# Patient Record
Sex: MALE | Race: WHITE
[De-identification: names, ages, dates, MRNs, and addresses within clinical notes are randomized per-mention and may not be internally consistent; named-entity substitution may affect disease eponyms.]

---

## 2020-02-27 LAB
BASOPHILS # BLD AUTO: 0 10^3/UL (ref 0–0.1)
BASOPHILS NFR BLD AUTO: 0 % (ref 0–10)
BASOPHILS NFR BLD MANUAL: 0 %
EOSINOPHIL # BLD AUTO: 0 10^3/UL (ref 0–0.3)
EOSINOPHIL NFR BLD AUTO: 1 % (ref 0–10)
EOSINOPHIL NFR BLD MANUAL: 1 %
ERYTHROCYTE [DISTWIDTH] IN BLOOD BY AUTOMATED COUNT: 14.3 % (ref 10–14.5)
HCT VFR BLD CALC: 44 % (ref 40–54)
HGB BLD-MCNC: 14.2 G/DL (ref 13.3–17.7)
LYMPHOCYTES # BLD AUTO: 1.6 X 10^3 (ref 1–4)
LYMPHOCYTES NFR BLD AUTO: 28 % (ref 12–44)
MCH RBC QN AUTO: 28 PG (ref 25–34)
MCHC RBC AUTO-ENTMCNC: 32 G/DL (ref 32–36)
MCV RBC AUTO: 88 FL (ref 80–99)
MONOCYTES # BLD AUTO: 0.6 X 10^3 (ref 0–1)
MONOCYTES NFR BLD AUTO: 10 % (ref 0–12)
MONOCYTES NFR BLD: 9 %
NEUTROPHILS # BLD AUTO: 3.7 X 10^3 (ref 1.8–7.8)
NEUTROPHILS NFR BLD AUTO: 62 % (ref 42–75)
NEUTS BAND NFR BLD MANUAL: 58 %
NEUTS BAND NFR BLD: 0 %
PLATELET # BLD: 166 10^3/UL (ref 130–400)
PMV BLD AUTO: 10.3 FL (ref 7.4–10.4)
RBC MORPH BLD: NORMAL
RETICS #: 69 10E9/L (ref 24–90)
RETICS/RBC NFR: 1.38 % (ref 0.5–2.4)
VARIANT LYMPHS NFR BLD MANUAL: 1 %
VARIANT LYMPHS NFR BLD MANUAL: 31 %
WBC # BLD AUTO: 5.9 10^3/UL (ref 4.3–11)

## 2020-03-12 LAB
BUN/CREAT SERPL: 11
CALCIUM SERPL-MCNC: 8.7 MG/DL (ref 8.5–10.1)
CHLORIDE SERPL-SCNC: 103 MMOL/L (ref 98–107)
CO2 SERPL-SCNC: 25 MMOL/L (ref 21–32)
CREAT SERPL-MCNC: 1.33 MG/DL (ref 0.6–1.3)
GFR SERPLBLD BASED ON 1.73 SQ M-ARVRAT: 53 ML/MIN
GLUCOSE SERPL-MCNC: 88 MG/DL (ref 70–105)
POTASSIUM SERPL-SCNC: 4.3 MMOL/L (ref 3.6–5)
SODIUM SERPL-SCNC: 133 MMOL/L (ref 135–145)
URATE SERPL-MCNC: 5.9 MG/DL (ref 2.6–7.2)

## 2020-03-16 ENCOUNTER — HOSPITAL ENCOUNTER (OUTPATIENT)
Dept: HOSPITAL 75 - RAD | Age: 70
End: 2020-03-16
Attending: INTERNAL MEDICINE
Payer: MEDICARE

## 2020-03-16 DIAGNOSIS — R91.1: ICD-10-CM

## 2020-03-16 DIAGNOSIS — C85.90: Primary | ICD-10-CM

## 2020-03-16 DIAGNOSIS — N28.9: ICD-10-CM

## 2020-03-16 PROCEDURE — 74178 CT ABD&PLV WO CNTR FLWD CNTR: CPT

## 2020-03-16 PROCEDURE — 71260 CT THORAX DX C+: CPT

## 2020-03-16 NOTE — DIAGNOSTIC IMAGING REPORT
PROCEDURE: CT chest with contrast, CT abdomen and pelvis with and

without contrast.



TECHNIQUE: Pre and post intravenous contrast axial imaging of the

abdomen and pelvis and post contrast axial imaging of the chest

were performed. Auto Exposure Controls were utilized during the

CT exam to meet ALARA standards for radiation dose reduction. 



DATE: March 16, 2020.



COMPARISON: None. 



INDICATION: 69-year-old male, history of lymphoma.



FINDINGS: 



There is pleural parenchymal scarring in the right lung apex.

There is mild pleural parenchymal scarring in the left lung apex.

There is a 3 mm left upper lobe pulmonary nodule on axial image

71. There is no additional focal airspace consolidation. There is

no pneumothorax. There is no pleural effusion. The central

airways are patent.



The heart is not enlarged. There is no identified pericardial

effusion. There is no identified central pulmonary embolus.



There is no identified abnormally enlarged mediastinal, hilar, or

axillary lymph node meeting CT size criteria for adenopathy.



There is a small hiatal hernia. There is contrast in the

esophagus which may relate to slow transit of recently swallowed

contrast material and/or gastroesophageal reflux.



The liver is normal in size and contour. There is no identified

liver lesion. The main, right, and left portal veins are patent.

The gallbladder is unremarkable. There is no intrahepatic or

extrahepatic bile duct dilation. The main pancreatic duct is not

abnormally dilated. Unremarkable appearance of the pancreatic

parenchyma. The spleen is normal in size. The adrenal glands are

unremarkable.



There is a left renal mass on axial image 149 measuring 3.5 x 3.3

cm in axial dimension with a precontrast attenuation of 53

Hounsfield units and postcontrast attenuation of 117 Hounsfield

units. This is an enhancing lesion and is highly concerning for

renal malignancy. There is no additional identified concerning

renal mass. The urinary collecting systems are not distended.

There is no identified renal or ureteral stone. There is a

filling defect in the posterior aspect of the urinary bladder

just to the right of midline on delayed image 158. This could

relate to a primary urinary bladder lesion versus asymmetric

prominence and protrusion of the prostate gland. Correlation with

cystoscopy is recommended.



There are sutures at the level of the mid sigmoid colon. There

are procedural-related changes along the anterior abdominal wall.

The intestinal tract is not distended. There is no free

intraperitoneal air. There is no drainable fluid collection.

There is no free pelvic fluid.



There are atherosclerotic calcifications. There is no identified

abnormally enlarged lymph node in the abdomen or pelvis which

meets CT size criteria for adenopathy.



There are degenerative changes of the spine. There is no

identified bone lesion concerning for bone metastasis. 



IMPRESSION: 

CT ABDOMEN AND PELVIS.

1. Enhancing left renal mass concerning for renal malignancy.

2. 3 mm left upper lobe pulmonary nodule.

3. Apparent filling defect in the posterior aspect of the urinary

bladder just to the right of midline. This potentially could

reflect a primary urinary bladder lesion versus eccentric

protrusion of the prostate gland. Correlation with cystoscopy is

recommended.







Dictated by: 



  Dictated on workstation # [id number]

## 2020-05-01 ENCOUNTER — HOSPITAL ENCOUNTER (OUTPATIENT)
Dept: HOSPITAL 75 - ONC | Age: 70
LOS: 5 days | Discharge: HOME | End: 2020-05-06
Attending: INTERNAL MEDICINE
Payer: MEDICARE

## 2020-05-01 DIAGNOSIS — Z80.0: ICD-10-CM

## 2020-05-01 DIAGNOSIS — Z80.41: ICD-10-CM

## 2020-05-01 DIAGNOSIS — I10: ICD-10-CM

## 2020-05-01 DIAGNOSIS — E88.09: Primary | ICD-10-CM

## 2020-05-01 LAB
ALBUMIN SERPL-MCNC: 3.5 GM/DL (ref 3.2–4.5)
ALP SERPL-CCNC: 69 U/L (ref 40–136)
ALT SERPL-CCNC: 20 U/L (ref 0–55)
BASOPHILS # BLD AUTO: 0 10^3/UL (ref 0–0.1)
BASOPHILS NFR BLD AUTO: 0 % (ref 0–10)
BILIRUB SERPL-MCNC: 0.7 MG/DL (ref 0.1–1)
BUN/CREAT SERPL: 11
CALCIUM SERPL-MCNC: 8.6 MG/DL (ref 8.5–10.1)
CHLORIDE SERPL-SCNC: 106 MMOL/L (ref 98–107)
CO2 SERPL-SCNC: 23 MMOL/L (ref 21–32)
CREAT SERPL-MCNC: 1.4 MG/DL (ref 0.6–1.3)
EOSINOPHIL # BLD AUTO: 0 10^3/UL (ref 0–0.3)
EOSINOPHIL NFR BLD AUTO: 1 % (ref 0–10)
ERYTHROCYTE [DISTWIDTH] IN BLOOD BY AUTOMATED COUNT: 14 % (ref 10–14.5)
GFR SERPLBLD BASED ON 1.73 SQ M-ARVRAT: 50 ML/MIN
GLUCOSE SERPL-MCNC: 98 MG/DL (ref 70–105)
HCT VFR BLD CALC: 41 % (ref 40–54)
HGB BLD-MCNC: 14.2 G/DL (ref 13.3–17.7)
LYMPHOCYTES # BLD AUTO: 2.4 X 10^3 (ref 1–4)
LYMPHOCYTES NFR BLD AUTO: 38 % (ref 12–44)
MANUAL DIFFERENTIAL PERFORMED BLD QL: NO
MCH RBC QN AUTO: 30 PG (ref 25–34)
MCHC RBC AUTO-ENTMCNC: 34 G/DL (ref 32–36)
MCV RBC AUTO: 86 FL (ref 80–99)
MONOCYTES # BLD AUTO: 0.5 X 10^3 (ref 0–1)
MONOCYTES NFR BLD AUTO: 9 % (ref 0–12)
NEUTROPHILS # BLD AUTO: 3.3 X 10^3 (ref 1.8–7.8)
NEUTROPHILS NFR BLD AUTO: 53 % (ref 42–75)
PLATELET # BLD: 187 10^3/UL (ref 130–400)
PMV BLD AUTO: 10.2 FL (ref 7.4–10.4)
POTASSIUM SERPL-SCNC: 3.8 MMOL/L (ref 3.6–5)
PROT SERPL-MCNC: 9.3 GM/DL (ref 6.4–8.2)
SODIUM SERPL-SCNC: 137 MMOL/L (ref 135–145)
URATE SERPL-MCNC: 6.4 MG/DL (ref 2.6–7.2)
WBC # BLD AUTO: 6.2 10^3/UL (ref 4.3–11)

## 2020-05-01 PROCEDURE — 99213 OFFICE O/P EST LOW 20 MIN: CPT

## 2020-05-01 PROCEDURE — 99214 OFFICE O/P EST MOD 30 MIN: CPT

## 2020-05-01 PROCEDURE — 84155 ASSAY OF PROTEIN SERUM: CPT

## 2020-05-01 PROCEDURE — 83883 ASSAY NEPHELOMETRY NOT SPEC: CPT

## 2020-05-01 PROCEDURE — 85045 AUTOMATED RETICULOCYTE COUNT: CPT

## 2020-05-01 PROCEDURE — 88342 IMHCHEM/IMCYTCHM 1ST ANTB: CPT

## 2020-05-01 PROCEDURE — 85007 BL SMEAR W/DIFF WBC COUNT: CPT

## 2020-05-01 PROCEDURE — 84156 ASSAY OF PROTEIN URINE: CPT

## 2020-05-01 PROCEDURE — 85025 COMPLETE CBC W/AUTO DIFF WBC: CPT

## 2020-05-01 PROCEDURE — 88305 TISSUE EXAM BY PATHOLOGIST: CPT

## 2020-05-01 PROCEDURE — 86335 IMMUNFIX E-PHORSIS/URINE/CSF: CPT

## 2020-05-01 PROCEDURE — 80048 BASIC METABOLIC PNL TOTAL CA: CPT

## 2020-05-01 PROCEDURE — 88365 INSITU HYBRIDIZATION (FISH): CPT

## 2020-05-01 PROCEDURE — 84550 ASSAY OF BLOOD/URIC ACID: CPT

## 2020-05-01 PROCEDURE — 38221 DX BONE MARROW BIOPSIES: CPT

## 2020-05-01 PROCEDURE — 84166 PROTEIN E-PHORESIS/URINE/CSF: CPT

## 2020-05-01 PROCEDURE — 86334 IMMUNOFIX E-PHORESIS SERUM: CPT

## 2020-05-01 PROCEDURE — 85027 COMPLETE CBC AUTOMATED: CPT

## 2020-05-01 PROCEDURE — 82570 ASSAY OF URINE CREATININE: CPT

## 2020-05-01 PROCEDURE — 83615 LACTATE (LD) (LDH) ENZYME: CPT

## 2020-05-01 PROCEDURE — 88313 SPECIAL STAINS GROUP 2: CPT

## 2020-05-01 PROCEDURE — 88364 INSITU HYBRIDIZATION (FISH): CPT

## 2020-05-01 PROCEDURE — 82784 ASSAY IGA/IGD/IGG/IGM EACH: CPT

## 2020-05-01 PROCEDURE — 88341 IMHCHEM/IMCYTCHM EA ADD ANTB: CPT

## 2020-05-01 PROCEDURE — 84165 PROTEIN E-PHORESIS SERUM: CPT

## 2020-05-01 PROCEDURE — 82232 ASSAY OF BETA-2 PROTEIN: CPT

## 2020-05-01 PROCEDURE — 80053 COMPREHEN METABOLIC PANEL: CPT

## 2020-05-01 PROCEDURE — 88311 DECALCIFY TISSUE: CPT

## 2020-05-08 ENCOUNTER — HOSPITAL ENCOUNTER (OUTPATIENT)
Dept: HOSPITAL 75 - ONC | Age: 70
LOS: 55 days | Discharge: HOME | End: 2020-07-02
Attending: INTERNAL MEDICINE
Payer: MEDICARE

## 2020-05-08 DIAGNOSIS — E88.09: Primary | ICD-10-CM

## 2020-05-08 DIAGNOSIS — I10: ICD-10-CM

## 2020-05-08 DIAGNOSIS — Z80.0: ICD-10-CM

## 2020-05-08 DIAGNOSIS — Z80.41: ICD-10-CM

## 2020-05-08 PROCEDURE — 99213 OFFICE O/P EST LOW 20 MIN: CPT

## 2020-07-06 LAB
ALBUMIN SERPL-MCNC: 3.4 GM/DL (ref 3.2–4.5)
ALP SERPL-CCNC: 78 U/L (ref 40–136)
ALT SERPL-CCNC: 22 U/L (ref 0–55)
BASOPHILS # BLD AUTO: 0 10^3/UL (ref 0–0.1)
BASOPHILS NFR BLD AUTO: 0 % (ref 0–10)
BILIRUB SERPL-MCNC: 0.7 MG/DL (ref 0.1–1)
BUN/CREAT SERPL: 10
CALCIUM SERPL-MCNC: 8.6 MG/DL (ref 8.5–10.1)
CHLORIDE SERPL-SCNC: 106 MMOL/L (ref 98–107)
CO2 SERPL-SCNC: 20 MMOL/L (ref 21–32)
CREAT SERPL-MCNC: 1.64 MG/DL (ref 0.6–1.3)
EOSINOPHIL # BLD AUTO: 0.1 10^3/UL (ref 0–0.3)
EOSINOPHIL NFR BLD AUTO: 1 % (ref 0–10)
GFR SERPLBLD BASED ON 1.73 SQ M-ARVRAT: 42 ML/MIN
GLUCOSE SERPL-MCNC: 131 MG/DL (ref 70–105)
HCT VFR BLD CALC: 39 % (ref 40–54)
HGB BLD-MCNC: 13 G/DL (ref 13.3–17.7)
LYMPHOCYTES # BLD AUTO: 2.1 X 10^3 (ref 1–4)
LYMPHOCYTES NFR BLD AUTO: 38 % (ref 12–44)
MANUAL DIFFERENTIAL PERFORMED BLD QL: NO
MCH RBC QN AUTO: 30 PG (ref 25–34)
MCHC RBC AUTO-ENTMCNC: 34 G/DL (ref 32–36)
MCV RBC AUTO: 89 FL (ref 80–99)
MONOCYTES # BLD AUTO: 0.3 X 10^3 (ref 0–1)
MONOCYTES NFR BLD AUTO: 6 % (ref 0–12)
NEUTROPHILS # BLD AUTO: 3 X 10^3 (ref 1.8–7.8)
NEUTROPHILS NFR BLD AUTO: 55 % (ref 42–75)
PLATELET # BLD: 168 10^3/UL (ref 130–400)
PMV BLD AUTO: 10.1 FL (ref 7.4–10.4)
POTASSIUM SERPL-SCNC: 3.8 MMOL/L (ref 3.6–5)
PROT SERPL-MCNC: 9.2 GM/DL (ref 6.4–8.2)
SODIUM SERPL-SCNC: 134 MMOL/L (ref 135–145)
URATE SERPL-MCNC: 5.4 MG/DL (ref 2.6–7.2)
WBC # BLD AUTO: 5.6 10^3/UL (ref 4.3–11)

## 2020-07-10 ENCOUNTER — HOSPITAL ENCOUNTER (OUTPATIENT)
Dept: HOSPITAL 75 - ONC | Age: 70
LOS: 69 days | Discharge: HOME | End: 2020-09-17
Attending: INTERNAL MEDICINE
Payer: MEDICARE

## 2020-07-10 DIAGNOSIS — Z98.890: ICD-10-CM

## 2020-07-10 DIAGNOSIS — I10: ICD-10-CM

## 2020-07-10 DIAGNOSIS — Z85.038: ICD-10-CM

## 2020-07-10 DIAGNOSIS — C85.90: Primary | ICD-10-CM

## 2020-07-10 PROCEDURE — 84155 ASSAY OF PROTEIN SERUM: CPT

## 2020-07-10 PROCEDURE — 83883 ASSAY NEPHELOMETRY NOT SPEC: CPT

## 2020-07-10 PROCEDURE — 99213 OFFICE O/P EST LOW 20 MIN: CPT

## 2020-07-10 PROCEDURE — 83615 LACTATE (LD) (LDH) ENZYME: CPT

## 2020-07-10 PROCEDURE — 84550 ASSAY OF BLOOD/URIC ACID: CPT

## 2020-07-10 PROCEDURE — 84165 PROTEIN E-PHORESIS SERUM: CPT

## 2020-07-10 PROCEDURE — 82784 ASSAY IGA/IGD/IGG/IGM EACH: CPT

## 2020-07-10 PROCEDURE — 80053 COMPREHEN METABOLIC PANEL: CPT

## 2020-07-10 PROCEDURE — 85025 COMPLETE CBC W/AUTO DIFF WBC: CPT

## 2020-07-30 ENCOUNTER — HOSPITAL ENCOUNTER (OUTPATIENT)
Dept: HOSPITAL 75 - CARD | Age: 70
End: 2020-07-30
Attending: INTERNAL MEDICINE
Payer: MEDICARE

## 2020-07-30 DIAGNOSIS — E78.2: ICD-10-CM

## 2020-07-30 DIAGNOSIS — C64.9: ICD-10-CM

## 2020-07-30 DIAGNOSIS — I10: Primary | ICD-10-CM

## 2020-07-30 PROCEDURE — 93306 TTE W/DOPPLER COMPLETE: CPT

## 2020-08-10 ENCOUNTER — HOSPITAL ENCOUNTER (OUTPATIENT)
Dept: HOSPITAL 75 - 4TH | Age: 70
Setting detail: OBSERVATION
LOS: 1 days | Discharge: HOME | End: 2020-08-11
Attending: INTERNAL MEDICINE | Admitting: INTERNAL MEDICINE
Payer: MEDICARE

## 2020-08-10 VITALS — SYSTOLIC BLOOD PRESSURE: 175 MMHG | DIASTOLIC BLOOD PRESSURE: 86 MMHG

## 2020-08-10 VITALS — BODY MASS INDEX: 28.7 KG/M2 | HEIGHT: 70.98 IN | WEIGHT: 205.03 LBS

## 2020-08-10 DIAGNOSIS — Z85.038: ICD-10-CM

## 2020-08-10 DIAGNOSIS — Z88.2: ICD-10-CM

## 2020-08-10 DIAGNOSIS — S82.62XA: Primary | ICD-10-CM

## 2020-08-10 DIAGNOSIS — Z79.899: ICD-10-CM

## 2020-08-10 DIAGNOSIS — Z85.528: ICD-10-CM

## 2020-08-10 DIAGNOSIS — N18.9: ICD-10-CM

## 2020-08-10 DIAGNOSIS — Z87.891: ICD-10-CM

## 2020-08-10 DIAGNOSIS — I12.9: ICD-10-CM

## 2020-08-10 PROCEDURE — 99211 OFF/OP EST MAY X REQ PHY/QHP: CPT

## 2020-08-10 PROCEDURE — 73600 X-RAY EXAM OF ANKLE: CPT

## 2020-08-10 PROCEDURE — 85810 BLOOD VISCOSITY EXAMINATION: CPT

## 2020-08-10 PROCEDURE — 83735 ASSAY OF MAGNESIUM: CPT

## 2020-08-10 PROCEDURE — 80053 COMPREHEN METABOLIC PANEL: CPT

## 2020-08-10 PROCEDURE — 36415 COLL VENOUS BLD VENIPUNCTURE: CPT

## 2020-08-10 NOTE — XMS REPORT
Encounter Summary

                             Created on: 08/10/2020



Cas Lewis

External Reference #: [id number]

: 1950

Sex: Male



Demographics





                          Address                   [address] [place]  [address]

 

                          Home Phone                [phone]

 

                          Preferred Language        English

 

                          Marital Status            Unknown

 

                          Church Affiliation     [employer]

 

                          Race                      White

 

                          Ethnic Group              Not  or 





Author





                          Author                    [facility]

 

                          Organization              [facility]

 

                          Address                   Unknown

 

                          Phone                     Unavailable







Support





                Name            Relationship    Address         Phone

 

                Kendy Richardson ECON            Unknown         [phone]

[phone]







Care Team Providers





                    Care Team Member Name Role                Phone

 

                    Arnaldo Singh         PCP                 [phone]

 

                    Tawil, Elias A MD   Unavailable         [phone]







Reason for Visit

* 



 



                           Reason                    Comments

 

 



                                         Kidney Mass 





* Consult, Test & Treat (Routine)



                          Referred By Contact       Referred To Contact



                 Status          Reason          Specialty       Diagnoses /  



                                         Procedures  

 

                                        



Tawil, Elias A, MD



[address] [place] [address]



Phone: [phone]



Fax: [phone]                       



Cc - W[facility] Cl Exm/Proc [address] 



Phone: [phone]



Fax: [phone]



                     No Auth Needed      Oncology            Diagnoses  



                                         Renal Mass  











Encounter Details





                          Care Team                 Description



                     Date                Type                Department  

 

                                        



Nabil Lee MD



 [address]



[facility] [address]



[place] [address] [phone] (Fax)                      Left renal mass (Primary Dx); 

Dysuria



                     2020          Office Visit        [facility]  



                                          [address]  



                                         [address]  



                                         [place]  



                                         [address] [phone]  







Social History





                                        Date



                 Tobacco Use     Types           Packs/Day       Years Used 

 

                                        Quit: 



                           Former Smoker             Pipe   

 

    



                     Smokeless Tobacco: Former  Chew                Quit: 



                                         User   







                    Drinks/Week         oz/Week             Comments



                                         Alcohol Use   

 

                                                             



                                         Never   







  



                     Alcohol Habits      Answer              Date Recorded

 

  



                     How often do you have a drink containing alcohol?  Never   

            2020

 

  



                           How many drinks containing alcohol do you have on  No

t asked 



                                         a typical day when you are drinking?  

 

  



                           How often do you have six or more drinks on one  Not 

asked 



                                         occasion?  







 



                           Sex Assigned at Birth     Date Recorded

 

 



                                         Male 







                                        Industry



                           Job Start Date            Occupation 

 

                                        Not on file



                           Not on file               Not on file 







                                        Travel End



                           Travel History            Travel Start 

 





                                         No recent travel history available.







                                        Date Recorded



                           COVID-19 Exposure         Response 

 

                                        2020  9:24 AM CDT



                           In the last month, have you been in contact with  No 

/ Unsure 



                                         someone who was confirmed or suspected 

to have  



                                         Coronavirus / COVID-19?  



documented as of this encounter



Last Filed Vital Signs





                    Reading             Time Taken          Comments



                                         Vital Sign   

 

                    124/65              2020  9:43 AM CDT  



                                         Blood Pressure   

 

                    80                  2020  9:43 AM CDT  



                                         Pulse   

 

                    -                   -                    



                                         Temperature   

 

                    -                   -                    



                                         Respiratory Rate   

 

                    -                   -                    



                                         Oxygen Saturation   

 

                    -                   -                    



                                         Inhaled Oxygen   



                                         Concentration   

 

                    93.4 kg (206 lb)    2020  9:43 AM CDT  



                                         Weight   

 

                    180.3 cm (5' 11")   2020  9:43 AM CDT  



                                         Height   

 

                    28.73               2020  9:43 AM CDT  



                                         Body Mass Index   



documented in this encounter



Patient Instructions

* Patient Instructions* 



 Tabatha Erazo MA - 2020 10:00 AM CDT







[facility] 

Pre-Operative Instructions



Surgical Procedure:  Robotic-assisted laparoscopic nephrectomy versus radical ne
phrectomy, intraoperative ultrasound  

Date of Surgery:  2020 

Arrival Time at the Admission Office (Main Lobby):  To be determined 



To ensure that your surgery can proceed without delay, you will be contacted by 
a phone triage nurse from the Pre-Operative Assessment Clinic (PAC) to complete 
this process.  



Please review the information given to you by your surgeon.



You will be called by the surgery staff with your day of surgery arrival time be
tween 2:30 - 4:30 PM the business day prior to surgery. If you have not heard fr
om them after 4:30 PM, please call [phone] to confirm your arrival time.



Pre-Operative Assessment and Instructions:



Once you speak to the nurse or are seen in the Pre-Operative Assessment Clinic, 
you will be given medication instructions. However, if surgery is within 2 weeks
, please read and follow the medication instructions below to prepare for surger
y before you speak with the phone triage nurse:



DO NOT STOP your blood thinner until you have contacted your prescribing provide
r or have been specifically instructed to do so.



Starting Now:

? Contact your provider who prescribes any of the following to develop a plan fo
r surgery:

o Blood thinners such as aspirin, Aggrenox, Brilinta, Effient, Eliquis, enoxapar
in (Lovenox), clopidogrel (Plavix), cilostazol, pentoxifylline (Trental), Pradax
a, Savaysa, ticlopidine, Xarelto, and warfarin (Coumadin)

o Immunosuppresants such as methotrexate, azathioprine, sulfasalazine, everolimu
s, sirolimus, Humira, Remicade, Enbrel, Simponi, Orencia, Cimzia, Actemra, and X
eljanz

o Chemotherapy



14 days prior to surgery:

? Stop most vitamins, herbals, and supplements including (but not limited to):

o Alpha lipoic acid, black cohosh, CoQ10, echinacea, eye vitamins, fish oil, fla
xseed oil, garlic, gingko biloba, ginseng, glucosamine/chondroitin, kava, Lovaza
, lutein, lysine, multivitamin, red yeast rice, KIKI-e, saw palmetto, [place]
s wort, turmeric, valerian root, Vascepa, Vitamin A, Vitamin B complex, Vitamin 
C, Vitamin E

? You DO NOT need to stop: iron, magnesium, potassium



7 days prior to surgery:

? Stop anti-inflammatory medications such as ibuprofen (Advil, Motrin), naproxen
(Aleve), [place], Excedrin, Midol, celecoxib (Celebrex), diclofenac ([place]
alfonso), diflunisal, etodolac, flurbiprofen, indomethacin, ketoprofen, ketorolac, m
eloxicam, nabumetone, and piroxicam



The day prior to surgery begin a clear liquid diet.  This includes water, Jell-O
, tea, coffee, broth, pop, clear juices (apple, cranberry, grape, orange witho
ut pulp), sports drinks, and popsicles.  You may have any color of the above dri
nks, but no milk or milk products. 



The day prior to surgery, drink one bottle of magnesium citrate at 2:00 p.m.  Ex
pect a laxative effect.  This laxative may be purchased at any pharmacy.  No pre
scription needed, but a printed prescription may be provided for your reference.



Do not drink alcohol within 24 hours of surgery.



Please do not eat or drink anything after midnight.  No gum, mints, hard candy, 
snacks, coffee, etc or chewing tobacco allowed after midnight before surgery.  Y
ou may brush your teeth but be sure to rinse and spit.

      (DIAL)

Please shower with an over the counter antibacterial soap the evening before or 
the morning of surgery.



If your surgery is scheduled as an outpatient, you must arrange to have someone 
drive you home and have someone with you 24 hours after anesthesia.



========================================================================



If you have any questions, please contact your provider's office at [phone]
. (Chauncey, Admin) 



For emergencies during evenings, nights, weekends, and holidays, contact [facility]  and request they contact the on-call Urology
Resident at [phone].



=========================================================================





Electronically signed by Tabatha Erazo MA at 2020 11:02 AM CDT





documented in this encounter



Progress Notes

* Nabil Lee MD - 2020 10:00 AM CDT



Formatting of this note might be different from the original.

Subjective:

Cas Lewis is a 69 y.o. male who is referred by Dr. Tawil for further tiffanie
luation and treatment of left renal mass



HPI:

69 year old male with PMH of HYPERTENSION, GERD, BPH (for years), colon cancer 1
995 (with partial colectomy; receives colonoscopy every 4 years), new diagnosis 
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



During work up for lymphoplasmacytic lymphoma about 1 month ago when imaging was
done, left renal mass was noted, concerning for RCC.  Imaging also showed abnor
mality in bladder for which Urologist Dr.Tawil did cystoscopy 2 weeks ago and wa
s found to be non-concerning.  He has had elevated PSA in the past and has had 3
cystoscopies and 2 prostate biopsies which were non-revealing; last cystoscopy 
performed Dec 2019.  He continues to take Cardura for for BPH and HYPERTENSION. 
His BPH symptoms are no longer a concern; he denies dribbling, incomplete empty
ing, weak urinary stream.  He is on surveillance for lymphoplasmacytic lymphoma 
and has a f/u appt on 2020 for that.



He reports dysuria with initiation of voids that has been ongoing for months and
urinary frequency which started 2020 after he was asked to increased fluid 
intake by his PCP.  This has eased since he has been drinking less fluids.



No hematuria, unexpected weight loss, night sweats, apetite changes, nocturia

No FH of kidney cancer. No FH of genetic cancer syndromes.



ROS:

10 point ROS negative other than chronic hearing loss, runny nose, light-headedn
ess when going from sitting to standing position and nervousness/anxiety.



Active Ambulatory Problems 

  Diagnosis Date Noted 

 No Active Ambulatory Problems 



Resolved Ambulatory Problems 

  Diagnosis Date Noted 

 No Resolved Ambulatory Problems 



Past Medical History: 

Diagnosis Date 

 Colon polyps  

 Depression  

 Elevated PSA  

 Enlarged prostate  

 Hypertension  

 Kidney stones  

 Lymphoplasmacytoid lymphoma, CLL (HCC)  

 Malignant neoplasm of colon (HCC)  



Surgical History: 

Procedure Laterality Date 

 HX TONSILLECTOMY   

 COLON SURGERY  1995 

 COLECTOMY  1995 

 HX APPENDECTOMY  1995 

 HERNIA REPAIR   

 CYSTOURETHROSCOPY  2020 

 PROSTATE SURGERY   

 Biopsy  



Current Outpatient Medications on File Prior to Visit 

Medication Sig Dispense Refill 

 amitriptyline (ELAVIL) 25 mg tablet    

 atenoloL (TENORMIN) 50 mg tablet    

 doxazosin (CARDURA) 4 mg tablet    

 pantoprazole DR (PROTONIX) 40 mg tablet    



No current facility-administered medications on file prior to visit.  



Social History 



Socioeconomic History 

 Marital status:  

  Spouse name: Not on file 

 Number of children: Not on file 

 Years of education: Not on file 

 Highest education level: Not on file 

Occupational History 

 Not on file 

Tobacco Use 

 Smoking status: Former Smoker 

  Types: Pipe 

  Last attempt to quit:  

  Years since quittin.3 

 Smokeless tobacco: Former User 

  Types: Chew 

  Quit date:  

Substance and Sexual Activity 

 Alcohol use: Never 

  Frequency: Never 

 Drug use: Never 

 Sexual activity: Not Currently 

  Partners: Female 

Other Topics Concern 

 Not on file 

Social History Narrative 

 Not on file 



Family History 

Problem Relation Age of Onset 

 Cancer Mother  

     Endometrail  

 Hypertension Mother  

 Cancer Father  

 Cancer-Colon Father  

 Hypertension Father  

 Neurologic Disorder Father  

 Alzheimer's Father  

 Cancer-Skin Brother  

 Cancer Daughter  

 Cancer-Breast Daughter  

 Hypertension Maternal Grandfather  



Objective:



Vitals: 

 20 0943 

BP: 124/65 

BP Source: Arm, Left Upper 

Patient Position: Sitting 

Pulse: 80 

Weight: 93.4 kg (206 lb) 

Height: 180.3 cm (71") 

PainSc: Zero 



Body mass index is 28.73 kg/m.



General: No acute distress, non-labored breathing. Afebrile

Head: Normocephalic and atraumatic.  

Eyes: Conjunctivae are normal. No scleral icterus. 

Cardiovascular: Regular rate and rhythm

Pulmonary/Chest: Normal Effort, CTAB

Abd: surgical scar vertically below umbilicus from previous colectomy, Soft, NT,
ND

Musculoskeletal: Moves all extremities

Skin: Warm and dry.  

Neuro: Awake and alert.

Psychiatric: Normal mood and affect. 

Nursing note and vitals reviewed.



ALL OUTSIDE RECORDS WERE REVIEWED AND FILMS EXAMINED



Imaging:  3-16-20 CT Chest W Ab/Pel W/WO - Impression:  1.  Enhancing left renal
mass concerning for renal malignancy.  2.  3 mm upper lobe pulmonary nodule.  3.
 Apparent filling defect of the urinary bladder just to the right of midline.  
This potentially could reflect a primary urinary bladder lesion vs eccentric pr
otrusion of the prostate gland.  Correlation with cystoscopy is recommended.  





Assessment/Plan:

Cas Lewis was seen today for kidney mass.



Diagnoses and all orders for this visit:



Left renal mass

Discussed with patient treatment options for left renal mass including surveilla
nce, biopsy, nephrectomy and patient elected to proceed with partial nephrectomy
with the possibility of radial nephrectomy if deemed necessary.  Risk and benef
its discussed with patient.



Dysuria

-     CULTURE-URINE W/SENSITIVITY; Future; Expected date: 2020



Plan for patient to return on 2020 for robotic-assisted laparoscopic nephre
ctomy vs radical nephrectomy



Patient discussed with Dr. Jesus Monaco MD 

Family Medicine, PGY-3



ATTESTATION



I personally performed the key portions of the E/M visit, discussed case with re
sident and concur with resident documentation of history, physical exam, assessm
ent, and treatment plan unless otherwise noted.



Patient referred to me by Dr. Tawil for the findings of a 3.5 cm left renal mass
concerning for renal cell carcinoma.  Patient does have a long history of BPH a
nd urinary symptoms which have been fully evaluated by Dr. Tawil.  He denies sharon
ss hematuria.  The mass was discovered incidentally during evaluation of lymphop
lasmacytic lymphoma.  He also has a history of colon cancer s/p hemicolectomy wi
th no additional treatment needed.  He continues with colonoscopy.



We discussed the findings of his mass and the likelihood that this represents a 
renal cell carcinoma.  We discussed options of observation, biopsy, ablation, pa
rtial nephrectomy, and radical nephrectomy.  The mass is in a difficult location
near the hilar vessels and ureter.  Therefore, I discussed that ablation is not 
an option.  The patient would like treatment, so we focused on partial nephrect
danielle versus radical nephrectomy.  Pros and cons of each approach were discussed i
n depth.  After discussion, we elected to proceed with a LEFT robotic partial ne
phrectomy and possible radical nephrectomy.  I quoted him a 30% chance of needin
g a radical nephrectomy given the location.  Patient would like to proceed.  All
risks and benefits were discussed in depth.  All questions answered.  Consent on
chart.  We will proceed with surgery in the near future.  Complex counseling.



Staff name:  Nabil Lee MD Date:  2020 



Patient Instructions 



[facility] 

Pre-Operative Instructions



Surgical Procedure:  Robotic-assisted laparoscopic nephrectomy versus radical ne
phrectomy, intraoperative ultrasound  

Date of Surgery:  2020 

Arrival Time at the Admission Office (Main Lobby):  To be determined 



To ensure that your surgery can proceed without delay, you will be contacted by 
a phone triage nurse from the Pre-Operative Assessment Clinic (PAC) to complete 
this process.  



Please review the information given to you by your surgeon.



You will be called by the surgery staff with your day of surgery arrival time be
tween 2:30 - 4:30 PM the business day prior to surgery. If you have not heard fr
om them after 4:30 PM, please call [phone] to confirm your arrival time.



Pre-Operative Assessment and Instructions:



Once you speak to the nurse or are seen in the Pre-Operative Assessment Clinic, 
you will be given medication instructions. However, if surgery is within 2 weeks
, please read and follow the medication instructions below to prepare for surger
y before you speak with the phone triage nurse:



DO NOT STOP your blood thinner until you have contacted your prescribing provide
r or have been specifically instructed to do so.



Starting Now:

? Contact your provider who prescribes any of the following to develop a plan fo
r surgery:

o Blood thinners such as aspirin, Aggrenox, Brilinta, Effient, Eliquis, enoxapar
in (Lovenox), clopidogrel (Plavix), cilostazol, pentoxifylline (Trental), Pradax
a, Savaysa, ticlopidine, Xarelto, and warfarin (Coumadin)

o Immunosuppresants such as methotrexate, azathioprine, sulfasalazine, everolimu
s, sirolimus, Humira, Remicade, Enbrel, Simponi, Orencia, Cimzia, Actemra, and X
eljanz

o Chemotherapy



14 days prior to surgery:

? Stop most vitamins, herbals, and supplements including (but not limited to):

o Alpha lipoic acid, black cohosh, CoQ10, echinacea, eye vitamins, fish oil, fla
xseed oil, garlic, gingko biloba, ginseng, glucosamine/chondroitin, kava, Lovaza
, lutein, lysine, multivitamin, red yeast rice, KIKI-e, saw palmetto, [place]
s wort, turmeric, valerian root, Vascepa, Vitamin A, Vitamin B complex, Vitamin 
C, Vitamin E

? You DO NOT need to stop: iron, magnesium, potassium



7 days prior to surgery:

? Stop anti-inflammatory medications such as ibuprofen (Advil, Motrin), naproxen
(Aleve), [place], Excedrin, Midol, celecoxib (Celebrex), diclofenac ([place]
alfonso), diflunisal, etodolac, flurbiprofen, indomethacin, ketoprofen, ketorolac, m
eloxicam, nabumetone, and piroxicam



The day prior to surgery begin a clear liquid diet.  This includes water, Jell-O
, tea, coffee, broth, pop, clear juices (apple, cranberry, grape, orange witho
ut pulp), sports drinks, and popsicles.  You may have any color of the above dri
nks, but no milk or milk products. 



The day prior to surgery, drink one bottle of magnesium citrate at 2:00 p.m.  Ex
pect a laxative effect.  This laxative may be purchased at any pharmacy.  No pre
scription needed, but a printed prescription may be provided for your reference.



Do not drink alcohol within 24 hours of surgery.



Please do not eat or drink anything after midnight.  No gum, mints, hard candy, 
snacks, coffee, etc or chewing tobacco allowed after midnight before surgery.  Y
ou may brush your teeth but be sure to rinse and spit.

      (DIAL)

Please shower with an over the counter antibacterial soap the evening before or 
the morning of surgery.



If your surgery is scheduled as an outpatient, you must arrange to have someone 
drive you home and have someone with you 24 hours after anesthesia.



========================================================================



If you have any questions, please contact your provider's office at [phone]
. (Chauncey, Admin) 



For emergencies during evenings, nights, weekends, and holidays, contact [facility]  and request they contact the on-call Urology
Resident at [phone].



=========================================================================







Electronically signed by Nabil Lee MD at 2020  8:02 AM CDT

documented in this encounter



Plan of Treatment





Not on filedocumented as of this encounter



Goals





     



            Goal       Patient    Associated  Recent Progress  Patient-Stat  Aut

hor



                     Goal Type           Problems            ed? 

 

     



                 Resume normal activities  Hospital        No              Lizette Zacarias RN



documented as of this encounter



Procedures





                                        Comments



                 Procedure Name  Priority        Date/Time       Associated Diag

nosis 

 

                                        



Results for this procedure are in the results section.



                 [facility] CULTURE-URINE  Routine         2020      Dysuria 



                                         11:00 AM CDT  



documented in this encounter



Results

* CULTURE-URINE W/SENSITIVITY (2020 11:00 AM CDT)



    



              Component    Value        Ref Range    Performed At  Pathologist



                                         Signature

 

    



                     Battery Name        URINE CULTURE       [facility] 

 

    



                     Specimen            URINE               [facility] 



                                         Description    

 

    



                     Special             NONE                [facility] 



                                         Requests    

 

    



                     Culture             NO GROWTH           [facility] 

 

    



                     Report Status       FINAL               [facility] 



                                         2020   











                                         Specimen

 





                                         Urine - Urine, Outpatient







   



                 Performing Organization  Address         [facility]  Ph

one Number

 

   



                     [facility]         [address] [place]

 [address] 





documented in this encounter



Visit Diagnoses









                                         Diagnosis

 





                                         Left renal mass



                                         Unspecified disorder of kidney and uret

er

 





                                         Dysuria



documented in this encounter

## 2020-08-10 NOTE — XMS REPORT
Continuity of Care Document

                             Created on: 08/10/2020



JAS BRODERICK

External Reference #: [id number]

: 1950

Sex: Male



Demographics





                          Address                   [address] [place]  [address]

 

                          Home Phone                [phone]

 

                          Preferred Language        English

 

                          Marital Status            Unknown

 

                          Nondenominational Affiliation     Unknown

 

                          Race                      Unknown

 

                          Ethnic Group              Unknown





Author





                          JAS Yanez

 

                          Organization              [facility]

 

                          Address                   Unknown

 

                          Phone                     Unavailable







Care Team Providers





                    Care Team Member Name Role                Phone

 

                    [facility]               Unavailable         Unavailable



                                    



Problems

        



                                        No Data Provided for This Section       

             



                                                                



Medications

        



                                        No Data Provided for This Section       

             



                                                                



Allergies, Adverse Reactions, Alerts

        



                                        No Known Medication Allergies           

           



                                                        



Immunizations

                    



                    Immunization                         Date Given             

            Site    

                          Status                         Last Updated           

      

                          Comments                         Source               

     

 

                    Evaluated Forecast                         2020       

                    

                          completed                                             

    

                                                table.evaluated-forecast {      

  border-collapse: collapse;        

font-family: [place], Helvetica, sans-serif;    }    .evaluated-forecast th, 
.evaluated-forecast td {        paddinpx 8px;    }    .evaluated-forecast 
thead th {        background: #[id number];        text-transform: lowercase;        
text-align: left;        font-size: 15px;        color: #fff;    }    
.evaluated-forecast tr {        border: 1px solid #[id number];    }    .evaluated-
forecast tbody tr {        border-bottom: 1px solid #[id number];    }    .evaluated-
forecast tbody tr:nth-child(odd) {        background: #[id number];    }    .e
valuated-forecast tbody th, .evaluated-forecast tbody tr td {        border-
right: 1px solid #[id number];    }    .evaluated-forecast tfoot th {        
background: #[id number];        text-align: left;        font-weight: normal;       
font-size: 10px;        color: #fff;    }    .evaluated-forecast tr *:nth-
child(3), .evaluated-forecast tr *:nth-child(4) {        text-align: right;    }
       

 

 

 

                          MARIETTA (Varivax)             1951    

 

                          DTaP, UF                  1957    

 

                          Zoster Subunit (Shingrix)         2000    

 

                          Influenza IIV4 MDV         2020    

 

                          Polio,                  PS12936-9^Too Old^LN    

 

                          MMR                       FO79478-2^Immune^LN    

 

                          Hib, UF                   EN77904-2^Too Old^LN    

 

                          Hep B, UF                 DP06786-7^Too Old^LN    

 

                          Hep A, UF                 ER09689-0^Too Old^LN    

 

                          Pneumococcal, UF          LO75743-3^Complete^LN    

 

                          Rotavirus, UF             DN17034-4^Too Old^LN    

 

                          Meningococcal, UF         WL25552-2^Too Old^LN    

 

                          HPV, UF                   KS91046-0^Too Old^LN    



                        [id number],                                                 
                              Influenza IIV3 High                        
10/23/2019                        Left Posterolateral fat of Upper Arm          
             Not Given                                                          
             [id number],                                                            
                   Influenza IIV3 High                        10/02/2018        
               Left Posterolateral fat of Upper Arm                        Not 
Given                                                                        
[id number],                                                                         
      Influenza IIV3 High                        10/24/2017                     
  Right Posterolateral fat of Upper Arm                        Not Given        
                                                               [id number],          
                                                                     PPSV23 
(Pneumovax 23)                        10/24/2017                        Left 
Posterolateral fat of Upper Arm                        Not Given                
                                                       [id number],                  
                                                             Influenza IIV3 High
                       10/18/2016                        Left Posterolateral fat
of Upper Arm                        Not Given                                   
                                    [id number],                                     
                                          PCV-13 (Prevnar)                      
 10/18/2016                        Right Posterolateral fat of Upper Arm        
               Not Given                                                        
               [id number],                                                          
                     Influenza IIV4 MDV                        2015       
                                        Not Given                               
                                        [id number],                                 
                                              Influenza IIV4 PFree              
         10/02/2014                                                Not Given    
                                                                   [id number],      
                                                                         
Influenza IIV3 MDV                        2013                            
                   Not Given                                                    
                   [id number],                                                      
                         Influenza TIV                        10/14/2010        
                                       Not Given                                
                                       [id number],                                  
                                             Influenza TIV                      
 2009                                                Not Given            
                                                           [id number],              
                                                                 Influenza TIV  
                     10/20/2008                                                
Not Given                                                                       
[id number],                                                                         
      Influenza TIV                        10/05/2007                           
                    Not Given                                                   
                    [id number],                                                     
                          Influenza TIV                        2006       
                                        Not Given                               
                                        [id number],                                 
                                              Influenza TIV                     
  2005                                                Not Given           
                                                            QV8619,             
                                                                  Influenza TIV 
                      10/12/2001                                                
Not Given                                                                       
GQ2025,                                                                         
      PPSV23 (Pneumovax 23)                        2000                   
                            Not Given                                           
                            [id number],                                             
                                  Influenza TIV                        
2000                                                Not Given             
                                                          ON9399,               
                                                                Td              
         2000                                                Not Given    
                                                                   [id number],      
                                                                         
Influenza TIV                        10/22/1999                                 
              Not Given                                                         
              [id number],                                                           
                    PPSV23 (Pneumovax 23)                        10/09/1998     
                                          Not Given                             
                                          [id number],                               
                                                Influenza TIV                   
    10/09/1998                                                Not Given         
                                                              [id number],           
                                                                    Influenza 
TIV                        10/17/1997                                           
    Not Given                                                                   
    [id number],                                                                     
                                                                                
                                                                                
                                                                                
                                                                                
                                                       



Results





                                        No Data Provided for This Section



                    



Pathology Reports





                                        No Data Provided for This Section       

             



                            



Diagnostic Reports

            



                                        No Data Provided for This Section       

             



                                                            



Consultation Notes

                    



                                        No Data Provided for This Section       

             



                                                            



Discharge Summaries

                    



                                        No Data Provided for This Section       

             



                                                            



History and Physicals

                    



                                        No Data Provided for This Section       

             



                                                                



Vital Signs

                         



                                        No Data Provided for This Section



                                                                 



Encounters

                    



                    Location                         Location Details           

              

Encounter Type                         Encounter Number                         

Reason For Visit                         Attending Provider                     

                    ADM Date                         DC Date                    

     Status      

                                        Source                    

 

                    [facility]                                                  OUTP

ATIENT              

                    [id number]                                                  D

AVID 

DUCHENE                          2020  

                          Active                         [facility],                    

 

                    HC8                                                  EXT REC

OVERY               

                    [id number]                                                  D

AVID 

DUCHENE                          2020  

                          Discharged                         [facility],                    

 

                    [facility]                                                  OUTP

ATIENT              

                    [id number]                                                  D

AVID 

DUCHENE                          06/15/2020                         06/15/2020  

                          Active                         [facility],                    

 

                    ICSPSCR                                                  O  

                    

                                                                                

              

                                                Active                         

[facility],                    



                                                                                
                           



Procedures

        



                                        No Data Provided for This Section



                                     



Plan of Care





                                        No Data Provided for This Section       

             



                                                            



Social History

                    



                                        No Data Provided for This Section       

             



                                                                 



Assessment and Plan

                    



                                        No Data Provided for This Section       

             



                                                            



Family History

                    



                                        No Data Provided for This Section       

             



                                                            



Advance Directives

                    



                                        No Data Provided for This Section       

             



                                                            



Functional Status

                    



                                        No Data Provided for This Section

## 2020-08-10 NOTE — XMS REPORT
Encounter Summary

                             Created on: 08/10/2020



Jas Lewis

External Reference #: [id number]

: 1950

Sex: Male



Demographics





                          Address                   [address] [place]  [address]

 

                          Home Phone                [phone]

 

                          Preferred Language        English

 

                          Marital Status            Unknown

 

                          Voodoo Affiliation     CHR

 

                          Race                      White

 

                          Ethnic Group              Not  or 





Author





                          Author                    [facility]

 

                          Organization              [facility]

 

                          Address                   Unknown

 

                          Phone                     Unavailable







Support





                Name            Relationship    Address         Phone

 

                Kendy Richardson ECON            Unknown         [phone]

[phone]







Care Team Providers





                    Care Team Member Name Role                Phone

 

                    Arnaldo Singh         PCP                 [phone]

 

                    Tawil, Elias A MD   Unavailable         [phone]







Reason for Visit

* Auth/Cert



                          Referred By Contact       Referred To Contact



                 Status          Reason          Specialty       Diagnoses /  



                                         Procedures  

 

                                        



                                        







                                         Diagnoses  



                                         Left renal mass  



                                         Left renal mass  



                                         [N28.89]

  



                                         P  



                                         rocedures  



                                         [place] LAPAROSCOPY  



                                         SURG PARTIAL  



                                         NEPHRECTOMY  



                                         IL ULTRASONIC  



                                         GUIDANCE  



                                         INTRAOPERATIVE  



                                         ROBOT ASSISTED  



                                         LAPAROSCOPIC  



                                         NEPHRECTOMY  



                                         PARTIAL  



                                         ULTRASOUND  



                                         GUIDANCE -  



                                         INTRAOPERATIVE  











Encounter Details





                          Care Team                 Description



                     Date                Type                Department  

 

                                        



Miranda Warner MD



 [address]



[facility] [address]



[place] [address]



[phone] (Fax)                      Renal cell carcinoma of left kidney (HCC

)



                     2020          [facility]  



                           2020                [address] [place] [address]  



                                         [phone]  







Social History





                                        Date



                 Tobacco Use     Types           Packs/Day       Years Used 

 

                                        Quit: 



                           Former Smoker             Pipe   

 

    



                     Smokeless Tobacco: Former  Chew                Quit: 



                                         User   







                    Drinks/Week         oz/Week             Comments



                                         Alcohol Use   

 

                                                             



                                         Never   







  



                     Alcohol Habits      Answer              Date Recorded

 

  



                     How often do you have a drink containing alcohol?  Never   

            2020

 

  



                           How many drinks containing alcohol do you have on  No

t asked 



                                         a typical day when you are drinking?  

 

  



                           How often do you have six or more drinks on one  Not 

asked 



                                         occasion?  







 



                           Sex Assigned at Birth     Date Recorded

 

 



                           Male                      2020  6:16 AM CDT







                                        Industry



                           Job Start Date            Occupation 

 

                                        Not on file



                           Not on file               Not on file 







                                        Travel End



                           Travel History            Travel Start 

 





                                         No recent travel history available.







                                        Date Recorded



                           COVID-19 Exposure         Response 

 

                                        2020  6:14 AM CDT



                           In the last month, have you been in contact with  No 

/ Unsure 



                                         someone who was confirmed or suspected 

to have  



                                         Coronavirus / COVID-19?  



documented as of this encounter



Last Filed Vital Signs





                    Reading             Time Taken          Comments



                                         Vital Sign   

 

                    143/62              2020  7:27 AM CDT  



                                         Blood Pressure   

 

                    77                  2020  7:27 AM CDT  



                                         Pulse   

 

                    37.5 C (99.5 F) 2020  7:27 AM CDT  



                                         Temperature   

 

                    -                   -                    



                                         Respiratory Rate   

 

                    93%                 2020  7:27 AM CDT  



                                         Oxygen Saturation   

 

                    -                   -                    



                                         Inhaled Oxygen   



                                         Concentration   

 

                    92.2 kg (203 lb 4.2 oz) 2020  4:38 PM CDT  



                                         Weight   

 

                    180.3 cm (5' 11")   2020  4:38 PM CDT  



                                         Height   

 

                    28.35               2020  4:38 PM CDT  



                                         Body Mass Index   



documented in this encounter



Functional Status





                                        Date of Assessment



                           Functional Status         Response 

 

                                        2020



                           Does the patient have a hearing impairment:  No 

 

                                        2020



                           Does the patient have a visual impairment:  No 

 

                                        2020



                           Does the patient have impaired ambulation:  No 

 

                                        2020



                           Does the patient have an activity of daily living  No

 



                                         (ADL) impairment:  

 

                                        2020



                           Does the patient have an instrumental activity of  No

 



                                         daily living (IADL) impairment:  







                                        Date of Assessment



                           Cognitive Status          Response 

 

                                        2020



                           Does the patient have a cognitive impairment:  No 



documented as of this encounter



Discharge Instructions

* Pre-Anesthesia Patient Instructions* 



 Nicky Alvarado RN - 2020 11:12 AM CDT



GENERAL INFORMATION



Before you come to the hospital

 Make arrangements for a responsible adult to drive you home and stay with you
 for 24 hours following surgery.

 Bath/Shower Instructions

 Take a bath or shower with antibacterial soap the night before or the morning
 of your procedure. Use clean towels.

 Put on clean clothes after bath or shower.  Avoid using lotion and oils.

 Sleep on clean sheets if bath or shower is done the night before procedure.

 Leave money, credit cards, jewelry, and any other valuables at home. The [facility] is not responsible for the loss or breakage of persona
l items.

 Remove nail polish, makeup and all jewelry (including piercings) before comin
g to the hospital.

 The morning of your procedure:

 brush your teeth and tongue

 do not smoke

 do not shave the area where you will have surgery



What to bring to the hospital

 ID/ Insurance Card

 Medical Device card

 Official documents for legal guardianship 

 Copy of your Living Will, Advanced Directives, and/or Durable Power of Attorn
ey 

 Small bag with a few personal belongings

 Walker,cane, or motorized scooter

 Cases for glasses/hearing aids/contact lens (bring solutions for contacts)

 Dress in clean, loose, comfortable clothing 



Eating or drinking before surgery

 Do not eat or drink anything after 11:00 p.m. the day before your procedure (
including gum, mints, candy, or chewing tobacco) OR follow the specific instruct
ions you were given by your Surgeon.

 You may have WATER ONLY up to 2 hours before arriving at the hospital.

 

Other instructions

Notify your surgeon if:

 you become ill with a cough, fever, sore throat, nausea, vomiting or flu-like
 symptoms

 you have any open wounds/sores that are red, painful, draining, or are new si
nce you last saw  the doctor

 you need to cancel your procedure

 You will receive a call with your surgery arrival time from between 2:30pm an
d 4:30pm the last business day before your procedure.  If you do not receive a c
all, please call [phone] before 4:30pm or [phone] after 4:30pm.



Notify us at [facility]: [phone]

 if you need to cancel your procedure

 if you are going to be late



Arrival at the [address] [address]



 Park in the  Parking Garage, located directly across from the main entrance
 to the hospital.

  parking is available  from 7 AM to 4 PM Monday through Friday.

 Enter through the ground floor [facility] entrance and check in at the Inf
ormation Desk in the lobby.

 They will validate your parking ticket and direct you to the next location.

 If you are a woman between the ages of 10 and 55, and have not had a hysterec
evy, you will be asked for a urine sample prior to surgery.  Please do not urin
ate before arriving in the Surgery Waiting Room.  Once there, check in and let t
he attendant know if you need to provide a sample.



For the safety of all patients, visitors and staff as we work to contain COVID-1
9, we must dramatically restrict patient visitors. 

For most patients, no visitors are allowed. Exceptions include:

? 1 parent or guardian for patients younger than 18 

? 1 support person for labor and delivery patients 

? 1 support person for patients with disabilities or impairments needing assista
nce 

? 1 support person or  for patients undergoing outpatient treatment or pro
cedures 

? Support persons for patients nearing end of life



You will need to have a COVID19 test performed 2 days prior to your surgery.  Yo
u will be contacted with the date and time of the COVID19 test.  Your test will 
be performed at a drive-thru clinic at the [facility].  If you are also plannin
g to have lab work drawn while at the [place], please do so first and then have y
our COVID19 test completed second.



[facility]

[address]

[place] [address]



Please bring a cell phone, your photo ID and your insurance card.

Please use the bathroom prior to your trip to the [facility] for your test.



Once arrived at the [facility], follow the signage/cones to a
 parking spot.  Park and call [phone] to check in.  The team will come out 
to you.  You do not have to get out of your vehicle.  Our team members will viral
ect the test sample using a swab and will be wearing all of the necessary person
al protective equipment, so you do not need to wear a mask.  Once the test is pe
rformed it will be important to self quarantine at home until your surgery.  Ple
ase stay at home, wash your hands frequently, and practice physical distancing. 





Electronically signed by Nicky Alvarado RN at 2020 11:12 AM CDT





* Pre-Anesthesia Medication Instructions* 



 Nicky Alvarado RN - 2020 11:14 AM CDT



Formatting of this note might be different from the original.



YOUR MEDICATIONS:



 amitriptyline (ELAVIL) 25 mg tablet  

 atenoloL (TENORMIN) 50 mg tablet  

 doxazosin (CARDURA) 4 mg tablet  

 pantoprazole DR (PROTONIX) 40 mg tablet  



 

YOUR  MEDICATION INSTRUCTIONS FOR SURGERY:



Before surgery

Stop taking any vitamins, herbals, and natural supplements 14 days before surger
y:



Stop the following medications 7 days before surgery:

 Anti-inflammatory medications such as ibuprofen (Advil, Motrin) and naproxen 
(Aleve)

You may use acetaminophen (Tylenol)



Morning of surgery

On the morning of surgery, do NOT take these medications:

 Remaining vitamins/supplements

 Ointments/creams/lotions/oils/powders/deodorant



On the morning of surgery, take ONLY these medications with a sip (1-2 ounces) o
f water:

 Amitriptyline

 Atenolol

 Doxazosin

 Pantoprazole



Other information

Before surgery, please contact ANTONETTE Sequeira, with any medicine updates or question
s.

 E-mail:  [email or web address]



Before going home from the hospital, please ask your doctor when you should re-s
tart your medicines that were stopped before surgery.



Electronically signed by Nicky Alvarado RN at 2020 11:14 AM CDT





documented in this encounter



Medications at Time of Discharge





                          Start Date                End Date



                 Medication      Sig             Dispensed       Refills  

 

                          2020                 



                     acetaminophen (TYLENOL)  Take two            0  



                           325 mg tablet             tablets by    



                                         mouth every 6    



                                         hours as    



                                         needed.    

 

                          2020                 



                     amitriptyline (ELAVIL) 25  Take 25 mg by       0  



                           mg tablet                 mouth at    



                                         bedtime    



                                         daily.    

 

                          2020                 



                     atenoloL (TENORMIN) 50 mg  Take 50 mg by       0  



                           tablet                    mouth daily.    

 

                          2020                 



                     doxazosin (CARDURA) 4 mg  Take 4 mg by        0  



                           tablet                    mouth daily.    

 

                          2020                 



                 oxyCODONE (ROXICODONE) 5  Take one        30 tablet       0  



                           mg tablet                 tablet to two    



                                         tablets by    



                                         mouth every 4    



                                         hours as    



                                         needed    

 

                          2020                 



                     pantoprazole DR     Take 40 mg by       0  



                           (PROTONIX) 40 mg tablet   mouth daily.    

 

                          2020                 



                 polyethylene glycol 3350  Take            527 g           3  



                           (MIRALAX) 17 gram/dose    seventeen g    



                           powder                    by mouth    



                                         daily.    

 

                          2020                 



                 senna/docusate (SENNA-S)  Take one        30 tablet       0  



                           8.6/50 mg tablet          tablet by    



                                         mouth twice    



                                         daily.    



documented as of this encounter



Progress Notes

* Miranda Warner MD - 2020 11:31 AM CDT



I called patient with pathology results.  Pathological T1a clear cell renal cell
 carcinoma, grade 2 with necrosis, margins negative.  He expressed understanding
 of the pathology.  He is doing well.  Pain nearly completely resolved.  Eating 
well and he is having normal bowel activity.  He will keep follow-up as schedule
d.



Electronically signed by Miranda Warner MD at 2020  6:22 PM CDT

* Miranda Warner MD - 2020  5:29 AM CDT



Formatting of this note might be different from the original.

Urology Progress Note 2020 



ASSESSMENT:

Jas Lewis is a 69 y.o. Male with history of lymphoma, left renal ma
ss s/p left robotic partial nephrectomy on 2020.  LOS: 0 days 



PLAN: Will discuss plan with staff surgeon - Dr. Warner



- Pain control: PO pain

- Diet/FEN: Regular diet, SLIVF

- GI: Bowel regimen, zofran prn

- : Felix out, trial of void; Cr 1.58 (1.45)

- Heme/ID: Afebrile. Hgb, WBC appropriate

- OOB/Amb 

- PTA: Atenolol, doxazosin

- Dispo: Discharge planning today; f/u 3 weeks with BMP



Prophylaxis Review:

-  DVT: SCD's, contraindictated (partial nephrectomy)

-  GI: Yes

-  Catheter: Yes

-  Abx: Yes - Kae-operative Luigi Mcclain M.D.

Urology PGY-2

Please page urology on call with questions



ATTESTATION



I personally performed the key portions of the E/M visit, discussed case with re
sident and concur with resident documentation of history, physical exam, assessm
ent, and treatment plan unless otherwise noted.



Patient doing well. No issues overnight.  Tolerating diet.  Pain controlled.  Am
bulating.



Abdomen soft, appropriate

Incisions c/d/i

Felix removed

EMILY with serosanginous fluid



A/P: s/p LEFT robotic partial nephrectomy doing well

-- increase activity

-- diet as tolerated

-- monitor EMILY and send EMILY creatinine -- D/C if normal

-- D/C planning.  F/U 3 weeks with BMP



Staff name:  Miranda Warner MD Date:  2020 



________________________________________________________________________ 

SUBJECTIVE: 

No acute events overnight. Pain is well-controlled. Patient denies nausea, denie
s vomiting, is ambulating. Patient has not passed flatus, has not had a bowel mo
vement. Patient is tolerating Diet Clear Liquid

Advance Diet as Tolerated.



OBJECTIVE:

                 Vital Signs: Most Recent                Vital Signs: Past 24 Ho
urs 

BP: 111/60 (38)

Temp: 37.1 C (98.7 F) (38)

Pulse: 82 (38)

Respirations: 16 PER MINUTE (38)

SpO2: 93 % (38)

Height: 180.3 cm (71") (1638)

Weight: 92.2 kg (203 lb 4.2 oz) (1638)  BP: (111-155)/(60-90) 

Temp:  [36.3 C (97.3 F)-37.1 C (98.8 F)] 

Pulse:  [62-82] 

Respirations:  [13 PER MINUTE-21 PER MINUTE] 

SpO2:  [91 %-99 %]  



Physical Exam:

General: Alert & oriented, no acute distress

Pulm: Equal chest rise, non-labored

CV: Regular rate and rhythm

Extremities: No edema, SCD's in place

Abd: Soft, appropriately tender to palpation, non-distended

Incisions/Wounds: Appropriately tender to palpation. Portsite and extraction sit
e incisions C/D/I with dermabond 

: Felix catheter in place draining pale salmon urine



Intake/Output:

Date 20 - 20 0720 - 20 0700 

Shift 4348-7566 9885-1172 24 Hour Total 5608-0282 5366-0345 24 Hour Total 

INTAKE 

P.O. 240 0 240    

I.V.(mL/kg/hr) 2500(2.3) 682 3182    

Shift Total(mL/kg) 2740(29.7) 682(7.4) 3422(37.1)    

OUTPUT 

Urine(mL/kg/hr) 465(0.4) 1350 1815    

  Urine 115  115    

  Urine Output (ml) (Indwelling Urinary Catheter 20 0830 16 FR) 350 1350 1
700    

Drains 105 80 185    

  Drain Output (ml) ([place] Drain 20 1129 Left Abdomen) 105 80 185 
  

Other 150  150    

  Estimated Blood Loss 150  150    

Shift Total(mL/kg) 720(7.8) 1430(15.5) 2150(23.3)    

NET 2020 1272    

Weight (kg) 92.2 92.2 92.2 92.2 92.2 92.2 



 



Labs:

                   Hematology                               Chemistry 

Recent Labs 

  20

0438 

WBC 7.7 

HGB 12.1* 

PLTCT 134* 

 Recent Labs 

  20

0734 

* 

K 3.8 

 

CO2 24 

BUN 18 

CR 1.45* 

GFR 48* 

GLU 91 

CA 8.7 

 



ACTIVE PROBLEMS:

Principal Problem:

  Left renal mass







Electronically signed by Miranda Warner MD at 2020  8:35 AM CDT

* Zaida Padilla RN - 2020  6:05 PM CDT



Patient arrived to room # (807) via cart accompanied by transport. Patient trans
ferred to the bed with assistance. Bedside safety checks completed. Initial zelda
ent assessment completed. Refer to flowsheet for details.



Admission skin assessment completed with: ANTONETTE Diaz



Pressure injury present on arrival?: No



1. Head/Face/Neck: No

2. Trunk/Back: No

3. Upper Extremities: No

4. Lower Extremities: No

5. Pelvic/Coccyx: No

6. Assessed for device associated injury? Yes

7. Malnutrition Screening Tool (Nursing Nutrition Assessment) Completed? No



See Doc Flowsheet for additional wound details. 



INTERVENTIONS: 

Falls bundle initiated. 



Electronically signed by Zaida Padilla RN at 2020  6:06 PM CDT

* Pretty Capellan RN - 2020  3:22 PM CDT



Attempted to edie patient for pre procedure covid screening, but no answer. Voice
mail and phone number left for patient to call return call.



Electronically signed by Pretty Capellan RN at 2020  3:25 PM CDT

* Nicky Alvarado RN - 2020 11:17 AM CDT



PAC phone triage completed with patient for surgery on 20 with Dr. Warner.
Medications, allergies and medical history reviewed and updated in chart.  He d
enies CP, SOA, dizziness, numbness, tingling, N/V/D, cough, fever, flu-like symp
toms, exposure to anyone ill, positive for COVID-19 or waiting for results of sa
me.  He denies travel outside of [place] or [place] in last 15 days.  He denies PMH of com
plications from anesthesia.  Denies URI symptoms at this time. No PAC visit karol
cated.



Preop and medication instructions reviewed with patient. No vitamins or suppleme
nts for 14 days and no NSAIDS for 7 days before surgery. 

NPO after 11 pm the night before surgery but ok to drink water until 2 hours bef
ore arrival at hospital. Patient verbalized understanding and copy of instructio
ns will be mailed to his home.



Patient informed of visitor policy and expressed understanding.





Electronically signed by Nicky Alvarado RN at 2020 11:19 AM CDT

documented in this encounter



H&P Notes

* Miranda Warner MD - 2020  6:42 AM CDT



Formatting of this note might be different from the original.

History and Physical Update Note



Allergies:  Sulfa (sulfonamide antibiotics)



Lab/Radiology/Other Diagnostic Tests:

24-hour labs:  No results found for this visit on 20 (from the past 24 angélica
r(s)).

Point of Care Testing:  (Last 24 hours):

 



ASSESSMENT: 69 y.o. male with history of cT1a LEFT renal mass who presents for L
EFT robot-assisted laparoscopic partial nephrectomy, possible radical, possible 
open. The patient has an allergy to sulfa medications. The patient does not take
anticoagulation or antiplatelet agents.





PLAN:

> To OR for LEFT robot-assisted laparoscopic partial nephrectomy, possible 
radical, possible open

> 2g Ancef preoperatively

> Risk and benefits of operative procedure were again discussed with the patient
in detail, as in clinic; all questions were answered. Informed consent was obtai
marbella and placed in the chart.



Discussed plan of care with staff surgeon, Dr. Warner, who directed plan of car
e



I have examined the patient, and there are no significant changes in their condi
tion, from the previous H&P performed on 20.



Lencho Pathak MD

Pager  [phone]



ATTESTATION



I personally performed the key portions of the E/M visit, discussed case with re
sident and concur with resident documentation of history, physical exam, assessm
ent, and treatment plan unless otherwise noted.



Patient here for a left robotic versus open partial nephrectomy and possible rad
ical nephrectomy.  The mass is in a difficult location.  He has also had multipl
e prior abdominal surgeries, so he understands increased risks of needing an ope
n approach or radical nephrectomy.  All risks and benefits were discussed in [facility]
th.  All questions answered.  Consent on chart.  Patient marked. Ready for OR.



Staff name:  Miranda Warner MD Date:  2020 



--------------------------------------------------------------------------------
------------------------------------------------------------



Electronically signed by Miranda Warner MD at 2020  7:27 AM CDT

* Miranda Warner MD - 2020 10:00 AM CDT



Formatting of this note might be different from the original.

Subjective:

Jas Lewis is a 69 y.o. male who is referred by Dr. Tawil for further tiffanie
luation and treatment of left renal mass



HPI:

69 year old male with PMH of HYPERTENSION, GERD, BPH (for years), colon cancer 1
995 (with partial colectomy; receives colonoscopy every 4 years), new diagnosis 
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



During work up for lymphoplasmacytic lymphoma about 1 month ago when imaging was
done, left renal mass was noted, concerning for RCC.  Imaging also showed abnor
mality in bladder for which Urologist Dr.Tawil did cystoscopy 2 weeks ago and wa
s found to be non-concerning.  He has had elevated PSA in the past and has had 3
cystoscopies and 2 prostate biopsies which were non-revealing; last cystoscopy 
performed Dec 2019.  He continues to take Cardura for for BPH and HYPERTENSION. 
His BPH symptoms are no longer a concern; he denies dribbling, incomplete empty
ing, weak urinary stream.  He is on surveillance for lymphoplasmacytic lymphoma 
and has a f/u appt on 2020 for that.



He reports dysuria with initiation of voids that has been ongoing for months and
urinary frequency which started 2020 after he was asked to increased fluid 
intake by his PCP.  This has eased since he has been drinking less fluids.



No hematuria, unexpected weight loss, night sweats, apetite changes, nocturia

No FH of kidney cancer. No FH of genetic cancer syndromes.



ROS:

10 point ROS negative other than chronic hearing loss, runny nose, light-headedn
ess when going from sitting to standing position and nervousness/anxiety.



Active Ambulatory Problems 

  Diagnosis Date Noted 

 No Active Ambulatory Problems 



Resolved Ambulatory Problems 

  Diagnosis Date Noted 

 No Resolved Ambulatory Problems 



Past Medical History: 

Diagnosis Date 

 Colon polyps  

 Depression  

 Elevated PSA  

 Enlarged prostate  

 Hypertension  

 Kidney stones  

 Lymphoplasmacytoid lymphoma, CLL (HCC)  

 Malignant neoplasm of colon (HCC)  



Surgical History: 

Procedure Laterality Date 

 HX TONSILLECTOMY   

 COLON SURGERY  1995 

 COLECTOMY  1995 

 HX APPENDECTOMY  1995 

 HERNIA REPAIR   

 CYSTOURETHROSCOPY  2020 

 PROSTATE SURGERY   

 Biopsy  



Current Outpatient Medications on File Prior to Visit 

Medication Sig Dispense Refill 

 amitriptyline (ELAVIL) 25 mg tablet    

 atenoloL (TENORMIN) 50 mg tablet    

 doxazosin (CARDURA) 4 mg tablet    

 pantoprazole DR (PROTONIX) 40 mg tablet    



No current facility-administered medications on file prior to visit.  



Social History 



Socioeconomic History 

 Marital status:  

  Spouse name: Not on file 

 Number of children: Not on file 

 Years of education: Not on file 

 Highest education level: Not on file 

Occupational History 

 Not on file 

Tobacco Use 

 Smoking status: Former Smoker 

  Types: Pipe 

  Last attempt to quit:  

  Years since quittin.3 

 Smokeless tobacco: Former User 

  Types: Chew 

  Quit date:  

Substance and Sexual Activity 

 Alcohol use: Never 

  Frequency: Never 

 Drug use: Never 

 Sexual activity: Not Currently 

  Partners: Female 

Other Topics Concern 

 Not on file 

Social History Narrative 

 Not on file 



Family History 

Problem Relation Age of Onset 

 Cancer Mother  

     Endometrail  

 Hypertension Mother  

 Cancer Father  

 Cancer-Colon Father  

 Hypertension Father  

 Neurologic Disorder Father  

 Alzheimer's Father  

 Cancer-Skin Brother  

 Cancer Daughter  

 Cancer-Breast Daughter  

 Hypertension Maternal Grandfather  



Objective:



Vitals: 

 20 0943 

BP: 124/65 

BP Source: Arm, Left Upper 

Patient Position: Sitting 

Pulse: 80 

Weight: 93.4 kg (206 lb) 

Height: 180.3 cm (71") 

PainSc: Zero 



Body mass index is 28.73 kg/m.



General: No acute distress, non-labored breathing. Afebrile

Head: Normocephalic and atraumatic.  

Eyes: Conjunctivae are normal. No scleral icterus. 

Cardiovascular: Regular rate and rhythm

Pulmonary/Chest: Normal Effort, CTAB

Abd: surgical scar vertically below umbilicus from previous colectomy, Soft, NT,
ND

Musculoskeletal: Moves all extremities

Skin: Warm and dry.  

Neuro: Awake and alert.

Psychiatric: Normal mood and affect. 

Nursing note and vitals reviewed.



ALL OUTSIDE RECORDS WERE REVIEWED AND FILMS EXAMINED



Imaging:  3-16-20 CT Chest W Ab/Pel W/WO - Impression:  1.  Enhancing left renal
mass concerning for renal malignancy.  2.  3 mm upper lobe pulmonary nodule.  3.
 Apparent filling defect of the urinary bladder just to the right of midline.  
This potentially could reflect a primary urinary bladder lesion vs eccentric pr
otrusion of the prostate gland.  Correlation with cystoscopy is recommended.  





Assessment/Plan:

Jas Lewis was seen today for kidney mass.



Diagnoses and all orders for this visit:



Left renal mass

Discussed with patient treatment options for left renal mass including surveilla
nce, biopsy, nephrectomy and patient elected to proceed with partial nephrectomy
with the possibility of radial nephrectomy if deemed necessary.  Risk and benef
its discussed with patient.



Dysuria

-     CULTURE-URINE W/SENSITIVITY; Future; Expected date: 2020



Plan for patient to return on 2020 for robotic-assisted laparoscopic nephre
ctomy vs radical nephrectomy



Patient discussed with Dr. Jesus Monaco MD 

Family Medicine, PGY-3



ATTESTATION



I personally performed the key portions of the E/M visit, discussed case with re
sident and concur with resident documentation of history, physical exam, assessm
ent, and treatment plan unless otherwise noted.



Patient referred to me by Dr. Tawil for the findings of a 3.5 cm left renal mass
concerning for renal cell carcinoma.  Patient does have a long history of BPH a
nd urinary symptoms which have been fully evaluated by Dr. Tawil.  He denies sharon
ss hematuria.  The mass was discovered incidentally during evaluation of lymphop
lasmacytic lymphoma.  He also has a history of colon cancer s/p hemicolectomy wi
th no additional treatment needed.  He continues with colonoscopy.



We discussed the findings of his mass and the likelihood that this represents a 
renal cell carcinoma.  We discussed options of observation, biopsy, ablation, pa
rtial nephrectomy, and radical nephrectomy.  The mass is in a difficult location
near the hilar vessels and ureter.  Therefore, I discussed that ablation is not 
an option.  The patient would like treatment, so we focused on partial nephrect
danielle versus radical nephrectomy.  Pros and cons of each approach were discussed i
n depth.  After discussion, we elected to proceed with a LEFT robotic partial ne
phrectomy and possible radical nephrectomy.  I quoted him a 30% chance of needin
g a radical nephrectomy given the location.  Patient would like to proceed.  All
risks and benefits were discussed in depth.  All questions answered.  Consent on
chart.  We will proceed with surgery in the near future.  Complex counseling.



Staff name:  Miranda Warner MD Date:  2020 



Patient Instructions 



[facility] 

Pre-Operative Instructions



Surgical Procedure:  Robotic-assisted laparoscopic nephrectomy versus radical ne
phrectomy, intraoperative ultrasound  

Date of Surgery:  2020 

Arrival Time at the Admission Office (Main Lobby):  To be determined 



To ensure that your surgery can proceed without delay, you will be contacted by 
a phone triage nurse from the Pre-Operative Assessment Clinic (PAC) to complete 
this process.  



Please review the information given to you by your surgeon.



You will be called by the surgery staff with your day of surgery arrival time be
tween 2:30 - 4:30 PM the business day prior to surgery. If you have not heard fr
om them after 4:30 PM, please call [phone] to confirm your arrival time.



Pre-Operative Assessment and Instructions:



Once you speak to the nurse or are seen in the Pre-Operative Assessment Clinic, 
you will be given medication instructions. However, if surgery is within 2 weeks
, please read and follow the medication instructions below to prepare for surger
y before you speak with the phone triage nurse:



DO NOT STOP your blood thinner until you have contacted your prescribing provide
r or have been specifically instructed to do so.



Starting Now:

? Contact your provider who prescribes any of the following to develop a plan fo
r surgery:

o Blood thinners such as aspirin, Aggrenox, Brilinta, Effient, Eliquis, enoxapar
in (Lovenox), clopidogrel (Plavix), cilostazol, pentoxifylline (Trental), Pradax
a, Savaysa, ticlopidine, Xarelto, and warfarin (Coumadin)

o Immunosuppresants such as methotrexate, azathioprine, sulfasalazine, everolimu
s, sirolimus, Humira, Remicade, Enbrel, Simponi, Orencia, Cimzia, Actemra, and X
eljanz

o Chemotherapy



14 days prior to surgery:

? Stop most vitamins, herbals, and supplements including (but not limited to):

o Alpha lipoic acid, black cohosh, CoQ10, echinacea, eye vitamins, fish oil, fla
xseed oil, garlic, gingko biloba, ginseng, glucosamine/chondroitin, kava, Lovaza
, lutein, lysine, multivitamin, red yeast rice, KIKI-e, saw palmetto, Barry
s wort, turmeric, valerian root, Vascepa, Vitamin A, Vitamin B complex, Vitamin 
C, Vitamin E

? You DO NOT need to stop: iron, magnesium, potassium



7 days prior to surgery:

? Stop anti-inflammatory medications such as ibuprofen (Advil, Motrin), naproxen
(Aleve), Toyin-C[place], Excedrin, Midol, celecoxib (Celebrex), diclofenac (Letts
alfonso), diflunisal, etodolac, flurbiprofen, indomethacin, ketoprofen, ketorolac, m
eloxicam, nabumetone, and piroxicam



The day prior to surgery begin a clear liquid diet.  This includes water, Jell-O
, tea, coffee, broth, pop, clear juices (apple, cranberry, grape, orange witho
ut pulp), sports drinks, and popsicles.  You may have any color of the above dri
nks, but no milk or milk products. 



The day prior to surgery, drink one bottle of magnesium citrate at 2:00 p.m.  Ex
pect a laxative effect.  This laxative may be purchased at any pharmacy.  No pre
scription needed, but a printed prescription may be provided for your reference.



Do not drink alcohol within 24 hours of surgery.



Please do not eat or drink anything after midnight.  No gum, mints, hard candy, 
snacks, coffee, etc or chewing tobacco allowed after midnight before surgery.  Y
ou may brush your teeth but be sure to rinse and spit.

      (DIAL)

Please shower with an over the counter antibacterial soap the evening before or 
the morning of surgery.



If your surgery is scheduled as an outpatient, you must arrange to have someone 
drive you home and have someone with you 24 hours after anesthesia.



========================================================================



If you have any questions, please contact your provider's office at [phone]
. (Yenni, Admin) 



For emergencies during evenings, nights, weekends, and holidays, contact [facility]  and request they contact the on-call Urology
Resident at [phone].



=========================================================================







Electronically signed by Miranda Warner MD at 2020  8:02 AM CDT

documented in this encounter



Miscellaneous Notes

* Care Plan - Gabriela Dodge RN - 2020 11:31 AM CDT



Felix is out and voiding well on own.

Free of falls.

Jas Lewis discharged on 2020.

 .

Discharge instructions reviewed with patient.

Valuables returned: pt states has all belongins

Where Are Valuables Stored?: to [facility].

Home medications: 

 .to ben at  pharmacy

Functional assessment at discharge complete: Yes .



 



Electronically signed by Gabriela Dodge RN at 2020 12:02 PM CDT

* Anesthesia Post Op Day 1 - Heather Lozano CRNA - 2020  7:27 AM CDT



Formatting of this note might be different from the original.

Anesthesia Follow-Up Evaluation: Post-Procedure Day One



Name: Jas Lewis     MRN: [id number]     : 1950     Age: 69 y.o
.     Sex: male 

__________________________________________________________________________ 



Procedure Date: 2020 

Procedure: Procedure(s) with comments:

ROBOT ASSISTED LAPAROSCOPIC NEPHRECTOMY PARTIAL - CASE LENGTH 4 HOURS, PLEASE CL
IP PT IN SDS/PRE-POST, REQUEST FIREFLY, INTRA-OP SONO, ARGON, FROZEN SECTION REQ
UIRED

ULTRASOUND GUIDANCE - INTRAOPERATIVE



Physical Assessment

Height: 180.3 cm (71")  Weight: 92.2 kg (203 lb 4.2 oz)



Vital Signs (Last Filed in 24 hours)

BP: 111/60 ( 0038)

Temp: 37.1 C (98.7 F) ( 0038)

Pulse: 82 ( 0038)

Respirations: 16 PER MINUTE ( 0038)

SpO2: 93 % ( 0038)

SpO2 Pulse: 71 ( 1345)

Height: 180.3 cm (71") ( 1638)



Patient History 

Allergies

Allergies 

Allergen Reactions 

 Sulfa (Sulfonamide Antibiotics) HIVES 

 



Medications

Scheduled Meds:atenoloL (TENORMIN) tablet 50 mg, 50 mg, Oral, QDAY

bacitracin topical ointment, , Topical, BID

ceFAZolin (ANCEF) IVP 1 g, 1 g, Intravenous, Q8H*

docusate (COLACE) oral solution 100 mg, 100 mg, Oral, BID

doxazosin (CARDURA) tablet 4 mg, 4 mg, Oral, QDAY

pantoprazole DR (PROTONIX) tablet 40 mg, 40 mg, Oral, QDAY(21)

polyethylene glycol 3350 (MIRALAX) packet 17 g, 1 packet, Oral, QDAY

senna (SENOKOT) oral syrup 17.6 mg, 17.6 mg, Oral, BID



Continuous Infusions:

PRN and Respiratory Meds:acetaminophen Q6H PRN, fentaNYL citrate PF Q1H PRN, hyo
scyamine Q4H PRN, ondansetron Q6H PRN **OR** ondansetron (ZOFRAN) IV Q6H PRN, ox
yCODONE Q4H PRN



Diagnostic Tests

Hematology: 

Lab Results 

Component Value Date 

 HGB 12.1 2020 

 HCT 35.3 2020 

 PLTCT 134 2020 

 WBC 7.7 2020 

 MCV 86.8 2020 

 MCH 29.8 2020 

 MCHC 34.3 2020 

 MPV 8.3 2020 

 RDW 14.6 2020 

  



General Chemistry: 

Lab Results 

Component Value Date 

  2020 

 K 4.1 2020 

  2020 

 CO2 24 2020 

 GAP 5 2020 

 BUN 20 2020 

 CR 1.58 2020 

  2020 

 CA 7.7 2020 

 ALBUMIN 3.4 2020 

 TOTBILI 1.2 2020 

 

Coagulation: No results found for: PT, PTT, INR



Follow-Up Assessment

Patient location during evaluation: floor



Anesthetic Complications: 

Anesthetic complications: The patient did not experience any anesthestic complic
ations. 



 Pain:

Score: 1



Management:adequate

 

Level of Consciousness: awake and alert

 Hydration:acceptable

 

Airway Patency: patent

 Respiratory Status: acceptable

 

Cardiovascular Status:acceptable

 Regional/Neuroaxial: 



 







Electronically signed by Heather Lozano CRNA at 2020  7:27 AM CDT

* Care Plan - Amada Sellers RN - 2020  6:24 AM CDT





Problem: Infection, Risk of, Urinary Catheter-Associated Urinary Tract Infection

Goal: Absence of urinary catheter-associated infection

Outcome: Goal Ongoing

Flowsheets (Taken 2020 0623)

Absence of urinary catheter-associated infections:

 Urinary catheter discontinuation

 Assess for signs and sypmtoms of catheter-associated urinary tract infection

Note: Phillipey pulled at 0620 

 

Problem: Falls, High Risk of

Goal: Absence of falls-Adult Patient

Outcome: Goal Ongoing

Flowsheets (Taken 2020 0623)

Absence of falls-Adult Patient:

 Complete Fall Risk Assessment.

 Implement fall risk bundle.

 Consider additional interventions if patient is confused, has gait/balance prob
lems and on high risk medications.

 Provide safe ambulation.

 Provde safe environment.

 Provide fall prevention strategies.

 

Problem: Discharge Planning

Goal: Participation in plan of care

Outcome: Goal Ongoing

Flowsheets (Taken 2020 0623)

Participation in Plan of Care: Involve patient/caregiver in care planning decisi
on making

Goal: Knowledge regarding plan of care

Outcome: Goal Ongoing

Flowsheets (Taken 2020 0623)

Knowledge regarding plan of care:

 Provide admission education to parent/caregiver

 Provide procedural and treatment education

 Provide infection prevention education

 Provide medication management education

 Provide fall prevention education

 Provide plan of care education

Goal: Prepared for discharge

Outcome: Goal Ongoing

Flowsheets (Taken 2020 0623)

Prepared for discharge:

 Complete ADL ability assessment

 Collaborate with multidisciplinary team for hospital discharge coordination

 Provide safe use medical equipment education

 Provide diet and oral health education

 



Electronically signed by Amada Sellers RN at 2020  6:25 AM CDT

* Procedures (Immed Post or Bedside) - Miranda Warner MD - 2020 12:10 PM 
  CDT



Formatting of this note might be different from the original.



 Brief Operative Note



Name: Jas Lewis is a 69 y.o. male     : 1950             MR
N#: [id number]

DATE OF OPERATION: 2020



Date:  2020    



Preoperative Dx: 

Left renal mass [N28.89]



Post-op Diagnosis 

   * Left renal mass [N28.89]



Procedure(s) (LRB):

ROBOT ASSISTED LAPAROSCOPIC NEPHRECTOMY PARTIAL (Left)

ULTRASOUND GUIDANCE - INTRAOPERATIVE (Left)



Anesthesia Type: General



Surgeon(s) and Role:

   * Miranda Warner MD - Primary

   * Param Hobson MD - Resident - Assisting



Findings: Left 4 cm posterior mid/inferior pole renal mass abutting renal artery
, significant endophytic component. Complex hilar anatomy with branching artery 
and vein. Able to perform partial nephrectomy without vascular injury, collectin
g system entry noted and oversewn. Hemostatic renorrhaphy. EMILY drain placed. Fole
y. 



Estimated Blood Loss: 50 ml



Specimen(s) Removed/Disposition: 

ID Type Source Tests Collected by Time Destination 

1 : PERITUMOR FAT Tissue Kidney, Left SURGICAL PATHOLOGY          Miranda Warner MD 2020 1059  

2 : DEEP MARGIN Tissue Kidney, Left SURGICAL PATHOLOGY          Miranda Warner MD 2020 1108  

3 : LEFT RENAL MASS Tissue Kidney, Left SURGICAL PATHOLOGY          Nick Warner MD 2020 1131  



Complications:  None



Implants: None



Drains: EMILY drain LLQ, Felix 



Disposition:  PACU - stable



Param Hobson MD

Pager [phone] 



ATTESTATION



I performed this procedure with a resident.



Staff name:  Miranda Warner MD Date:  2020 







Electronically signed by Miranda Warner MD at 2020  1:10 PM CDT

* Operative Report (Direct Entry) - Miranda Warner MD - 2020  8:39 AM CDT



Formatting of this note might be different from the original.

OPERATIVE REPORT



Name: Jas Lewis is a 69 y.o. male     : 1950             MR
N#: [id number]



DATE OF OPERATION: 2020



Surgeon(s) and Role:

   * Miranda Warner MD - Primary

   * Param Hobson MD - Resident - Assisting

 

Preoperative Diagnosis:  

Left renal mass [N28.89]



Post-op Diagnosis 

   * Left renal mass [N28.89]



Procedure(s) (LRB):

ROBOT ASSISTED LAPAROSCOPIC NEPHRECTOMY PARTIAL (Left)

ULTRASOUND GUIDANCE - INTRAOPERATIVE (Left)



Anesthesia Type: General



Indications: 68 yo M with history of lymphoma, who was found to have an enhancin
g left renal mass, presents for robotic partial nephrectomy. 



FINDINGS:  

1 late branching artery, and 1 early branching vein; artery  clamped with 24-min
[place] warm ischemia time; 4 cm posterior LEFT midpole mass excised with grossly ne
gative margins; hemostatic renorrhaphy. 



OPERATIVE DETAILS: 

The patient was identified and informed consent was obtained.  The patient was b
rought to the operative suite and placed supine on the table.  A time-out proced
ure was performed per protocol.  Preoperative antibiotics were administered.  Se
quential compression devices were operational on bilateral lower extremities.  A
fter smooth induction of general anesthesia, the patient was moved to the latera
l position with care taken to pad all pressure points including the use of an ax
illary roll and then secured to the table.  The abdomen and flank were prepped a
nd draped in the usual sterile fashion.



Veress was needle placed in the left upper quadrant and drop test confirmed plac
ement.  The abdomen was insufflated 15 mm of pneumoperitoneum.  An 8 mm robotic 
non-bladed trocar was placed at the umbilical level just lateral to the rectus m
uscle.  Camera was inserted and no injuries were noted.  This permitted advancem
ent of our two 8-mm assistant ports without difficulty into the left upper quadr
ant inferior to the costal margin and in the left lower quadrant medial and supe
rior to the anterior superior iliac spine. An AirSeal trocar, and a 10-mm assist
ant port were then place between these ports.  The robotic camera was advanced a
nd there was obvious midline omental adhesions that were taken down bluntly with
 the Kitt[place].  No bowel injuries were identified, but he did have some bowel adh
esions to the midline. The left colon was also tented up on the left side inferi
ellen. The robot was then docked. 



We began by taking down the White line of [place] and the lateral attachments of gina vu left colon and this occurred without damage to the bowel. The colon was mobil
ized superiorly past the splenocolic attachments, and the spleen was easily medi
alized away from the kidney. The mesentery of the colon was swept away and media
lized leaving Gerota's fascia visible. The kidney was immediately visualized, we
 then dissected into the retroperitoneal fat and identified the ureter, and we t
racked the ureter up to the renal pelvis. The hilar vessels were then easily teresa
ntified medial and anterior to the left renal pelvis There was 1 late branching 
artery, and one early branching renal vein, which were dissected cleanly.  The u
reter was identified and tented upwards, away from the hilum.  After the hilum w
as adequately mobilized, we then turned our attention identifying the mass itsel
f.  Gerota fascia was incised and the kidney was exposed. The patient did have s
ignificantly adherent fat. The entire kidney had to be mobilized and medialized 
in order to identify the posterior mid/inferior pole mass. Of note a branch of gina vu renal artery was abutting but not involving the tumor which was partially exo
phytic. We felt we could avoid this vessel and we proceed with plans to perform 
a partial nephrectomy. 



Once the mass was identified, we were able to sho out its margins and performed
 an intraoperative ultrasound to verify its depth and laterality.  The mass was 
significantly endophytic and appeared to abut the collecting system.  It was kofi
y near the renal hilum and ureter which were identified and avoided throughout t
he case.  



Once this was completed, we administered 12.5 g of mannitol and clamped our blayne
l hilum with 2 bulldog clamps on the main artery.  We did not clamp the vein.  



We excised the mass using a combination of sharp dissection and electrocautery. 
 We were able to trace the contour of the mass away from the renal surface itsel
f and coagulated any bleeding vessels as they occurred.  We did enter the [facility]
ting system on the deepest aspect of the resection.  After the mass was excised,
 it was placed in an endocatch bag for later removal.  It had grossly negative m
argins.  We also took a deep margin and sent to pathology for frozen section.  T
he margin was negative for carcinoma.



We then ran a 2-0 V-loc suture along the base of the defect to close the collect
ing system.  We then placed a Surgicel bolster into the defect and closed the pa
renchyma over it using 2-0 V-loc suture with Hem-o-lyndsey clips renorrhaphy.  This 
required a total of 3 sutures.  Once this was done, we took the bulldog clamps o
ff the renal artery and noted no significant oozing or bleeding.  5 cc of Flosea
l and a sheet of Surgicel were placed over the defect.  Floseal  was also placed
 at the renal hilum, which was atraumatic.  Gerota fascia was then re-approximat
ed with a 2-0 V-loc suture.



Total warm ischemia time was 24 minutes.  

  

A Alexander-Washington drain was brought in through one of the camera ports after closi
ng the 12 mm ports with a Estevan-Jordan device and on 0-Vicryl suture.  The ro
bot was then undocked.  We then moved away from the field.  The mass was able to
 be brought out through the assistant port site.  After the fascia had been clos
ed, the skin was irrigated and closed using 4-0 Monocryl.  Local anesthetic was 
applied.  The patient was then cleaned, dressed, smoothly extubated, and dischar
ged to the postanesthesia care unit in stable condition.  



Estimated Blood Loss:  150 ml



Specimen(s) Removed/Disposition: 

ID Type Source Tests Collected by Time Destination 

1 : PERITUMOR FAT Tissue Kidney, Left SURGICAL PATHOLOGY          Miranda Warner MD 2020 1059  

2 : DEEP MARGIN Tissue Kidney, Left SURGICAL PATHOLOGY          Miranda Warner MD 2020 1108  

3 : LEFT RENAL MASS Tissue Kidney, Left SURGICAL PATHOLOGY          Nick Warner MD 2020 1131  



Complications:  None



Implants: None



Drains: Felix Catheter: 40 mL and Alexander-Washington Drain: #1 = 10 mL



Disposition:  PACU - stable



Param Hobson MD

Pager [phone] 



ATTESTATION



I performed this procedure with a resident.



Staff name:  Miranda Warner MD Date:  2020 







Electronically signed by Miranda Warner MD at 2020  8:47 AM CDT

documented in this encounter



Plan of Treatment





Not on filedocumented as of this encounter



Goals





     



            Goal       Patient    Associated  Recent Progress  Patient-Stat  Aut

hor



                     Goal Type           Problems            ed? 

 

     



                 Resume normal activities  [facility]              Lizette Zacarias RN



documented as of this encounter



Procedures





                                        Comments



                 Procedure Name  Priority        Date/Time       Associated Diag

nosis 

 

                                        



Results for this procedure are in the results section.



                     HC CREATININE-FLUID  Routine             2020  



                                         6:50 AM CDT  

 

                                        



Results for this procedure are in the results section.



                     HC CBC,AUTOMATED    Routine             2020  



                                         4:38 AM CDT  

 

                                        



Results for this procedure are in the results section.



                     HC BASIC METABOLIC PANEL  Routine             2020  



                                         4:38 AM CDT  

 

                                        



Results for this procedure are in the results section.



                  FROZEN SECTION #1  Routine         2020      Left blayne

l mass 



                                         10:59 AM CDT  

 

                                         



                     ULTRASOUND GUIDANCE -   2020          Left renal mass

 



                           INTRAOPERATIVE            7:45 AM CDT  

 

                                         



                     ROBOT ASSISTED      2020          Left renal mass 



                           LAPAROSCOPIC NEPHRECTOMY   7:45 AM CDT  



                                         PARTIAL    

 

                                        



Results for this procedure are in the results section.



                     HC COMPREHENSIVE    STAT                2020  



                           METABOLIC PANEL           7:34 AM CDT  

 

                                        



Results for this procedure are in the results section.



                     HC BLOOD TYPING, ABO  STAT                2020  



                           CONFIRM 91                6:55 AM CDT  

 

                                        



Results for this procedure are in the results section.



                     HC CBC,AUTOMATED    Routine             2020  



                                         6:45 AM CDT  

 

                                        



Results for this procedure are in the results section.



                     HC ABO GROUP        Routine             2020  



                                         6:45 AM CDT  

 

                                        



Results for this procedure are in the results section.



                           PATHOLOGY INTEROPERATIVE   2020  



                           REPORT SCAN               12:00 AM CDT  

 

                                        



Results for this procedure are in the results section.



                           TELEMETRY STRIPS-SCAN     2020  



                                         12:00 AM CDT  

 

                                        



Results for this procedure are in the results section.



                     EXTERNAL COVID-19   STAT                05/15/2020  



                           (SARS-COV-2)              7:15 AM CDT  



documented in this encounter



Results

* BASIC METABOLIC PANEL (06/15/2020 11:08 AM CDT)



    



              Component    Value        Ref Range    Performed At  Pathologist



                                         Signature

 

    



                 Sodium          136 (L)         137 - 147 MMOL/L  [facility] 

 

    



                 Potassium       4.3             3.5 - 5.1 MMOL/L  [facility] 

 

    



                 Chloride        106             98 - 110 MMOL/L  [facility] 

 

    



                 CO2             24              21 - 30 MMOL/L  [facility] 

 

    



                 Anion Gap       6               3 - 12          [facility] 

 

    



                 Glucose         81              70 - 100 MG/DL  [facility] 

 

    



                 Blood Urea      15              7 - 25 MG/DL    [facility] 



                                         Nitrogen    

 

    



                 Creatinine      1.56 (H)        0.4 - 1.24 MG/DL  [facility] 

 

    



                 Calcium         9.1             8.5 - 10.6 MG/DL  [facility] 

 

    



                 eGFR Non        44 (L)          >60 mL/min      [facility] 



                                              Comment:   



                           [employer]                  The eGFR is not validated f

or   



                                         use in drug dosing   



                                         adjustments. Continue to use   



                                         estimated creatinine   



                                         clearance per dosing reference   



                                         text. Please contact the   



                                         Clinical Pharmacist for   



                                         questions.   

 

    



                 eGFR     54 (L)          >60 mL/min      [facility] 



                           [employer]                  Comment:   



                                         The eGFR is not validated for   



                                         use in drug dosing   



                                         adjustments. Continue to use   



                                         estimated creatinine   



                                         clearance per dosing reference   



                                         text. Please contact the   



                                         Clinical Pharmacist for   



                                         questions.   











                                         Specimen

 





                                         Blood







   



                 Performing Organization  Address         City/[facility]  Ph

one Number

 

   



                      [facility]         [address] [place] [address] 





* DRAIN FLUID CREATININE (2020  6:50 AM CDT)



    



              Component    Value        Ref Range    Performed At  Pathologist



                                         Signature

 

    



                 Drain Fluid     1.80            mg/dL           [facility] 



                           Creatinine                Comment:   



                                         Body fluid to serum creatinine   



                                         ratios >1.0 suggest the   



                                         specimen may be   



                                         contaminated with urine   











                                         Specimen

 





                                         Drainage







   



                 Performing Organization  Address         City/[facility]  Ph

one Number

 

   



                      [facility]         [address] [place] [address] 





* BASIC METABOLIC PANEL (2020  4:38 AM CDT)



    



              Component    Value        Ref Range    Performed At  Pathologist



                                         Signature

 

    



                 Sodium          134 (L)         137 - 147 MMOL/L  [facility] 

 

    



                 Potassium       4.1             3.5 - 5.1 MMOL/L  [facility] 

 

    



                 Chloride        105             98 - 110 MMOL/L  [facility] 

 

    



                 CO2             24              21 - 30 MMOL/L  [facility] 

 

    



                 Anion Gap       5               3 - 12          [facility] 

 

    



                 Glucose         100             70 - 100 MG/DL  [facility] 

 

    



                 Blood Urea      20              7 - 25 MG/DL    [facility] 



                                         Nitrogen    

 

    



                 Creatinine      1.58 (H)        0.4 - 1.24 MG/DL  [facility] 

 

    



                 Calcium         7.7 (L)         8.5 - 10.6 MG/DL   [facility] 

 

    



                 eGFR Non        44 (L)          >60 mL/min       [facility] 



                                              Comment:   



                           [employer]                  The eGFR is not validated f

or   



                                         use in drug dosing   



                                         adjustments. Continue to use   



                                         estimated creatinine   



                                         clearance per dosing reference   



                                         text. Please contact the   



                                         Clinical Pharmacist for   



                                         questions.   

 

    



                 eGFR     53 (L)          >60 mL/min      [facility] 



                           [employer]                  Comment:   



                                         The eGFR is not validated for   



                                         use in drug dosing   



                                         adjustments. Continue to use   



                                         estimated creatinine   



                                         clearance per dosing reference   



                                         text. Please contact the   



                                         Clinical Pharmacist for   



                                         questions.   











                                         Specimen

 





                                         Blood







   



                 Performing Organization  Address         City/[facility]  Ph

one Number

 

   



                      [facility]         [address] [place] [address] 





* CBC (2020  4:38 AM CDT)



    



              Component    Value        Ref Range    Performed At  Pathologist



                                         Signature

 

    



                 White Blood     7.7             4.5 - 11.0 K/UL   [facility] 



                                         Cells    

 

    



                 RBC             4.07 (L)        4.4 - 5.5 M/UL  [facility] 

 

    



                 Hemoglobin      12.1 (L)        13.5 - 16.5 GM/DL   [facility] 

 

    



                 Hematocrit      35.3 (L)        40 - 50 %       [facility] 

 

    



                 MCV             86.8            80 - 100 FL      [facility] 

 

    



                 MCH             29.8            26 - 34 PG       [facility] 

 

    



                 MCHC            34.3            32.0 - 36.0 G/DL  [facility] 

 

    



                 RDW             14.6            11 - 15 %        [facility] 

 

    



                 Platelet Count  134 (L)         150 - 400 K/UL   [facility] 

 

    



                 MPV             8.3             7 - 11 FL        [facility] 











                                         Specimen

 





                                         Blood







   



                 Performing Organization  Address         City/[facility]  Ph

one Number

 

   



                     [facility]         [address] [place] [address] 





* SURGICAL PATHOLOGY          (2020 10:59 AM CDT)



    



              Component    Value        Ref Range    Performed At  Pathologist



                                         Signature

 

    



                     PATHOLOGY           [facility]   [facility] 



                           REPORT                    [facility]   



                                         [email or web address]   



                                         Department of Pathology and   



                                         Laboratory Medicine   



                                         [address] [place] [address]   



                                         Surgical Pathology Office:   



                                         [phone] Fax:   



                                         [phone]   



                                         SURGICAL PATHOLOGY REPORT   



                                         NAME: JAS LEWIS SURG   



                                         PATH #: [id number] MR #:   



                                         [id number] SPECIMEN   



                                         CLASS: SR BILLING #:   



                                         [id number] ALT ID #:   



                                         LOCATION: [facility] DATE OF   



                                         PROCEDURE: 2020 AGE: 69   



                                         SEX: M DATE RECEIVED:   



                                         2020 :   



                                         1950 TIME RECEIVED:   



                                         11:14 PHYSICIAN:   



                                         MIRANDA WARNER  UROTEMITOPE DATE OF   



                                         REPORT: 2020 COPY TO:   



                                         DATE OF PRINTIN2020   



                                         ##############################   



                                         ##############################   



                                         ############   



                                         Final Diagnosis:   



                                         A. Kidney, deep margin,   



                                         biopsy:    



                                         Negative for malignancy.   



                                         B. Soft tissue, peritumor fat,   



                                         biopsy:   



                                         Negative for malignancy.   



                                         C. Kidney, left, partial   



                                         nephrectomy:   



                                         Renal cell carcinoma, clear   



                                         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   



                                         2.7 cm, with   



                                         necrosis, confined to kidney.   



                                         Margins uninvolved.   



                                         Comment:   



                                         KIDNEY: Nephrectomy, Partial   



                                         or Radical   



                                         CAP Version: Kidney 4.0.1.0   



                                         Procedure   



                                         Partial nephrectomy   



                                         Specimen Laterality   



                                         Left   



                                         Tumor Site (select all that   



                                         apply)   



                                         Not specified   



                                         Tumor Size (largest tumor if   



                                         multiple)   



                                         Greatest dimension: 2.7 cm   



                                         Tumor Focality   



                                         Unifocal   



                                         Histologic Type   



                                         Clear cell renal cell   



                                         carcinoma   



                                         Sarcomatoid Features   



                                         Not identified   



                                         Rhabdoid Features   



                                         Not identified   



                                         Tumor Necrosis (any amount)   



                                         Present: <5%   



                                         Histologic Grade (WHO/ISUP)   



                                         G2: Nucleoli are conspicuous   



                                         and eosinophilic at x400   



                                         magnification   



                                         and visible but not prominent   



                                                  



                                         at x100 magnification.   



                                         Tumor Extension (select all   



                                         that apply)   



                                         Tumor limited to kidney   



                                         Tumor extension into other   



                                         organ(s)/structure(s)   



                                         (specify):   



                                         Margins (select all that   



                                         apply)   



                                         Margins uninvolved by invasive   



                                         carcinoma   



                                         Lymph-Vascular Invasion   



                                         (excluding renal vein and its   



                                         muscle containing segmental   



                                         branches and   



                                         inferior venacava)   



                                         Not identified   



                                         Pathologic Staging (pTNM)   



                                         TNM Descriptors (required only   



                                         if applicable) (select all   



                                         that apply)   



                                         Primary Tumor (pT)   



                                         pT1: Tumor 7 cm or less in   



                                         greatest dimension, limited to   



                                         the kidney   



                                         pT1a:   Tumor 4 cm or less   



                                         in greatest dimension, limited   



                                         to the   



                                         kidney   



                                         Regional Lymph Nodes (pN)   



                                         pNX:   Regional lymph   



                                         nodes cannot be assessed   



                                         No nodes submitted or found   



                                         Distant Metastasis (pM)   



                                         Not applicable   



                                         Pathologic Findings in   



                                         Nonneoplastic Kidney (select   



                                         all that apply)   



                                         Insufficient tissue (partial   



                                         nephrectomy specimen with <5   



                                         mm of   



                                         adjacent nonneoplastic kidney)   



                                         Significant pathologic   



                                         alterations   



                                         Glomerular disease (specify   



                                         type): Mild   



                                         Vascular disease (specify   



                                         type): Mild   



                                         Other Tumors and/or Tumor-like   



                                         Lesions (select all that   



                                         apply)   



                                         None   



                                         The pathologic stage assigned   



                                         here should be regarded as   



                                         provisional, as   



                                         it reflects only current   



                                         pathologic data and does not   



                                         incorporate full   



                                         knowledge of the patient's   



                                         clinical status and/or prior   



                                         pathology.   



                                         Pursuant to the Quality   



                                         Assurance Program at the   



                                         [facility] Pathology Department,   



                                         selected slides from this case   



                                         have been   



                                         concurrently reviewed by the   



                                         following pathologist:  Dr. Danna Avila who   



                                         agrees with the final   



                                         diagnosis.   



                                         Attestation:   



                                         By this signature, I attest   



                                         that I have personally   



                                         formulated the final   



                                         interpretation expressed in   



                                         this report and that the above   



                                         diagnosis is   



                                         based upon my examination of   



                                         the slides and/or other   



                                         material indicated in   



                                         this report.   



                                         +++Electronically Signed Out   



                                         By RAKAN DESAI MD on   



                                         2020+++   



                                               



                                         ksw/2020   



                                              



                                         ##############################   



                                         ##############################   



                                         ############   



                                         Material Received:   



                                         A: deep margin   



                                         B: peritumor fat   



                                         C: left renal mass stitch at   



                                         deep margin   



                                         History:   



                                         69-year-old male with a   



                                         clinical history of a left   



                                         renal mass.   



                                         Gross Description:   



                                         A. Received fresh labeled with   



                                         the patient's name and "deep   



                                         margin" is a   



                                         0.7 x 0.5 x 0.3 cm fragment of   



                                         tan-brown soft tissue. The   



                                         specimen is   



                                         submitted entirely in cassette   



                                         A1FS for frozen and permanent   



                                         sectioning.   



                                         (sc)   



                                         B. Received in formalin   



                                         labeled with the patient's   



                                         name and "peritumor   



                                         fat" is a 2.8 x 2.3 x 0.3 cm   



                                         aggregate of yellow-tan   



                                         fibroadipose tissue.   



                                         The specimen is submitted   



                                         without sectioning in cassette   



                                         B1. (sc)   



                                         C. Fixative: Formalin   



                                         Labeled: "Left renal mass"   



                                         Measurement: 4.7 x 2.9 x 2.4   



                                         cm   



                                         Mass: 2.7 x 2.4 x 1.8 cm   



                                         Mass appearance: Tan-yellow to   



                                         tan-red, soft, hemorrhagic and   



                                         possibly   



                                         necrotic   



                                         Perinephric fat involved by   



                                         tumor: No   



                                         The resection margin is inked   



                                         black. The remainder of the   



                                         specimen is   



                                         inked blue.   



                                         Mass from inked resection   



                                         margin: 0.1 cm   



                                         Tissue submitted to   



                                         [facility]ios[facility] Repository Core   



                                         Facility: No   



                                         Representative sections are   



                                         submitted as follows:   



                                         C1-C2   Representative   



                                         sections of mass to inked   



                                         margin.   



                                         C3   Additional   



                                         representative section of   



                                         tumor.   



                                         C4   Tumor nearest   



                                         perinephric fat (blue ink).   



                                         C5   Representative   



                                         section of uninvolved kidney.   



                                         (cjj)   



                                         sc/2020   



                                         Intraoperative Consultation:   



                                         A1FS, kidney, "deep margin",   



                                         biopsy:   



                                         Negative for malignancy.   



                                         MARINO BRIZUELA MD   











                                         Specimen

 





                                         Tissue - Kidney, Left

 





                                         Tissue - Kidney, Left

 





                                         Tissue - Kidney, Left







   



                 Performing Organization  Address         City/State/Zipcode  Ph

one Number

 

   



                     [facility]         [address] [place]

 [address] 





* COMPREHENSIVE METABOLIC PANEL (2020  7:34 AM CDT)



    



              Component    Value        Ref Range    Performed At  Pathologist



                                         Signature

 

    



                 Sodium          135 (L)         137 - 147 MMOL/L  [facility] 

 

    



                 Potassium       3.8             3.5 - 5.1 MMOL/L  [facility] 

 

    



                 Chloride        104             98 - 110 MMOL/L  [facility] 

 

    



                 Glucose         91              70 - 100 MG/DL  [facility] 

 

    



                 Blood Urea      18              7 - 25 MG/DL    [facility] 



                                         Nitrogen    

 

    



                 Creatinine      1.45 (H)        0.4 - 1.24 MG/DL  [facility] 

 

    



                 Calcium         8.7             8.5 - 10.6 MG/DL  [facility] 

 

    



                 Total Protein   8.7 (H)         6.0 - 8.0 G/DL  [facility] 

 

    



                 Total Bilirubin  1.2             0.3 - 1.2 MG/DL  [facility] 

 

    



                 Albumin         3.4 (L)         3.5 - 5.0 G/DL  [facility] 

 

    



                 Alk Phosphatase  58              25 - 110 U/L    [facility] 

 

    



                 AST (SGOT)      23              7 - 40 U/L      [facility] 

 

    



                 CO2             24              21 - 30 MMOL/L  [facility] 

 

    



                 ALT (SGPT)      29              7 - 56 U/L      [facility] 

 

    



                 Anion Gap       7               3 - 12          [facility] 

 

    



                 eGFR Non        48 (L)          >60 mL/min      [facility] 



                                              Comment:   



                           [employer]                  The eGFR is not validated f

or   



                                         use in drug dosing   



                                         adjustments. Continue to use   



                                         estimated creatinine   



                                         clearance per dosing reference   



                                         text. Please contact the   



                                         Clinical Pharmacist for   



                                         questions.   

 

    



                 eGFR     58 (L)          >60 mL/min       [facility] 



                           [facility]                  Comment:   



                                         The eGFR is not validated for   



                                         use in drug dosing   



                                         adjustments. Continue to use   



                                         estimated creatinine   



                                         clearance per dosing reference   



                                         text. Please contact the   



                                         Clinical Pharmacist for   



                                         questions.   











                                         Specimen

 





                                         Blood







   



                 Performing Organization  Address         City/[facility]

one Number

 

   



                      [facility]         [address] [place] [address] 





* BLOOD TYPE CONFIRMATION - ORDER ONLY IF REQUESTED BY LAB (2020  6:55 AM 
  CDT)



    



              Component    Value        Ref Range    Performed At  Pathologist



                                         Signature

 

    



                     ABO/RH(D)           B POS                [facility] 











                                         Specimen

 





                                         Blood







   



                 Performing Organization  Address         City/State/[facility]

one Number

 

   



                      [facility]         [address] [place] [address] 





* TYPE & CROSSMATCH (2020  6:45 AM CDT)



    



              Component    Value        Ref Range    Performed At  Pathologist



                                         Signature

 

    



                     Units Ordered       4                    [facility] 

 

    



                     Crossmatch          2020,2359      [facility] 



                                         Expires    

 

    



                     Record Check        2ND TYPE REQUIRED    [facility] 

 

    



                     ABO/RH(D)           B POS                [facility] 

 

    



                     Antibody Screen     NEG                  [facility] 

 

    



                     Electronic          YES                  [facility] 



                                         Crossmatch    

 

    



                     Unit Number         [id number]        [facility] 

 

    



                     Blood Component     RBC,ADSOL,LEUKO REDUCED    [facility] 



                                         Type    

 

    



                     Unit Division       0                    [facility] 

 

    



                     Status OF Unit      REL FROM ALLOC      [facility] 

 

    



                     Transfusion         OK TO TRANSFUSE      [facility] 



                                         Status    

 

    



                     Crossmatch          COMPATIBLE,ELECTRONIC    [facility] 



                                         Result    

 

    



                     Unit Number         [id number]        [facility] 

 

    



                     Blood Component     RBC,ADSOL,LEUKO REDUCED    [facility] 



                                         Type    

 

    



                     Unit Division       0                    [facility] 

 

    



                     Status OF Unit      REL FROM ALLOC       [facility] 

 

    



                     Transfusion         OK TO TRANSFUSE      [facility] 



                                         Status    

 

    



                     Crossmatch          COMPATIBLE,ELECTRONIC    [facility] 



                                         Result    











                                         Specimen

 





                                         Blood







   



                 Performing Organization  Address         City/[facility]

one Number

 

   



                      [facility]         [address] [place] [address] 





* CBC (2020  6:45 AM CDT)



    



              Component    Value        Ref Range    Performed At  Pathologist



                                         Signature

 

    



                 White Blood     5.9             4.5 - 11.0 K/UL   [facility] 



                                         Cells    

 

    



                 RBC             4.89            4.4 - 5.5 M/UL   [facility] 

 

    



                 Hemoglobin      14.7            13.5 - 16.5 GM/DL   [facility] 

 

    



                 Hematocrit      42.8            40 - 50 %       [facility] 

 

    



                 MCV             87.6            80 - 100 FL     [facility] 

 

    



                 MCH             30.0            26 - 34 PG       [facility] 

 

    



                 MCHC            34.2            32.0 - 36.0 G/DL   [facility] 

 

    



                 RDW             14.5            11 - 15 %       [facility] 

 

    



                 Platelet Count  160             150 - 400 K/UL  [facility] 

 

    



                 MPV             8.3             7 - 11 FL        [facility] 











                                         Specimen

 





                                         Blood







   



                 Performing Organization  Address         City/[facility]  Ph

one Number

 

   



                      [facility]         [address] [place]

 [address] 





* TELEMETRY STRIPS-SCAN (2020 12:00 AM CDT)



 



                           Narrative                 Performed At

 

 



                                         This result has an attachment that is n

ot available. 



                                         Ordered by an unspecified provider. 





* PATHOLOGY INTEROPERATIVE REPORT SCAN (2020 12:00 AM CDT)



 



                           Narrative                 Performed At

 

 



                                         This result has an attachment that is n

ot available. 



                                         Ordered by an unspecified provider. 





* EXTERNAL COVID-19 (SARS-COV-2) (05/15/2020  7:15 AM CDT)



    



              Component    Value        Ref Range    Performed At  Pathologist



                                         Signature

 

    



                           Overall                   [facility] OUTS[facility] 



                           COVID-19                  LAB 



                                         (SARS-CoV-2)    



                                         Result    

 

    



                     Source COVID-19     Unknown             [facility] 



                           (SARS-CoV-2)              LAB 

 

    



                     COVID-19            Negative            [facility] 



                           (SARS-CoV-2)              LAB 



                                         RNA    

 

    



                           Pan-SARS RNA              [facility] 



                                         LAB 











                                         Specimen

 





                                         Nasopharyngeal Swab -



                                         Nasopharyngeal Swab







 



                           Narrative                 Performed At

 

 



                                         This result has an attachment that is n

ot available. 







   



                 Performing Organization  Address         City/[facility]  Ph

one Number

 

   



                                         [facility] LAB   





documented in this encounter



Visit Diagnoses









                                         Diagnosis

 





                                         Left renal mass



                                         Unspecified disorder of kidney and uret

er

 





                                         Renal cell carcinoma of left kidney (HC

C)



documented in this encounter



Admitting Diagnoses









                                         Diagnosis

 





                                         Left renal mass



                                         Unspecified disorder of kidney and uret

er



documented in this encounter



Administered Medications





                Action Date     Dose            Rate            Site



                           Medication Order          MAR Action    

 

                2020  7:42 AM CDT 1,000 mg                         



                           acetaminophen (TYLENOL) tablet 1,000 mg  Given    



                                         1,000 mg, Oral, ONCE, 1 dose, Tu20 at 0745, To be given pre-op wit

h     



                                         a sip of water immediately upon arrival

     



                                         to [facility] (>30 minutes prior to scheduled 

    



                                         surgery time). TOTAL ACETAMINOPHEN DOSE

     



                                         NOT TO EXCEED 4 GM DAILY., Pre-Op     

 

  

 

                2020  9:13 AM  mg                           



                           acetaminophen (TYLENOL) tablet 650 mg  Given    



                                         650 mg, Oral, EVERY  6 HOURS PRN,     



                                         Starting 20 at 1634, Until 20 at 1331, Pain non-opioid: may 

e     



                                         used alone or in combination with opioi

d     



                                         analgesia, TOTAL ACETAMINOPHEN DOSE NOT

     



                                         TO EXCEED 4GM DAILY,     

 

                    650 mg                                   



                           Given                     2020   



                                         4:49 PM CDT   

 

  

 

                2020  8:43 AM CDT 50 mg                            



                           atenoloL (TENORMIN) tablet 50 mg  Given    



                                         50 mg, Oral, DAILY, First dose on 20 at 0900, Until Discontinued,   

  



                                         Hold for heart rate < 55 bpm or systoli

c     



                                         BP < 110,     

 

  

 

                2020  8:50 PM CDT                                  



                           bacitracin topical ointment  Given    



                                         Topical, TWICE DAILY, First dose on 20 at 2100, Until Discontinued,   

  



                                         Apply to tip of penis and catheter for 

    



                                         comfort,     

 

  

 

                2020  8:44 AM CDT 1 g                              



                           ceFAZolin (ANCEF) IVP 1 g  Given    



                                         1 g, Intravenous, EVERY  8 HOURS, 3    

 



                                         doses, First dose on 20 at    

 



                                         1645, Last dose on 20 at 0845,

     



                                         IV PUSH -- RECONSTITUTE each 1 g vial b

     



                                         adding 10 mL 0.9% NACL,     

 

                    1 g                                      



                           Given                     2020   



                                         12:25 AM CDT   

 

                    1 g                                      



                           Given                     2020   



                                         4:49 PM CDT   

 

  

 

                2020  8:50 PM  mg                           



                           docusate (COLACE) oral solution 100 mg  Given    



                                         100 mg, Oral, TWICE DAILY, First dose o

n     



                                         20 at 2100, Until Discontinued

,     



                                         Hold for loose stools,     

 

  

 

                2020  8:44 AM CDT 4 mg                             



                           doxazosin (CARDURA) tablet 4 mg  Given    



                                         4 mg, Oral, DAILY, First dose on 20 at 0900, Until Discontinued    

 

 

  

 

                2020  7:42 AM  mg                           



                           gabapentin (NEURONTIN) capsule 300 mg  Given    



                                         300 mg, Oral, ONCE, 1 dose, 20

     



                                         at 0745, To be given pre-op with a sip 

    



                                         of water immediately upon arrival to [facility]     



                                         (>30 minutes prior to scheduled surgery

     



                                         time)., Pre-Op     

 

  

 

                2020  6:40 AM CDT 1,000 mL        20 mL/hr         



                           lactated ringers infusion  Given - New    



                           1,000 mL, 1,000 mL, Intravenous, at 20  Bag    



                                         mL/hr, CONTINUOUS, Starting 20

     



                                         at 0615, Until 20 at 1634,    

 



                                         Pre-Op     

 

  

 

  



                                         ondansetron (ZOFRAN) injection 4 mg 



                                         4 mg, Intravenous, EVERY  6 HOURS PRN, 



                                         Starting 20 at 1634, Until 20 at 1331, Nausea/Vomiting 



                                         Injectable 

 

  

 

  



                                         ondansetron (ZOFRAN) tablet 4 mg 



                                         4 mg, Oral, EVERY  6 HOURS PRN, Startin

g 



                                         20 at 1634, Until 20 



                                         at 1331, Nausea/Vomiting PO 

 

  

 

                2020  7:42 AM CDT 5 mg                             



                           oxyCODONE (ROXICODONE) tablet 5 mg  Given    



                                         5 mg, Oral, ONCE, 1 dose, 20 a

t     



                                         0745, To be given pre-op with a sip of 

    



                                         water immediately upon arrival to [facility]  

   



                                         (>30 minutes prior to scheduled surgery

     



                                         time)., Pre-Op     

 

  

 

                2020 12:56 PM CDT 5 mg                             



                           oxyCODONE (ROXICODONE) tablet 5-10 mg  Given    



                                         5-10 mg, Oral, ONCE PRN, 1 dose,     



                                         Starting 20 at 1145, Until 20 at 1256, Pain PO, For Pain Scor

e     



                                         <4, PACU (only)     

 

  

 

                2020  4:50 AM CDT 5 mg                             



                           oxyCODONE (ROXICODONE) tablet 5-10 mg  Given    



                                         5-10 mg, Oral, EVERY  4 HOURS PRN,     



                                         Starting 20 at 1634, Until 20 at 1331, Pain PO     

 

                    5 mg                                     



                           Given                     2020   



                                         4:49 PM CDT   

 

  

 

                2020  8:44 AM CDT 17 g                             



                           polyethylene glycol 3350 (MIRALAX)  Given    



                                         packet 17 g     



                                         17 g (1 packet), Oral, DAILY, First dos

e     



                                         on 20 at 1645, Until     



                                         Discontinued, 8.5 GRAMS = 0.5 PACKET 17

     



                                         GRAMS = 1 PACKET 34 GRAMS = 2 PACKETS, 

    

 

                    17 g                                     



                           Given                     2020   



                                         4:49 PM CDT   

 

  

 

                2020  8:50 PM CDT 17.6 mg                          



                           senna (SENOKOT) oral syrup 17.6 mg  Given    



                                         17.6 mg, Oral, TWICE DAILY, First dose 

    



                                         on 20 at 2100, Until     



                                         Discontinued, Hold for loose stools,   

  

 

  

 

                2020 12:25 AM CDT                 100 mL/hr        



                           sodium chloride 0.9 %   infusion  Given - New    



                           1,000 mL, Intravenous, at 100 mL/hr,  Bag    



                                         CONTINUOUS, Starting 20 at    

 



                                         1315, Until 20 at 0610     

 

                                        100 mL/hr            



                           Given - New Bag           2020   



                                         1:56 PM CDT   

 

  



documented in this encounter

## 2020-08-10 NOTE — XMS REPORT
Encounter Summary

                             Created on: 08/10/2020



Cas Lewis

External Reference #: [id number]

: 1950

Sex: Male



Demographics





                          Address                   [address] [place]  [address]

 

                          Home Phone                [phone]

 

                          Preferred Language        English

 

                          Marital Status            Unknown

 

                          Gnosticist Affiliation     [facility]

 

                          Race                      White

 

                          Ethnic Group              Not  or 





Author





                          Author                    [facility]

 

                          Organization              [facility]

 

                          Address                   Unknown

 

                          Phone                     Unavailable







Support





                Name            Relationship    Address         Phone

 

                Kendy Richardson ECON            Unknown         [phone]

[phone]







Care Team Providers





                    Care Team Member Name Role                Phone

 

                    Arnaldo Singh         PCP                 [phone]

 

                    Tawil, Elias A MD   Unavailable         [phone]







Encounter Details





                          Care Team                 Description



                     Date                Type                Department  

 

                                                     



                           06/15/2020                Travel   







Social History





                                        Date



                 Tobacco Use     Types           Packs/Day       Years Used 

 

                                        Quit: 



                           Former Smoker             Pipe   

 

    



                     Smokeless Tobacco: Former  Chew                Quit: 



                                         User   







                    Drinks/Week         oz/Week             Comments



                                         Alcohol Use   

 

                                                             



                                         Never   







  



                     Alcohol Habits      Answer              Date Recorded

 

  



                     How often do you have a drink containing alcohol?  Never   

            2020

 

  



                           How many drinks containing alcohol do you have on  No

t asked 



                                         a typical day when you are drinking?  

 

  



                           How often do you have six or more drinks on one  Not 

asked 



                                         occasion?  







 



                           Sex Assigned at Birth     Date Recorded

 

 



                           Male                      2020  6:16 AM CDT







                                        Industry



                           Job Start Date            Occupation 

 

                                        Not on file



                           Not on file               Not on file 







                                        Travel End



                           Travel History            Travel Start 

 





                                         No recent travel history available.







                                        Date Recorded



                           COVID-19 Exposure         Response 

 

                                        6/15/2020 11:02 AM CDT



                           In the last month, have you been in contact with  No 

/ Unsure 



                                         someone who was confirmed or suspected 

to have  



                                         Coronavirus / COVID-19?  



documented as of this encounter



Functional Status





                                        Date of Assessment



                           Functional Status         Response 

 

                                        2020



                           Does the patient have a hearing impairment:  No 

 

                                        2020



                           Does the patient have a visual impairment:  No 

 

                                        2020



                           Does the patient have impaired ambulation:  No 

 

                                        2020



                           Does the patient have an activity of daily living  No

 



                                         (ADL) impairment:  

 

                                        2020



                           Does the patient have an instrumental activity of  No

 



                                         daily living (IADL) impairment:  







                                        Date of Assessment



                           Cognitive Status          Response 

 

                                        2020



                           Does the patient have a cognitive impairment:  No 



documented as of this encounter



Plan of Treatment





Not on filedocumented as of this encounter



Goals





     



            Goal       Patient    Associated  Recent Progress  Patient-Stat  Aut

hor



                     Goal Type           Problems            ed? 

 

     



                 Resume normal activities  Hospital        No              Lizette Zacarias RN



documented as of this encounter



Visit Diagnoses

Not on filedocumented in this encounter

## 2020-08-10 NOTE — XMS REPORT
Encounter Summary

                             Created on: 08/10/2020



Cas Lewis

External Reference #: [id number]

: 1950

Sex: Male



Demographics





                          Address                   [address] [place]  [address]

 

                          Home Phone                [phone]

 

                          Preferred Language        English

 

                          Marital Status            Unknown

 

                          Denominational Affiliation     [employer]

 

                          Race                      White

 

                          Ethnic Group              Not  or 





Author





                          Author                    [facility]

 

                          Organization              [facility]

 

                          Address                   Unknown

 

                          Phone                     Unavailable







Support





                Name            Relationship    Address         Phone

 

                Kendy Richardson ECON            Unknown         [phone]

[phone]







Care Team Providers





                    Care Team Member Name Role                Phone

 

                    Arnaldo Singh         PCP                 [phone]

 

                    Tawil, Elias A MD   Unavailable         [phone]







Reason for Visit

* Auth/Cert



                          Referred By Contact       Referred To Contact



                 Status          Reason          Specialty       Diagnoses /  



                                         Procedures  

 

                                        



                                        







                                         Diagnoses  



                                         Left renal mass  



                                         Left renal mass  



                                         [N28.89]

  



                                         P  



                                         rocedures  



                                         ID LAPAROSCOPY  



                                         SURG PARTIAL  



                                         NEPHRECTOMY  



                                         ID ULTRASONIC  



                                         GUIDANCE  



                                         INTRAOPERATIVE  



                                         ROBOT ASSISTED  



                                         LAPAROSCOPIC  



                                         NEPHRECTOMY  



                                         PARTIAL  



                                         ULTRASOUND  



                                         GUIDANCE -  



                                         INTRAOPERATIVE  











Encounter Details





                          Care Team                 Description



                     Date                Type                Department  

 

                                        



Wallisch, William, MD



[address]



[place], [place] [id number] [phone] (Fax)                       



                     2020          Anesthesia          [facility] OR  



                                         [address] [id number] [phone]  







Anesthesia Record





                    Responsible Anesthesiologist Anesthesia Start Time Anesthesi

a Stop Time



                                         Procedure Name   

 

                    Wallisch, William, MD 20 0745       20 1239



                                         ROBOT ASSISTED   



                                         LAPAROSCOPIC NEPHRECTOMY   



                                         PARTIAL (Left Flank)   







   



                 Date            Time            Event           Comment

 

   



                     720                AN Equip Check 



                                            

 

   



                                         07  

 

   



                           0744                      Out of Pre 



                                         Procedure 

 

   



                           0745                      Anes Start 

 

   



                           0745                      In Room 

 

   



                           0746                      An Start Data 

 

   



                     0754                An Induction        The patient was ree

valuated immediately before moderate or

 deep sedation



                                         use and before anesthesia induction.

 

   



                           0757                      An Intubation 

 

   



                           0811                      Anesthesia 



                                         Ready 

 

   



                           0835                      Antibiotic 



                                         Given 

 

   



                           0839                      Proc Start 

 

   



                           1214                      An Extubation 

 

   



                           1236                      an stop data 

 

   



                     1238                Handoff to RN       I completed my SBAR

 handoff to the receiving nurse.

 

   



                           1239                      An Stop 











                                         Meds

 

 



                           Name                      Total

 

 



                           lidocaine (2%) 200 mg/10mL Injection  100 mg



                                         syringe 

 

 



                           propofol (DIPRIVAN) 200 mg/ 20 mL  160 mg



                                         injection (VIAL) 

 

 



                           rocuronium (ZEMURON) injection  160 mg

 

 



                           dexamethasone  (DECADRON)  4 mg/mL  4 mg



                                         injection 

 

 



                           phenylephrine (VERN-SYNEPHRINE) 0.1 mg/mL  400 mcg



                                         injection (SYRINGE) 

 

 



                           sugammadex (BRIDION) 100 mg/mL iv soln  200 mg

 

 



                           dextran 70/hypromellose (GENTEAL TEARS;  2 drop



                                         BION TEARS) ophthalmic solution 

 

 



                           HYDROmorphone  injection (DILAUDID)  2  1.75 mg



                                         mg syringe 

 

 



                           lidocaine PF 1% (10 mg/mL) injection  0 mL



                                         0.1-2 mL 

 

 



                           ePHEDrine 50 mg/mL 50 mg in sodium  20 mg



                                         chloride PF 0.9% 5 mL IV syringe 

 

 



                           ceFAZolin (ANCEF) injection  2 g

 

 



                           ketamine (KETALAR)  10 mg/mL injection  30 mg

 

 



                           mannitol 12.5 g/50 mL injection (VIAL)  12.5 g

 

 



                           dexMEDEtomidine (PRECEDEX) 20 mcg/5 mL  16 mcg



                                         (4 mcg/mL) injection 

 

 



                           ondansetron (ZOFRAN) injection 4 mg  4 mg

 

 



                           lactated ringers infusion  600 mL

 

 



                           electrolyte-A (PLASMA-LYTE A PH 7.4)  1,900 mL



                                         infusion 





* 



                                        Name

 

                                        O2 

 

                                        N2O Inspired

 

                                        N2O

 

                                         Isoflurane 

 

                                        Inspired Isoflurane 





* 



                                        No blood administrations on file.









                                        Removal



                     Type                Details             Placement 

 

                                         



                     Wounds              20; 1130; Left; Abdomen; Surgical

  20 1130 by Lee, 



                     (NOT for            Incision; 4 port sites  ANTONETTE Savage 



                                         Pressure   



                                         Injuries)   

 

                                        20 1038 by Mariama Edmondson



                     Peripheral          20; 0640; RN; L; Posterior;   0640 by Rodrick, 



                     IV                  Forearm; 18 G; N/A; 1; 20; 1038  

ANTONETTE Rodriguez 

 

                                        20 1214 by Carol Cueva SRNA



                     ETT                 20; 0757; Ventilated by mask with

  20 0757 by Anay, 



                           oral airway (2); Direct laryngoscopy,  RAYRAY Medina 



                                         Stylet; Single-Lumen, Cuffed; 7.5mm;  



                                         Mac; 4; Oral; 1-Full view of the  



                                         glottis; 1 insertion attempt;  



                                         Auscultation, ETCO2 Detector; 23  



                                         centimeters; atraumatic, oral mucosa  



                                         unchanged; 20; 1214  

 

                                        20 0845 by Gabriela Dodge RN



                     Peripheral          20; 0802; Provider; R; Hand; 18 G

;  20 0802 by RADHA Cueva                  2; 20; 0845   Carol, SRNA 

 

                                        20 0619 by Amada Sellers RN



                     Indwelling          20; 0830; Unit (Comment) (OR 3); 

 20 0830 by Lee, 



                     Urinary             16 FR; Regular (Two-way); 20; 061

9  ANTONETTE Savage 



                                         Catheter   

 

                                        20 1000 by Gabriela Dodge RN



                     Alexander             20; 1129; Left; Abdomen; 19 FR;  

20 1129 by Felix Howard               20; 1000      ANTONETTE Savage 



                                         Drain   



documented in this encounter



Social History





                                        Date



                 Tobacco Use     Types           Packs/Day       Years Used 

 

                                        Quit: 



                           Former Smoker             Pipe   

 

    



                     Smokeless Tobacco: Former  Chew                Quit: 



                                         User   







                    Drinks/Week         oz/Week             Comments



                                         Alcohol Use   

 

                                                             



                                         Never   







  



                     Alcohol Habits      Answer              Date Recorded

 

  



                     How often do you have a drink containing alcohol?  Never   

            2020

 

  



                           How many drinks containing alcohol do you have on  No

t asked 



                                         a typical day when you are drinking?  

 

  



                           How often do you have six or more drinks on one  Not 

asked 



                                         occasion?  







 



                           Sex Assigned at Birth     Date Recorded

 

 



                           Male                      2020  6:16 AM CDT







                                        Industry



                           Job Start Date            Occupation 

 

                                        Not on file



                           Not on file               Not on file 







                                        Travel End



                           Travel History            Travel Start 

 





                                         No recent travel history available.







                                        Date Recorded



                           COVID-19 Exposure         Response 

 

                                        2020  6:14 AM CDT



                           In the last month, have you been in contact with  No 

/ Unsure 



                                         someone who was confirmed or suspected 

to have  



                                         Coronavirus / COVID-19?  



documented as of this encounter



OR Notes

* Anesthesia Postprocedure Evaluation - Marvin Key MD - 2020  1:53 PM
   CDT



Formatting of this note might be different from the original.

Post-Anesthesia Evaluation



Name: Cas Lewis      MRN: [id number]     : 1950     Age: 69 y.
o.     Sex: male 

__________________________________________________________________________ 



Procedure Date: 2020

Procedure(s) (LRB):

ROBOT ASSISTED LAPAROSCOPIC NEPHRECTOMY PARTIAL (Left)

ULTRASOUND GUIDANCE - INTRAOPERATIVE (Left)  



Surgeon: Surgeon(s):

Nabil Lee MD Penticuff, Justin, MD



Post-Anesthesia Vitals

BP: 145/85 ( 1345)

Pulse: 71 ( 1345)

Respirations: 18 PER MINUTE ( 1345)

SpO2: 92 % ( 1345)

SpO2 Pulse: 71 ( 1345) 

Vitals Value Taken Time 

/85 2020  1:45 PM 

Temp 37.1 C (98.8 F) 2020 12:40 PM 

Pulse 71 2020  1:45 PM 

Respirations 18 PER MINUTE 2020  1:45 PM 

SpO2 92 % 2020  1:45 PM 



Post Anesthesia Evaluation Note



Evaluation location: Pre/Post

Patient participation: recovered; patient participated in evaluation

Level of consciousness: alert



Pain score: 2

Pain management: adequate



Hydration: normovolemia

Temperature: 36.0C - 38.4C

Airway patency: adequate



Perioperative Events



   Post-op nausea and vomiting: no PONV



Postoperative Status

Cardiovascular status: hemodynamically stable

Respiratory status: spontaneous ventilation

Follow-up needed: none



Perioperative Events

Perioperative Event: No

Emergency Case Activation: No 



Electronically signed by Hung Bustos MD at 2020  2:12 PM CDT





Associated attestation - Hung Bustos MD - 2020  2:12 PM CDT



Formatting of this note might be different from the original.

ATTESTATION



Post-Anesthesia Evaluation Attestation: I reviewed and agree the indicated post-
anesthesia care was provided. I have reviewed key portions of the indicated post
 anesthesia care. I have examined the patient's vitals, physical status, and com
plications and agree with what is documented.



Staff name:  Hung Bustos MD Date:  2020 



* Anesthesia Preprocedure Evaluation - Wallisch, William, MD - 2020  7:04 
  AM CDT



Formatting of this note might be different from the original.

Anesthesia Pre-Procedure Evaluation



Name: Cas Lewis      MRN: [id number]     : 1950     Age: 69 y.
o.     Sex: male 

_________________________________________________________________________ 



Procedure Info: 

Procedure Information  

 Date/Time:  2045 

 Procedures: 

    ROBOT ASSISTED LAPAROSCOPIC NEPHRECTOMY PARTIAL (Left ) - CASE LENGTH 4 HOUR
S, PLEASE CLIP PT IN SDS/PRE-POST, REQUEST FIREFLY, INTRA-OP SONO, ARGON, FROZEN
 SECTION REQUIRED

    ULTRASOUND GUIDANCE - INTRAOPERATIVE (Left ) 

 Location:  MAIN OR 03 / Main OR/Periop 

 Surgeon:  Nabil Lee MD 

 



Physical Assessment

Vital Signs (last filed in past 24 hours):

BP: 146/83 (657)

Temp: 36.7 C (98.1 F) (657)

Pulse: 62 (657)

Respirations: 20 PER MINUTE (657)

SpO2: 98 % (657)

Height: 182.9 cm (72") (657)

Weight: 92.2 kg (203 lb 3.2 oz) (657) 

 

Patient History 

Allergies 

Allergen Reactions 

 Sulfa (Sulfonamide Antibiotics) HIVES 

 



Current Medications  

Medication Directions 

amitriptyline (ELAVIL) 25 mg tablet  

atenoloL (TENORMIN) 50 mg tablet  

doxazosin (CARDURA) 4 mg tablet  

pantoprazole DR (PROTONIX) 40 mg tablet  



Review of Systems/Medical History



Patient summary reviewed

Nursing notes reviewed

Pertinent labs reviewed



PONV Screening: Non-smoker

No history of anesthetic complications

No family history of anesthetic complications



Airway - negative



Pulmonary - negative

    Not a current smoker



Cardiovascular 



    Exercise tolerance: >4 METS 

    Beta Blocker therapy: Yes (took this am)

    Beta blockers within 24 hours: Yes



    Hypertension, well controlled



GI/Hepatic/Renal 



    GERD, well controlled



Neuro/Psych 



    Psychiatric history

        Depression



Musculoskeletal 



    Back pain (lower back pain, mild)



Endocrine/Other 



    Malignancy



Constitution - negative

 Physical Exam



Airway Findings

    Mallampati: II

    TM distance: >3 FB

    Neck ROM: full

    Mouth opening: good



Dental Findings: 

    Upper dentures and lower dentures



Cardiovascular Findings: Negative

    Rhythm: regular      Rate: normal



Pulmonary Findings: Negative

    Breath sounds clear to auscultation.



Abdominal Findings: Negative



Neurological Findings: Negative

    Alert and oriented x 3



Constitutional findings: Negative

 



Diagnostic Tests

Hematology: 

Lab Results 

Component Value Date 

 HGB 14.7 2020 

 HCT 42.8 2020 

 PLTCT 160 2020 

 WBC 5.9 2020 

 MCV 87.6 2020 

 MCH 30.0 2020 

 MCHC 34.2 2020 

 MPV 8.3 2020 

 RDW 14.5 2020 

  



General Chemistry: No results found for: NA, K, CL, CO2, GAP, BUN, CR, GLU, CA, 
KETONES, ALBUMIN, LACTIC, OBSCA, MG, TOTBILI, TOTBILCB, PO4 

Coagulation: No results found for: PT, PTT, INR



Anesthesia Plan



ASA score: 3 

Plan: general

Induction method: intravenous

NPO status: acceptable



Informed Consent

Anesthetic plan and risks discussed with patient.

Use of blood products discussed with patient

Blood Consent: consented



Plan discussed with: anesthesiologist, SRNA and CRNA.





Electronically signed by Wallisch, William, MD at 2020  7:20 AM CDT

documented in this encounter



Plan of Treatment





Not on filedocumented as of this encounter



Goals





     



            Goal       Patient    Associated  Recent Progress  Patient-Stat  Aut

hor



                     Goal Type           Problems            ed? 

 

     



                 Resume normal activities  [facility]              Lizette Zacarias RN



documented as of this encounter



Visit Diagnoses

Not on filedocumented in this encounter



Administered Medications





                Action Date     Dose            Rate            Site



                           Medication Order          MAR Action    

 

                2020  8:36 AM CDT 2 g                              



                           ceFAZolin (ANCEF) injection  Given    



                                         INTRA-PROCEDURE MED, Starting 20 at 0836, Until 20 at  

   



                                         1247, Anesthesia Intra-op     

 

  

 

                2020  8:43 AM CDT 4 mg                             



                           dexamethasone (DECADRON) injection  Given    



                                         Intravenous, INTRA-PROCEDURE MED,     



                                         Starting 20 at 0843, Until 20 at 1247, Anesthesia Intra-op   

  

 

  

 

                2020 12:10 PM CDT 4 mcg                            



                           dexMEDEtomidine (PRECEDEX) injection  Given    



                                         INTRA-PROCEDURE MED, Starting 20 at 1144, Until 20 at  

   



                                         1247, Anesthesia Intra-op     

 

                    4 mcg                                    



                           Given                     2020   



                                         11:59 AM CDT   

 

                    4 mcg                                    



                           Given                     2020   



                                         11:50 AM CDT   

 

  

 

                2020  7:57 AM CDT 2 drops                          



                           dextran 70/hypromellose (GENTEAL TEARS)  Given    



                                         ophthalmic solution     



                                         INTRA-PROCEDURE MED, Starting 20 at 0757, Until 20 at  

   



                                         1247, Anesthesia Intra-op     

 

  

 

                2020 10:06 AM CDT                                  



                           electrolyte-A (PLASMA-LYTE A PH 7.4)  Given - New    



                           injection                 Bag    



                                         INTRA-PROCEDURE MED(CONT), Starting 20 at 0804, Until 20 at  

   



                                         1247, Anesthesia Intra-op     

 

                                                             



                           Given - New Bag           2020   



                                         8:04 AM CDT   

 

  

 

                2020 10:10 AM CDT 10 mg                            



                           ePHEDrine 50 mg/mL 50 mg in sodium  Bolus    



                                         chloride PF 0.9% 5 mL IV syringe     



                                         5 mL, INTRA-PROCEDURE MED(CONT),     



                                         Starting 20 at 0835, Until 20 at 1247, Anesthesia Intra-op   

  

 

                    10 mg                                    



                           Given - New Bag           2020   



                                         8:35 AM CDT   

 

  

 

                2020 12:46 PM CDT 0.25 mg                          



                           HYDROmorphone injection (DILAUDID)  Given    



                                         INTRA-PROCEDURE MED, Starting 20 at 0746, Until 20 at  

   



                                         1247, Anesthesia Intra-op     

 

                    0.25 mg                                  



                           Given                     2020   



                                         10:50 AM CDT   

 

                    0.25 mg                                  



                           Given                     2020   



                                         9:17 AM CDT   

 

  

 

                2020  8:43 AM CDT 10 mg                            



                           ketamine (KETALAR) injection  Given    



                                         INTRA-PROCEDURE MED, Starting 20 at 0843, Until 20 at  

   



                                         1247, Anesthesia Intra-op     

 

                    20 mg                                    



                           Given                     2020   



                                         8:11 AM CDT   

 

  

 

                2020  7:54 AM  mg                           



                           lidocaine (PF) injection  Given    



                                         INTRA-PROCEDURE MED, Starting 20 at 0754, Until 20 at  

   



                                         1247, Anesthesia Intra-op     

 

  

 

                2020 10:59 AM CDT 12.5 g                           



                           mannitol injection        Given    



                                         INTRA-PROCEDURE MED, Starting 20 at 1059, Until 20 at  

   



                                         1247, Anesthesia Intra-op     

 

  

 

                2020 12:12 PM CDT 4 mg                             



                           ondansetron (ZOFRAN) injection 4 mg  Given    



                                         4 mg, Intravenous, ONCE PRN, 1 dose,   

  



                                         Starting 20 at 1145, Until 20 at 1212, Other...,     



                                         nausea/vomiting, First line agent., DO 

    



                                         NOT ADMINISTER if given within the last

     



                                         six hours., PACU (only)     

 

  

 

                2020  8:32 AM  mcg                          



                           phenylephrine in NS injection syringe  Given    



                                         Intravenous, INTRA-PROCEDURE MED,     



                                         Starting 20 at 0806, Until 20 at 1247, Anesthesia Intra-op   

  

 

                    100 mcg                                  



                           Given                     2020   



                                         8:20 AM CDT   

 

                    100 mcg                                  



                           Given                     2020   



                                         8:12 AM CDT   

 

  

 

                2020  7:54 AM  mg                           



                           propofol (DIPRIVAN) injection  Given    



                                         INTRA-PROCEDURE MED, Starting 20 at 0754, Until 20 at  

   



                                         1247, Anesthesia Intra-op     

 

  

 

                2020 11:40 AM CDT 10 mg                            



                           rocuronium injection      Given    



                                         Intravenous, INTRA-PROCEDURE MED,     



                                         Starting 20 at 0754, Until 20 at 1247, Anesthesia Intra-op   

  

 

                    20 mg                                    



                           Given                     2020   



                                         10:32 AM CDT   

 

                    10 mg                                    



                           Given                     2020   



                                         10:10 AM CDT   

 

  

 

                2020 12:10 PM  mg                           



                           sugammadex (BRIDION) injection  Given    



                                         Intravenous, INTRA-PROCEDURE MED,     



                                         Starting 20 at 1210, Until 20 at 1247, Anesthesia Intra-op   

  

 

  



documented in this encounter

## 2020-08-10 NOTE — XMS REPORT
Encounter Summary

                             Created on: 08/10/2020



Cas Lewis

External Reference #: [id number]

: 1950

Sex: Male



Demographics





                          Address                   [address] [place]  [address]

 

                          Home Phone                [phone]

 

                          Preferred Language        English

 

                          Marital Status            Unknown

 

                          Denominational Affiliation     [facility]

 

                          Race                      White

 

                          Ethnic Group              Not  or 





Author





                          Author                    [facility]

 

                          Organization              [facility]

 

                          Address                   Unknown

 

                          Phone                     Unavailable







Support





                Name            Relationship    Address         Phone

 

                Kendy Richardson ECON            Unknown         [phone]

19







Care Team Providers





                    Care Team Member Name Role                Phone

 

                    Paadeline Arnaldo         PCP                 [phone]

 

                    Tawil, Elias A MD   Unavailable         [phone]







Encounter Details





                          Care Team                 Description



                     Date                Type                Department  

 

                                        



Unknown, Unknown, MD                     



                     05/15/2020          Orders Only         [facility]  



                                         [address] [address]  







Social History





                                        Date



                 Tobacco Use     Types           Packs/Day       Years Used 

 

                                        Quit: 



                           Former Smoker             Pipe   

 

    



                     Smokeless Tobacco: Former  Chew                Quit: 



                                         User   







                    Drinks/Week         oz/Week             Comments



                                         Alcohol Use   

 

                                                             



                                         Never   







  



                     Alcohol Habits      Answer              Date Recorded

 

  



                     How often do you have a drink containing alcohol?  Never   

            2020

 

  



                           How many drinks containing alcohol do you have on  No

t asked 



                                         a typical day when you are drinking?  

 

  



                           How often do you have six or more drinks on one  Not 

asked 



                                         occasion?  







 



                           Sex Assigned at Birth     Date Recorded

 

 



                                         Male 







                                        Industry



                           Job Start Date            Occupation 

 

                                        Not on file



                           Not on file               Not on file 







                                        Travel End



                           Travel History            Travel Start 

 





                                         No recent travel history available.







                                        Date Recorded



                           COVID-19 Exposure         Response 

 

                                        2020  6:14 AM CDT



                           In the last month, have you been in contact with  No 

/ Unsure 



                                         someone who was confirmed or suspected 

to have  



                                         Coronavirus / COVID-19?  



documented as of this encounter



Plan of Treatment





Not on filedocumented as of this encounter



Goals





     



            Goal       Patient    Associated  Recent Progress  Patient-Stat  Aut

hor



                     Goal Type           Problems            ed? 

 

     



                 Resume normal activities  Hospital        No              Lizette Zacarias RN



documented as of this encounter



Visit Diagnoses

Not on filedocumented in this encounter

## 2020-08-10 NOTE — XMS REPORT
Encounter Summary

                             Created on: 08/10/2020



Cas Lewis

External Reference #: [id number]

: 1950

Sex: Male



Demographics





                          Address                   [address] [place]  [address]

 

                          Home Phone                [phone]

 

                          Preferred Language        English

 

                          Marital Status            Unknown

 

                          Restorationist Affiliation     [employer]

 

                          Race                      White

 

                          Ethnic Group              Not  or 





Author





                          Author                    [facility]

 

                          Organization              [facility]

 

                          Address                   Unknown

 

                          Phone                     Unavailable







Support





                Name            Relationship    Address         Phone

 

                Kendy Richardson ECON            Unknown         [phone]

19







Care Team Providers





                    Care Team Member Name Role                Phone

 

                    Arnaldo Singh         PCP                 [phone]

 

                    Tawil, Elias A MD   Unavailable         [phone]







Reason for Visit

* 



 



                           Reason                    Comments

 

 



                           Medical Question          patient's daughter asking i

f pre-surgery Covid 19 testing can

be done



                                         locally--pt lives 2 hrs away









Encounter Details





                          Care Team                 Description



                     Date                Type                Department  

 

                                        



Nabil Lee MD



 [address]



[facility] [address]



[place] [address] [phone] (Fax)                      Medical Question (patient's daughter ask

ing if pre-surgery 

Covid 19 testing can be done locally--pt lives 2 hrs away)



                     2020          Telephone           [facility]  



                                          [address]  



                                         [address]  



                                         [place]  



                                         [address] [phone]  







Social History





                                        Date



                 Tobacco Use     Types           Packs/Day       Years Used 

 

                                        Quit: 



                           Former Smoker             Pipe   

 

    



                     Smokeless Tobacco: Former  Chew                Quit: 



                                         User   







                    Drinks/Week         oz/Week             Comments



                                         Alcohol Use   

 

                                                             



                                         Never   







  



                     Alcohol Habits      Answer              Date Recorded

 

  



                     How often do you have a drink containing alcohol?  Never   

            2020

 

  



                           How many drinks containing alcohol do you have on  No

t asked 



                                         a typical day when you are drinking?  

 

  



                           How often do you have six or more drinks on one  Not 

asked 



                                         occasion?  







 



                           Sex Assigned at Birth     Date Recorded

 

 



                                         Male 







                                        Industry



                           Job Start Date            Occupation 

 

                                        Not on file



                           Not on file               Not on file 







                                        Travel End



                           Travel History            Travel Start 

 





                                         No recent travel history available.







                                        Date Recorded



                           COVID-19 Exposure         Response 

 

                                        2020  9:24 AM CDT



                           In the last month, have you been in contact with  No 

/ Unsure 



                                         someone who was confirmed or suspected 

to have  



                                         Coronavirus / COVID-19?  



documented as of this encounter



Miscellaneous Notes

* Telephone Encounter - Kendy Gardner LPN - 2020 11:07 AM CDT



Pt daughter Kendy calling to see if pre-surgery Covid testing can be done lo
edie to pt as he lives 2 hrs away.  Attempted to return call but phone VM box was
full & I was unable to leave a message.



Order for Covid testing faxed to daughter at fax # [phone] with instruction
s that testing needs to be done within 48 hrs of surgery and [facility] must receive t
he results.



Electronically signed by Kendy Gardner LPN at 2020 11:12 AM CDT

documented in this encounter



Plan of Treatment





Not on filedocumented as of this encounter



Goals





     



            Goal       Patient    Associated  Recent Progress  Patient-Stat  Aut

hor



                     Goal Type           Problems            ed? 

 

     



                 Resume normal activities  Hospital        No              Lizette Zacarias RN



documented as of this encounter



Results

* COVID-19 (SARS-COV-2) PCR (05/15/2020)



    



              Component    Value        Ref Range    Performed At  Pathologist



                                         Signature

 

    



                           COVID-19                   [facility] 



                                         (SARS-CoV-2)    



                                         PCR    

 

    



                           COVID-19                   [facility] 



                                         (SARS-CoV-2)    



                                         PCR Source    











                                         Specimen

 





                                         Nasopharyngeal Swab







 



                           Narrative                 Performed At

 

 



                                         This result has an attachment that is n

ot available. 







   



                 Performing Organization  Address         City/State/Zipcode  Ph

one Number

 

   



                      [facility]         [address] [place]

 [address] 





documented in this encounter



Visit Diagnoses









                                         Diagnosis

 





                                         Preop testing



                                         Preoperative examination, unspecified



documented in this encounter

## 2020-08-10 NOTE — XMS REPORT
Encounter Summary

                             Created on: 08/10/2020



Cas Lewis

External Reference #: [id number]

: 1950

Sex: Male



Demographics





                          Address                   [address] [place]  [address]

 

                          Home Phone                [phone]

 

                          Preferred Language        English

 

                          Marital Status            Unknown

 

                          [employer] Affiliation     [employer]

 

                          Race                      White

 

                          Ethnic Group              Not  or 





Author





                          Author                    [facility]

 

                          Organization              [facility]

 

                          Address                   Unknown

 

                          Phone                     Unavailable







Support





                Name            Relationship    Address         Phone

 

                Kendy Richardson ECON            Unknown         [phone]

19







Care Team Providers





                    Care Team Member Name Role                Phone

 

                    Arnaldo Singh         PCP                 [phone]

 

                    Tawil, Elias A MD   Unavailable         [phone]







Reason for Visit

* 



 



                           Reason                    Comments

 

 



                                         Kidney Cancer 









Encounter Details





                          Care Team                 Description



                     Date                Type                Department  

 

                                        



Nabil Lee MD



 [address]



[facility] [address]



[place] [address] [phone] (Fax)                      Renal cell carcinoma of left kidney (HCC

); 

CKD (chronic kidney disease) stage 3, GFR 30-59 ml/min (HCC)



                     06/15/2020          Office Visit        [facility]  



                                          [address]  



                                         [address]  



                                         [place]  



                                         [address] [phone]  







Social History





                                        Date



                 Tobacco Use     Types           Packs/Day       Years Used 

 

                                        Quit: 



                           Former Smoker             Pipe   

 

    



                     Smokeless Tobacco: Former  Chew                Quit: 



                                         User   







                    Drinks/Week         oz/Week             Comments



                                         Alcohol Use   

 

                                                             



                                         Never   







  



                     Alcohol Habits      Answer              Date Recorded

 

  



                     How often do you have a drink containing alcohol?  Never   

            2020

 

  



                           How many drinks containing alcohol do you have on  No

t asked 



                                         a typical day when you are drinking?  

 

  



                           How often do you have six or more drinks on one  Not 

asked 



                                         occasion?  







 



                           Sex Assigned at Birth     Date Recorded

 

 



                           Male                      2020  6:16 AM CDT







                                        Industry



                           Job Start Date            Occupation 

 

                                        Not on file



                           Not on file               Not on file 







                                        Travel End



                           Travel History            Travel Start 

 





                                         No recent travel history available.







                                        Date Recorded



                           COVID-19 Exposure         Response 

 

                                        6/15/2020 11:02 AM CDT



                           In the last month, have you been in contact with  No 

/ Unsure 



                                         someone who was confirmed or suspected 

to have  



                                         Coronavirus / COVID-19?  



documented as of this encounter



Last Filed Vital Signs





                    Reading             Time Taken          Comments



                                         Vital Sign   

 

                    116/71              06/15/2020  1:58 PM CDT  



                                         Blood Pressure   

 

                    76                  06/15/2020  1:58 PM CDT  



                                         Pulse   

 

                    -                   -                    



                                         Temperature   

 

                    -                   -                    



                                         Respiratory Rate   

 

                    -                   -                    



                                         Oxygen Saturation   

 

                    -                   -                    



                                         Inhaled Oxygen   



                                         Concentration   

 

                    90.7 kg (200 lb)    06/15/2020  1:58 PM CDT  



                                         Weight   

 

                    180.3 cm (5' 11")   06/15/2020  1:58 PM CDT  



                                         Height   

 

                    27.89               06/15/2020  1:58 PM CDT  



                                         Body Mass Index   



documented in this encounter



Functional Status





                                        Date of Assessment



                           Functional Status         Response 

 

                                        2020



                           Does the patient have a hearing impairment:  No 

 

                                        2020



                           Does the patient have a visual impairment:  No 

 

                                        2020



                           Does the patient have impaired ambulation:  No 

 

                                        2020



                           Does the patient have an activity of daily living  No

 



                                         (ADL) impairment:  

 

                                        2020



                           Does the patient have an instrumental activity of  No

 



                                         daily living (IADL) impairment:  







                                        Date of Assessment



                           Cognitive Status          Response 

 

                                        2020



                           Does the patient have a cognitive impairment:  No 



documented as of this encounter



Progress Notes

* Nabil Lee MD - 06/15/2020  2:00 PM CDT



Formatting of this note might be different from the original.

Date of Service: 6/15/2020 



Subjective:      Follow-up kidney cancer.  

 

History of Present Illness

Cas Lewis is a 69 y.o. male with history of lymphoma, LEFT renal ma
ss s/p LEFT robotic partial nephrectomy on 2020 T1a clear cell renal cell c
arcinoma, grade 2 with necrosis, margins negative. Patient has done well post-op
eratively and is not having any abdominal pain. He has normal bowel function. No
voiding complaints or issues. He states that he is back to regular activities.



Medical History: 

Diagnosis Date 

 Colon polyps  

 Depression  

 Elevated PSA  

 Enlarged prostate  

 Hypertension  

 Kidney stones  

 Lymphoplasmacytoid lymphoma, CLL (HCC)  

 Malignant neoplasm of colon (HCC)  



Surgical History: 

Procedure Laterality Date 

 HX TONSILLECTOMY   

 COLON SURGERY  1995 

 COLECTOMY  1995 

 HX APPENDECTOMY  1995 

 HERNIA REPAIR   

 CYSTOURETHROSCOPY  2020 

 ROBOT ASSISTED LAPAROSCOPIC NEPHRECTOMY PARTIAL Left 2020 

 Performed by Nabil Lee MD at [facility] OR 

 ULTRASOUND GUIDANCE - INTRAOPERATIVE Left 2020 

 Performed by Nabil Lee MD at [facility] OR 

 PROSTATE SURGERY   

 Biopsy  



Family History 

Problem Relation Age of Onset 

 Cancer Mother  

     Endometrail  

 Hypertension Mother  

 Cancer Father  

 Cancer-Colon Father  

 Hypertension Father  

 Neurologic Disorder Father  

 Alzheimer's Father  

 Cancer-Skin Brother  

 Cancer Daughter  

 Cancer-Breast Daughter  

 Hypertension Maternal Grandfather  



Current Outpatient Medications 

Medication Sig Dispense Refill 

 acetaminophen (TYLENOL) 325 mg tablet Take two tablets by mouth every 6 hour
s as needed.   

 amitriptyline (ELAVIL) 25 mg tablet Take 25 mg by mouth at bedtime daily.   

 atenoloL (TENORMIN) 50 mg tablet Take 50 mg by mouth daily.   

 doxazosin (CARDURA) 4 mg tablet Take 4 mg by mouth daily.   

 oxyCODONE (ROXICODONE) 5 mg tablet Take one tablet to two tablets by mouth e
very 4 hours as needed 30 tablet 0 

 pantoprazole DR (PROTONIX) 40 mg tablet Take 40 mg by mouth daily.   

 polyethylene glycol 3350 (MIRALAX) 17 gram/dose powder Take seventeen g by m
outh daily. 527 g 3 

 senna/docusate (SENNA-S) 8.6/50 mg tablet Take one tablet by mouth twice mary
ly. 30 tablet 0 



No current facility-administered medications for this visit.  



Allergies 

Allergen Reactions 

 Sulfa (Sulfonamide Antibiotics) HIVES 



Social History 



Socioeconomic History 

 Marital status:  

  Spouse name: Not on file 

 Number of children: Not on file 

 Years of education: Not on file 

 Highest education level: Not on file 

Occupational History 

 Not on file 

Tobacco Use 

 Smoking status: Former Smoker 

  Types: Pipe 

  Last attempt to quit:  

  Years since quittin.4 

 Smokeless tobacco: Former User 

  Types: Chew 

  Quit date:  

Substance and Sexual Activity 

 Alcohol use: Never 

  Frequency: Never 

 Drug use: Never 

 Sexual activity: Not Currently 

  Partners: Female 

Other Topics Concern 

 Not on file 

Social History Narrative 

 Not on file 



Review of Systems 

Constitutional: Positive for fatigue. Negative for activity change, appetite nadira
nge, chills, diaphoresis, fever and unexpected weight change. 

HENT: Negative for congestion, hearing loss, mouth sores and sinus pressure.  

Eyes: Negative for visual disturbance. 

Respiratory: Negative for apnea, cough, chest tightness and shortness of breath.
 

Cardiovascular: Negative for chest pain, palpitations and leg swelling. 

Gastrointestinal: Negative for abdominal distention, abdominal pain, blood in st
ool, constipation, nausea and vomiting. 

Genitourinary: Negative for decreased urine volume, difficulty urinating, discha
rge, dysuria, enuresis, flank pain, frequency, genital sores, hematuria, penile 
pain, penile swelling, scrotal swelling, testicular pain and urgency. 

Musculoskeletal: Negative for arthralgias, back pain, gait problem and myalgias.


Skin: Negative for rash and wound. 

Neurological: Negative for dizziness, tremors, seizures, syncope, weakness, ligh
t-headedness, numbness and headaches. 

Hematological: Negative for adenopathy. Does not bruise/bleed easily. 

Psychiatric/Behavioral: Negative for decreased concentration and dysphoric mood.
The patient is not nervous/anxious.  



Objective:       

 acetaminophen (TYLENOL) 325 mg tablet Take two tablets by mouth every 6 hour
s as needed. 

 amitriptyline (ELAVIL) 25 mg tablet Take 25 mg by mouth at bedtime daily. 

 atenoloL (TENORMIN) 50 mg tablet Take 50 mg by mouth daily. 

 doxazosin (CARDURA) 4 mg tablet Take 4 mg by mouth daily. 

 oxyCODONE (ROXICODONE) 5 mg tablet Take one tablet to two tablets by mouth e
very 4 hours as needed 

 pantoprazole DR (PROTONIX) 40 mg tablet Take 40 mg by mouth daily. 

 polyethylene glycol 3350 (MIRALAX) 17 gram/dose powder Take seventeen g by m
outh daily. 

 senna/docusate (SENNA-S) 8.6/50 mg tablet Take one tablet by mouth twice mary
ly. 



Vitals: 

 06/15/20 1358 

BP: 116/71 

BP Source: Arm, Left Upper 

Patient Position: Sitting 

Pulse: 76 

Weight: 90.7 kg (200 lb) 

Height: 180.3 cm (71") 

PainSc: Zero 



Body mass index is 27.89 kg/m. 



Physical Exam 

Constitutional: He is oriented to person, place, and time. He appears well-devel
oped and well-nourished. No distress. 

HENT: 

Head: Normocephalic and atraumatic. 

Eyes: EOM are normal. Right eye exhibits no discharge. Left eye exhibits no disc
harge. 

Neck: Normal range of motion. No JVD present. 

Cardiovascular: Normal rate and regular rhythm. 

Pulmonary/Chest: Effort normal and breath sounds normal. No stridor. No respirat
ory distress. He has no wheezes. 

Abdominal: Soft. He exhibits no distension. There is no abdominal tenderness. 

Genitourinary:    Genitourinary Comments: Lap incisions well healing. No CVA ten
derness. No ecchymoses.  



Musculoskeletal: Normal range of motion.    

   General: No edema. 

Neurological: He is alert and oriented to person, place, and time. Coordination 
normal. 

Skin: Skin is warm. He is not diaphoretic. No erythema. No pallor. 

Psychiatric: He has a normal mood and affect. His behavior is normal. Judgment a
nd thought content normal. 

Vitals reviewed.



Basic Metabolic Profile  

Lab Results 

Component Value Date/Time 

  (L) 06/15/2020 11:08 AM 

 K 4.3 06/15/2020 11:08 AM 

 CA 9.1 06/15/2020 11:08 AM 

  06/15/2020 11:08 AM 

 CO2 24 06/15/2020 11:08 AM 

 GAP 6 06/15/2020 11:08 AM 

 Lab Results 

Component Value Date/Time 

 BUN 15 06/15/2020 11:08 AM 

 CR 1.56 (H) 06/15/2020 11:08 AM 

 GLU 81 06/15/2020 11:08 AM 

 



Pathology (2020)

Final Diagnosis: 

A. Kidney, deep margin, biopsy:  

Negative for malignancy. 

B. Soft tissue, peritumor fat, biopsy: 

Negative for malignancy. 

C. Kidney, left, partial nephrectomy: 

Renal cell carcinoma, clear cell type, ISUP/WHO grade 2, 2.7 cm, with 

necrosis, confined to kidney. 

Margins uninvolved. 

 



Assessment and Plan:

Problem 

Ckd (Chronic Kidney Disease) Stage 3, Gfr 30-59 Ml/Min ([facility]) 

 Baseline Cr ~1.5

 

Renal Cell Carcinoma of Left Kidney (Hcc) 

 LEFT renal mass s/p LEFT robotic partial nephrectomy on 2020 T1a clear alonso
l renal cell carcinoma, grade 2 with necrosis, margins negative. 



Lab Results 

Component Value Date/Time 

 CR 1.56 (H) 06/15/2020 11:08 AM 

 CR 1.58 (H) 2020 04:38 AM 

 CR 1.45 (H) 2020 07:34 AM 



 



Renal cell carcinoma of left kidney (HCC)

69 y.o. male with history of lymphoma, LEFT renal mass s/p LEFT robotic partial 
nephrectomy on 2020 T1a clear cell renal cell carcinoma, grade 2 with necro
sis, margins negative. 



Plan:

- Patient plans to continue follow up surveillance for recurrence of mass with jerome robles urologist Dr. Tawil given long distance to [facility] 

- Outlined typical surveillence plan to patient and family for pT1a masses with 
negative margins including imaging out to 3 years post-op and BMPs

- He will transfer care back to Dr. Tawil and follow up with [facility] prn.



Patient seen and discussed with Dr. Lee, who directed plan of care.



Marvin Tay MD

Urology Resident



 

ATTESTATION



I personally performed the key portions of the E/M visit, discussed case with re
sident and concur with resident documentation of history, physical exam, assessm
ent, and treatment plan unless otherwise noted.



Staff name:  Nabil Lee MD Date:  2020 



 



Electronically signed by Nabil Lee MD at 2020  5:13 PM CDT

documented in this encounter



Plan of Treatment





Not on filedocumented as of this encounter



Goals





     



            Goal       Patient    Associated  Recent Progress  Patient-Stat  Aut

hor



                     Goal Type           Problems            ed? 

 

     



                 Resume normal activities  [facility]              Lizette Zacarias RN



documented as of this encounter



Visit Diagnoses









                                         Diagnosis

 





                                         Renal cell carcinoma of left kidney (HC

C)

 





                                         CKD (chronic kidney disease) stage 3, G

FR 30-59 ml/min (HCC)



                                         Chronic kidney disease, Stage III (mode

rate)





* Assessment & Plan Note - Marvin Tay MD - 06/15/2020 10:00 PM CDT



Associated Problem(s): Renal cell carcinoma of left kidney (HCC)



69 y.o. male with history of lymphoma, LEFT renal mass s/p LEFT robotic partial 
nephrectomy on 2020 T1a clear cell renal cell carcinoma, grade 2 with necro
sis, margins negative. 



Plan:

- Patient plans to continue follow up surveillance for recurrence of mass with jerome robles urologist Dr. Tawil given long distance to [facility] 

- Outlined typical surveillence plan to patient and family for pT1a masses with 
negative margins including imaging out to 3 years post-op and BMPs

- He will transfer care back to Dr. Tawil and follow up with [facility] prn.



Electronically signed by Marvin Tay MD at 06/15/2020 10:03 PM CDT

documented in this encounter

## 2020-08-10 NOTE — XMS REPORT
Encounter Summary

                             Created on: 08/10/2020



Cas Lewis

External Reference #: [id number]

: 1950

Sex: Male



Demographics





                          Address                   [address] [place]  [address]

 

                          Home Phone                [phone]

 

                          Preferred Language        English

 

                          Marital Status            Unknown

 

                          B[employer]ist Affiliation     [employer]

 

                          Race                      White

 

                          Ethnic Group              Not  or 





Author





                          Author                    [facility]

 

                          Organization              [facility]

 

                          Address                   Unknown

 

                          Phone                     Unavailable







Support





                Name            Relationship    Address         Phone

 

                Kendy Richardson ECON            Unknown         [phone]

19







Care Team Providers





                    Care Team Member Name Role                Phone

 

                    Arnaldo Singh         PCP                 [phone]

 

                    Tawil, Elias A MD   Unavailable         [phone]







Encounter Details





                          Care Team                 Description



                     Date                Type                Department  

 

                                        



Nabil Lee MD



 [address]



[facility] [address]



[place] [address] [phone] (Fax)                      Left renal mass (Primary Dx)



                     2020          Prep for Case       [facility]  



                                          [address]  



                                         [address]  



                                         [place]  



                                         [address] [phone]  







Social History





                                        Date



                 Tobacco Use     Types           Packs/Day       Years Used 

 

                                        Quit: 



                           Former Smoker             Pipe   

 

    



                     Smokeless Tobacco: Former  Chew                Quit: 



                                         User   







                    Drinks/Week         oz/Week             Comments



                                         Alcohol Use   

 

                                                             



                                         Never   







  



                     Alcohol Habits      Answer              Date Recorded

 

  



                     How often do you have a drink containing alcohol?  Never   

            2020

 

  



                           How many drinks containing alcohol do you have on  No

t asked 



                                         a typical day when you are drinking?  

 

  



                           How often do you have six or more drinks on one  Not 

asked 



                                         occasion?  







 



                           Sex Assigned at Birth     Date Recorded

 

 



                                         Male 







                                        Industry



                           Job Start Date            Occupation 

 

                                        Not on file



                           Not on file               Not on file 







                                        Travel End



                           Travel History            Travel Start 

 





                                         No recent travel history available.







                                        Date Recorded



                           COVID-19 Exposure         Response 

 

                                        2020  9:24 AM CDT



                           In the last month, have you been in contact with  No 

/ Unsure 



                                         someone who was confirmed or suspected 

to have  



                                         Coronavirus / COVID-19?  



documented as of this encounter



Plan of Treatment





Not on filedocumented as of this encounter



Goals





     



            Goal       Patient    Associated  Recent Progress  Patient-Stat  Aut

hor



                     Goal Type           Problems            ed? 

 

     



                 Resume normal activities  Hospital                      Lizette Zacarias RN



documented as of this encounter



Visit Diagnoses









                                         Diagnosis

 





                                         Left renal mass



                                         Unspecified disorder of kidney and uret

er



documented in this encounter

## 2020-08-10 NOTE — XMS REPORT
Encounter Summary

                             Created on: 08/10/2020



Cas Lewis

External Reference #: [id number]

: 1950

Sex: Male



Demographics





                          Address                   [address] [place]  [address]

 

                          Home Phone                [phone]

 

                          Preferred Language        English

 

                          Marital Status            Unknown

 

                          Restoration Affiliation     [facility]

 

                          Race                      White

 

                          Ethnic Group              Not  or 





Author





                          Author                    [facility]

 

                          Organization              [facility]

 

                          Address                   Unknown

 

                          Phone                     Unavailable







Support





                Name            Relationship    Address         Phone

 

                Kendy Richardson ECON            Unknown         [phone]

19







Care Team Providers





                    Care Team Member Name Role                Phone

 

                    Arnaldo Singh         PCP                 [phone]

 

                    Tawil, Elias A MD   Unavailable         [phone]







Encounter Details





                          Care Team                 Description



                     Date                Type                Department  

 

                                        



[facility] Johnson                          Preop testing



                     2020          Orders Only         [facility]  



                                          [address]  



                                         [address]  



                                         [place]  



                                         [id number] [phone]  







Social History





                                        Date



                 Tobacco Use     Types           Packs/Day       Years Used 

 

                                        Quit: 



                           Former Smoker             Pipe   

 

    



                     Smokeless Tobacco: Former  Chew                Quit: 



                                         User   







                    Drinks/Week         oz/Week             Comments



                                         Alcohol Use   

 

                                                             



                                         Never   







  



                     Alcohol Habits      Answer              Date Recorded

 

  



                     How often do you have a drink containing alcohol?  Never   

            2020

 

  



                           How many drinks containing alcohol do you have on  No

t asked 



                                         a typical day when you are drinking?  

 

  



                           How often do you have six or more drinks on one  Not 

asked 



                                         occasion?  







 



                           Sex Assigned at Birth     Date Recorded

 

 



                           Male                      2020  6:16 AM CDT







                                        Industry



                           Job Start Date            Occupation 

 

                                        Not on file



                           Not on file               Not on file 







                                        Travel End



                           Travel History            Travel Start 

 





                                         No recent travel history available.



documented as of this encounter



Functional Status





                                        Date of Assessment



                           Functional Status         Response 

 

                                        2020



                           Does the patient have a hearing impairment:  No 

 

                                        2020



                           Does the patient have a visual impairment:  No 

 

                                        2020



                           Does the patient have impaired ambulation:  No 

 

                                        2020



                           Does the patient have an activity of daily living  No

 



                                         (ADL) impairment:  

 

                                        2020



                           Does the patient have an instrumental activity of  No

 



                                         daily living (IADL) impairment:  







                                        Date of Assessment



                           Cognitive Status          Response 

 

                                        2020



                           Does the patient have a cognitive impairment:  No 



documented as of this encounter



Plan of Treatment





Not on filedocumented as of this encounter



Goals





     



            Goal       Patient    Associated  Recent Progress  Patient-Stat  Aut

hor



                     Goal Type           Problems            ed? 

 

     



                 Resume normal activities  Hospital        No              Lizette Zacarias RN



documented as of this encounter



Procedures





                                        Comments



                 Procedure Name  Priority        Date/Time       Associated Diag

nosis 

 

                                         



                 COVID-19 (SARS-COV-2) PCR  Routine         05/15/2020      Preo

p testing 



documented in this encounter



Results

* COVID-19 (SARS-COV-2) PCR (05/15/2020)



    



              Component    Value        Ref Range    Performed At  Pathologist



                                         Signature

 

    



                           COVID-19                   [facility] 



                                         (SARS-CoV-2)    



                                         PCR    

 

    



                           COVID-19                   [facility] 



                                         (SARS-CoV-2)    



                                         PCR Source    











                                         Specimen

 





                                         Nasopharyngeal Swab







 



                           Narrative                 Performed At

 

 



                                         This result has an attachment that is n

ot available. 







   



                 Performing Organization  Address         City/State/Zipcode  Ph

one Number

 

   



                      [facility]         [address] [place]

 [address] 





documented in this encounter



Visit Diagnoses









                                         Diagnosis

 





                                         Preop testing



                                         Preoperative examination, unspecified



documented in this encounter

## 2020-08-10 NOTE — XMS REPORT
Encounter Summary

                             Created on: 08/10/2020



Cas Lewis

External Reference #: [id number]

: 1950

Sex: Male



Demographics





                          Address                   [address] [place]  [address]

 

                          Home Phone                [phone]

 

                          Preferred Language        English

 

                          Marital Status            Unknown

 

                          Gnosticism Affiliation     [facility]

 

                          Race                      White

 

                          Ethnic Group              Not  or 





Author





                          Author                    [facility]

 

                          Organization              [facility]

 

                          Address                   Unknown

 

                          Phone                     Unavailable







Support





                Name            Relationship    Address         Phone

 

                Kendy CHAVEZ            Unknown         [phone]

19







Care Team Providers





                    Care Team Member Name Role                Phone

 

                    Arnaldo Singh         PCP                 [phone]

 

                    Tawil, Elias A MD   Unavailable         [phone]







Reason for Visit

* 



 



                           Reason                    Comments

 

 



                                         Navigation Assessment 









Encounter Details





                          Care Team                 Description



                     Date                Type                Department  

 

                                        



Nabil Lee MD



 [address]



[facility] [address]



[place] [address]



[phone] (Fax)                      Navigation Assessment



                     2020          Telephone           [facility]  



                                         [facility] [address] [address]  



                                         [phone]  







Social History





                                        Date



                 Tobacco Use     Types           Packs/Day       Years Used 

 

                                         



                                         Never Assessed    







 



                           Sex Assigned at Birth     Date Recorded

 

 



                                         Male 







                                        Industry



                           Job Start Date            Occupation 

 

                                        Not on file



                           Not on file               Not on file 







                                        Travel End



                           Travel History            Travel Start 

 





                                         No recent travel history available.



documented as of this encounter



Miscellaneous Notes

* Telephone Encounter - Ayleen Hoyt RN - 2020  2:12 PM CDT



Formatting of this note might be different from the original.

Navigation Intake Assessment Document



Patient Name:  Cas Lewis

:  1950

Insurance:   Medicare



Future Appointments 

Date Time Provider Department Center 

2020 10:00 AM Nabil Lee MD [facility] 



Diagnosis & Reason for Visit:  Left Renal Mass



Physician Info:

 Referring Physician:  Elias Tawil

 PCP:  Arnaldo Singh



Location of Films:  [facility] 



Location of Pathology:  None



History of Present Illness:  Mr. Lewis is a 69 year old gentleman referred by
Dr. Tawil for left renal mass concerning for renal cell carcinoma.  The mass is 
described as 3.5 x 3.3 cm in size and enhancing.  CT was ordered by Dr. Chin for
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  CT also identified an a
bnormality in the bladder but Dr. Tawil performed a cystoscopy and the abnormali
ty was noted to be the prostate protruding into the bladder.  Patient also has a
history of elevated PSA but prostate biopsy done in 2019 showed only b
enign tissue.  Dr. Chin's note references a remote history of colon cancer.  Ot
er medical history includes hypertension.  Patient formerly smoked a pipe for ab
out three years.



Imaging:  3-16-20 CT Chest W Ab/Pel W/WO - Impression:  1.  Enhancing left renal
mass concerning for renal malignancy.  2.  3 mm upper lobe pulmonary nodule.  3.
 Apparent filling defect of the urinary bladder just to the right of midline.  
This potentially could reflect a primary urinary bladder lesion vs eccentric pr
otrusion of the prostate gland.  Correlation with cystoscopy is recommended.  



Prior Treatment (XRT, Surgery, Chemotherapy):  None



Comments:  This RN spoke to patient who verbalized understanding of date, time a
nd location of appointment.  Gave direct contact information for further questio
ns or concerns.  Appointment information sent to patient via mail.  Patient noti
fied of zero visitor policy.  ANTONETTE Naranjo







Electronically signed by Ayleen Hoyt, RN at 2020  8:38 AM CDT

documented in this encounter



Plan of Treatment





Not on filedocumented as of this encounter



Goals





     



            Goal       Patient    Associated  Recent Progress  Patient-Stat  Aut

hor



                     Goal Type           Problems            ed? 

 

     



                 Resume normal activities  [facility]              Lizette Zacarias RN



documented as of this encounter



Visit Diagnoses

Not on filedocumented in this encounter

## 2020-08-10 NOTE — XMS REPORT
Encounter Summary

                             Created on: 08/10/2020



Cas Lewis

External Reference #: [id number]

: 1950

Sex: Male



Demographics





                          Address                   [address]  [address]

 

                          Home Phone                [phone]

 

                          Preferred Language        English

 

                          Marital Status            Unknown

 

                          [employer]ian Affiliation     [facility]

 

                          Race                      White

 

                          Ethnic Group              Not  or 





Author





                          Author                    [facility]

 

                          Organization              [facility]

 

                          Address                   Unknown

 

                          Phone                     Unavailable







Support





                Name            Relationship    Address         Phone

 

                Kendy Richardson ECON            Unknown         [phone]

19







Care Team Providers





                    Care Team Member Name Role                Phone

 

                    Arnaldo Singh         PCP                 [phone]

 

                    Tawil, Elias A MD   Unavailable         [phone]







Encounter Details





                          Care Team                 Description



                     Date                Type                Department  

 

                                                     



                           2020                Travel   







Social History





                                        Date



                 Tobacco Use     Types           Packs/Day       Years Used 

 

                                        Quit: 



                           Former Smoker             Pipe   

 

    



                     Smokeless Tobacco: Former  Chew                Quit: 



                                         User   







                    Drinks/Week         oz/Week             Comments



                                         Alcohol Use   

 

                                                             



                                         Never   







  



                     Alcohol Habits      Answer              Date Recorded

 

  



                     How often do you have a drink containing alcohol?  Never   

            2020

 

  



                           How many drinks containing alcohol do you have on  No

t asked 



                                         a typical day when you are drinking?  

 

  



                           How often do you have six or more drinks on one  Not 

asked 



                                         occasion?  







 



                           Sex Assigned at Birth     Date Recorded

 

 



                                         Male 







                                        Industry



                           Job Start Date            Occupation 

 

                                        Not on file



                           Not on file               Not on file 







                                        Travel End



                           Travel History            Travel Start 

 





                                         No recent travel history available.







                                        Date Recorded



                           COVID-19 Exposure         Response 

 

                                        2020  9:24 AM CDT



                           In the last month, have you been in contact with  No 

/ Unsure 



                                         someone who was confirmed or suspected 

to have  



                                         Coronavirus / COVID-19?  



documented as of this encounter



Plan of Treatment





Not on filedocumented as of this encounter



Goals





     



            Goal       Patient    Associated  Recent Progress  Patient-Stat  Aut

hor



                     Goal Type           Problems            ed? 

 

     



                 Resume normal activities  [facility]        No              Lizette Zacarias RN



documented as of this encounter



Visit Diagnoses

Not on filedocumented in this encounter

## 2020-08-10 NOTE — XMS REPORT
Encounter Summary

                             Created on: 08/10/2020



Cas Lewis

External Reference #: [id number]

: 1950

Sex: Male



Demographics





                          Address                   [address] [place]  [address]

 

                          Home Phone                [phone]

 

                          Preferred Language        English

 

                          Marital Status            Unknown

 

                          [place] Affiliation     [facility]

 

                          Race                      White

 

                          Ethnic Group              Not  or 





Author





                          Author                    [facility] 

System

 

                          Organization              [facility]

 

                          Address                   Unknown

 

                          Phone                     Unavailable







Support





                Name            Relationship    Address         Phone

 

                Kendy Richardson ECON            Unknown         [phone]

[phone]







Care Team Providers





                    Care Team Member Name Role                Phone

 

                    Arnaldo Singh         PCP                 [phone]

 

                    Tawil, Elias A MD   Unavailable         [phone]







Encounter Details





                          Care Team                 Description



                     Date                Type                Department  

 

                                        



Nabil Lee MD



 [address]



[facility] [address]



[place] [address]



[phone] (Fax)                       



                     06/15/2020          [facility]                 [facility]  







Social History





                                        Date



                 Tobacco Use     Types           Packs/Day       Years Used 

 

                                        Quit: 



                           Former Smoker             Pipe   

 

    



                     Smokeless Tobacco: Former  Chew                Quit: 



                                         User   







                    Drinks/Week         oz/Week             Comments



                                         Alcohol Use   

 

                                                             



                                         Never   







  



                     Alcohol Habits      Answer              Date Recorded

 

  



                     How often do you have a drink containing alcohol?  Never   

            2020

 

  



                           How many drinks containing alcohol do you have on  No

t asked 



                                         a typical day when you are drinking?  

 

  



                           How often do you have six or more drinks on one  Not 

asked 



                                         occasion?  







 



                           Sex Assigned at Birth     Date Recorded

 

 



                           Male                      2020  6:16 AM CDT







                                        Industry



                           Job Start Date            Occupation 

 

                                        Not on file



                           Not on file               Not on file 







                                        Travel End



                           Travel History            Travel Start 

 





                                         No recent travel history available.







                                        Date Recorded



                           COVID-19 Exposure         Response 

 

                                        6/15/2020 11:02 AM CDT



                           In the last month, have you been in contact with  No 

/ Unsure 



                                         someone who was confirmed or suspected 

to have  



                                         Coronavirus / COVID-19?  



documented as of this encounter



Functional Status





                                        Date of Assessment



                           Functional Status         Response 

 

                                        2020



                           Does the patient have a hearing impairment:  No 

 

                                        2020



                           Does the patient have a visual impairment:  No 

 

                                        2020



                           Does the patient have impaired ambulation:  No 

 

                                        2020



                           Does the patient have an activity of daily living  No

 



                                         (ADL) impairment:  

 

                                        2020



                           Does the patient have an instrumental activity of  No

 



                                         daily living (IADL) impairment:  







                                        Date of Assessment



                           Cognitive Status          Response 

 

                                        2020



                           Does the patient have a cognitive impairment:  No 



documented as of this encounter



Medications at Time of Discharge





                          Start Date                End Date



                 Medication      Sig             Dispensed       Refills  

 

                          2020                 



                     acetaminophen (TYLENOL)  Take two            0  



                           325 mg tablet             tablets by    



                                         mouth every 6    



                                         hours as    



                                         needed.    

 

                          2020                 



                     amitriptyline (ELAVIL) 25  Take 25 mg by       0  



                           mg tablet                 mouth at    



                                         bedtime    



                                         daily.    

 

                          2020                 



                     atenoloL (TENORMIN) 50 mg  Take 50 mg by       0  



                           tablet                    mouth daily.    

 

                          2020                 



                     doxazosin (CARDURA) 4 mg  Take 4 mg by        0  



                           tablet                    mouth daily.    

 

                          2020                 



                 oxyCODONE (ROXICODONE) 5  Take one        30 tablet       0  



                           mg tablet                 tablet to two    



                                         tablets by    



                                         mouth every 4    



                                         hours as    



                                         needed    

 

                          2020                 



                     pantoprazole DR     Take 40 mg by       0  



                           (PROTONIX) 40 mg tablet   mouth daily.    

 

                          2020                 



                 polyethylene glycol 3350  Take            527 g           3  



                           (MIRALAX) 17 gram/dose    seventeen g    



                           powder                    by mouth    



                                         daily.    

 

                          2020                 



                 senna/docusate (SENNA-S)  Take one        30 tablet       0  



                           8.6/50 mg tablet          tablet by    



                                         mouth twice    



                                         daily.    



documented as of this encounter



Plan of Treatment





Not on filedocumented as of this encounter



Goals





     



            Goal       Patient    Associated  Recent Progress  Patient-Stat  Aut

hor



                     Goal Type           Problems            ed? 

 

     



                 Resume normal activities  [facility]              Lizette Zacarias RN



documented as of this encounter



Procedures





                                        Comments



                 Procedure Name  Priority        Date/Time       Associated Diag

nosis 

 

                                        



Results for this procedure are in the results section.



                 HC BASIC METABOLIC PANEL  Routine         06/15/2020      Left 

renal mass 



                                         11:08 AM CDT  



documented in this encounter



Results

* BASIC METABOLIC PANEL (06/15/2020 11:08 AM CDT)



    



              Component    Value        Ref Range    Performed At  Pathologist



                                         Signature

 

    



                 Sodium          136 (L)         137 - 147 MMOL/L  [facility] 

 

    



                 Potassium       4.3             3.5 - 5.1 MMOL/L  [facility] 

 

    



                 Chloride        106             98 - 110 MMOL/L  [facility] 

 

    



                 CO2             24              21 - 30 MMOL/L  [facility] 

 

    



                 Anion Gap       6               3 - 12          [facility] 

 

    



                 Glucose         81              70 - 100 MG/DL  [facility] 

 

    



                 Blood Urea      15              7 - 25 MG/DL    [facility] 



                                         Nitrogen    

 

    



                 Creatinine      1.56 (H)        0.4 - 1.24 MG/DL  [facility] 

 

    



                 Calcium         9.1             8.5 - 10.6 MG/DL  [facility] 

 

    



                 eGFR Non        44 (L)          >60 mL/min      [facility] 



                                              Comment:   



                           [employer]                  The eGFR is not validated f

or   



                                         use in drug dosing   



                                         adjustments. Continue to use   



                                         estimated creatinine   



                                         clearance per dosing reference   



                                         text. Please contact the   



                                         Clinical Pharmacist for   



                                         questions.   

 

    



                 eGFR     54 (L)          >60 mL/min      [facility] 



                           [employer]                  Comment:   



                                         The eGFR is not validated for   



                                         use in drug dosing   



                                         adjustments. Continue to use   



                                         estimated creatinine   



                                         clearance per dosing reference   



                                         text. Please contact the   



                                         Clinical Pharmacist for   



                                         questions.   











                                         Specimen

 





                                         Blood







   



                 Performing Organization  Address         City/State/Zipcode  Ph

one Number

 

   



                     [facility]         [address] [place]

 [address] 





documented in this encounter



Visit Diagnoses









                                         Diagnosis

 





                                         Left renal mass



                                         Unspecified disorder of kidney and uret

er



documented in this encounter

## 2020-08-10 NOTE — XMS REPORT
Patient Summarization Differential

                             Created on: 08/10/2020



JAS BRODERICK

External Reference #: [id number]

: 1950

Sex: Male



Demographics





                          Address                   [address]



 

                          Home Phone                [phone]

 

                          Preferred Language        English

 

                          Marital Status            Unknown

 

                          Religion Affiliation     Unknown

 

                          Race                      White

 

                          Ethnic Group              Unknown





Author





                          Author                    [place]  [employer]

 

                          [facility]              [place] [facility] [employer]

 

                          Address                   [address] [place] [address]



 

                          Phone                     [phone]







Care Team Providers





                    Care Team Member Name Role                Phone

 

                    ARNALDO GOMEZ S       Unavailable         [phone]

 

                    ROOSEVELT ALVA MD    Unavailable         Unavailable

 

                    PAONI, ARNALDO         Unavailable         Unavailable

 

                    PAONI, ARNALDO         Unavailable         Unavailable

 

                    PAONI, ARNALDO         Unavailable         Unavailable

 

                    BROWN, TERRY      Unavailable         Unavailable

 

                    BROWN, TERRY      Unavailable         Unavailable

 

                    BROWN, TERRY      Unavailable         Unavailable

 

                    ROOSEVELT ALVA MD    Unavailable         Unavailable

 

                    SOLANGE ALBERTO MD     Unavailable         Unavailable

 

                    ARNALDO GOMEZ DO S    Unavailable         Unavailable

 

                    JOSE HONEYCUTT MD Unavailable         Unavailable

 

                    SOLANGE ALBERTO MD     Unavailable         Unavailable

 

                    PADILAN STAUFFER ARNALDO S    Unavailable         Unavailable

 

                    DUCHENEMIRANDA      Unavailable         Unavailable

 

                    DUCHENE, MIRANDA      Unavailable         Unavailable

 

                    DUCHENE, MIRANDA      Unavailable         Unavailable

 

                    ANUSHA SNYDER MD   Unavailable         Unavailable

 

                    HOWAYEK, JENNY      Unavailable         Unavailable

 

                    HOWAYEK, JENNY      Unavailable         Unavailable

 

                    HOWAYEK, JENNY      Unavailable         Unavailable

 

                    BROKOB, RD       Unavailable         Unavailable

 

                    BROKOB, RD       Unavailable         Unavailable

 

                    BROKOB, RD       Unavailable         Unavailable

 

                    LEISURE, LYNIETA    Unavailable         Unavailable

 

                    PADILAN, ARNALDO         Unavailable         Unavailable

 

                    PADILAN, ARNALDO         Unavailable         Unavailable

 

                    EDITH FARLEY MD  Unavailable         Unavailable

 

                          Unavailable               Unavailable

 

                          Unavailable               Unavailable

 

                          Unavailable               Unavailable

 

                          Unavailable               Unavailable

 

                          Unavailable               Unavailable



                                            



Allergies

                      



      



           Allergy   Reported Allergen(s)  Allergy Type  Date of   Reaction(s)  

Care      

Facility



                     Classificati        Onset               Provider 



                                         on      

 

      



            Sulfonamides  Sulfonamides (Antibiotic)  Drug Allergy  2014   

[facility]

Via



                     (antibiotic)        TANGELA Bal



                           (21 sources)              [facility]



                                         ([id number])

 

      



              Unclassified  SULFACETAMIDE    JENNY FERRIS        Not



              (27 sources)  SODIUM-SULFUR    MODERATE     HOWAYEK      Available



                                         ([id number])



                                                                                
       



Encounters

                      



    



              Encounter Date  Encounter Type  Encounter Diagnosis  Care Provider

  Facility

 

    



                 Start:          Patient encounter   EDITH FARLEY MD  [facility] 

[place]



                     2020          [facility]

 

    



                 Start:          Patient encounter   [facility]

carmen #1



                     2020          procedure           [facility]



                                         

    



                                         End:    



                                         2020    

 

    



                 [facility]          [facility]



                     [facility]          [facility]           [facility]



                                         

    



                                         End:    



                                         2020    

 

    



                 [facility]          [facility]

[facility] #[facility]



                     07-          procedure           of [facility]



                                         

    



                                         End:    



                                         07-    

 

    



                 Start:          Patient encounter   ANUSHA SNYDER MD  [facility] 

hristi



                     07-          procedure           [facility]

 

    



                 [facility]:          [facility]



                     [facility]-          procedure           of [facility]



                                         

    



                                         End:    



                                         07-    

 

    



                 Start:          Patient encounter   NARVAEZ REMY MD  [facility]

isti



                     2020          procedure           [facility]

 

    



                 Start:          Patient encounter   ANUSHA SNYDER MD  [facility] 

hristi



                     2020          procedure           [facility]

 

    



                 Start:          Patient encounter   [facility]

strict #1



                     05-          procedure           of [facility]



                                         

    



                                         End:    



                                         05-    

 

    



                 Start:          Patient encounter   NARVAEZ REMY MD  [facility]

isti



                     2020          procedure           [facility]



                                         

    



                                         End:    



                                         2020    

 

    



                 Start:          Patient encounter   NARVAEZ REMY MD  [facility]

isti



                     2020          procedure           [facility]

 

    



                 Start:          Patient encounter   NARVAEZ REMY MD  [facility]

isti



                     2020          procedure           [facility]



                                         

    



                                         End:    



                                         2020    

 

    



                 Start:          Patient encounter   NARVAEZ REMY MD  [facility]

isti



                     2020          procedure           [facility]

 

    



                 Start:          Patient encounter   NARVAEZ REMY MD  [facility]

isti



                     2020          procedure           [facility]

 

    



                 Start:          Patient encounter   NARVAEZ REMY MD  [facility]

isti



                     2020          procedure           [facility]

 

    



                 Start:          Patient encounter   NARVAEZ REMY MD  [facility]

isti



                     2020          procedure           [facility]

 

    



                 Start:          Patient encounter   NARVAEZ REMY MD  [facility]

isti



                     2020          procedure           [facility]

 

    



                 Start:          Patient encounter   NARVAEZ REMY MD  [facility]

isti



                     2020          procedure           [facility]

 

    



                 Start:          Pre-operative   ROOSEVELT ALVA MD  [facility]



                     2020          examination,        [facility]



                           unspecified               ([id number])

 

    



                 Start:          Encounter for other  ROOSEVELT ALVA MD  [facility] 

hristi



                     2020          preprocedural       [facility]



                           examination               ([id number])

 

    



                 Start:          Patient encounter   NARVAEZ REMY MD  [facility]

isti



                     2020          procedure           [facility]

 

    



                 Start:          Patient encounter   [facility]

strict #1



                     2020          procedure           of [facility]



                                         

                                         ([id number])



                                         End:    



                                         2020    

 

    



                 Start:          Patient encounter   [facility]

strict #1



                     2020          procedure           of [facility]



                                         

                                         ([id number])



                                         End:    



                                         2020    

 

    



                 Start:          Patient encounter   [facility]

strict #1



                     2020          procedure           of [facility]



                                         

                                         ([id number])



                                         End:    



                                         2020    

 

    



                 Start:          Patient encounter   [facility]

strict #1



                     01-          procedure           of [facility]



                                         

                                         ([id number])



                                         End:    



                                         01-    

 

    



                 Start:          Patient encounter   [facility]

strict #1



                     2020          procedure           of [facility]



                                         

                                         ([id number])



                                         End:    



                                         2020    

 

    



                 Start:          Patient encounter   [facility]

strict #1



                     2019          procedure           of [facility]



                                         

                                         ([id number])



                                         End:    



                                         2019    

 

    



                 Start:          Patient encounter   [facility]

strict #1



                     2019          procedure           of [facility]



                                         

                                         ([id number])



                                         End:    



                                         2019    

 

    



                 Start:          Patient encounter   [facility]

strict #1



                     2019          procedure           of [facility]



                                         

                                         ([id number])



                                         End:    



                                         2019    

 

    



                 Start:          Patient encounter   [facility]

strict #1



                     2019          procedure           of [facility]



                                         

                                         ([id number])



                                         End:    



                                         2019    

 

    



                 Start:          Patient encounter   NA NA           Not [facility]

le ([id number])



                           2017                procedure   



                                         

    



                                         End:    



                                         2017    

 

    



                 Start:          Patient encounter   ROOSEVELT ALVA MD  Not [facility]

ble ([id number])



                           2016                procedure   



                                         

    



                                         End:    



                                         2016    

 

    



                 Start:          Patient encounter   ROOSEVELT ALVA MD  [facility] 

risti



                     2016          procedure           [facility]



                                         

    



                                         End:    



                                         2016    

 

    



                 Start:          Patient encounter   ROOSEVELT ALVA MD  [facility] 

risti



                     2016          procedure           [facility]



                                         

    



                                         End:    



                                         2016    

 

    



                 Start:          Patient encounter   ROOSEVELT ALVA MD  [facility] 

risti



                     2014          procedure           [facility]



                                         

    



                                         End:    



                                         2014    

 

    



                 Start:          Patient encounter   JOSE HONEYCUTT MD  [facility]

 [place]



                     2010          procedure           [facility]



                                         

    



                                         End:    



                                         2010    

 

    



                     Encounter for other  [facility] 

#1



                           general examination       of [facility]



                                         ([id number])



                                                                                
                                                                                
                                                                                
                                                                                
                                                                                
                                 



Medical Equipment

          No Information                                                        
 



Goals

          No Information                                                        
 



Immunizations

                      The data below is from unstructured sourcesNo immunization
records.No immunization records.No immunization records.                        
                                           



Interventions

          No Information                                                        
 



Medications

                      



    



              Medication   Drug         Dates        Sig          Sig (Original)



                           Class(es)                 (Normalized) 

 

    



                     cloNIDine           Central             Start:  



                     (1 source)          alpha-2             2020  



                           Adrenergic                

  



                           Agonist                   End:  



                                         2020  

 

    



                           Normal saline             Start:  



                           (1 source)                2020  



                                         

  



                                         End:  



                                         2020  



                                                                                
       



Payers

                      



   



                 Date            Payer           Normalized Payer  Policy ID

 

   



                           MEDICARE                  MEDICARE 

 

   



                           ALTERNATIVE RISK SERVICES  Unavailable / Unknown 



                                                                                
       



Plan of Treatment

                      The data below is from unstructured sources            



                          Discharge Date                   16 12:00pm     

           

 

                          Prescriptions                   See Medication Section

                



            



                          Discharge Date                   16 11:00am     

           

 

                          Instructions/Education Provided                   [facility]

NOSCOPY                   



EGD-ESOPHAGOGASTRODUODENOSCOPY                    

Gastritis (DC)                    

Hiatal Hernia (DC)                    

Ulcer and Gastritis Diet                                  

 

                          Prescriptions                   See Medication Section

                



No plan of care.                                                                
   



Problems

                      



     



            Problem    Problem    Date Last  Documented  Episodic/Chr  Provider



                 Classificati    Recorded        Date            onic 



                                         on     

 

     



              Abdominal    Diaphragmatic hernia without  08-   Episodic  

   TAKAAKI KIDO



                     hernia              obstruction or gangrene ;     MD



                                         

                                         Translations: [Diaphragmatic hernia    



                           (7 sources)               without mention of obstruct

ion or    



                                         gangrene]    

 

     



              Acute        Acute bronchitis, unspecified ;  2020   Episodi

c     TERRY



                     bronchitis          Translations: [Acute bronchitis]     BR

OWN



                                         (13 sources)     

 

     



              Cancer of    Personal history of other malignant  08-   Epi

sodic     TAKAAKI 

KIDO



                     colon               neoplasm of large intestine ;     MD



                           (10 sources)              Translations: [Personal his

tory of    



                                         malignant neoplasm of large    



                                         intestine]    

 

     



              Cancer of    Malignant neoplasm of unspecified  2020   Chron

ic      BASSHIMON FARLEY



                     kidney and          kidney, except renal pelvis     MD



                                         renal pelvis     



                                         (3 sources)     

 

     



              Conditions   Dizziness and giddiness  2020   Episodic     LY

NIETA



                           associated                LEISURE



                                         with     



                                         dizziness or     



                                         vertigo     



                                         (10 sources)     

 

     



              Disorders of  Mixed hyperlipidemia  2020   Chronic      DAMIEN FARLEY



                           lipid                     MD



                                         metabolism     



                                         (3 sources)     

 

     



              Esophageal   Gastro-esophageal reflux disease  08-   Chroni

c      TAKAAKI 

KIDO



                     disorders           with esophagitis ; Translations:     MD



                           (8 sources)               [Reflux esophagitis]    

 

     



              Essential    Essential (primary) hypertension ;  08-   

magali      TAKAAKI 

KIDO



                     hypertension        Translations: [Unspecified     MD



                           (22 sources)              essential hypertension]    

 

     



              Gastritis    Gastritis, unspecified, without  08-   Episodi

c     TAKAAKI KIDO



                     and                 bleeding ; Translations:     MD



                           duodenitis                [Unspecified gastritis and 

   



                           (7 sources)               gastroduodenitis, without m

ention    



                                         of hemorrhage]    

 

     



              Hemorrhoids  Second degree hemorrhoids ;  08-   Episodic   

  TAKAAKI KIDO



                     (8 sources)         Translations: [Internal hemorrhoids    

 MD



                                         without mention of complication]    

 

     



              Non-Hodgkin`  Non-Hodgkin lymphoma, unspecified,  2020   Chr

onic      SOLANGE ALBERTO



                     s lymphoma          unspecified site     MD



                                         (5 sources)     

 

     



              Other and    Polyp of colon  08-   Episodic     TAKAAKI KID

O



                           unspecified               MD



                                         benign     



                                         neoplasm     



                                         (6 sources)     

 

     



                 Other and       Benign neoplasm of rectum and anal    Episodic 

       TAKAAKI KIDO



                     unspecified         canal               MD



                                         benign     



                                         neoplasm     



                                         (1 source)     

 

     



                 Other and       Personal history of colonic polyps    Episodic 

       TAKAAKI KIDO



                           unspecified               MD



                                         benign     



                                         neoplasm     



                                         (1 source)     

 

     



                 Other           Disorder of kidney and ureter,    Episodic     

   SOLANGE ALBERTO



                     diseases of         unspecified         MD



                                         kidney and     



                                         ureters     



                                         (3 sources)     

 

     



              Other ear    Impacted cerumen, right ear  2020   Episodic   

  LYNIETA



                           and sense                 [facility]



                                         organ     



                                         disorders     



                                         (10 sources)     

 

     



                 Other lower     Solitary pulmonary nodule    Episodic        TONIA ALBERTO



                           respiratory               MD



                                         disease     



                                         (3 sources)     

 

     



              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

ic      SOLANGE ALBERTO



                     nutritional;        metabolism, not elsewhere     MD



                           endocrine;                classified    



                                         and     



                                         metabolic     



                                         disorders     



                                         (21 sources)     

 

     



                 Other           Special screening for malignant    Episodic    

    JOSE



                     screening           neoplasms of colon     TANGELA TINSLEY



                                         for     



                                         suspected     



                                         conditions     



                                         (not mental     



                                         disorders or     



                                         infectious     



                                         disease)     



                                         (1 source)     

 

     



              Residual     Family history of malignant  08-   Episodic   

  TAKAAKI KIDO



                     codes;              neoplasm of digestive organs     MD



                                         unclassified     



                                         (24 sources)     

 

     



                 Residual        Family history of malignant    Episodic        

JOSE



                     codes;              neoplasm of gastrointestinal tract     

TANGELA TINSLEY



                                         unclassified     



                                         (1 source)     

 

     



              Residual     Family history of malignant  2020   Episodic   

  SOLANGE ALBERTO



                     codes;              neoplasm of ovary     MD



                                         unclassified     



                                         (21 sources)     

 

     



              Residual     Other specified postprocedural  2020   Episodic

     ANUSHA TAHA



                     codes;              states              MD



                                         unclassified     



                                         (2 sources)     

 

     



              Sprains and  Strain of muscle, fascia and tendon  2020   Epi

sodic     JENNY



                     strains             of abdomen, initial encounter ;     JABARI LINTON



                           (10 sources)              Translations: [Other specif

ied    



                                         sites of sprains and strains]    

 

     



              Syncope      Syncope and collapse  2020   Episodic     LYNTAMIKA

TA



                           (9 sources)               [facility]ISURE



                                                                                
                                                                                
                                                                                
                                                                           



Procedures

                      The data below is from unstructured sourcesNo known 
history of procedures.                                                          
         



Results

                      



     



            Test Name  Value      Interpreta  Reference  Facilit    Date



                 tion            Range           y               Time

 

 



                                         laboratory



                                         on



                                         2020

 

     



            Basophils (Bld)  0.0 10*3/uL  Invalid    0.0-0.2    [facility]    



              [#/Vol]      Interpreta   K/uL         l            020



                     tion Code           Distric             14:15-0



                           t #1 of                   [address] [place] [address] 



                                         () 

 

     



            Basophils/100 WBC  0.40 %     Invalid    0.00-2.50  [facility]    



              (Bld)        Interpreta   %            l            020



                     tion Code           Distric             14:15-0



                           t #1 of                   [address] [place] [address] 



                                         () 

 

     



            Calcium.ionized ISE  1.23       Invalid    1.12-1.32  [facility]    



              [Moles/Vol]   Interpreta   mmol/L       l            020



                     tion Code           Distric             14:15-0



                           t #1 of                   [address] [place] [address] 



                                         ([id number]) 

 

     



            Chloride [Moles/Vol]  105 mmol/L  Invalid         [facility]    0





                 Interpreta      mmol/L          l               020



                     tion Code           Distric             14:15-0



                           t #1 of                   [address] [place] [address] 



                                         ([id number]) 

 

     



            Creatinine  1.50 mg/dL  Invalid    0.50-1.50  [facility]    



              [Mass/Vol]   Interpreta   mg/dL        l            020



                     tion Code           Distric             14:15-0



                           t #1 of                   [address] [place] [address] 



                                         ([id number]) 

 

     



            Eosinophils (Bld)  0.1 10*3/uL  Invalid    0.0-0.7    [facility]    



              [#/Vol]      Interpreta   K/uL         l            020



                     tion Code           Distric             14:15-0



                           t #1 of                   [address] [place] [address] 



                                         ([id number]) 

 

     



            Eosinophils/100 WBC  1.3 %      Invalid    0.0-7.0 %  [facility]    



                 (Bld)           Interpreta      l               020



                     tion Code           Distric             14:15-0



                           t #1 of                   [address] [place] [address] 



                                         ([id number]) 

 

     



            Erythrocyte  14.2 %     Invalid    11.6-14.8  [facility]    



              distribution width   Interpreta   %            l            020



                 (RBC) [Ratio]   tion Code       Distric         14:15-0



                           t #1 of                   [address] [place] [address] 



                                         () 

 

     



            GFR/1.73 sq  46 mL/min/{1.73_m2}  Low        >59        [facility]    0

7-11-2



                 M.predicted MDRD    mL/min/1.7      l               020



                 (S/P/Bld) [Vol    3m2             [facility]         14:15-0



                     rate/Area]          t #1 of             [address] [place] [address] 



                                         () 

 

     



            Glucose [Mass/Vol]  91 mg/dL   Invalid         [facility]    

1-2



                 Interpreta      mg/dL           l               020



                     tion Code           Distric             14:15-0



                           t #1 of                   [address] [place] [address] 



                                         () 

 

     



            HCO3 (P) [Moles/Vol]  25         Invalid    22-33      [facility]    



                 Interpreta      mEq/L           l               020



                     tion Code           Distric             14:15-0



                           t #1 of                   [address] [place] [address] 



                                         () 

 

     



            Hematocrit (Bld)  38.6 %     Low        42.0-52.0  [facility]    



                 [Volume fraction]    %               l               020



                           Distric                   14:15-0



                           t #1 of                   [address] [place] [address] 



                                         () 

 

     



            Hemoglobin (Bld)  12.6 g/dL  Low        14.0-17.0  [facility]    



                 [Mass/Vol]      g/dL            l               020



                           Distric                   14:15-0



                           t #1 of                   [address] [place] [address] 



                                         () 

 

     



            Lymphocytes (Bld)  2.16 10*3/uL  Invalid    0.60-3.40  [facility]    



              [#/Vol]      Interpreta   K/uL         l            020



                     tion Code           Distric             14:15-0



                           t #1 of                   [address] [place] [address] 



                                         () 

 

     



            Lymphocytes/100 WBC  40.9 %     Invalid    10.0-50.0  [facility]    



              (Bld)        Interpreta   %            l            020



                     tion Code           Distric             14:15-0



                           t #1 of                   [address] [place] [address] 



                                         () 

 

     



            MCH (RBC) [Entitic  29.7 pg    Invalid    27.0-31.2  [facility]    

1-2



              mass]        Interpreta   pg           l            020



                     tion Code           Distric             14:15-0



                           t #1 of                   [address] [place] [address] 



                                         () 

 

     



            MCHC (RBC)  32.6 g/dL  Invalid    32.0-36.0  [facility]    



              [Mass/Vol]   Interpreta   g/dL         l            020



                     tion Code           Distric             14:15-0



                           t #1 of                   400



                                         [facility] 



                                         ([id number]) 

 

     



            MCV (RBC) [Entitic  91.0 fL    Invalid    80.0-97.0  [facility]    

1-2



              vol]         Interpreta   fL           l            020



                     tion Code           Distric             14:15-0



                           t #1 of                   400



                                         [facility] 



                                         ([id number]) 

 

     



            Monocytes (Bld)  0.7 10*3/uL  Invalid    0.0-0.9    [facility]    



              [#/Vol]      Interpreta   K/uL         l            020



                     tion Code           Distric             14:15-0



                           t #1 of                   400



                                         [facility] 



                                         ([id number]) 

 

     



            Monocytes/100 WBC  12.9 %     High       0.0-12.0 %  [facility]    -2



                     (Bld)               l                   020



                           Distric                   14:15-0



                           t #1 of                   400



                                         [facility] 



                                         ([id number]) 

 

     



            Neutrophils (Bld)  2.35 10*3/uL  Invalid    2.00-6.90  [facility]    



              [#/Vol]      Interpreta   K/uL         l            020



                     tion Code           Distric             14:15-0



                           t #1 of                   [address] [place] [address] 



                                         ([id number]) 

 

     



            Neutrophils/100 WBC  44.5 %     Invalid    37.0-80.0  [facility]    



              (Bld)        Interpreta   %            l            020



                     tion Code           Distric             14:15-0



                           t #1 of                   400



                                         [facility] 



                                         ([id number]) 

 

     



            Platelet mean volume  10.2 fL    High       7.4-10.0   [facility]    



                 (Bld) [Entitic vol]    fL              l               020



                           Dist[facility]                   14:15-0



                           t #1 of                   400



                                         [facility] 



                                         ([id number]) 

 

     



            Platelets (Bld)  166 10*3/uL  Invalid    150-400    [facility]    



              [#/Vol]      Interpreta   K/uL         l            020



                     tion Code           Distric             14:15-0



                           t #1 of                   [address] [place] [address] 



                                         ([id number]) 

 

     



            Potassium  3.9 mmol/L  Invalid    3.5-5.3    [facility]    



              [Moles/Vol]   Interpreta   mmol/L       l            020



                     tion Code           Distric             14:15-0



                           t #1 of                   400



                                         [facility] 



                                         ([id number]) 

 

     



            RBC (Bld) [#/Vol]  4.24 10*6/uL  Invalid    4.20-5.40  [facility]    2



                 Interpreta      M/uL            l               020



                     tion Code           Distric             14:15-0



                           t #1 of                   [address] [place] [address] 



                                         ([id number]) 

 

     



            Sodium [Moles/Vol]  140 mmol/L  Invalid    134-148    [facility]    2



                 Interpreta      mmol/L          l               020



                     tion Code           Distric             14:15-0



                           t #1 of                   [address] [place] [address] 



                                         () 

 

     



            Urea nitrogen  17 mg/dL   Invalid    5-25 mg/dL  [facility]    2



                 [Mass/Vol]      Interpreta      l               020



                     tion Code           Distric             14:15-0



                           t #1 of                   [address] [place] [address] 



                                         ([id number]) 

 

     



            WBC (Bld) [#/Vol]  5.28 10*3/uL  Invalid    5.00-10.00  [facility]    0

-2



                 Interpreta      K/uL            l               020



                     tion Code           Distric             14:15-0



                           t #1 of                   [address] [place] [address] 



                                         () 

 

 



                                         laboratory



                                         on



                                         2020-07-10

 

     



            Anion gap  9 mmol/L   Invalid    6-14       [facility]    07-10-2



                 [Moles/Vol]     Interpreta      l               020



                     tion Code           Distric             02:56-0



                           t #1 of                   [address] [place] [address] 



                                         ([id number]) 

 

     



            Calcium [Mass/Vol]  8.8 mg/dL  Invalid    8.3-10.4   [facility]    07-1

0-2



                 Interpreta      mg/dL           l               020



                     tion Code           Distric             02:56-0



                           t #1 of                   [address] [place] [address] 



                                         () 

 

     



            Chloride [Moles/Vol]  102 mmol/L  Invalid         [facility]    0

10-2



                 Interpreta      mmol/L          l               020



                     tion Code           Distric             02:56-0



                           t #1 of                   [address] [place] [address] 



                                         () 

 

     



            Creatinine  1.50 mg/dL  Invalid    0.50-1.50  [facility]    07-10-2



              [Mass/Vol]   Interpreta   mg/dL        l            020



                     tion Code           Distric             02:56-0



                           t #1 of                   [address] [place] [address] 



                                         () 

 

     



            GFR/1.73 sq  46 mL/min/{1.73_m2}  Low        >59        [facility]    0

-10-2



                 M.predicted MDRD    mL/min/1.7      l               020



                 (S/P/Bld) [Vol    3m2             [facility]         02:56-0



                     rate/Area]          t #1 of             [address] [place] [address] 



                                         ([id number]) 

 

     



            Glucose [Mass/Vol]  99 mg/dL   Invalid         [facility]    07-1

0-2



                 Interpreta      mg/dL           l               020



                     tion Code           Distric             02:56-0



                           t #1 of                   [address] [place] [address] 



                                         ([id number]) 

 

     



            HCO3 (P) [Moles/Vol]  27         Invalid    22-33      [facility]    07

-10-2



                 Interpreta      mEq/L           l               020



                     tion Code           Distric             02:56-0



                           t #1 of                   [address] [place] [address] 



                                         ([id number]) 

 

     



            Osmolality Calc  279        Low        280-295    [facility]    07-10-2



                     [Osmolality]        l                   020



                           Distric                   02:56-0



                           t #1 of                   [address] [place] [address] 



                                         ([id number]) 

 

     



            Potassium  3.7 mmol/L  Invalid    3.5-5.3    [facility]    07-10-2



              [Moles/Vol]   Interpreta   mmol/L       l            020



                     tion Code           Distric             02:56-0



                           t #1 of                   [address] [place] [address] 



                                         ([id number]) 

 

     



            Sodium [Moles/Vol]  134 Testing performed at [facility]  Invalid    134-

148    [facility]   

 07-10-2



                 Interpreta      mmol/L          l               020



                     tion Code           Distric             02:56-0



                           t #1 of                   [address] [place] [address] 



                                         ([id number]) 

 

     



            Urea nitrogen  17 mg/dL   Invalid    5-25 mg/dL  [facility]    07-10-2



                 [Mass/Vol]      Interpreta      l               020



                     tion Code           Distric             02:56-0



                           t #1 of                   [address] [place] [address] 



                                         ([id number]) 

 

 



                                         not yet categorized



                                         on 2020-05-15

 

     



              COVID19      Negative     Invalid      [facility]      05-15-2



                     Interpreta          l                   020



                     tion Code           Distric             03:15-0



                           t #1 of                   [address] [place] [address] 



                                         () 

 

 



                                         not yet categorized



                                         on 2020

 

     



              Laboratory report  .            Invalid      [facility]      



                     Interpreta          ([id number])             020



                           tion Code                 08:24-0



                                         500

 

 



                                         laboratory



                                         on



                                         2020

 

     



            Albumin Elph (U)  37.7       Invalid    %          [facility]    



                 [Mass fraction]   Interpreta      (04412)         020



                           tion Code                 08:21-0



                                         500

 

     



            Alpha 1 globulin  7.3        Invalid    %          [facility]    



                 Elph (U) [Mass   Interpreta      (01834)         020



                     fraction]           tion Code           08:21-0



                                         500

 

     



            Alpha 2 globulin  13.3       Invalid    %          [facility]    



                 Elph (U) [Mass   Interpreta      (51135)         020



                     fraction]           tion Code           08:21-0



                                         500

 

     



            Beta globulin Elph  21.1       Invalid    %          [facility]    01-2

2-2



                 (U) [Mass fraction]   Interpreta      (81556)         020



                           tion Code                 08:21-0



                                         500

 

     



            Gamma globulin Elph  20.6       Invalid    %          [facility]    

22-2



                 (U) [Mass fraction]   Interpreta      (71115)         020



                           tion Code                 08:21-0



                                         500

 

     



            Protein.monoclonal  Not Observed  Invalid    Not        [facility]    0

22-2



              Elph (U) [Mass   Interpreta   Observed %   (81440)      020



                     fraction]           tion Code           08:21-0



                                         500

 

 



                                         laboratory



                                         on



                                         2020

 

     



            Protein (U)  7.9 mg/dL  Invalid    Not Estab.  [facility]    



              [Mass/Vol]   Interpreta   mg/dL        (96381)      020



                           tion Code                 01:47-0



                                         500

 

 



                                         not yet categorized



                                         on 2020

 

     



            ALBUMIN, U  37.7       Invalid    %          [facility]    



                     Interpreta          l                   020



                     tion Code           Distric             06:-



                           t #1 of                   500



                                         [facility] 



                                         ([id number]) 

 

     



            ALPHA-1-GLOBULIN, U  7.3        Invalid    %          [facility]    



                     Interpreta          l                   020



                     tion Code           Distric             06:-



                           t #1 of                   500



                                         [facility] 



                                         ([id number]) 

 

     



            ALPHA-2-GLOBULIN, U  13.3       Invalid    %          [facility]    



                     Interpreta          l                   020



                     tion Code           Distric             06:-0



                           t #1 of                   500



                                         [facility] 



                                         ([id number]) 

 

     



            BETA GLOBULIN, U  21.1       Invalid    %          [facility]    



                     Interpreta          l                   020



                     tion Code           Distric             06:-0



                           t #1 of                   500



                                         [facility] 



                                         ([id number]) 

 

     



            GAMMA GLOBULIN, U  20.6       Invalid    %          [facility]    



                     Interpreta          l                   020



                     tion Code           Distric             06:01-0



                           t #1 of                   500



                                         [facility] 



                                         ([id number]) 

 

     



            M-SPIKE, %  NOT OBSERVED  Invalid    NOT        [facility]    



                 Interpreta      OBSERVED %      l               020



                     tion Code           Distric             06:-



                           t #1 of                   500



                                         [facility] 



                                         ([id number]) 

 

     



              PDF          .            Invalid      [facility]      



                     Interpreta          l                   020



                     tion Code           Distric             06:



                           t #1 of                   [address] [place] [address] 



                                         ([id number]) 

 

     



              PLEASE NOTE:  PROTEIN ELECTROPHORESIS SCAN WILL FOLLOW VIA  [facility]

d      [facility]      





                 COMPUTER, MAIL, OR\.br\ DELIVERY.  Interpreta      l    

           020



                     tion Code           Distric             06:01-0



                           t #1 of                   500



                                         [facility] 



                                         () 

 

 



                                         laboratory



                                         on



                                         2020

 

     



              Laboratory comment  Comment      Invalid      [facility]      



                 Yordan (Report)    Interpreta      ([id number])         020



                           tion Code                 21:05-0



                                         500

 

     



            Protein (U)  7.9 mg/dL  Invalid    NOT ESTAB.  [facility]    



              [Mass/Vol]   Interpreta   MG/DL        l            020



                     tion Code           Distric             06:01-0



                           t #1 of                   500



                                         [facility] 



                                         () 

 

 



                                         laboratory



                                         on



                                         2020

 

     



            Albumin BCG dye  3.9        Invalid    3.6-5.1    [facility]    



              [Mass/Vol]   Interpreta   g/dL         l            020



                     tion Code           Distric             02:36-0



                           t #1 of                   500



                                         [facility] 



                                         () 

 

     



            ALP [Catalytic  75 U/L     Invalid     U/L  [facility]    



                 activity/Vol]   Interpreta      l               020



                     tion Code           Distric             02:36-0



                           t #1 of                   [address] [place] [address] 



                                         () 

 

     



            ALT [Catalytic  28 U/L     Invalid    6-45 U/L   [facility]    



                 activity/Vol]   Interpreta      l               020



                     tion Code           Distric             02:36-0



                           t #1 of                   [address] [place] [address] 



                                         () 

 

     



            Anion gap  13 mmol/L  Invalid    6-14       [facility]    



                 [Moles/Vol]     Interpreta      l               020



                     tion Code           Distric             02:36-0



                           t #1 of                   [address] [place] [address] 



                                         () 

 

     



            AST [Catalytic  23 U/L     Invalid    2-40 U/L   [facility]    



                 activity/Vol]   Interpreta      l               020



                     tion Code           Distric             02:36-0



                           t #1 of                   [address] [place] [address] 



                                         () 

 

     



            Bilirubin [Mass/Vol]  0.8 mg/dL  Invalid    0.2-1.2    [facility]    



                 Interpreta      mg/dL           l               020



                     tion Code           Distric             02:36-0



                           t #1 of                   [address] [place] [address] 



                                         ([id number]) 

 

     



            Calcium [Mass/Vol]  9.0 mg/dL  Invalid    8.3-10.4   [facility]    -

7-2



                 Interpreta      mg/dL           l               020



                     tion Code           Distric             02:36-0



                           t #1 of                   500



                                         [place] 



                                         [place] 



                                         [place] 



                                         ([id number]) 

 

     



            Chloride [Moles/Vol]  103 mmol/L  Invalid         [facility]    0

17-2



                 Interpreta      mmol/L          l               020



                     tion Code           Distric             02:36-0



                           t #1 of                   [address] [place] [address] 



                                         () 

 

     



            Creatinine  1.51 mg/dL  High       0.50-1.50  [facility]    -2



                 [Mass/Vol]      mg/dL           l               020



                           Distric                   02:36-0



                           t #1 of                   [address] [place] [address] 



                                         () 

 

     



            GFR/1.73 sq  46 mL/min/{1.73_m2}  Low        >59        [facility]    0

-17-2



                 M.predicted MDRD    mL/min/1.7      l               020



                 (S/P/Bld) [Vol    3m2             Distric         02:36-0



                     rate/Area]          t #1 of             [address] [place] [address] 



                                         () 

 

     



            Globulin (S)  5.5 g/dL   High       2.3-3.5    [facility]    -2



                 [Mass/Vol]      g/dL            l               020



                           Distric                   02:36-0



                           t #1 of                   [address] [place] [address] 



                                         () 

 

     



            Glucose [Mass/Vol]  93 mg/dL   Invalid         [facility]    -

7-2



                 Interpreta      mg/dL           l               020



                     tion Code           Distric             02:36-0



                           t #1 of                   [address] [place] [address] 



                                         () 

 

     



            HCO3 (P) [Moles/Vol]  24         Invalid    22-33      [facility]    -2



                 Interpreta      mEq/L           l               020



                     tion Code           Distric             02:36-0



                           t #1 of                   [address] [place] [address] 



                                         () 

 

     



            Osmolality Calc  282        Invalid    280-295    [facility]    2



                 [Osmolality]    Interpreta      l               020



                     tion Code           Distric             02:36-0



                           t #1 of                   [address] [place] [address] 



                                         () 

 

     



            Potassium  4.2 mmol/L  Invalid    3.5-5.3    [facility]    -2



              [Moles/Vol]   Interpreta   mmol/L       l            020



                     tion Code           Distric             02:36-0



                           t #1 of                   [address] [place] [address] 



                                         () 

 

     



            Protein [Mass/Vol]  9.4 g/dL   High       6.0-8.3    [facility]    01-1

7-2



                     g/dL                l                   020



                           Distric                   02:36-0



                           t #1 of                   [address] [place] [address] 



                                         () 

 

     



            Sodium [Moles/Vol]  136 mmol/L  Invalid    134-148    [facility]    



                 Interpreta      mmol/L          l               020



                     tion Code           Distric             02:36-0



                           t #1 of                   [address] [place] [address] 



                                         () 

 

     



            Urea nitrogen  16 mg/dL   Invalid    5-25 mg/dL  [facility]    



                 [Mass/Vol]      Interpreta      l               020



                     tion Code           Distric             02:36-0



                           t #1 of                   [address] [place] [address] 



                                         () 

 

 



                                         laboratory



                                         on



                                         2020-01-10

 

     



            Albumin BCG dye  3.7        Invalid    3.6-5.1    [facility]    01-10-2



              [Mass/Vol]   Interpreta   g/dL         l            020



                     tion Code           Distric             02:33-0



                           t #1 of                   [address] [place] [address] 



                                         () 

 

     



            ALP [Catalytic  75 U/L     Invalid     U/L  [facility]    01-10-2



                 activity/Vol]   Interpreta      l               020



                     tion Code           Distric             02:33-0



                           t #1 of                   [address] [place] [address] 



                                         () 

 

     



            ALT [Catalytic  23 U/L     Invalid    6-45 U/L   [facility]    01-10-2



                 activity/Vol]   Interpreta      l               020



                     tion Code           Distric             02:33-0



                           t #1 of                   [address] [place] [address] 



                                         () 

 

     



            Anion gap  14 mmol/L  Invalid    6-14       [facility]    01-10-2



                 [Moles/Vol]     Interpreta      l               020



                     tion Code           Distric             02:33-0



                           t #1 of                   [address] [place] [address] 



                                         () 

 

     



            AST [Catalytic  23 U/L     Invalid    2-40 U/L   [facility]    01-10-2



                 activity/Vol]   Interpreta      l               020



                     tion Code           Distric             02:33-0



                           t #1 of                   [address] [place] [address] 



                                         () 

 

     



            Bilirubin [Mass/Vol]  0.6 mg/dL  Invalid    0.2-1.2    [facility]    01

-10-2



                 Interpreta      mg/dL           l               020



                     tion Code           Distric             02:33-0



                           t #1 of                   [address] [place] [address] 



                                         () 

 

     



            Calcium [Mass/Vol]  8.7 mg/dL  Invalid    8.3-10.4   [facility]    01-1

0-2



                 Interpreta      mg/dL           l               020



                     tion Code           Distric             02:33-0



                           t #1 of                   [address] [place] [address] 



                                         () 

 

     



            Chloride [Moles/Vol]  104 mmol/L  Invalid         [facility]    0

1-10-2



                 Interpreta      mmol/L          l               020



                     tion Code           Distric             02:33-0



                           t #1 of                   [address] [place] [address] 



                                         () 

 

     



            Cholesterol  147 mg/dL  Invalid    100-240    [facility]    01-10-2



              [Mass/Vol]   Interpreta   mg/dL        l            020



                     tion Code           Distric             02:33-0



                           t #1 of                   [address] [place] [address] 



                                         () 

 

     



            Cholesterol in HDL  36 mg/dL   Invalid    30-85      [facility]    01-1

0-2



              [Mass/Vol]   Interpreta   mg/dL        l            020



                     tion Code           Distric             02:33-0



                           t #1 of                   [address] [place] [address] 



                                         () 

 

     



            Cholesterol in LDL  92 mg/dL   Invalid    0-100      [facility]    01-1

0-2



              [Mass/Vol]   Interpreta   mg/dL        l            020



                     tion Code           Distric             02:33-0



                           t #1 of                   [address] [place] [address] 



                                         () 

 

     



            Cholesterol in VLDL  19 mg/dL   Invalid    0-42 mg/dL  [facility]    01

-10-2



                 [Mass/Vol]      Interpreta      l               020



                     tion Code           Distric             02:33-0



                           t #1 of                   [address] [place] [address] 



                                         () 

 

     



            Cholesterol.total/Ch  4.1 {ratio}  Invalid    3.7-6.7    [facility]    

01-10-2



                 olesterol in HDL   Interpreta      l               020



                 [Mass ratio]    tion Code       Distric         02:33-0



                           t #1 of                   [address] [place] [address] 



                                         () 

 

     



            Creatinine  1.46 mg/dL  Invalid    0.50-1.50  [facility]    01-10-2



              [Mass/Vol]   Interpreta   mg/dL        l            020



                     tion Code           Distric             02:33-0



                           t #1 of                   [address] [place] [address] 



                                         () 

 

     



            GFR/1.73 sq  48 mL/min/{1.73_m2}  Low        >59        [facility]    0

10-2



                 M.predicted MDRD    mL/min/1.7      l               020



                 (S/P/Bld) [Vol    3m2             Distric         02:33-0



                     rate/Area]          t #1 of             [address] [place] [address] 



                                         () 

 

     



            Globulin (S)  5.7 g/dL   High       2.3-3.5    [facility]    01-10-2



                 [Mass/Vol]      g/dL            l               020



                           Distric                   02:33-0



                           t #1 of                   [address] [place] [address] 



                                         () 

 

     



            Glucose [Mass/Vol]  97 mg/dL   Invalid         [facility]    -

0-2



                 Interpreta      mg/dL           l               020



                     tion Code           Distric             02:33-0



                           t #1 of                   [address] [place] [address] 



                                         ([id number]) 

 

     



            HCO3 (P) [Moles/Vol]  23         Invalid    22-33      [facility]    01

-10-2



                 Interpreta      mEq/L           l               020



                     tion Code           Distric             02:33-0



                           t #1 of                   [address] [place] [address] 



                                         ([id number]) 

 

     



            Osmolality Calc  284        Invalid    280-295    [facility]    01-10-2



                 [Osmolality]    Interpreta      l               020



                     tion Code           Distric             02:33-0



                           t #1 of                   [address] [place] [address] 



                                         ([id number]) 

 

     



            Potassium  4.3 mmol/L  Invalid    3.5-5.3    [facility]    01-10-2



              [Moles/Vol]   Interpreta   mmol/L       l            020



                     tion Code           Distric             02:33-0



                           t #1 of                   [address] [place] [address] 



                                         ([id number]) 

 

     



            Protein [Mass/Vol]  9.4 g/dL   High       6.0-8.3    [facility]    

0-2



                     g/dL                l                   020



                           Distric                   02:33-0



                           t #1 of                   [address] [place] [address] 



                                         ([id number]) 

 

     



            Sodium [Moles/Vol]  137 mmol/L  Invalid    134-148    [facility]    01-

10-2



                 Interpreta      mmol/L          l               020



                     tion Code           Distric             02:33-0



                           t #1 of                   [address] [place] [address] 



                                         ([id number]) 

 

     



            Triglyceride  93 mg/dL   Invalid         [facility]    01-10-2



              [Mass/Vol]   Interpreta   mg/dL        l            020



                     tion Code           Distric             02:33-0



                           t #1 of                   [address] [place] [address] 



                                         ([id number]) 

 

     



            Urea nitrogen  16 mg/dL   Invalid    5-25 mg/dL  [facility]    01-10-2



                 [Mass/Vol]      Interpreta      l               020



                     tion Code           Distric             02:33-0



                           t #1 of                   [address] [place] [address] 



                                         ([id number]) 

 

 



                                         other



                                         on 2019

 

     



            Albumin BCG dye  3.7        Invalid    3.6-5.1    [facility]    -2



              [Mass/Vol]   Interpreta   g/dL         l            019



                     tion Code           Distric             08:11-0



                           t #1 of                   [address] [place] [address] 



                                         ([id number]) 

 

     



            Cholesterol in VLDL  16 mg/dL   Invalid    0-42 mg/dL  [facility]    



                 [Mass/Vol]      Interpreta      l               019



                     tion Code           Distric             08:11-



                           t #1 of                   [address] [place] [address] 



                                         ([id number]) 

 

     



            Cholesterol.total/Ch  4.3 {ratio}  Invalid    3.7-6.7    [facility]    





                 olesterol in HDL   Interpreta      l               019



                 [Mass ratio]    tion Code       Distric         08:11-0



                           t #1 of                   [address] [place] [address] 



                                         ([id number]) 

 

     



            GFR/1.73 sq  49 mL/min/{1.73_m2}  Low        >59        [facility]    0

-2



                 M.predicted MDRD    mL/min/1.7      l               019



                 (S/P/Bld) [Vol    3m2             Distric         08:110



                     rate/Area]          t #1 of             [address] [place] [address] 



                                         ([id number]) 

 

     



            Globulin (S)  4.6 g/dL   High       2.3-3.5    [facility]    



                 [Mass/Vol]      g/dL            l               019



                           Distric                   08:-0



                           t #1 of                   [address] [place] [address] 



                                         ([id number]) 

 

     



            HCO3 (P) [Moles/Vol]  23         Invalid    22-33      [facility]    2



                 Interpreta      mEq/L           l               019



                     tion Code           Distric             08:-



                           t #1 of                   [address] [place] [address] 



                                         ([id number]) 

 

     



            Osmolality Calc  286        Invalid    280-295    [facility]    



                 [Osmolality]    Interpreta      l               019



                     tion Code           Distric             08:-



                           t #1 of                   [address] [place] [address] 



                                         ([id number]) 

 

     



            Prostate specific Ag  4.7        High       0.0-4.0    [facility]    



                 DL <= 0.01 ng/mL    ng/mL           l               019



                     [Mass/Vol]          Distric             08:11-0



                           t #1 of                   [address] [place] [address] 



                                         ([id number]) 

 

 



                                         metabolic panel



                                         on



                                         2019

 

     



            ALP [Catalytic  74 U/L     Invalid     U/L  [facility]    2



                 activity/Vol]   Interpreta      l               019



                     tion Code           Distric             08:11-0



                           t #1 of                   [address] [place] [address] 



                                         ([id number]) 

 

     



            ALT [Catalytic  19 U/L     Invalid    6-45 U/L   [facility]    



                 activity/Vol]   Interpreta      l               019



                     tion Code           Distric             08:11-0



                           t #1 of                   [address] [place] [address] 



                                         ([id number]) 

 

     



            Anion gap  12 mmol/L  Invalid    6-14       [facility]    -2



                 [Moles/Vol]     Interpreta      l               019



                     tion Code           Distric             08:11-0



                           t #1 of                   [address] [place] [address] 



                                         ([id number]) 

 

     



            AST [Catalytic  20 U/L     Invalid    2-40 U/L   [facility]    -2



                 activity/Vol]   Interpreta      l               019



                     tion Code           Distric             08:11-0



                           t #1 of                   [address] [place] [address] 



                                         ([id number]) 

 

     



            Bilirubin [Mass/Vol]  1.1 mg/dL  Invalid    0.2-1.2    [facility]    -2



                 Interpreta      mg/dL           l               019



                     tion Code           Distric             08:11-0



                           t #1 of                   [address] [place] [address] 



                                         ([id number]) 

 

     



            Calcium [Mass/Vol]  8.9 mg/dL  Invalid    8.3-10.4   [facility]    06-2

7-2



                 Interpreta      mg/dL           l               019



                     tion Code           Distric             08:11-0



                           t #1 of                   [address] [place] [address] 



                                         ([id number]) 

 

     



            Chloride [Moles/Vol]  106 mmol/L  Invalid         [facility]    0

6-27-2



                 Interpreta      mmol/L          l               019



                     tion Code           Distric             08:11-0



                           t #1 of                   [address] [place] [address] 



                                         ([id number]) 

 

     



            Creatinine  1.42 mg/dL  Invalid    0.50-1.50  [facility]    -2



              [Mass/Vol]   Interpreta   mg/dL        l            019



                     tion Code           Distric             08:11-0



                           t #1 of                   [address] [place] [address] 



                                         ([id number]) 

 

     



            Glucose [Mass/Vol]  94 mg/dL   Invalid         [facility]    06-2

7-2



                 Interpreta      mg/dL           l               019



                     tion Code           Distric             08:11-0



                           t #1 of                   [address] [place] [address] 



                                         ([id number]) 

 

     



            Potassium  4.0 mmol/L  Invalid    3.5-5.3    [facility]    27-2



              [Moles/Vol]   Interpreta   mmol/L       l            019



                     tion Code           Distric             08:11-0



                           t #1 of                   [address] [place] [address] 



                                         ([id number]) 

 

     



            Protein [Mass/Vol]  8.3 g/dL   Invalid    6.0-8.3    [facility]    06-2

7-2



                 Interpreta      g/dL            l               019



                     tion Code           Distric             08:11-0



                           t #1 of                   [address] [place] [address] 



                                         ([id number]) 

 

     



            Sodium [Moles/Vol]  137 mmol/L  Invalid    134-148    [facility]    

27-2



                 Interpreta      mmol/L          l               019



                     tion Code           Distric             08:11-0



                           t #1 of                   [address] [place] [address] 



                                         ([id number]) 

 

     



            Urea nitrogen  22 mg/dL   Invalid    5-25 mg/dL  [facility]    -2



                 [Mass/Vol]      Interpreta      l               019



                     tion Code           Distric             08:11-0



                           t #1 of                   400



                                         [facility] 



                                         ([id number]) 

 

 



                                         cardiac



                                         on



                                         2019

 

     



            Cholesterol  133 mg/dL  Invalid    100-240    [facility]    -2



              [Mass/Vol]   Interpreta   mg/dL        l            019



                     tion Code           Distric             08:11-0



                           t #1 of                   400



                                         [facility] 



                                         ([id number]) 

 

     



            Cholesterol in HDL  31 mg/dL   Invalid    30-85      [facility]    06-2

7-2



              [Mass/Vol]   Interpreta   mg/dL        l            019



                     tion Code           Distric             08:11-0



                           t #1 of                   400



                                         [facility] 



                                         ([id number]) 

 

     



            Cholesterol in LDL  86 mg/dL   Invalid    0-100      [facility]    06-2

7-2



              [Mass/Vol]   Interpreta   mg/dL        l            019



                     tion Code           Distric             08:11-0



                           t #1 of                   400



                                         [facility] 



                                         ([id number]) 

 

     



            Triglyceride  81 mg/dL   Invalid         [facility]    -2



              [Mass/Vol]   Interpreta   mg/dL        l            019



                     tion Code           Distric             08:11-0



                           t #1 of                   400



                                         [facility] 



                                         ([id number]) 

 

 



                                         urinalysis



                                         on



                                         2017

 

     



            Clarity (U)  Clear      Invalid    Clear      Not        



                     Interpreta          [facility]             017



                     tion Code           le                  11:55-0



                           ([id number])                   400

 

     



            Color (U)  Yellow     Invalid    Colorless-  Not        



                 Interpreta      Lt. Yellow      [facility]         017



                     tion Code           le                  11:55-0



                           (28639)                   400

 

     



            Leukocyte esterase  Negative   Invalid    Negative   Not        

5-2



                 Test strip Ql (U)   Interpreta      [facility]         017



                     tion Code           le                  11:55-0



                           ([id number])                   400

 

     



            Protein (U)  Negative   Invalid    Negative   Not        



                 [Mass/Vol]      Interpreta      [facility]         017



                     tion Code           le                  11:55-0



                           ([id number])                   400

 

     



              RBC LM.HPF (Urine  Rare/HPF     Abnormal     Not          



                     sed) [#/Area]       [facility]             017



                           le                        11:55-0



                           ([id number])                   400

 

     



            Specific gravity (U)  1.010      Invalid    1.000-1.03  Not        0





              [Rel density]   Interpreta   0            [facility]      017



                     tion Code           le                  11:55-0



                           (35616)                   400

 

     



              WBC LM.HPF (Urine  Negative     Invalid      Not          



                 sed) [#/Area]   Interpreta      [facility]         017



                     tion Code           le                  11:55-0



                           (19851)                   400

 

 



                                         other



                                         on 2017

 

     



              Bacteria LM Ql  Negative     Invalid      Not          



                 (Urine sed)     Interpreta      [facility]         017



                     tion Code           le                  11:55-0



                           (91217)                   400

 

     



            Bilirubin Confirm Ql  N/A        Abnormal   Negative   Not        



                     (U)                 [facility]             017



                           le                        11:55-0



                           ([id number])                   400

 

     



            Bilirubin Ql (U)  Negative   Invalid    Negative   Not        



                     Interpreta          [facility]             017



                     tion Code           le                  11:55-0



                           (25521)                   400

 

     



            Erythrocyte  13.4 %     Invalid    11.6-14.8  Not        



              distribution width   Interpreta   %            [facility]      017



                 (RBC) [Ratio]   tion Code       le              12:57-0



                           (71209)                   400

 

     



            GFR/1.73 sq  60 mL/min/{1.73_m2}  Invalid    >59        Not        0





              M.predicted MDRD   Interpreta   mL/min/1.7   [facility]      017



              (S/P/Bld) [Vol   tion Code    3m2          le           12:57-0



                     rate/Area]          ([id number])             400

 

     



            Glucose Test strip  Negative   Invalid    Negative   Not        

5-2



                 (U) [Mass/Vol]   Interpreta      [facility]         017



                     tion Code           le                  11:55-0



                           (28142)                   400

 

     



            HCO3 (P) [Moles/Vol]  27         Invalid    22-33      Not        



                 Interpreta      mEq/L           [facility]         017



                     tion Code           le                  12:57-0



                           (61925)                   400

 

     



            Hemoglobin Ql (U)  Negative   Invalid    Negative   Not        



                     Interpreta          [facility]             017



                     tion Code           le                  11:55-0



                           (53727)                   400

 

     



            Ketones (U)  Negative   Invalid    Negative   Not        



                 [Mass/Vol]      Interpreta      [facility]         017



                     tion Code           le                  11:55-0



                           (11504)                   400

 

     



            MCHC (RBC)  32.4 g/dL  Invalid    32.0-36.0  Not        



              [Mass/Vol]   Interpreta   g/dL         [facility]      017



                     tion Code           le                  12:57-0



                           (68763)                   400

 

     



            Nitrite Ql (U)  Negative   Invalid    Negative   Not        



                     Interpreta          [facility]             017



                     tion Code           le                  11:55-0



                           (35405)                   400

 

     



            Osmolality Calc  289        Invalid    280-295    Not        



                 [Osmolality]    Interpreta      [facility]         017



                     tion Code           le                  12:57-0



                           (28813)                   400

 

     



            pH (U)     6.5 [pH]   Invalid    5-8.5      Not        



                     Interpreta          [facility]             017



                     tion Code           le                  11:55-0



                           (01471)                   400

 

     



            Platelet mean volume  11.1 fL    High       7.4-10.0   Not        



                 (Bld) [Entitic vol]    fL              [facility]         017



                           le                        12:57-0



                           (20828)                   400

 

     



              Urine Volume  Urine Volume Sufficient (10mL)  Invalid      Not    

      



                     Interpreta          [facility]             017



                     tion Code           le                  11:55-0



                           (28910)                   400

 

     



            Urobilinogen Qn (U)  0.2 {Lori'U}/dL  Abnormal   0.2-1.0    Not  

      



                           [facility]                   017



                           le                        11:55-0



                           (58463)                   400

 

     



              Yeast.budding Ql  No Yeast present  Invalid      Not          



                 (Urine sed)     Interpreta      [facility]         017



                     tion Code           le                  11:55-0



                           (31384)                   400

 

     



                 Urine Saved if Culture Needed (48hrs from  Abnormal        Not 

            



                     time of collection)    [facility]             017



                           le                        11:55-0



                           (32984)                   400

 

 



                                         metabolic panel



                                         on



                                         2017

 

     



            Anion gap  11 mmol/L  Invalid    6-14       Not        



                 [Moles/Vol]     Interpreta      [facility]         017



                     tion Code           le                  12:57-0



                           (17764)                   400

 

     



            Calcium [Mass/Vol]  9.4 mg/dL  Invalid    8.3-10.4   Not        -2



                 Interpreta      mg/dL           [facility]         017



                     tion Code           le                  12:57-0



                           (69983)                   400

 

     



            Chloride [Moles/Vol]  106 mmol/L  Invalid         Not        0





                 Interpreta      mmol/L          [facility]         017



                     tion Code           le                  12:57-0



                           (79243)                   400

 

     



            Creatinine  1.20 mg/dL  Invalid    0.50-1.50  Not        



              [Mass/Vol]   Interpreta   mg/dL        [facility]      017



                     tion Code           le                  12:57-0



                           (53029)                   400

 

     



            Glucose [Mass/Vol]  99 mg/dL   Invalid         Not        08-2

5-2



                 Interpreta      mg/dL           [facility]         017



                     tion Code           le                  12:57-0



                           (65743)                   400

 

     



            Potassium  4.4 mmol/L  Invalid    3.5-5.3    Not        



              [Moles/Vol]   Interpreta   mmol/L       [facility]      017



                     tion Code           le                  12:57-0



                           (99497)                   400

 

     



            Sodium [Moles/Vol]  140 mmol/L  Invalid    134-148    Not        



                 Interpreta      mmol/L          [facility]         017



                     tion Code           le                  12:57-0



                           (04595)                   400

 

     



            Urea nitrogen  13 mg/dL   Invalid    5-25 mg/dL  Not        



                 [Mass/Vol]      Interpreta      [facility]         017



                     tion Code           le                  12:57-0



                           (76961)                   400

 

 



                                         hematology



                                         on



                                         2017

 

     



            Basophils (Bld)  0.0 10*3/uL  Invalid    0.0-0.2    Not        



              [#/Vol]      Interpreta   K/uL         [facility]      017



                     tion Code           le                  12:57-0



                           (71198)                   400

 

     



            Basophils/100 WBC  0.30 %     Invalid    0.00-2.50  Not        



              (Bld)        Interpreta   %            [facility]      017



                     tion Code           le                  12:57-0



                           (84993)                   400

 

     



            Eosinophils (Bld)  0.0 10*3/uL  Invalid    0.0-0.7    Not        



              [#/Vol]      Interpreta   K/uL         [facility]      017



                     tion Code           le                  12:57-0



                           (66697)                   400

 

     



            Eosinophils/100 WBC  0.5 %      Invalid    0.0-7.0 %  Not        



                 (Bld)           Interpreta      [facility]         017



                     tion Code           le                  12:57-0



                           (33754)                   400

 

     



            Hematocrit (Bld)  45.1 %     Invalid    42.0-52.0  Not        



              [Volume fraction]   Interpreta   %            [facility]      017



                     tion Code           le                  12:57-0



                           (52177)                   400

 

     



            Hemoglobin (Bld)  14.6 g/dL  Invalid    14.0-17.0  Not        



              [Mass/Vol]   Interpreta   g/dL         [facility]      017



                     tion Code           le                  12:57-0



                           (97190)                   400

 

     



            Lymphocytes (Bld)  1.51 10*3/uL  Invalid    0.60-3.40  Not        



              [#/Vol]      Interpreta   K/uL         [facility]      017



                     tion Code           le                  12:57-0



                           (23976)                   400

 

     



            Lymphocytes/100 WBC  25.5 %     Invalid    10.0-50.0  Not        

25-2



              (Bld)        Interpreta   %            [facility]      017



                     tion Code           le                  12:57-0



                           (15105)                   400

 

     



            MCH (RBC) [Entitic  28.7 pg    Invalid    27.0-31.2  Not        08-2

5-2



              mass]        Interpreta   pg           [facility]      017



                     tion Code           le                  12:57-0



                           (85379)                   400

 

     



            MCV (RBC) [Entitic  88.6 fL    Invalid    80.0-97.0  Not        08-2

5-2



              vol]         Interpreta   fL           [facility]      017



                     tion Code           le                  12:57-0



                           (79034)                   400

 

     



            Monocytes (Bld)  0.6 10*3/uL  Invalid    0.0-0.9    Not        

-2



              [#/Vol]      Interpreta   K/uL         [facility]      017



                     tion Code           le                  12:57-0



                           (53643)                   400

 

     



            Monocytes/100 WBC  9.6 %      Invalid    0.0-12.0 %  Not        

5-2



                 (Bld)           Interpreta      [facility]         017



                     tion Code           le                  12:57-0



                           (27935)                   400

 

     



            Neutrophils (Bld)  3.78 10*3/uL  Invalid    2.00-6.90  Not        -2



              [#/Vol]      Interpreta   K/uL         [facility]      017



                     tion Code           le                  12:57-0



                           (90112)                   400

 

     



            Neutrophils/100 WBC  64.1 %     Invalid    37.0-80.0  Not        

25-2



              (Bld)        Interpreta   %            [facility]      017



                     tion Code           le                  12:57-0



                           (26507)                   400

 

     



            Platelets (Bld)  188 10*3/uL  Invalid    150-400    Not        

-2



              [#/Vol]      Interpreta   K/uL         [facility]      017



                     tion Code           le                  12:57-0



                           (70943)                   400

 

     



            RBC (Bld) [#/Vol]  5.09 10*6/uL  Invalid    4.20-5.40  Not        -2



                 Interpreta      M/uL            [facility]         017



                     tion Code           le                  12:57-0



                           (04514)                   400

 

     



            WBC (Bld) [#/Vol]  5.91 10*3/uL  Invalid    5.00-10.00  Not        0

-2



                 Interpreta      K/uL            [facility]         017



                     tion Code           le                  12:57-0



                           (30520)                   400



                                                                                
                                                                                
                                                                                
                                                                                
                                                                                
                                                                                
                                                                                
                                                                                
                                                                                
                                                                                
                                                                                
                                                                                
                                                                                
                                                                                
                                                                                
                                                                                
                                                                                
                                                                                
                                                                                
                                                                                
                                                                                
                                                                                
                                                                    



Social History

          No Information                                                        
  



Vital Signs

                      The data below is from unstructured sources            



                    Vital                   Response                   Date/Time

                

 

                    Height (Feet)                   5 feet                    11:42am     

           

 

                    Height (Inches)                   11.00 inches              

     2016 

11:42am                

 

                          Height (Calculated Centimeters)                   180.

956197 cm                 

                                        2016 11:42am                

 

                    Weight (Pounds)                   205 pounds                

   2016 

11:42am                

 

                    Weight (Ounces)                   0.0 oz                   1

2016 11:42am   

             

 

                    Weight (Calculated Grams)                   79377.44 gm     

              

2016 11:42am                

 

                          Weight (Calculated Kilograms)                   92.986

437 kilograms             

                                        2016 11:42am                

 

                    Calculated BMI                   28.6                    11:42am      

          



            



                    Vital                   Response                   Date/Time

                

 

                          Temperature (Fahrenheit)                   97.8 degree

s F (97.6 - 99.5)         

                                        2016 11:00am                

 

                          Temperature (Calculated Celsius)                   36.

52772 degrees C (36.4 - 

37.5)                                   2016 11:00am                

 

                    Temperature Source                   Tympanic               

    2016 

11:00am                

 

                    Pulse Rate (adult)                   69 bpm (60 - 90)       

            

2016 11:00am                

 

                    Respiratory Rate                   20 bpm (12 - 24)         

          2016

 11:00am                

 

                    O2 Sat by Pulse Oximetry                   97 % (88 - 100)  

                 

2016 11:00am                

 

                    Blood Pressure                   130/71 mm Hg               

    2016 

11:00am                

 

                     Blood Pressure Mean                   110 mm Hg            

       2016 

8:28am                

 

                    Pain                                                       

 

                     Numeric Pain Scale                   0-No Pain             

      2016 

11:00am                

 

                     Pain Intensity                   0                   2016 10:45am        

        

 

                    Height (Feet)                   5 feet                    8:25am      

          

 

                    Height (Inches)                   11.00 inches              

     2016 

8:25am                

 

                          Height (Calculated Centimeters)                   180.

743098 cm                 

                                        2016 8:25am                

 

                    Weight (Pounds)                   205 pounds                

   2016 8:25am

                

 

                    Weight (Ounces)                   0.0 oz                   1

2016 8:25am    

            

 

                    Weight (Calculated Grams)                   16240.44 gm     

              

2016 8:25am                

 

                          Weight (Calculated Kilograms)                   92.986

437 kilograms             

                                        2016 8:25am                

 

                    Calculated BMI                   28.6                   11/2

3/2016 8:25am       

         



            



                    Vital                   Response                   Date/Time

                

 

                          Temperature (Fahrenheit)                   97.5 degree

s F (97.6 - 99.5)         

                                                        

 

                          Temperature (Calculated Celsius)                   36.

70116 degrees C (36.4 - 

37.5)                                                   

 

                    Temperature Source                   Tympanic               

                    

 

 

                    Pulse Rate (adult)                   58 bpm (60 - 90)       

                    

        

 

                    Respiratory Rate                   18 bpm (12 - 24)         

                    

      

 

                    O2 Sat by Pulse Oximetry                   93 % (88 - 100)  

                    

             

 

                    Blood Pressure                   116/66 mm Hg               

                    

 

                    Pain                                                       

 

                     Pain Intensity                   0                         

           

 

                    Height (Feet)                   5 feet                      

              

 

                    Height (Inches)                   11.00 inches              

                    

  

 

                          Height (Calculated Centimeters)                   180.

817924 cm                 

                                                        

 

                    Weight (Pounds)                   205 pounds                

                    

 

                    Weight (Calculated Grams)                   84744.437 gm    

                    

            

 

                          Weight (Calculated Kilograms)                   92.986

437 kilograms             

                                                        

 

                    Calculated BMI                   28.59                      

              



            



                    Vital                   Response                   Date/Time

                

 

                          Temperature (Fahrenheit)                   97.5 degree

s F (97.6 - 99.5)         

                                                        

 

                          Temperature (Calculated Celsius)                   36.

28113 degrees C (36.4 - 

37.5)                                                   

 

                    Temperature Source                   Tympanic               

                    

 

 

                    Pulse Rate (adult)                   58 bpm (60 - 90)       

                    

        

 

                    Respiratory Rate                   18 bpm (12 - 24)         

                    

      

 

                    O2 Sat by Pulse Oximetry                   93 % (88 - 100)  

                    

             

 

                    Blood Pressure                   116/66 mm Hg               

                    

 

                    Pain                                                       

 

                     Pain Intensity                   0                         

           

 

                    Height (Feet)                   5 feet                      

              

 

                    Height (Inches)                   11.00 inches              

                    

  

 

                          Height (Calculated Centimeters)                   180.

382132 cm                 

                                                        

 

                    Weight (Pounds)                   205 pounds                

                    

 

                    Weight (Calculated Grams)                   55129.437 gm    

                    

            

 

                          Weight (Calculated Kilograms)                   92.986

437 kilograms             

                                                        

 

                    Calculated BMI                   28.59                      

              



                                                                    



Functional Status

                      The data below is from unstructured sourcesNo functional 
status results.No functional status results.No functional status results.No 
functional status results.No functional status results.                         
                                           



Mental Status

          No Information                                                        
  



Advance Directives

                      



                    Directive                   Response                   Recor

ded Date/Time       

         

 

                    Advance Directives                   No                    11:43am      

          

 

                    Health Care Power of                    No          

         16 

11:43am                

 

                    Organ Donor                   No                   16 

11:43am             

   

 

                    Resuscitation Status                   Full Code            

       16 

11:43am                



            



                    Directive                   Response                   Recor

ded Date/Time       

         

 

                    Advance Directives                   No                    8:26am       

         

 

                    Health Care Power of                    No          

         16 

8:26am                

 

                    Organ Donor                   No                   16 

8:26am              

  

 

                    Resuscitation Status                   Full Code            

       16 

8:26am                



            



                    Directive                   Response                   Recor

ded Date/Time       

         

 

                    Advance Directives                   No                    8:12am       

         

 

                    Health Care Power of                    No          

         14 

8:12am                

 

                    Organ Donor                   No                   14 

8:12am              

  

 

                    Resuscitation Status                   Full Code            

       14 

8:12am                



                                                                    



Discharge Instructions

          No hospital discharge instructions.                          

Patient Instructions              

Physician Instructions              

              

New, Converted, or Re-Newed RX: RX on Chart              

Follow Up 3 years              

              

Activity as tolerated              

              

High Fiber Diet 25g or more per day              

              

Avoid Alcohol, Caffeine, Spicy Greasy and Acid foods.              

              

Drink 64 fluid oz or more of fluids per day.              

              

Symptoms to Report: Fever over 101 degree F, Nausea/Vomiting               

If any problems/questions: Contact your physician or go to Emergency Room       
       

              

            No hospital discharge instructions.                                 
                         



Additional Source Comments

          This clinical document has been generated using [employer] 
software that has been certified by the Office of the National Coordinator for 
Health Information Technology (ONC 15.99.04.3023.Diam.31.00.0.449380) and the 
National Committee for  (NCQA, as an eMeasure certified 
technology).            

            

FOR RECORDS PERTAINING TO PATIENTS WHO ARE OR HAVE BEEN ENROLLED IN A CHEMICAL D
EPENDENCY/SUBSTANCE ABUSE PROGRAM, SOME INFORMATION MAY BE OMITTED. This clinica
l summary was aggregated from multiple sources.  Caution should be exercised in 
using it in the provision of clinical care. This summary normalizes information 
from multiple sources, and as a consequence, information in this document may ma
terially change the coding, format and clinical context of patient data. In olesya
tion, data may be omitted in some cases. CLINICAL DECISIONS SHOULD BE BASED ON T
HE PRIMARY CLINICAL RECORDS. [employer] provides no warranty or guara
ntee of the accuracy or completeness of information in this document.The followi
ng information is based on time limited clinical information

## 2020-08-10 NOTE — XMS REPORT
Encounter Summary

                             Created on: 08/10/2020



Cas Lewis

External Reference #: [id number]

: 1950

Sex: Male



Demographics





                          Address                   [address] [place]  [address]

 

                          Home Phone                [phone]

 

                          Preferred Language        English

 

                          Marital Status            Unknown

 

                          Rastafarian Affiliation     [facility]

 

                          Race                      White

 

                          Ethnic Group              Not  or 





Author





                          Author                    [facility]

 

                          Organization              [facility]

 

                          Address                   Unknown

 

                          Phone                     Unavailable







Support





                Name            Relationship    Address         Phone

 

                Kendy Richardson ECON            Unknown         [phone]

[phone]







Care Team Providers





                    Care Team Member Name Role                Phone

 

                    Arnaldo Singh         PCP                 [phone]

 

                    Tawil, Elias A MD   Unavailable         [phone]







Reason for Visit

* 



 



                           Reason                    Comments

 

 



                                         Results 









Encounter Details





                          Care Team                 Description



                     Date                Type                Department  

 

                                        



Latoya Arellano RN                     Results



                     2020          Telephone           [facility]  



                                         [address] [place] [address]  







Social History





                                        Date



                 Tobacco Use     Types           Packs/Day       Years Used 

 

                                        Quit: 



                           Former Smoker             Pipe   

 

    



                     Smokeless Tobacco: Former  Chew                Quit: 



                                         User   







                    Drinks/Week         oz/Week             Comments



                                         Alcohol Use   

 

                                                             



                                         Never   







  



                     Alcohol Habits      Answer              Date Recorded

 

  



                     How often do you have a drink containing alcohol?  Never   

            2020

 

  



                           How many drinks containing alcohol do you have on  No

t asked 



                                         a typical day when you are drinking?  

 

  



                           How often do you have six or more drinks on one  Not 

asked 



                                         occasion?  







 



                           Sex Assigned at Birth     Date Recorded

 

 



                           Male                      2020  6:16 AM CDT







                                        Industry



                           Job Start Date            Occupation 

 

                                        Not on file



                           Not on file               Not on file 







                                        Travel End



                           Travel History            Travel Start 

 





                                         No recent travel history available.



documented as of this encounter



Functional Status





                                        Date of Assessment



                           Functional Status         Response 

 

                                        2020



                           Does the patient have a hearing impairment:  No 

 

                                        2020



                           Does the patient have a visual impairment:  No 

 

                                        2020



                           Does the patient have impaired ambulation:  No 

 

                                        2020



                           Does the patient have an activity of daily living  No

 



                                         (ADL) impairment:  

 

                                        2020



                           Does the patient have an instrumental activity of  No

 



                                         daily living (IADL) impairment:  







                                        Date of Assessment



                           Cognitive Status          Response 

 

                                        2020



                           Does the patient have a cognitive impairment:  No 



documented as of this encounter



Plan of Treatment





Not on filedocumented as of this encounter



Goals





     



            Goal       Patient    Associated  Recent Progress  Patient-Stat  Aut

hor



                     Goal Type           Problems            ed? 

 

     



                 Resume normal activities  Hospital        No              [facility]

s,



                                         Lizette, RN



documented as of this encounter



Visit Diagnoses

Not on filedocumented in this encounter

## 2020-08-10 NOTE — XMS REPORT
Continuity of Care Document

                             Created on: 08/10/2020



JAS BRODERICK

External Reference #: [id number]

: 1950

Sex: Male



Demographics





                          Home Phone                [phone]

 

                          Preferred Language        Unknown

 

                          Marital Status            Unknown

 

                          [employer] Affiliation     Unknown

 

                          Race                      Unknown

 

                          Ethnic Group              Unknown





Author





                          Organization              Unknown

 

                          Address                   Unknown

 

                          Phone                     Unavailable



              



Allergies

      



             Active           Description           Code           Type         

  Severity   

                Reaction           Onset           Reported/Identified          

 

Relationship to Patient                 Clinical Status        

 

             Yes           SULFACETAMIDE SODIUM-SULFUR                          

           

MODERATE           MODERATE                                              

  

        

 

             Yes           SULFACETAMIDE SODIUM-SULFUR                          

           

MODERATE           OTHER                                                 

  

     

 

           Yes           SULFA           SULFA                       Unknown    

       N/A 

                                2014                                    



                      



Medications

      



                Medication           Packaging           Start Date           St

op Date         

                    Route               Dosage              Sig        

 

                                      KETOROLAC VIAL INJ 60 MG/2CC (TORADOL VIAL

)                     MG  

             2017                                    

       

     ONCE&1112                  

 

                                      ORPHENADRINE INJ 60 MG/2CC (NORFLEX)      

               MG         

             2017                                    

             

ONCE&1112                  

 

                                                    NORMAL SALINE 1000CC IV BAG 

INJ 0.9 % (NS 1000CC IV BAG)            

             ml           2020                       

      

                                                    ONCE&1816                  

 

                                CLONIDINE TAB 0.1 MG (CATAPRES)                 

    MG                  

2020                                                 

      

ONCE&1914                  



                        



Problems

      



             Date Dx Coded           Attending           Type           Code    

       

Diagnosis                               Diagnosed By        

 

             1445           SOLANGE ALBERTO MD           Ot           E88.09 

          OTH

DISORDERS OF PLASMA-PROTEIN METABOLI                    

 

             1445           SOLANGE ALBERTO MD           Ot           I10    

       

ESSENTIAL (PRIMARY) HYPERTENSION                    

 

             1445           SOLANGE ALBERTO MD           Ot           Z80.0  

         

FAMILY HISTORY OF MALIGNANT NEOPLASM OF                     

 

             1445           SOLANGE ALBERTO MD           Ot           Z80.41 

          

FAMILY HISTORY OF MALIGNANT NEOPLASM OF                     

 

             2010                        Ot           V16.0           FAMI

LY HX-GI 

MALIGNANCY                                       

 

             2010                        Ot           V76.51           SCR

EEN MAL NEOP-

COLON                                            

 

           2014                       Ot           550.92                 

           

   

 

           2014                       Ot           V72.63                 

           

   

 

           2014                       Ot           V72.81                 

           

   

 

           2014                       Ot           V74.8                  

           

  

 

           2014                       Ot           550.92                 

           

   

 

             2014           ROOSEVELT ALVA MD           Ot           211.4 

          

BENIGN NEOPL RECTUM/ANUS                         

 

             2014           ROOSEVELT ALVA MD           Ot           455.0 

          INT

HEMORRHOID W/O COMPL                             

 

             2014           ROOSEVELT ALVA MD           Ot           455.3 

          EXT

HEMORRHOID W/O COMPL                             

 

             2014           ROOSEVELT ALVA MD           Ot           530.11

           

REFLUX ESOPHAGITIS                               

 

             2014           ROOSEVELT ALVA MD           Ot           535.50

           

UNSP GASTRITIS   GASTRODUODENITIS W/O ME                    

 

             2014           ROOSEVELT ALVA MD           Ot           553.3 

          

DIAPHRAGMATIC HERNIA                             

 

             2014           ROOSEVELT ALVA MD           Ot           V10.05

           HX

OF COLONIC MALIGNANCY                            

 

             2016                        Ot           550.92           LYUDMILA

AT INGUINAL 

HERNIA                                           

 

             2016                        Ot           V72.63           PRE

-PROCEDURAL 

LABORATORY EXAMINATION                           

 

             2016                        Ot           V72.81           

EXAM-PRE-OPERATIVE CARDIOVASCULAR                    

 

             2016                        Ot           V74.8           SCRE

EN-BACTERIAL 

DIS NEC                                          

 

             2016                        Ot           550.92           LYUDMILA

AT INGUINAL 

HERNIA                                           

 

             2016           ROOSEVELT ALVA MD, Ot           V72.84

           

EXAM PRE-OPERATIVE NOS                           

 

             2016           ROOSEVELT ALVA MD           Ot           K21.9 

          

GASTRO-ESOPHAGEAL REFLUX DISEASE WITHOUT                    

 

             2016           ROOSEVELT ALVA MD           Ot           Z01.81

8           

ENCOUNTER FOR OTHER PREPROCEDURAL EXAMIN                    

 

             2016           ROOSEVELT ALVA MD           Ot           Z85.03

8           

PERSONAL HISTORY OF MALIGNANT NEOPLASM O                    

 

             2016           ROOSEVELT ALVA MD           Ot           Z86.01

0           

PERSONAL HISTORY OF COLONIC POLYPS                    

 

             2016                        Ot           550.92           LYUDMILA

AT INGUINAL 

HERNIA                                           

 

             2016                        Ot           V72.63           PRE

-PROCEDURAL 

LABORATORY EXAMINATION                           

 

             2016                        Ot           V72.81           

EXAM-PRE-OPERATIVE CARDIOVASCULAR                    

 

             2016                        Ot           V74.8           SCRE

EN-BACTERIAL 

DIS NEC                                          

 

             2016                        Ot           550.92           LYUDMILA

AT INGUINAL 

HERNIA                                           

 

             2016           ROOSEVELT ALVA MD           Ot           V72.84

           

EXAM PRE-OPERATIVE NOS                           

 

             2016           ROOSEVELT ALVA MD           Ot           I10   

        

ESSENTIAL (PRIMARY) HYPERTENSION                    

 

             2016           ROOSEVELT ALVA MD           Ot           K21.0 

          

GASTRO-ESOPHAGEAL REFLUX DISEASE WITH ES                    

 

             2016           ROOSEVELT ALVA MD           Ot           K29.70

           

GASTRITIS, UNSPECIFIED, WITHOUT BLEEDING                    

 

             2016           ROOSEVELT ALVA MD           Ot           K44.9 

          

DIAPHRAGMATIC HERNIA WITHOUT OBSTRUCTION                    

 

             2016           ROOSEVELT ALVA MD           Ot           K63.5 

          

POLYP OF COLON                                   

 

             2016           ROOSEVELT ALVA MD, Ot           K64.1 

          

SECOND DEGREE HEMORRHOIDS                        

 

             2016           ROOSEVELT ALVA MD           Ot           Z80.0 

          

FAMILY HISTORY OF MALIGNANT NEOPLASM OF                     

 

             2016           ROOSEVELT ALVA MD           Ot           Z85.03

8           

PERSONAL HISTORY OF MALIGNANT NEOPLASM O                    

 

             2016           ROOSEVELT ALVA MD           Ot           I10   

        

ESSENTIAL (PRIMARY) HYPERTENSION                    

 

             2016           ROOSEVELT ALVA MD           Ot           K21.0 

          

GASTRO-ESOPHAGEAL REFLUX DISEASE WITH ES                    

 

             2016           ROOSEVELT ALVA MD           Ot           K29.70

           

GASTRITIS, UNSPECIFIED, WITHOUT BLEEDING                    

 

             2016           ROOSEVELT ALVA MD           Ot           K44.9 

          

DIAPHRAGMATIC HERNIA WITHOUT OBSTRUCTION                    

 

             2016           ROOSEVELT ALVA MD           Ot           K63.5 

          

POLYP OF COLON                                   

 

             2016           ROOSEVELT ALVA MD           Ot           K64.1 

          

SECOND DEGREE HEMORRHOIDS                        

 

             2016           ROOSEVELT ALVA MD           Ot           Z80.0 

          

FAMILY HISTORY OF MALIGNANT NEOPLASM OF                     

 

             2016           ROOSEVELT ALVA MD           Ot           Z85.03

8           

PERSONAL HISTORY OF MALIGNANT NEOPLASM O                    

 

             2016           ROOSEVELT ALVA MD           Ot           I10   

        

ESSENTIAL (PRIMARY) HYPERTENSION                    

 

             2016           ROOSEVELT ALVA MD           Ot           K21.0 

          

GASTRO-ESOPHAGEAL REFLUX DISEASE WITH ES                    

 

             2016           ROOSEVELT ALVA MD           Ot           K29.70

           

GASTRITIS, UNSPECIFIED, WITHOUT BLEEDING                    

 

             2016           ROOSEVELT ALVA MD           Ot           K44.9 

          

DIAPHRAGMATIC HERNIA WITHOUT OBSTRUCTION                    

 

             2016           ROOSEVELT ALVA MD           Ot           K63.5 

          

POLYP OF COLON                                   

 

             2016           ROOSEVELT ALVA MD           Ot           K64.1 

          

SECOND DEGREE HEMORRHOIDS                        

 

             2016           ROOSEVELT ALVA MD           Ot           Z80.0 

          

FAMILY HISTORY OF MALIGNANT NEOPLASM OF                     

 

             2016           ROOSEVELT ALVA MD           Ot           Z85.03

8           

PERSONAL HISTORY OF MALIGNANT NEOPLASM O                    

 

             2017           Calos Osborn            848.8   

        OTHER 

SPECIFIED SITES OF SPRAINS AND STRAINS                    

 

             2017           Calos Osborn            S39.011A

           

STRAIN OF MUSCLE, FASCIA AND TENDON OF ABDOMEN, INIT ENCNTR                    

 

             2019                        W            466.0           ACUT

E BRONCHITIS   

                                                 

 

             2019                        W            J20.9           ACUT

E BRONCHITIS, 

UNSPECIFIED                                      

 

             2019                        W            401.9           UNSP

ECIFIED 

ESSENTIAL HYPERTENSION                           

 

             2019                        W            I10           ESSENT

IAL (PRIMARY) 

HYPERTENSION                                     

 

             2020           NIDA TINSLEY, ROOSEVELT           Ot           V72.84

           

EXAM PRE-OPERATIVE NOS                           

 

             2020           SOLANGE ALBERTO MD           Ot           E88.09 

          OTH

DISORDERS OF PLASMA-PROTEIN METABOLI                    

 

             2020           SOLANGE ALBERTO MD           Ot           I10    

       

ESSENTIAL (PRIMARY) HYPERTENSION                    

 

             2020           SOLANGE ALBERTO MD           Ot           Z80.0  

         

FAMILY HISTORY OF MALIGNANT NEOPLASM OF                     

 

             2020           SOLANGE ALBERTO MD           Ot           Z80.41 

          

FAMILY HISTORY OF MALIGNANT NEOPLASM OF                     

 

             2020           SOLANGE ALBERTO MD           Ot           E88.09 

          OTH

DISORDERS OF PLASMA-PROTEIN METABOLI                    

 

             2020           SOLANGE ALBERTO MD           Ot           I10    

       

ESSENTIAL (PRIMARY) HYPERTENSION                    

 

             2020           SOLANGE ALBERTO MD           Ot           Z80.0  

         

FAMILY HISTORY OF MALIGNANT NEOPLASM OF                     

 

             2020           SOLANGE ALBERTO MD           Ot           Z80.41 

          

FAMILY HISTORY OF MALIGNANT NEOPLASM OF                     

 

             2020           SOLANGE ALBERTO MD           Ot           C85.90 

          

NON-HODGKIN LYMPHOMA, UNSPECIFIED, UNSPE                    

 

             2020           SOLANGE ALBERTO MD           Ot           N28.9  

         

DISORDER OF KIDNEY AND URETER, UNSPECIFI                    

 

             2020           SOLANGE ALBERTO MD           Ot           R91.1  

         

SOLITARY PULMONARY NODULE                        

 

             2020           SOLANGE ALBERTO MD           Ot           C85.90 

          

NON-HODGKIN LYMPHOMA, UNSPECIFIED, UNSPE                    

 

             2020           SOLANGE ALBERTO MD           Ot           N28.9  

         

DISORDER OF KIDNEY AND URETER, UNSPECIFI                    

 

             2020           SOLANGE ALBERTO MD           Ot           R91.1  

         

SOLITARY PULMONARY NODULE                        

 

             2020           SOLANGE ALBERTO MD           Ot           E88.09 

          OTH

DISORDERS OF PLASMA-PROTEIN METABOLI                    

 

             2020           SOLANGE ALBERTO MD           Ot           I10    

       

ESSENTIAL (PRIMARY) HYPERTENSION                    

 

             2020           SOLANGE ALBERTO MD           Ot           Z80.0  

         

FAMILY HISTORY OF MALIGNANT NEOPLASM OF                     

 

             2020           SOLANGE ALBERTO MD           Ot           Z80.41 

          

FAMILY HISTORY OF MALIGNANT NEOPLASM OF                     

 

             2020           SOLANGE ALBERTO MD           Ot           C85.90 

          

NON-HODGKIN LYMPHOMA, UNSPECIFIED, UNSPE                    

 

             2020           SOLANGE ALBERTO MD           Ot           N28.9  

         

DISORDER OF KIDNEY AND URETER, UNSPECIFI                    

 

             2020           SOLANGE ALBERTO MD           Ot           R91.1  

         

SOLITARY PULMONARY NODULE                        

 

             2020           SOLANGE ALBERTO MD           Ot           E88.09 

          OTH

DISORDERS OF PLASMA-PROTEIN METABOLI                    

 

             2020           REMY TINSLEY NARVAEZ           Ot           I10    

       

ESSENTIAL (PRIMARY) HYPERTENSION                    

 

             2020           SOLANGE ALBERTO MD           Ot           Z80.0  

         

FAMILY HISTORY OF MALIGNANT NEOPLASM OF                     

 

             2020           PRADEEP ALBERTO MDNER           Ot           Z80.41 

          

FAMILY HISTORY OF MALIGNANT NEOPLASM OF                     

 

             2020           PRADEEP ALBERTO MDNER           Ot           E88.09 

          OTH

DISORDERS OF PLASMA-PROTEIN METABOLI                    

 

             2020           SOLANGE ALBERTO MD           Ot           I10    

       

ESSENTIAL (PRIMARY) HYPERTENSION                    

 

             2020           REMY TINSLEY NARVAEZ           Ot           Z80.0  

         

FAMILY HISTORY OF MALIGNANT NEOPLASM OF                     

 

             2020           SOLANGE ALBERTO MD           Ot           Z80.41 

          

FAMILY HISTORY OF MALIGNANT NEOPLASM OF                     

 

             2020           SOLANGE ALBERTO MD           Ot           E88.09 

          OTH

DISORDERS OF PLASMA-PROTEIN METABOLI                    

 

             2020           SOLANGE ALBERTO MD           Ot           I10    

       

ESSENTIAL (PRIMARY) HYPERTENSION                    

 

             2020           PRADEEP ALBERTO MDNER           Ot           Z80.0  

         

FAMILY HISTORY OF MALIGNANT NEOPLASM OF                     

 

             2020           SOLANGE ALBERTO MD           Ot           Z80.41 

          

FAMILY HISTORY OF MALIGNANT NEOPLASM OF                     

 

             2020           SOLANGE ALBERTO MD           Ot           E88.09 

          OTH

DISORDERS OF PLASMA-PROTEIN METABOLI                    

 

             2020           SOLANGE ALBERTO MD           Ot           I10    

       

ESSENTIAL (PRIMARY) HYPERTENSION                    

 

             2020           SOLANGE ALBERTO MD           Ot           Z80.0  

         

FAMILY HISTORY OF MALIGNANT NEOPLASM OF                     

 

             2020           SOLANGE ALBERTO MD           Ot           Z80.41 

          

FAMILY HISTORY OF MALIGNANT NEOPLASM OF                     

 

             2020           SOLANGE ALBERTO MD           Ot           E88.09 

          OTH

DISORDERS OF PLASMA-PROTEIN METABOLI                    

 

             2020           SOLANGE ALBERTO MD           Ot           I10    

       

ESSENTIAL (PRIMARY) HYPERTENSION                    

 

             2020           PRADEEP ALBERTO MDNER           Ot           Z80.0  

         

FAMILY HISTORY OF MALIGNANT NEOPLASM OF                     

 

             2020           REMY TINSLEY NARVAEZ           Ot           Z80.41 

          

FAMILY HISTORY OF MALIGNANT NEOPLASM OF                     

 

             2020           LEISURE, MARIUM           W            H61.21

           

IMPACTED CERUMEN, RIGHT EAR                      

 

             2020           LEISURE, LYNMARKA           W            R42   

        

DIZZINESS AND GIDDINESS                          

 

             2020           LEISURE, LYNMARKA           W            R55   

        

SYNCOPE AND COLLAPSE                             

 

             2020           LEISURE, LYNMARKA           W            Z00.8 

          

ENCOUNTER FOR OTHER GENERAL EXAMINATION                    

 

             2020           LEISURE, ANTONA           W            401.0 

          

MALIGNANT ESSENTIAL HYPERTENSION                    

 

             2020           MARIUM KENDALL           W            I10   

        

ESSENTIAL (PRIMARY) HYPERTENSION                    

 

             2020           MARIUM KENDALL            J20.9 

          

ACUTE BRONCHITIS, UNSPECIFIED                    

 

             2020           MARIUM KENDALL            S39.01

1A           

STRAIN OF MUSCLE, FASCIA AND TENDON OF ABDOMEN, INIT ENCNTR                    

 

             2020           ANUSHA SNYDER MD, Ot           C85.9

0           

NON-HODGKIN LYMPHOMA, UNSPECIFIED, UNSPE                    

 

             2020           ANUSHA SNYDER MD, Ot           I10  

         

ESSENTIAL (PRIMARY) HYPERTENSION                    

 

             2020           ANUSHA SNYDER MD, Ot           Z85.0

38           

PERSONAL HISTORY OF MALIGNANT NEOPLASM O                    

 

             2020           ANUSHA SNYDER MD, Ot           Z98.8

90           

OTHER SPECIFIED POSTPROCEDURAL STATES                    

 

             2020           EDITH FARLEY MD, Ot           C64.

9           

MALIGNANT NEOPLASM OF UNSP KIDNEY, EXCEP                    

 

             2020           EDITH FARLEY MD           Ot           E78.

2           

MIXED HYPERLIPIDEMIA                             

 

             2020           EDITH FARLEY MD, Ot           I10 

          

ESSENTIAL (PRIMARY) HYPERTENSION                    

 

             2020           EDITH FARLEY MD, Ot           C64.

9           

MALIGNANT NEOPLASM OF UNSP KIDNEY, EXCEP                    

 

             2020           EDITH FARLEY MD           Ot           E78.

2           

MIXED HYPERLIPIDEMIA                             

 

             2020           EDITH FARLEY MD, Ot           I10 

          

ESSENTIAL (PRIMARY) HYPERTENSION                    



                                                                                
                                                                                
                                                                                
                     



Procedures

      



There is no data.                  



Results

      



                    Test                Result              Range        

 

                                        Urinalysis - 17 10:55         

 

                    Icotest             N/A                 Negative        

 

                    Urine Volume           Urine Volume Sufficient (10mL)       

              

 

                    Urine Yeast           No Yeast present                     

 

                    Urine-Appearance           Clear               Clear        

 

                    Urine-Bacteria           Negative                     

 

                    Urine-Bilirubin           Negative            Negative      

  

 

                    Urine-Blood           Negative            Negative        

 

                    Urine-Color           Yellow              Colorless-[place]

ow        

 

                    Urine-Glucose           Negative            Negative        

 

                    Urine-Ketones           Negative            Negative        

 

                    Urine-Leukocytes           Negative            Negative     

   

 

                    Urine-Nitrite           Negative            Negative        

 

                          Urine-Other                Urine Saved if Culture Need

ed (48hrs from time of 

collection)                                      

 

                    Urine-pH            6.5                 5-8.5        

 

                    Urine-Protein           Negative            Negative        

 

                    Urine-RBC           Rare/HPF                     

 

                    Urine-Specific Gravity           1.010               1.000-1

.030        

 

                    Urine-WBC           Negative                     

 

                    Urobilinogen           0.2 E.U./dL            0.2-1.0       

 

 

                                        BMP - 17 11:57         

 

                    Anion Gap           11                  6-14        

 

                    BUN                 13 mg/dL            5-25        

 

                    Calcium             9.4 mg/dL           8.3-10.4        

 

                    Chloride            106 mmol/L                   

 

                    CO2                 27 mEq/L            22-33        

 

                    Creat               1.20 mg/dL           0.50-1.50        

 

                    eGFR                60 mL/min/1.73m2           >59        

 

                    Glucose             99 mg/dL                    

 

                    Osmo                289                 280-295        

 

                    Potassium           4.4 mmol/L           3.5-5.3        

 

                    Sodium              140 mmol/L           134-148        

 

                                        PSA Yearly Screen - 19 07:11      

   

 

                    PSA TOTAL           4.7 ng/mL           0.0-4.0        

 

                                        Lipid Panel - 01/10/20 07:33         

 

                    C/HDL               4.1                 3.7-6.7        

 

                    Cholesterol           147 mg/dL           100-240        

 

                    HDL                 36 mg/dL            30-85        

 

                    LDL-Calculated           92 mg/dL            0-100        

 

                    Trig                93 mg/dL                    

 

                    VLDL                19 mg/dL            0-42        

 

                                        Comprehensive Metabolic Panel - 20

 07:36         

 

                    Albumin             3.9 g/dL            3.6-5.1        

 

                    ALP                 75 U/L                      

 

                    ALT                 28 U/L              6-45        

 

                    Anion Gap           13                  6-14        

 

                    AST                 23 U/L              2-40        

 

                    BUN                 16 mg/dL            5-25        

 

                    Calcium             9.0 mg/dL           8.3-10.4        

 

                    Chloride            103 mmol/L                   

 

                    CO2                 24 mEq/L            22-33        

 

                    Creat               1.51 mg/dL           0.50-1.50        

 

                    eGFR                46 mL/min/1.73m2           >59        

 

                    Globulin            5.5 g/dL            2.3-3.5        

 

                    Glucose             93 mg/dL                    

 

                    Osmo                282                 280-295        

 

                    Potassium           4.2 mmol/L           3.5-5.3        

 

                    Sodium              136 mmol/L           134-148        

 

                    TBil                0.8 mg/dL           0.2-1.2        

 

                    TP                  9.4 g/dL            6.0-8.3        

 

                                        Protein Electro, Random Urine - 20

 11:01         

 

                    Protein,Total,Urine           7.9 mg/dL           Not Estab.

        

 

                    Please note:           Comment                      

 

                    Albumin, U           37.7 %                       

 

                    Alpha-1-Globulin, U           7.3 %                        

 

                    Alpha-2-Globulin, U           13.3 %                       

 

                    Beta Globulin, U           21.1 %                       

 

                    Gamma Globulin, U           20.6 %                       

 

                    M-Agustin, %           Not Observed %           Not Observed  

      

 

                    PDF                 .                            

 

                                        Protein Electro, Random Urine - 20

 11:01         

 

                    PROTEIN,TOTAL,URINE           7.9 MG/DL           NOT ESTAB.

        

 

                    ALBUMIN, U           37.7 %                       

 

                    ALPHA-1-GLOBULIN, U           7.3 %                        

 

                    ALPHA-2-GLOBULIN, U           13.3 %                       

 

                    BETA GLOBULIN, U           21.1 %                       

 

                    GAMMA GLOBULIN, U           20.6 %                       

 

                          PLEASE NOTE:              PROTEIN ELECTROPHORESIS SCAN

 WILL FOLLOW VIA COMPUTER, 

MAIL, OR.brCOURIER DELIVERY.                     

 

                    M-SPIKE, %           NOT OBSERVED %           NOT OBSERVED  

      

 

                    PDF                 .                            

 

                                        COVID19 - 05/15/20 07:15         

 

                    COVID19              NEGATIVE test performed at [facility]         

            

 

                                        Troponin I - 20 13:18         

 

                    Troponin            <0.020 ng/mL           0.0-0.4        

 

                                        [facility] - 07/10/20 06:56         

 

                    Anion Gap           9                   6-14        

 

                    BUN                 17 mg/dL            5-25        

 

                    Calcium             8.8 mg/dL           8.3-10.4        

 

                    Chloride            102 mmol/L                   

 

                    CO2                 27 mEq/L            22-33        

 

                    Creat               1.50 mg/dL           0.50-1.50        

 

                    eGFR                46 mL/min/1.73m2           >59        

 

                    Glucose             99 mg/dL                    

 

                    Osmo                279                 280-295        

 

                    Potassium           3.7 mmol/L           3.5-5.3        

 

                    Sodium              134 Testing performed at [facility] mmol/L  

         134-148       



 

                                        [facility] - 20 18:15         

 

                    BUN                 17 mg/dL            5-25        

 

                    Chloride            105 mmol/L                   

 

                    CO2                 25 mEq/L            22-33        

 

                    Creat               1.50 mg/dL           0.50-1.50        

 

                    eGFR                46 mL/min/1.73m2           >59        

 

                    Glucose             91 mg/dL                    

 

                    Ionized Ca           1.23 mmol/L           1.12-1.32        

 

                    Potassium           3.9 mmol/L           3.5-5.3        

 

                    Sodium              140 mmol/L           134-148        



                                    



Encounters

      



                ACCT No.           Visit Date/Time           Discharge          

 Status         

             Pt. Type           Provider           Facility           Loc./Unit 

          

Complaint        

 

             [id number]           2020 14:10:00                         

            

Document Registration                                                           

         

 

                    [id number]           2020 08:30:00            23:59:59        

                CLS             Outpatient           EDITH FARLEY MD         

  [facility]           CARD                      HTN        

 

                    [id number]           07/10/2020 14:05:00           07/10/2

020 23:59:59        

                CLS             Outpatient           ANUSHA SNYDER MD          

 [facility]           ON[facility]                                

 

                    [id number]           2020 15:18:00           07/02/2

020 14:46:00        

                DIS             Outpatient           SOLANGE ALBERTO MD

[facility]           ONC                                

 

                    [id number]           2020 08:21:00           

020 00:01:00        

                DIS             Outpatient           SOLANGE ALBERTO MD

[facility]           ONC                                

 

                    [id number]           2020 07:45:00           

020 23:59:59        

                CLS             Outpatient           SOLANGE ALBERTO MD

[facility]           RAD                       [place]        

 

                    [id number]           2016 07:42:00           

016 11:00:00        

                DIS             Outpatient           ROOSEVELT ALVA MD           

[facility]                       HX COLON CANCER; REFLUX

        

 

                    [id number]           2016 06:04:00           

016 12:00:00        

                DIS             Outpatient           ROOSEVELT ALVA MD           

[facility]           PREOP                     HX COLON CANCER;REFLUX 

       

 

                    [id number]           2014 07:51:00           

014 10:50:00        

                DIS             Outpatient           ROOSEVELT ALVA MD           

[facility]                       OCCULT BLOOD IN STOOLS;

 XH COLON 

CANCER        

 

                    [id number]           2014 06:20:00           

014 23:59:59        

                CLS             Outpatient           ROOSEVELT ALVA MD           

[facility]           PREOP                     OCCULT BLOOD IN STOOLS,

 HX COLON 

CANCER        

 

             [id number]           2020 07:30:00                        P

EN           

Lizbeth FARLEY MD, EDITH FERRARA           [facility]

                          CARD                      HTN        

 

             [id number]           2011 05:40:00                         

            

Document Registration                                                           

         

 

             [id number]           2011 12:06:00                         

            

Document Registration                                                           

         

 

             [id number]           2010 09:55:00                         

            

Document Registration                                                           

         

 

                [id number]           2020 16:33:00           2020 23:59

:00           

DIS             Outpatient           Kay Madrid                              

      

                                                 

 

                [id number]           2020 17:28:00           2020 20:35

:00           

DIS                 Outpatient           MARIUM KENDALL [facility]                                 

 

                [id number]           07/10/2020 15:48:00           07/10/2020 23:59

:00           

DIS             Outpatient           Kay Madrid                              

      

                                                 

 

                [id number]           07/10/2020 06:51:00           07/10/2020 23:59

:00           

DIS             Outpatient           Calos Osborn                             

      

                                                 

 

                [id number]           2020 12:21:00           2020 14:15

:00           

DIS                 Outpatient           Irena, [facility]                    ER                                 

 

                [id number]           05/15/2020 07:11:00           05/15/2020 23:59

:00           

DIS             Outpatient           JewelhenchristopherNabil                             

      

                                                 

 

                [id number]           2020 13:07:00           2020 23:59

:00           

DIS             Outpatient           Paoni, Arnaldo                                

      

                                                 

 

                [id number]           2020 10:58:00           2020 23:59

:00           

DIS             Outpatient           Paoni, Arnaldo                                

      

                                                 

 

                [id number]           2020 07:31:00           2020 23:59

:00           

DIS             Outpatient           Paoni, Arnaldo                                

      

                                                 

 

                [id number]           01/10/2020 07:29:00           01/10/2020 23:59

:00           

DIS             Outpatient           Paoni, Arnaldo                                

      

                                                 

 

                [id number]           2020 13:57:00           2020 23:59

:00           

DIS             Outpatient           Paoni, Arnaldo                                

      

                                                 

 

                [id number]           2019 14:27:00           2019 23:59:

00           DIS

             Outpatient           Paoni, Arnaldo                                   

     

     

 

                [id number]           2019 06:55:00           2019 23:59:

00           DIS

             Outpatient           Paoni, Arnaldo                                   

     

     

 

                [id number]           2017 10:45:00           2017 13:05:

00           DIS

                    Outpatient           Irena [facility]                                 

 

             [id number]           2019 14:02:00                               

      Document 

Registration                                                                    

 

             [id number]           2019 09:32:00                               

      Document 

Registration                                                                    

 

             [id number]           2017 11:13:28                                 

    Document 

Registration                                                                    

 

             [id number]           2014 07:51:50                        [facility]   

        

Document Registration

## 2020-08-10 NOTE — XMS REPORT
Encounter Summary

                             Created on: 08/10/2020



Cas Lewis

External Reference #: [id number]

: 1950

Sex: Male



Demographics





                          Address                   [address]  [address]

 

                          Home Phone                [phone]

 

                          Preferred Language        English

 

                          Marital Status            Unknown

 

                          Congregation Affiliation     [employer]

 

                          Race                      White

 

                          Ethnic Group              Not  or 





Author





                          Author                    [facility]

 

                          Organization              [facility]

 

                          Address                   Unknown

 

                          Phone                     Unavailable







Support





                Name            Relationship    Address         Phone

 

                Kendy Richardson ECON            Unknown         [phone]

19







Care Team Providers





                    Care Team Member Name Role                Phone

 

                    Arnaldo Singh         PCP                 [phone]

 

                    Tawil, Elias A MD   Unavailable         [phone]







Encounter Details





                          Care Team                 Description



                     Date                Type                Department  

 

                                                     



                           2020                Travel   







Social History





                                        Date



                 Tobacco Use     Types           Packs/Day       Years Used 

 

                                        Quit: 



                           Former Smoker             Pipe   

 

    



                     Smokeless Tobacco: Former  Chew                Quit: 



                                         User   







                    Drinks/Week         oz/Week             Comments



                                         Alcohol Use   

 

                                                             



                                         Never   







  



                     Alcohol Habits      Answer              Date Recorded

 

  



                     How often do you have a drink containing alcohol?  Never   

            2020

 

  



                           How many drinks containing alcohol do you have on  No

t asked 



                                         a typical day when you are drinking?  

 

  



                           How often do you have six or more drinks on one  Not 

asked 



                                         occasion?  







 



                           Sex Assigned at Birth     Date Recorded

 

 



                           Male                      2020  6:16 AM CDT







                                        Industry



                           Job Start Date            Occupation 

 

                                        Not on file



                           Not on file               Not on file 







                                        Travel End



                           Travel History            Travel Start 

 





                                         No recent travel history available.







                                        Date Recorded



                           COVID-19 Exposure         Response 

 

                                        2020  6:14 AM CDT



                           In the last month, have you been in contact with  No 

/ Unsure 



                                         someone who was confirmed or suspected 

to have  



                                         Coronavirus / COVID-19?  



documented as of this encounter



Plan of Treatment





Not on filedocumented as of this encounter



Goals





     



            Goal       Patient    Associated  Recent Progress  Patient-Stat  Aut

hor



                     Goal Type           Problems            ed? 

 

     



                 Resume normal activities  Hospital        No              Lizette Zacarias RN



documented as of this encounter



Visit Diagnoses

Not on filedocumented in this encounter

## 2020-08-10 NOTE — XMS REPORT
Encounter Summary

                             Created on: 08/10/2020



Cas Lewis

External Reference #: [id number]

: 1950

Sex: Male



Demographics





                          Address                   [address] [place]  [address]

 

                          Home Phone                [phone]

 

                          Preferred Language        English

 

                          Marital Status            Unknown

 

                          Yazidism Affiliation     [employer]

 

                          Race                      White

 

                          Ethnic Group              Not  or 





Author





                          Author                    [facility]

 

                          Organization              [facility]

 

                          Address                   Unknown

 

                          Phone                     Unavailable







Support





                Name            Relationship    Address         Phone

 

                Kendy Richardson ECON            Unknown         [phone]

19







Care Team Providers





                    Care Team Member Name Role                Phone

 

                          PCP                       Unavailable







Encounter Details





                          Care Team                 Description



                     Date                Type                Department  

 

                                                     



                     2020          [facility]  



                           Encounter                 [facility]  



                                         [address] [address]  



                                         [phone]  







Social History





                                        Date



                 Tobacco Use     Types           Packs/Day       Years Used 

 

                                         



                                         Never Assessed    







 



                           Sex Assigned at Birth     Date Recorded

 

 



                                         Male 







                                        Industry



                           Job Start Date            Occupation 

 

                                        Not on file



                           Not on file               Not on file 







                                        Travel End



                           Travel History            Travel Start 

 





                                         No recent travel history available.



documented as of this encounter



Medications at Time of Discharge





                          Start Date                End Date



                 Medication      Sig             Dispensed       Refills  

 

                          2020                 



                     amitriptyline (ELAVIL) 25  Take 25 mg by       0  



                           mg tablet                 mouth at    



                                         bedtime    



                                         daily.    

 

                          2020                 



                     doxazosin (CARDURA) 4 mg  Take 4 mg by        0  



                           tablet                    mouth daily.    

 

                          2020                 



                     pantoprazole DR     Take 40 mg by       0  



                           (PROTONIX) 40 mg tablet   mouth daily.    



documented as of this encounter



Plan of Treatment





Not on filedocumented as of this encounter



Goals





     



            Goal       Patient    Associated  Recent Progress  Patient-Stat  Aut

hor



                     Goal Type           Problems            ed? 

 

     



                 Resume normal activities  Hospital                      Lizette Zacarias RN



documented as of this encounter



Procedures





                                        Comments



                 Procedure Name  Priority        Date/Time       Associated Diag

nosis 

 

                                        



Results for this procedure are in the results section.



                     CT CHEST/ABD/PEL EXTERNAL  Routine             2020  



                           IMAGING                   12:00 AM CDT  



documented in this encounter



Results

* CT CHEST/ABD/PEL EXTERNAL IMAGING (2020 12:00 AM CDT)







                                         Specimen

 









 



                           Narrative                 Performed At

 

 



                                         This order has been auto finalized and 

does not contain a result. 





documented in this encounter



Visit Diagnoses

Not on filedocumented in this encounter

## 2020-08-10 NOTE — XMS REPORT
Clinical Summary

                             Created on: 08/10/2020



Jas Lewis

External Reference #: [id number]

: 1950

Sex: Male



Demographics





                          Address                   [address]  [address]

 

                          Home Phone                [phone]

 

                          Preferred Language        English

 

                          Marital Status            Unknown

 

                          Mosque Affiliation     [facility]

 

                          Race                      White

 

                          Ethnic Group              Not  or 





Author





                          Author                    [facility]

 

                          Organization              [facility]

 

                          Address                   Unknown

 

                          Phone                     Unavailable







Support





                Name            Relationship    Address         Phone

 

                Kendy Richardson ECON            Unknown         [phone]

[phone]







Care Team Providers





                    Care Team Member Name Role                Phone

 

                    Arnaldo Singh         PCP                 [phone]

 

                    Tawil, Elias A MD   Unavailable         [phone]







Source Comments

Some departments are not documenting in the electronic medical record.  If you d
o not see the information that you expected, contact Release of Information in [facility] Information Management department at [phone] for further assistan
ce in locating additional records.[facility]



Allergies





                                        Comments



                 Active Allergy  Reactions       Severity        Noted Date 

 

                                         



                 Sulfa (Sulfonamide  HIVES           Medium          2014 



                                         Antibiotics)    







Medications





                          End Date                  Status



              Medication   Sig          Dispensed    Refills      Start  



                                         Date  

 

                                                    Active



                 amitriptyline (ELAVIL) 25  Take 25 mg by   0               //202  



                     mg tablet           mouth at            0  



                                         bedtime     



                                         daily.     

 

                                                    Active



                 atenoloL (TENORMIN) 50 mg  Take 50 mg by   0                 



                     tablet              mouth daily.        0  

 

                                                    Active



                 doxazosin (CARDURA) 4 mg  Take 4 mg by    0                 



                     tablet              mouth daily.        0  

 

                                                    Active



                 pantoprazole DR  Take 40 mg by   0                 



                     (PROTONIX) 40 mg tablet  mouth daily.        0  

 

                                                    Active



                 acetaminophen (TYLENOL)  Take two        0                 



                     325 mg tablet       tablets by          0  



                                         mouth every 6     



                                         hours as     



                                         needed.     

 

                                                    Active



              oxyCODONE (ROXICODONE) 5  Take one     30 tablet    0            0

  



                     mg tablet           tablet to two       0  



                                         tablets by     



                                         mouth every 4     



                                         hours as     



                                         needed     

 

                                                    Active



              senna/docusate (SENNA-S)  Take one     30 tablet    0            0

  



                     8.6/50 mg tablet    tablet by           0  



                                         mouth twice     



                                         daily.     

 

                                                    Active



              polyethylene glycol 3350  Take         527 g        3            0

  



                     (MIRALAX) 17 gram/dose  seventeen g         0  



                           powder                    by mouth     



                                         daily.     







Active Problems





 



                           Problem                   Noted Date

 

 



                           CKD (chronic kidney disease) stage 3, GFR 30-59 ml/mi

n  06/15/2020

 

 



                                         Overview:



                                         Baseline Cr ~1.5

 

 



                           Renal cell carcinoma of left kidney  2020

 

 



                                         Overview:



                                         Formatting of this note might be differ

ent from the original.



                                         LEFT renal mass s/p LEFT robotic partia

l nephrectomy on 2020 T1a clear



                                         cell renal cell carcinoma, grade 2 with

 necrosis, margins negative.





                                         Lab Results



                                         Component Value Date/Time



                                         CR 1.56 (H) 06/15/2020 11:08 AM



                                         CR 1.58 (H) 2020 04:38 AM



                                         CR 1.45 (H) 2020 07:34 AM





                                         





                                         L



                                         ast Assessment & Plan:



                                         69 y.o. male with history of lymphoma, 

LEFT renal mass s/p LEFT robotic



                                         partial nephrectomy on 2020 T1a cl

ear cell renal cell carcinoma, grade



                                         2 with necrosis, margins negative.





                                         Plan:



                                         - Patient plans to continue follow up s

urveillance for recurrence of mass



                                         with referring urologist Dr. Tawil give

n long distance to [facility]



                                         - Outlined typical surveillence plan to

 patient and family for pT1a masses



                                         with negative margins including imaging

 out to 3 years post-op and BMPs



                                         - He will transfer care back to Dr. Hamlin

il and follow up with [facility] prn.







Encounters





                          Care Team                 Description



                     Date                Type                Specialty  

 

                                        



Latoya Arellano RN                     Results



                     2020          Telephone           Urgent Care  

 

                                        



Dallas Johnson                          Preop testing



                     2020          Orders Only         Urology  

 

                                        



Miranda Lee MD                      Renal cell carcinoma of left kidney (HCC

); 

CKD (chronic kidney disease) stage 3, GFR 30-59 ml/min (HCC)



                     06/15/2020          Office Visit        Urology  

 

                                        



Miranda Lee MD                       



                     06/15/2020          Hospital            Lab  



                                         Encounter   

 

                                                     



                           06/15/2020                Travel   

 

                                        



Miranda Lee MD                      Results (pt calling for pathology result

s)



                     2020          Telephone           Urology  

 

                                        



Miranda Lee MD                      ROBOT ASSISTED LAPAROSCOPIC NEPHRECTOMY 

PARTIAL



                           2020                Surgery   

 

                                        



Wallisch, William, MD                    



                           2020                Anesthesia   



                                         Event   

 

                                        



Miranda Lee MD                      Renal cell carcinoma of left kidney (HCC

)



                           2020                Hospital   



                           -                         Encounter   



                                         2020                Travel   

 

                                        



Marian, MD Marian                     



                     05/15/2020          Orders Only         Lab  

 

                                        



Miranda Lee MD                      Medical Question (patient's daughter ask

ing if pre-surgery 

Covid 19 testing can be done locally--pt lives 2 hrs away)



                     2020          Telephone           Urology  



from Last 3 Months



Family History





   



                 Medical History  Relation        Name            Comments

 

   



                           Cancer-Skin               Brother  

 

   



                           Cancer                    Daughter  

 

   



                           Cancer-Breast             Daughter  

 

   



                           Alzheimer's               Father  

 

   



                           Cancer                    Father  

 

   



                           Cancer-Colon              Father  

 

   



                           Hypertension              Father  

 

   



                           Neurologic Disorder       Father  

 

   



                           Hypertension              Maternal  



                                         Grandfather  

 

   



                     Cancer              Mother              [employer]ometrail

 

   



                           Hypertension              Mother  







   



                 Relation        Name            Status          Comments

 

   



                                         Brother   

 

   



                                         Daughter   

 

   



                           Father                     

 

   



                                         Maternal Grandfather   

 

   



                                         Mother   







Social History





                                        Date



                 Tobacco Use     Types           Packs/Day       Years Used 

 

                                        Quit: 



                           Former Smoker             Pipe   

 

    



                     Smokeless Tobacco: Former  Chew                Quit: 



                                         User   







                    Drinks/Week         oz/Week             Comments



                                         Alcohol Use   

 

                                                             



                                         Never   







  



                     Alcohol Habits      Answer              Date Recorded

 

  



                     How often do you have a drink containing alcohol?  Never   

            2020

 

  



                           How many drinks containing alcohol do you have on  No

t asked 



                                         a typical day when you are drinking?  

 

  



                           How often do you have six or more drinks on one  Not 

asked 



                                         occasion?  







 



                           Sex Assigned at Birth     Date Recorded

 

 



                           Male                      2020  6:16 AM CDT







                                        Industry



                           Job Start Date            Occupation 

 

                                        Not on file



                           Not on file               Not on file 







                                        Travel End



                           Travel History            Travel Start 

 





                                         No recent travel history available.







Last Filed Vital Signs





                    Reading             Time Taken          Comments



                                         Vital Sign   

 

                    116/71              06/15/2020  1:58 PM CDT  



                                         Blood Pressure   

 

                    76                  06/15/2020  1:58 PM CDT  



                                         Pulse   

 

                    37.5 C (99.5 F) 2020  7:27 AM CDT  



                                         Temperature   

 

                    -                   -                    



                                         Respiratory Rate   

 

                    93%                 2020  7:27 AM CDT  



                                         Oxygen Saturation   

 

                    -                   -                    



                                         Inhaled Oxygen   



                                         Concentration   

 

                    90.7 kg (200 lb)    06/15/2020  1:58 PM CDT  



                                         Weight   

 

                    180.3 cm (5' 11")   06/15/2020  1:58 PM CDT  



                                         Height   

 

                    27.89               06/15/2020  1:58 PM CDT  



                                         Body Mass Index   







Plan of Treatment





   



                 Health Maintenance  Due Date        Last Done       Comments

 

   



                           MEDICARE ANNUAL WELLNESS  1950  



                                         VISIT   

 

   



                           DTAP/TDAP VACCINES (1 -   1968  



                                         Tdap)   

 

   



                           HEPATITIS C SCREENING     1968  

 

   



                           PHYSICAL (COMPREHENSIVE)  1968  



                                         EXAM   

 

   



                           COLORECTAL CANCER         2000  



                                         SCREENING   

 

   



                           SHINGLES RECOMBINANT      2000  



                                         VACCINE (1 of 2)   

 

   



                           ABDOMINAL AORTIC ANEURYSM  2015  



                                         SCREENING   

 

   



                           PNEUMONIA (PPSV23)        2015  



                                         VACCINE (1 of 1 - PPSV23)   

 

   



                     INFLUENZA VACCINE   10/01/2020          10/23/2019 







Goals





     



            Goal       Patient    Associated  Recent Progress  Patient-Stat  Aut

hor



                     Goal Type           Problems            ed? 

 

     



                 Resume normal activities  [facility]        Lizette Gibson RN







Procedures





                                        Comments



                 Procedure Name  Priority        Date/Time       Associated Diag

nosis 

 

                                        



Results for this procedure are in the results section.



                 HC BASIC METABOLIC PANEL  Routine         06/15/2020      Left 

renal mass 



                                         11:08 AM CDT  

 

                                        



Results for this procedure are in the results section.



                     HC CREATININE-FLUID  Routine             2020  



                                         6:50 AM CDT  

 

                                        



Results for this procedure are in the results section.



                     HC BASIC METABOLIC PANEL  Routine             2020  



                                         4:38 AM CDT  

 

                                        



Results for this procedure are in the results section.



                     HC CBC,AUTOMATED    Routine             2020  



                                         4:38 AM CDT  

 

                                        



Results for this procedure are in the results section.



                 HC FROZEN SECTION #1  Routine         2020      Left blayne

l mass 



                                         10:59 AM CDT  

 

                                         



                     ULTRASOUND GUIDANCE -   2020          Left renal mass

 



                           INTRAOPERATIVE            7:45 AM CDT  

 

                                         



                     ROBOT ASSISTED      2020          Left renal mass 



                           LAPAROSCOPIC NEPHRECTOMY   7:45 AM CDT  



                                         PARTIAL    

 

                                        



Results for this procedure are in the results section.



                     HC COMPREHENSIVE    STAT                2020  



                           METABOLIC PANEL           7:34 AM CDT  

 

                                        



Results for this procedure are in the results section.



                     HC BLOOD TYPING, ABO  STAT                2020  



                           CONFIRM 91                6:55 AM CDT  

 

                                        



Results for this procedure are in the results section.



                     HC ABO GROUP        Routine             2020  



                                         6:45 AM CDT  

 

                                        



Results for this procedure are in the results section.



                     HC CBC,AUTOMATED    Routine             2020  



                                         6:45 AM CDT  

 

                                        



Results for this procedure are in the results section.



                           PATHOLOGY INTEROPERATIVE   2020  



                           REPORT SCAN               12:00 AM CDT  

 

                                        



Results for this procedure are in the results section.



                           TELEMETRY STRIPS-SCAN     2020  



                                         12:00 AM CDT  

 

                                        



Results for this procedure are in the results section.



                     EXTERNAL COVID-19   STAT                05/15/2020  



                           (SARS-COV-2)              7:15 AM CDT  

 

                                         



                 COVID-19 (SARS-COV-2) PCR  Routine         05/15/2020      Preo

p testing 



from Last 3 Months



Results

* BASIC METABOLIC PANEL (06/15/2020 11:08 AM CDT)



Only the most recent of 2 results within the time period is included.



    



              Component    Value        Ref Range    Performed At  Pathologist



                                         Signature

 

    



                 Sodium          136 (L)         137 - 147 MMOL/L  [facility] 

 

    



                 Potassium       4.3             3.5 - 5.1 MMOL/L  [facility] 

 

    



                 Chloride        106             98 - 110 MMOL/L  [facility] 

 

    



                 CO2             24              21 - 30 MMOL/L  [facility] 

 

    



                 Anion Gap       6               3 - 12          [facility] 

 

    



                 Glucose         81              70 - 100 MG/DL  [facility] 

 

    



                 Blood Urea      15              7 - 25 MG/DL    [facility] 



                                         Nitrogen    

 

    



                 Creatinine      1.56 (H)        0.4 - 1.24 MG/DL  [facility] 

 

    



                 Calcium         9.1             8.5 - 10.6 MG/DL  [facility] 

 

    



                 eGFR Non        44 (L)          >60 mL/min       [facility] 



                                              Comment:   



                           [employer]                  The eGFR is not validated f

or   



                                         use in drug dosing   



                                         adjustments. Continue to use   



                                         estimated creatinine   



                                         clearance per dosing reference   



                                         text. Please contact the   



                                         Clinical Pharmacist for   



                                         questions.   

 

    



                 eGFR     54 (L)          >60 mL/min      [facility] 



                           [employer]                  Comment:   



                                         The eGFR is not validated for   



                                         use in drug dosing   



                                         adjustments. Continue to use   



                                         estimated creatinine   



                                         clearance per dosing reference   



                                         text. Please contact the   



                                         Clinical Pharmacist for   



                                         questions.   











                                         Specimen

 





                                         Blood







   



                 Performing Organization  Address         City[facility]  Ph

one Number

 

   



                      [facility]         [address] [place] [address] 





* DRAIN FLUID CREATININE (2020  6:50 AM CDT)



    



              Component    Value        Ref Range    Performed At  Pathologist



                                         [facility]

 

    



                 Drain Fluid     1.80            mg/dL            [facility] 



                           Creatinine                Comment:   



                                         Body fluid to serum creatinine   



                                         ratios >1.0 suggest the   



                                         specimen may be   



                                         contaminated with urine   











                                         Specimen

 





                                         Drainage







   



                 Performing Organization  Address         City/[facility]

one Number

 

   



                      [facility]         [address] [place] [address] 





* CBC (2020  4:38 AM CDT)



Only the most recent of 2 results within the time period is included.



    



              Component    Value        Ref Range    Performed At  Pathologist



                                         Signature

 

    



                 White Blood     7.7             4.5 - 11.0 K/UL   [facility] 



                                         Cells    

 

    



                 RBC             4.07 (L)        4.4 - 5.5 M/UL  [facility] 

 

    



                 Hemoglobin      12.1 (L)        13.5 - 16.5 GM/DL  [facility] 

 

    



                 Hematocrit      35.3 (L)        40 - 50 %       [facility] 

 

    



                 MCV             86.8            80 - 100 FL     [facility] 

 

    



                 MCH             29.8            26 - 34 PG      [facility] 

 

    



                 MCHC            34.3            32.0 - 36.0 G/DL  [facility] 

 

    



                 RDW             14.6            11 - 15 %       [facility] 

 

    



                 Platelet Count  134 (L)         150 - 400 K/UL   [facility] 

 

    



                 MPV             8.3             7 - 11 FL        [facility] 











                                         Specimen

 





                                         Blood







   



                 Performing Organization  Address         City/State/Zipcode  Ph

one Number

 

   



                     [facility]         [address] [place]

 [address] 





* SURGICAL PATHOLOGY          (2020 10:59 AM CDT)



    



              Component    Value        Ref Range    Performed At  Pathologist



                                         Signature

 

    



                     PATHOLOGY           [facility] 



                           RE[facility]                    [facility]   



                                         [email or web address]   



                                         Department of Pathology and   



                                         Laboratory Medicine   



                                         [address] [place] [address]   



                                         Surgical Pathology Office:   



                                         [phone] Fax:   



                                         [phone]   



                                         SURGICAL PATHOLOGY REPORT   



                                         NAME: JAS LEWIS SURG   



                                         PATH #: [id number] MR #:   



                                         [id number] SPECIMEN   



                                         CLASS: SR BILLING #:   



                                         [id number] ALT ID #:   



                                         LOCATION: 8 DATE OF   



                                         PROCEDURE: 2020 AGE: 69   



                                         SEX: M DATE RECEIVED:   



                                         2020 :   



                                         1950 TIME RECEIVED:   



                                         11:14 PHYSICIAN:   



                                         MIRANDA LEE  UROTEMITOPE DATE OF   



                                         REPORT: 2020 COPY TO:   



                                         DATE OF PRINTIN2020   



                                         ##############################   



                                         ##############################   



                                         ############   



                                         Final Diagnosis:   



                                         A. Kidney, deep margin,   



                                         biopsy:    



                                         Negative for malignancy.   



                                         B. Soft tissue, peritumor fat,   



                                         biopsy:   



                                         Negative for malignancy.   



                                         C. Kidney, left, partial   



                                         nephrectomy:   



                                         Renal cell carcinoma, clear   



                                         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   



                                         2.7 cm, with   



                                         necrosis, confined to kidney.   



                                         Margins uninvolved.   



                                         Comment:   



                                         KIDNEY: Nephrectomy, Partial   



                                         or Radical   



                                         CAP Version: Kidney 4.0.1.0   



                                         Procedure   



                                         Partial nephrectomy   



                                         Specimen Laterality   



                                         Left   



                                         Tumor Site (select all that   



                                         apply)   



                                         Not specified   



                                         Tumor Size (largest tumor if   



                                         multiple)   



                                         Greatest dimension: 2.7 cm   



                                         Tumor Focality   



                                         Unifocal   



                                         Histologic Type   



                                         Clear cell renal cell   



                                         carcinoma   



                                         Sarcomatoid Features   



                                         Not identified   



                                         Rhabdoid Features   



                                         Not identified   



                                         Tumor Necrosis (any amount)   



                                         Present: <5%   



                                         Histologic Grade (WHO/ISUP)   



                                         G2: Nucleoli are conspicuous   



                                         and eosinophilic at x400   



                                         magnification   



                                         and visible but not prominent   



                                                  



                                         at x100 magnification.   



                                         Tumor Extension (select all   



                                         that apply)   



                                         Tumor limited to kidney   



                                         Tumor extension into other   



                                         organ(s)/structure(s)   



                                         (specify):   



                                         Margins (select all that   



                                         apply)   



                                         Margins uninvolved by invasive   



                                         carcinoma   



                                         Lymph-Vascular Invasion   



                                         (excluding renal vein and its   



                                         muscle containing segmental   



                                         branches and   



                                         inferior venacava)   



                                         Not identified   



                                         Pathologic Staging (pTNM)   



                                         TNM Descriptors (required only   



                                         if applicable) (select all   



                                         that apply)   



                                         Primary Tumor (pT)   



                                         pT1: Tumor 7 cm or less in   



                                         greatest dimension, limited to   



                                         the kidney   



                                         pT1a:   Tumor 4 cm or less   



                                         in greatest dimension, limited   



                                         to the   



                                         kidney   



                                         Regional Lymph Nodes (pN)   



                                         pNX:   Regional lymph   



                                         nodes cannot be assessed   



                                         No nodes submitted or found   



                                         Distant Metastasis (pM)   



                                         Not applicable   



                                         Pathologic Findings in   



                                         Nonneoplastic Kidney (select   



                                         all that apply)   



                                         Insufficient tissue (partial   



                                         nephrectomy specimen with <5   



                                         mm of   



                                         adjacent nonneoplastic kidney)   



                                         Significant pathologic   



                                         alterations   



                                         Glomerular disease (specify   



                                         type): Mild   



                                         Vascular disease (specify   



                                         type): Mild   



                                         Other Tumors and/or Tumor-like   



                                         Lesions (select all that   



                                         apply)   



                                         None   



                                         The pathologic stage assigned   



                                         here should be regarded as   



                                         provisional, as   



                                         it reflects only current   



                                         pathologic data and does not   



                                         incorporate full   



                                         knowledge of the patient's   



                                         clinical status and/or prior   



                                         pathology.   



                                         Pursuant to the Quality   



                                         Assurance Program at the   



                                         [facility] Pathology Department,   



                                         selected slides from this case   



                                         have been   



                                         concurrently reviewed by the   



                                         following pathologist:  Dr. Danna Avila who   



                                         agrees with the final   



                                         diagnosis.   



                                         Attestation:   



                                         By this signature, I attest   



                                         that I have personally   



                                         formulated the final   



                                         interpretation expressed in   



                                         this report and that the above   



                                         diagnosis is   



                                         based upon my examination of   



                                         the slides and/or other   



                                         material indicated in   



                                         this report.   



                                         +++Electronically Signed Out   



                                         By RAKAN DESAI MD on   



                                         2020+++   



                                               



                                         rosie/2020   



                                              



                                         ##############################   



                                         ##############################   



                                         ############   



                                         Material Received:   



                                         A: deep margin   



                                         B: peritumor fat   



                                         C: left renal mass stitch at   



                                         deep margin   



                                         History:   



                                         69-year-old male with a   



                                         clinical history of a left   



                                         renal mass.   



                                         Gross Description:   



                                         A. Received fresh labeled with   



                                         the patient's name and "deep   



                                         margin" is a   



                                         0.7 x 0.5 x 0.3 cm fragment of   



                                         tan-brown soft tissue. The   



                                         specimen is   



                                         submitted entirely in cassette   



                                         A1FS for frozen and permanent   



                                         sectioning.   



                                         (sc)   



                                         B. Received in formalin   



                                         labeled with the patient's   



                                         name and "peritumor   



                                         fat" is a 2.8 x 2.3 x 0.3 cm   



                                         aggregate of yellow-tan   



                                         fibroadipose tissue.   



                                         The specimen is submitted   



                                         without sectioning in cassette   



                                         B1. (sc)   



                                         C. Fixative: Formalin   



                                         Labeled: "Left renal mass"   



                                         Measurement: 4.7 x 2.9 x 2.4   



                                         cm   



                                         Mass: 2.7 x 2.4 x 1.8 cm   



                                         Mass appearance: Tan-yellow to   



                                         tan-red, soft, hemorrhagic and   



                                         possibly   



                                         necrotic   



                                         Perinephric fat involved by   



                                         tumor: No   



                                         The resection margin is inked   



                                         black. The remainder of the   



                                         specimen is   



                                         inked blue.   



                                         Mass from inked resection   



                                         margin: 0.1 cm   



                                         Tissue submitted to   



                                         Biospecimen Repository Core   



                                         Facility: No   



                                         Representative sections are   



                                         submitted as follows:   



                                         C1-C2   Representative   



                                         sections of mass to inked   



                                         margin.   



                                         C3   Additional   



                                         representative section of   



                                         tumor.   



                                         C4   Tumor nearest   



                                         perinephric fat (blue ink).   



                                         C5   Representative   



                                         section of uninvolved kidney.   



                                         (cjj)   



                                         sc/2020   



                                         Intraoperative Consultation:   



                                         A1FS, kidney, "deep margin",   



                                         biopsy:   



                                         Negative for malignancy.   



                                         MARINO BRIZUELA MD   











                                         Specimen

 





                                         Tissue - Kidney, Left

 





                                         Tissue - Kidney, Left

 





                                         Tissue - Kidney, Left







   



                 Performing Organization  Address         City/State/Zipcode  Ph

one Number

 

   



                     [facility]         [address] [place]

 [address] 





* COMPREHENSIVE METABOLIC PANEL (2020  7:34 AM CDT)



    



              Component    Value        Ref Range    Performed At  Pathologist



                                         Signature

 

    



                 Sodium          135 (L)         137 - 147 MMOL/L  [facility] 

 

    



                 Potassium       3.8             3.5 - 5.1 MMOL/L  [facility] 

 

    



                 Chloride        104             98 - 110 MMOL/L  [facility] 

 

    



                 Glucose         91              70 - 100 MG/DL  [facility] 

 

    



                 Blood Urea      18              7 - 25 MG/DL    [facility] 



                                         Nitrogen    

 

    



                 Creatinine      1.45 (H)        0.4 - 1.24 MG/DL  [facility] 

 

    



                 Calcium         8.7             8.5 - 10.6 MG/DL  [facility] 

 

    



                 Total Protein   8.7 (H)         6.0 - 8.0 G/DL  [facility] 

 

    



                 Total Bilirubin  1.2             0.3 - 1.2 MG/DL  [facility] 

 

    



                 Albumin         3.4 (L)         3.5 - 5.0 G/DL  [facility] 

 

    



                 Alk Phosphatase  58              25 - 110 U/L    [facility] 

 

    



                 AST (SGOT)      23              7 - 40 U/L      [facility] 

 

    



                 CO2             24              21 - 30 MMOL/L  [facility] 

 

    



                 ALT (SGPT)      29              7 - 56 U/L      [facility] 

 

    



                 Anion Gap       7               3 - 12          [facility] 

 

    



                 eGFR Non        48 (L)          >60 mL/min      [facility] 



                                              Comment:   



                           American                  The eGFR is not validated f

or   



                                         use in drug dosing   



                                         adjustments. Continue to use   



                                         estimated creatinine   



                                         clearance per dosing reference   



                                         text. Please contact the   



                                         Clinical Pharmacist for   



                                         questions.   

 

    



                 eGFR     58 (L)          >60 mL/min      [facility] 



                           American                  Comment:   



                                         The eGFR is not validated for   



                                         use in drug dosing   



                                         adjustments. Continue to use   



                                         estimated creatinine   



                                         clearance per dosing reference   



                                         text. Please contact the   



                                         Clinical Pharmacist for   



                                         questions.   











                                         Specimen

 





                                         Blood







   



                 Performing Organization  Address         City/[facility]  Ph

one Number

 

   



                      [facility]         [address] [place]

 [address] 





* BLOOD TYPE CONFIRMATION - ORDER ONLY IF REQUESTED BY LAB (2020  6:55 AM 
  CDT)



    



              Component    Value        Ref Range    Performed At  Pathologist



                                         Signature

 

    



                     ABO/RH(D)           B POS                [facility] 











                                         Specimen

 





                                         Blood







   



                 Performing Organization  Address         City/[facility]

one Number

 

   



                      [facility]         [address] [place] [address] 





* TYPE & CROSSMATCH (2020  6:45 AM CDT)



    



              Component    Value        Ref Range    Performed At  Pathologist



                                         Signature

 

    



                     Units Ordered       4                    [facility] 

 

    



                     Crossmatch          2020,2359     [facility] 



                                         Expires    

 

    



                     Record Check        2ND TYPE REQUIRED    [facility] 

 

    



                     ABO/RH(D)           B POS                [facility] 

 

    



                     Antibody Screen     NEG                 [facility] 

 

    



                     Electronic          YES                 [facility] 



                                         Crossmatch    

 

    



                     Unit Number         [id number]        [facility] 

 

    



                     Blood Component     RBC,ADSOL,LEUKO REDUCED   [facility] 



                                         Type    

 

    



                     Unit Division       0                    [facility] 

 

    



                     Status OF Unit      REL FROM ALLOC      [facility] 

 

    



                     Transfusion         OK TO TRANSFUSE      [facility] 



                                         Status    

 

    



                     Crossmatch          COMPATIBLE,ELECTRONIC    [facility] 



                                         Result    

 

    



                     Unit Number         [id number]        [facility] 

 

    



                     Blood Component     RBC,ADSOL,LEUKO REDUCED   [facility] 



                                         Type    

 

    



                     Unit Division       0                    [facility] 

 

    



                     Status OF Unit      REL FROM ALLOC      [facility] 

 

    



                     Transfusion         OK TO TRANSFUSE     [facility] 



                                         Status    

 

    



                     Crossmatch          COMPATIBLE,ELECTRONIC    [facility] 



                                         Result    











                                         Specimen

 





                                         Blood







   



                 Performing Organization  Address         [facility]  Ph

one Number

 

   



                      [facility]         [address] [place]

 [address] 





* PATHOLOGY INTEROPERATIVE REPORT SCAN (2020 12:00 AM CDT)



 



                           Narrative                 Performed At

 

 



                                         This result has an attachment that is n

ot available. 



                                         Ordered by an unspecified provider. 





* TELEMETRY STRIPS-SCAN (2020 12:00 AM CDT)



 



                           Narrative                 Performed At

 

 



                                         This result has an attachment that is n

ot available. 



                                         Ordered by an unspecified provider. 





* EXTERNAL COVID-19 (SARS-COV-2) (05/15/2020  7:15 AM CDT)



    



              Component    Value        Ref Range    Performed At  Pathologist



                                         Signature

 

    



                           Overall                   OTHER OUTSIDE 



                           COVID-19                  LAB 



                                         (SARS-CoV-2)    



                                         Result    

 

    



                     Source COVID-19     Unknown             OTHER OUTSIDE 



                           (SARS-CoV-2)              LAB 

 

    



                     COVID-19            Negative            OTHER OUTSIDE 



                           (SARS-CoV-2)              LAB 



                                         RNA    

 

    



                           Pan-SARS RNA              OTHER OUTSIDE 



                                         LAB 











                                         Specimen

 





                                         Nasopharyngeal Swab -



                                         Nasopharyngeal Swab







 



                           Narrative                 Performed At

 

 



                                         This result has an attachment that is n

ot available. 







   



                 Performing Organization  Address         City/[facility]  Ph

one Number

 

   



                                         OTHER OUTSIDE LAB   





* COVID-19 (SARS-COV-2) PCR (05/15/2020)



    



              Component    Value        Ref Range    Performed At  Pathologist



                                         Signature

 

    



                           COVID-19                  [facility] 



                                         (SARS-CoV-2)    



                                         PCR    

 

    



                           COVID-19                   [facility] 



                                         (SARS-CoV-2)    



                                         PCR Source    











                                         Specimen

 





                                         Nasopharyngeal Swab







 



                           Narrative                 Performed At

 

 



                                         This result has an attachment that is n

ot available. 







   



                 Performing Organization  Address         City/[facility]  [place]

one Number

 

   



                      [facility]         [address] [place]

 [address] 





from Last 3 Months



Insurance





                                        Type



            Payer      Benefit    Subscriber ID  Effective  Phone      Address 



                           Plan /                    Dates   



                                         Group     

 

                                        Medicare



                 MEDICARE        MEDICARE        xxxxxxxxxxx     2015-P   



                           PART A AND                resent   



                                         B     

 

                                        PPO



                 [employer]  BANKERS         xxxxxxxxx       20

20-P   



                           LIFE &                    resent   



                                         CASUALTY     







     



            Guarantor Name  Account    Relation to  Date of    Phone      Royal

g Address



                     Type                Patient             Birth  

 

     



            Jas Lewis  Personal/F  Self       1950  [phone]

[phone]               (Home)              [place] [address]







Advance Directives





                          Patient Representative    Explanation



                           Type                      Date Recorded  

 

                                                     



                                         Advance   



                                         Directive/DPOA   











                          Date Inactivated          Comments



                           Code Status               Date Activated  

 

                          2020  1:36 PM         



                           Full Code                 2020  4:34 PM  







  



                           Provider has discussed Code Status  Yes 



                                         w/Patient or Family?

## 2020-08-10 NOTE — XMS REPORT
Encounter Summary

                             Created on: 08/10/2020



Jas Lewis

External Reference #: [id number]

: 1950

Sex: Male



Demographics





                          Address                   [address] [place]  [address]

 

                          Home Phone                [phone]

 

                          Preferred Language        English

 

                          Marital Status            Unknown

 

                          Gnosticism Affiliation     CHR

 

                          Race                      White

 

                          Ethnic Group              Not  or 





Author





                          Author                    [facility]

 

                          Organization              [facility]

 

                          Address                   Unknown

 

                          Phone                     Unavailable







Support





                Name            Relationship    Address         Phone

 

                Kendy Richardson ECON            Unknown         [phone]

[phone]







Care Team Providers





                    Care Team Member Name Role                Phone

 

                    Arnaldo Singh         PCP                 [phone]

 

                    Tawil, Elias A MD   Unavailable         [phone]







Reason for Visit

* Auth/Cert



                          Referred By Contact       Referred To Contact



                 Status          Reason          Specialty       Diagnoses /  



                                         Procedures  

 

                                        



                                        







                                         Diagnoses  



                                         Left renal mass  



                                         Left renal mass  



                                         [N28.89]

  



                                         P  



                                         rocedures  



                                         TX LAPAROSCOPY  



                                         SURG PARTIAL  



                                         NEPHRECTOMY  



                                         TX ULTRASONIC  



                                         GUIDANCE  



                                         INTRAOPERATIVE  



                                         ROBOT ASSISTED  



                                         LAPAROSCOPIC  



                                         NEPHRECTOMY  



                                         PARTIAL  



                                         ULTRASOUND  



                                         GUIDANCE -  



                                         INTRAOPERATIVE  











Encounter Details





                          Care Team                 Description



                     Date                Type                Department  

 

                                        



Miranda Warner MD



 [address]



[facility] [address]



[place] [address]



[phone] (Fax)                      ROBOT ASSISTED LAPAROSCOPIC NEPHRECTOMY 

PARTIAL



                     2020          Surgery             [facility] [place]  



                                         [address] [address]  



                                         [phone]  







Social History





                                        Date



                 Tobacco Use     Types           Packs/Day       Years Used 

 

                                        Quit: 



                           Former Smoker             Pipe   

 

    



                     Smokeless Tobacco: Former  Chew                Quit: 



                                         User   







                    Drinks/Week         oz/Week             Comments



                                         Alcohol Use   

 

                                                             



                                         Never   







  



                     Alcohol Habits      Answer              Date Recorded

 

  



                     How often do you have a drink containing alcohol?  Never   

            2020

 

  



                           How many drinks containing alcohol do you have on  No

t asked 



                                         a typical day when you are drinking?  

 

  



                           How often do you have six or more drinks on one  Not 

asked 



                                         occasion?  







 



                           Sex Assigned at Birth     Date Recorded

 

 



                           Male                      2020  6:16 AM CDT







                                        Industry



                           Job Start Date            Occupation 

 

                                        Not on file



                           Not on file               Not on file 







                                        Travel End



                           Travel History            Travel Start 

 





                                         No recent travel history available.







                                        Date Recorded



                           COVID-19 Exposure         Response 

 

                                        2020  6:14 AM CDT



                           In the last month, have you been in contact with  No 

/ Unsure 



                                         someone who was confirmed or suspected 

to have  



                                         Coronavirus / COVID-19?  



documented as of this encounter



Last Filed Vital Signs





                    Reading             Time Taken          Comments



                                         Vital Sign   

 

                    143/62              2020  7:27 AM CDT  



                                         Blood Pressure   

 

                    77                  2020  7:27 AM CDT  



                                         Pulse   

 

                    37.5 C (99.5 F) 2020  7:27 AM CDT  



                                         Temperature   

 

                    -                   -                    



                                         Respiratory Rate   

 

                    93%                 2020  7:27 AM CDT  



                                         Oxygen Saturation   

 

                    -                   -                    



                                         Inhaled Oxygen   



                                         Concentration   

 

                    92.2 kg (203 lb 4.2 oz) 2020  4:38 PM CDT  



                                         Weight   

 

                    180.3 cm (5' 11")   2020  4:38 PM CDT  



                                         Height   

 

                    28.35               2020  4:38 PM CDT  



                                         Body Mass Index   



documented in this encounter



Functional Status





                                        Date of Assessment



                           Functional Status         Response 

 

                                        2020



                           Does the patient have a hearing impairment:  No 

 

                                        2020



                           Does the patient have a visual impairment:  No 

 

                                        2020



                           Does the patient have impaired ambulation:  No 

 

                                        2020



                           Does the patient have an activity of daily living  No

 



                                         (ADL) impairment:  

 

                                        2020



                           Does the patient have an instrumental activity of  No

 



                                         daily living (IADL) impairment:  







                                        Date of Assessment



                           Cognitive Status          Response 

 

                                        2020



                           Does the patient have a cognitive impairment:  No 



documented as of this encounter



Discharge Instructions

* Pre-Anesthesia Patient Instructions* 



 Nicky Alvarado RN - 2020 11:12 AM CDT



GENERAL INFORMATION



Before you come to the hospital

 Make arrangements for a responsible adult to drive you home and stay with you
 for 24 hours following surgery.

 Bath/Shower Instructions

 Take a bath or shower with antibacterial soap the night before or the morning
 of your procedure. Use clean towels.

 Put on clean clothes after bath or shower.  Avoid using lotion and oils.

 Sleep on clean sheets if bath or shower is done the night before procedure.

 Leave money, credit cards, jewelry, and any other valuables at home. The [facility] is not responsible for the loss or breakage of persona
l items.

 Remove nail polish, makeup and all jewelry (including piercings) before comin
g to the hospital.

 The morning of your procedure:

 brush your teeth and tongue

 do not smoke

 do not shave the area where you will have surgery



What to bring to the hospital

 ID/ Insurance Card

 Medical Device card

 Official documents for legal guardianship 

 Copy of your Living Will, Advanced Directives, and/or Durable Power of Attorn
ey 

 Small bag with a few personal belongings

 Walker,cane, or motorized scooter

 Cases for glasses/hearing aids/contact lens (bring solutions for contacts)

 Dress in clean, loose, comfortable clothing 



Eating or drinking before surgery

 Do not eat or drink anything after 11:00 p.m. the day before your procedure (
including gum, mints, candy, or chewing tobacco) OR follow the specific instruct
ions you were given by your Surgeon.

 You may have WATER ONLY up to 2 hours before arriving at the hospital.

 

Other instructions

Notify your surgeon if:

 you become ill with a cough, fever, sore throat, nausea, vomiting or flu-like
 symptoms

 you have any open wounds/sores that are red, painful, draining, or are new si
nce you last saw  the doctor

 you need to cancel your procedure

 You will receive a call with your surgery arrival time from between 2:30pm an
d 4:30pm the last business day before your procedure.  If you do not receive a c
all, please call [phone] before 4:30pm or [phone] after 4:30pm.



Notify us at [facility]: [phone]

 if you need to cancel your procedure

 if you are going to be late



Arrival at the [address] [address]



 Park in the  Parking Garage, located directly across from the main entrance
 to the hospital.

  parking is available  from 7 AM to 4 PM Monday through Friday.

 Enter through the ground floor [facility] entrance and check in at the Inf
ormation Desk in the lobby.

 They will validate your parking ticket and direct you to the next location.

 If you are a woman between the ages of 10 and 55, and have not had a hysterec
evy, you will be asked for a urine sample prior to surgery.  Please do not urin
ate before arriving in the Surgery Waiting Room.  Once there, check in and let t
he attendant know if you need to provide a sample.



For the safety of all patients, visitors and staff as we work to contain COVID-1
9, we must dramatically restrict patient visitors. 

For most patients, no visitors are allowed. Exceptions include:

? 1 parent or guardian for patients younger than 18 

? 1 support person for labor and delivery patients 

? 1 support person for patients with disabilities or impairments needing assista
nce 

? 1 support person or  for patients undergoing outpatient treatment or pro
cedures 

? Support persons for patients nearing end of life



You will need to have a COVID19 test performed 2 days prior to your surgery.  Alexei
u will be contacted with the date and time of the COVID19 test.  Your test will 
be performed at a drive-thru clinic at the [facility].  If you are also plannin
g to have lab work drawn while at the [facility], please do so first and then have y
our COVID19 test completed second.



[facility]

[address]

[place] [address]



Please bring a cell phone, your photo ID and your insurance card.

Please use the bathroom prior to your trip to the [facility] for your test.



Once [facility], follow the signage/cones to a
 parking spot.  Park and call [phone] to check in.  The team will come out 
to you.  You do not have to get out of your vehicle.  Our team members will viral
ect the test sample using a swab and will be wearing all of the necessary person
al protective equipment, so you do not need to wear a mask.  Once the test is pe
rformed it will be important to self quarantine at home until your surgery.  Ple
ase stay at home, wash your hands frequently, and practice physical distancing. 





Electronically signed by Nicky Alvarado RN at 2020 11:12 AM CDT





* Pre-Anesthesia Medication Instructions* 



 Nicky Alvarado, RN - 2020 11:14 AM CDT



Formatting of this note might be different from the original.



YOUR MEDICATIONS:



 amitriptyline (ELAVIL) 25 mg tablet  

 atenoloL (TENORMIN) 50 mg tablet  

 doxazosin (CARDURA) 4 mg tablet  

 pantoprazole DR (PROTONIX) 40 mg tablet  



 

YOUR  MEDICATION INSTRUCTIONS FOR SURGERY:



Before surgery

Stop taking any vitamins, herbals, and natural supplements 14 days before surger
y:



Stop the following medications 7 days before surgery:

 Anti-inflammatory medications such as ibuprofen (Advil, Motrin) and naproxen 
(Aleve)

You may use acetaminophen (Tylenol)



Morning of surgery

On the morning of surgery, do NOT take these medications:

 Remaining vitamins/supplements

 Ointments/creams/lotions/oils/powders/deodorant



On the morning of surgery, take ONLY these medications with a sip (1-2 ounces) o
f water:

 Amitriptyline

 Atenolol

 Doxazosin

 Pantoprazole



Other information

Before surgery, please contact ANTONETTE Sequeira, with any medicine updates or question
s.

 E-mail:  [email or web address]



Before going home from the hospital, please ask your doctor when you should re-s
tart your medicines that were stopped before surgery.



Electronically signed by Nicky Alvarado RN at 2020 11:14 AM CDT





documented in this encounter



Medications at Time of Discharge





                          Start Date                End Date



                 Medication      Sig             Dispensed       Refills  

 

                          2020                 



                     acetaminophen (TYLENOL)  Take two            0  



                           325 mg tablet             tablets by    



                                         mouth every 6    



                                         hours as    



                                         needed.    

 

                          2020                 



                     amitriptyline (ELAVIL) 25  Take 25 mg by       0  



                           mg tablet                 mouth at    



                                         bedtime    



                                         daily.    

 

                          2020                 



                     atenoloL (TENORMIN) 50 mg  Take 50 mg by       0  



                           tablet                    mouth daily.    

 

                          2020                 



                     doxazosin (CARDURA) 4 mg  Take 4 mg by        0  



                           tablet                    mouth daily.    

 

                          2020                 



                 oxyCODONE (ROXICODONE) 5  Take one        30 tablet       0  



                           mg tablet                 tablet to two    



                                         tablets by    



                                         mouth every 4    



                                         hours as    



                                         needed    

 

                          2020                 



                     pantoprazole DR     Take 40 mg by       0  



                           (PROTONIX) 40 mg tablet   mouth daily.    

 

                          2020                 



                 polyethylene glycol 3350  Take            527 g           3  



                           (MIRALAX) 17 gram/dose    seventeen g    



                           powder                    by mouth    



                                         daily.    

 

                          2020                 



                 senna/docusate (SENNA-S)  Take one        30 tablet       0  



                           8.6/50 mg tablet          tablet by    



                                         mouth twice    



                                         daily.    



documented as of this encounter



Progress Notes

* Miranda Warner MD - 2020 11:31 AM CDT



I called patient with pathology results.  Pathological T1a clear cell renal cell
 carcinoma, grade 2 with necrosis, margins negative.  He expressed understanding
 of the pathology.  He is doing well.  Pain nearly completely resolved.  Eating 
well and he is having normal bowel activity.  He will keep follow-up as schedule
d.



Electronically signed by Miranda Warner MD at 2020  6:22 PM CDT

* Miranda Warner MD - 2020  5:29 AM CDT



Formatting of this note might be different from the original.

Urology Progress Note 2020 



ASSESSMENT:

Jas Lewis is a 69 y.o. Male with history of lymphoma, left renal ma
ss s/p left robotic partial nephrectomy on 2020.  LOS: 0 days 



PLAN: Will discuss plan with staff surgeon - Dr. Warner



- Pain control: PO pain

- Diet/FEN: Regular diet, SLIVF

- GI: Bowel regimen, zofran prn

- : Felix out, trial of void; Cr 1.58 (1.45)

- Heme/ID: Afebrile. Hgb, WBC appropriate

- OOB/Amb 

- PTA: Atenolol, doxazosin

- Dispo: Discharge planning today; f/u 3 weeks with BMP



Prophylaxis Review:

-  DVT: SCD's, contraindictated (partial nephrectomy)

-  GI: Yes

-  Catheter: Yes

-  Abx: Yes - Kae-operative Luigi Mcclain M.D.

Urology PGY-2

Please page urology on call with questions



ATTESTATION



I personally performed the key portions of the E/M visit, discussed case with re
sident and concur with resident documentation of history, physical exam, assessm
ent, and treatment plan unless otherwise noted.



Patient doing well. No issues overnight.  Tolerating diet.  Pain controlled.  Am
bulating.



Abdomen soft, appropriate

Incisions c/d/i

Felix removed

EMILY with serosanginous fluid



A/P: s/p LEFT robotic partial nephrectomy doing well

-- increase activity

-- diet as tolerated

-- monitor EMILY and send EMILY creatinine -- D/C if normal

-- D/C planning.  F/U 3 weeks with BMP



Staff name:  Miranda Warner MD Date:  2020 



________________________________________________________________________ 

SUBJECTIVE: 

No acute events overnight. Pain is well-controlled. Patient denies nausea, denie
s vomiting, is ambulating. Patient has not passed flatus, has not had a bowel mo
vement. Patient is tolerating Diet Clear Liquid

Advance Diet as Tolerated.



OBJECTIVE:

                 Vital Signs: Most Recent                Vital Signs: Past 24 Ho
urs 

BP: 111/60 (38)

Temp: 37.1 C (98.7 F) (38)

Pulse: 82 (38)

Respirations: 16 PER MINUTE (38)

SpO2: 93 % (38)

Height: 180.3 cm (71") (1638)

Weight: 92.2 kg (203 lb 4.2 oz) (1638)  BP: (111-155)/(60-90) 

Temp:  [36.3 C (97.3 F)-37.1 C (98.8 F)] 

Pulse:  [62-82] 

Respirations:  [13 PER MINUTE-21 PER MINUTE] 

SpO2:  [91 %-99 %]  



Physical Exam:

General: Alert & oriented, no acute distress

Pulm: Equal chest rise, non-labored

CV: Regular rate and rhythm

Extremities: No edema, SCD's in place

Abd: Soft, appropriately tender to palpation, non-distended

Incisions/Wounds: Appropriately tender to palpation. Portsite and extraction sit
e incisions C/D/I with dermabond 

: Felix catheter in place draining pale salmon urine



Intake/Output:

Date 20 - 20 0720 - 20 0700 

Shift 8641-0475 7574-6536 24 Hour Total 2189-4465 5881-1570 24 Hour Total 

INTAKE 

P.O. 240 0 240    

I.V.(mL/kg/hr) 2500(2.3) 682 3182    

Shift Total(mL/kg) 2740(29.7) 682(7.4) 3422(37.1)    

OUTPUT 

Urine(mL/kg/hr) 465(0.4) 1350 1815    

  Urine 115  115    

  Urine Output (ml) (Indwelling Urinary Catheter 20 0830 16 FR) 350 1350 1
700    

Drains 105 80 185    

  Drain Output (ml) ([place] Drain 20 1129 Left Abdomen) 105 80 185 
  

Other 150  150    

  Estimated Blood Loss 150  150    

Shift Total(mL/kg) 720(7.8) 1430(15.5) 2150(23.3)    

NET 2020 1272    

Weight (kg) 92.2 92.2 92.2 92.2 92.2 92.2 



 



Labs:

                   Hematology                               Chemistry 

Recent Labs 

  20

0438 

WBC 7.7 

HGB 12.1* 

PLTCT 134* 

 Recent Labs 

  20

0734 

* 

K 3.8 

 

CO2 24 

BUN 18 

CR 1.45* 

GFR 48* 

GLU 91 

CA 8.7 

 



ACTIVE PROBLEMS:

Principal Problem:

  Left renal mass







Electronically signed by Miranda Warner MD at 2020  8:35 AM CDT

* Zaida Padilla RN - 2020  6:05 PM CDT



Patient arrived to room # (807) via cart accompanied by transport. Patient trans
ferred to the bed with assistance. Bedside safety checks completed. Initial zelda
ent assessment completed. Refer to flowsheet for details.



Admission skin assessment completed with: ANTONETTE Diaz



Pressure injury present on arrival?: No



1. Head/Face/Neck: No

2. Trunk/Back: No

3. Upper Extremities: No

4. Lower Extremities: No

5. Pelvic/Coccyx: No

6. Assessed for device associated injury? Yes

7. Malnutrition Screening Tool (Nursing Nutrition Assessment) Completed? No



See Doc Flowsheet for additional wound details. 



INTERVENTIONS: 

Falls bundle initiated. 



Electronically signed by Zaida Padilla RN at 2020  6:06 PM CDT

* Pretty Capellan RN - 2020  3:22 PM CDT



Attempted to edie patient for pre procedure covid screening, but no answer. Voice
mail and phone number left for patient to call return call.



Electronically signed by Pretty Capellan RN at 2020  3:25 PM CDT

* Nicky Alvarado RN - 2020 11:17 AM CDT



PAC phone triage completed with patient for surgery on 20 with Dr. Warner.
Medications, allergies and medical history reviewed and updated in chart.  He d
enies CP, SOA, dizziness, numbness, tingling, N/V/D, cough, fever, flu-like symp
toms, exposure to anyone ill, positive for COVID-19 or waiting for results of sa
me.  He denies travel outside of [place] or [place] in last 15 days.  He denies PMH of com
plications from anesthesia.  Denies URI symptoms at this time. No PAC visit karol
cated.



Preop and medication instructions reviewed with patient. No vitamins or suppleme
nts for 14 days and no NSAIDS for 7 days before surgery. 

NPO after 11 pm the night before surgery but ok to drink water until 2 hours bef
ore arrival at hospital. Patient verbalized understanding and copy of instructio
ns will be mailed to his home.



Patient informed of visitor policy and expressed understanding.





Electronically signed by Nicky Alvarado RN at 2020 11:19 AM CDT

documented in this encounter



H&P Notes

* Miranda Warner MD - 2020  6:42 AM CDT



Formatting of this note might be different from the original.

History and Physical Update Note



Allergies:  Sulfa (sulfonamide antibiotics)



Lab/Radiology/Other Diagnostic Tests:

24-hour labs:  No results found for this visit on 20 (from the past 24 angélica
r(s)).

Point of Care Testing:  (Last 24 hours):

 



ASSESSMENT: 69 y.o. male with history of cT1a LEFT renal mass who presents for L
EFT robot-assisted laparoscopic partial nephrectomy, possible radical, possible 
open. The patient has an allergy to sulfa medications. The patient does not take
anticoagulation or antiplatelet agents.





PLAN:

> To OR for LEFT robot-assisted laparoscopic partial nephrectomy, possible 
radical, possible open

> 2g Ancef preoperatively

> Risk and benefits of operative procedure were again discussed with the patient
in detail, as in clinic; all questions were answered. Informed consent was obtai
marbella and placed in the chart.



Discussed plan of care with staff surgeon, Dr. Warner, who directed plan of car
e



I have examined the patient, and there are no significant changes in their condi
tion, from the previous H&P performed on 20.



Lencho Pathak MD

Pager  [phone]



ATTESTATION



I personally performed the key portions of the E/M visit, discussed case with re
sident and concur with resident documentation of history, physical exam, assessm
ent, and treatment plan unless otherwise noted.



Patient here for a left robotic versus open partial nephrectomy and possible rad
ical nephrectomy.  The mass is in a difficult location.  He has also had multipl
e prior abdominal surgeries, so he understands increased risks of needing an ope
n approach or radical nephrectomy.  All risks and benefits were discussed in [facility]
th.  All questions answered.  Consent on chart.  Patient marked. Ready for OR.



Staff name:  Miranda Warner MD Date:  2020 



--------------------------------------------------------------------------------
------------------------------------------------------------



Electronically signed by Miranda Warner MD at 2020  7:27 AM CDT

* Miranda Warner MD - 2020 10:00 AM CDT



Formatting of this note might be different from the original.

Subjective:

Jas Lewis is a 69 y.o. male who is referred by Dr. Tawil for further tiffanie
luation and treatment of left renal mass



HPI:

69 year old male with PMH of HYPERTENSION, GERD, BPH (for years), colon cancer 1
995 (with partial colectomy; receives colonoscopy every 4 years), new diagnosis 
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



During work up for lymphoplasmacytic lymphoma about 1 month ago when imaging was
done, left renal mass was noted, concerning for RCC.  Imaging also showed abnor
mality in bladder for which Urologist Dr.Tawil did cystoscopy 2 weeks ago and wa
s found to be non-concerning.  He has had elevated PSA in the past and has had 3
cystoscopies and 2 prostate biopsies which were non-revealing; last cystoscopy 
performed Dec 2019.  He continues to take Cardura for for BPH and HYPERTENSION. 
His BPH symptoms are no longer a concern; he denies dribbling, incomplete empty
ing, weak urinary stream.  He is on surveillance for lymphoplasmacytic lymphoma 
and has a f/u appt on 2020 for that.



He reports dysuria with initiation of voids that has been ongoing for months and
urinary frequency which started 2020 after he was asked to increased fluid 
intake by his PCP.  This has eased since he has been drinking less fluids.



No hematuria, unexpected weight loss, night sweats, apetite changes, nocturia

No FH of kidney cancer. No FH of genetic cancer syndromes.



ROS:

10 point ROS negative other than chronic hearing loss, runny nose, light-headedn
ess when going from sitting to standing position and nervousness/anxiety.



Active Ambulatory Problems 

  Diagnosis Date Noted 

 No Active Ambulatory Problems 



Resolved Ambulatory Problems 

  Diagnosis Date Noted 

 No Resolved Ambulatory Problems 



Past Medical History: 

Diagnosis Date 

 Colon polyps  

 Depression  

 Elevated PSA  

 Enlarged prostate  

 Hypertension  

 Kidney stones  

 Lymphoplasmacytoid lymphoma, CLL (HCC)  

 Malignant neoplasm of colon (HCC)  



Surgical History: 

Procedure Laterality Date 

 HX TONSILLECTOMY   

 COLON SURGERY  1995 

 COLECTOMY  1995 

 HX APPENDECTOMY  1995 

 HERNIA REPAIR  2011 

 CYSTOURETHROSCOPY  2020 

 PROSTATE SURGERY   

 Biopsy  



Current Outpatient Medications on File Prior to Visit 

Medication Sig Dispense Refill 

 amitriptyline (ELAVIL) 25 mg tablet    

 atenoloL (TENORMIN) 50 mg tablet    

 doxazosin (CARDURA) 4 mg tablet    

 pantoprazole DR (PROTONIX) 40 mg tablet    



No current facility-administered medications on file prior to visit.  



Social History 



Socioeconomic History 

 Marital status:  

  Spouse name: Not on file 

 Number of children: Not on file 

 Years of education: Not on file 

 Highest education level: Not on file 

Occupational History 

 Not on file 

Tobacco Use 

 Smoking status: Former Smoker 

  Types: Pipe 

  Last attempt to quit:  

  Years since quittin.3 

 Smokeless tobacco: Former User 

  Types: Chew 

  Quit date:  

Substance and Sexual Activity 

 Alcohol use: Never 

  Frequency: Never 

 Drug use: Never 

 Sexual activity: Not Currently 

  Partners: Female 

Other Topics Concern 

 Not on file 

Social History Narrative 

 Not on file 



Family History 

Problem Relation Age of Onset 

 Cancer Mother  

     Endometrail  

 Hypertension Mother  

 Cancer Father  

 Cancer-Colon Father  

 Hypertension Father  

 Neurologic Disorder Father  

 Alzheimer's Father  

 Cancer-Skin Brother  

 Cancer Daughter  

 Cancer-Breast Daughter  

 Hypertension Maternal Grandfather  



Objective:



Vitals: 

 20 0943 

BP: 124/65 

BP Source: Arm, Left Upper 

Patient Position: Sitting 

Pulse: 80 

Weight: 93.4 kg (206 lb) 

Height: 180.3 cm (71") 

PainSc: Zero 



Body mass index is 28.73 kg/m.



General: No acute distress, non-labored breathing. Afebrile

Head: Normocephalic and atraumatic.  

Eyes: Conjunctivae are normal. No scleral icterus. 

Cardiovascular: Regular rate and rhythm

Pulmonary/Chest: Normal Effort, CTAB

Abd: surgical scar vertically below umbilicus from previous colectomy, Soft, NT,
ND

Musculoskeletal: Moves all extremities

Skin: Warm and dry.  

Neuro: Awake and alert.

Psychiatric: Normal mood and affect. 

Nursing note and vitals reviewed.



ALL OUTSIDE RECORDS WERE REVIEWED AND FILMS EXAMINED



Imaging:  3-16-20 CT Chest W Ab/Pel W/WO - Impression:  1.  Enhancing left renal
mass concerning for renal malignancy.  2.  3 mm upper lobe pulmonary nodule.  3.
 Apparent filling defect of the urinary bladder just to the right of midline.  
This potentially could reflect a primary urinary bladder lesion vs eccentric pr
otrusion of the prostate gland.  Correlation with cystoscopy is recommended.  





Assessment/Plan:

Jas Lewis was seen today for kidney mass.



Diagnoses and all orders for this visit:



Left renal mass

Discussed with patient treatment options for left renal mass including surveilla
nce, biopsy, nephrectomy and patient elected to proceed with partial nephrectomy
with the possibility of radial nephrectomy if deemed necessary.  Risk and benef
its discussed with patient.



Dysuria

-     CULTURE-URINE W/SENSITIVITY; Future; Expected date: 2020



Plan for patient to return on 2020 for robotic-assisted laparoscopic nephre
ctomy vs radical nephrectomy



Patient discussed with Dr. Jesus Monaco MD 

Family Medicine, PGY-3



ATTESTATION



I personally performed the key portions of the E/M visit, discussed case with re
sident and concur with resident documentation of history, physical exam, assessm
ent, and treatment plan unless otherwise noted.



Patient referred to me by Dr. Tawil for the findings of a 3.5 cm left renal mass
concerning for renal cell carcinoma.  Patient does have a long history of BPH a
nd urinary symptoms which have been fully evaluated by Dr. Tawil.  He denies sharon
ss hematuria.  The mass was discovered incidentally during evaluation of lymphop
lasmacytic lymphoma.  He also has a history of colon cancer s/p hemicolectomy wi
th no additional treatment needed.  He continues with colonoscopy.



We discussed the findings of his mass and the likelihood that this represents a 
renal cell carcinoma.  We discussed options of observation, biopsy, ablation, pa
rtial nephrectomy, and radical nephrectomy.  The mass is in a difficult location
near the hilar vessels and ureter.  Therefore, I discussed that ablation is not 
an option.  The patient would like treatment, so we focused on partial nephrect
danielle versus radical nephrectomy.  Pros and cons of each approach were discussed i
n depth.  After discussion, we elected to proceed with a LEFT robotic partial ne
phrectomy and possible radical nephrectomy.  I quoted him a 30% chance of needin
g a radical nephrectomy given the location.  Patient would like to proceed.  All
risks and benefits were discussed in depth.  All questions answered.  Consent on
chart.  We will proceed with surgery in the near future.  Complex counseling.



Staff name:  Miranda Warner MD Date:  2020 



Patient Instructions 



[facility] 

Pre-Operative Instructions



Surgical Procedure:  Robotic-assisted laparoscopic nephrectomy versus radical ne
phrectomy, intraoperative ultrasound  

Date of Surgery:  2020 

Arrival Time at the Admission Office (Main Lobby):  To be determined 



To ensure that your surgery can proceed without delay, you will be contacted by 
a phone triage nurse from the Pre-Operative Assessment Clinic (PAC) to complete 
this process.  



Please review the information given to you by your surgeon.



You will be called by the surgery staff with your day of surgery arrival time be
tween 2:30 - 4:30 PM the business day prior to surgery. If you have not heard fr
om them after 4:30 PM, please call [phone] to confirm your arrival time.



Pre-Operative Assessment and Instructions:



Once you speak to the nurse or are seen in the Pre-Operative Assessment Clinic, 
you will be given medication instructions. However, if surgery is within 2 weeks
, please read and follow the medication instructions below to prepare for surger
y before you speak with the phone triage nurse:



DO NOT STOP your blood thinner until you have contacted your prescribing provide
r or have been specifically instructed to do so.



Starting Now:

? Contact your provider who prescribes any of the following to develop a plan fo
r surgery:

o Blood thinners such as aspirin, Aggrenox, Brilinta, Effient, Eliquis, enoxapar
in (Lovenox), clopidogrel (Plavix), cilostazol, pentoxifylline (Trental), Pradax
a, Savaysa, ticlopidine, Xarelto, and warfarin (Coumadin)

o Immunosuppresants such as methotrexate, azathioprine, sulfasalazine, everolimu
s, sirolimus, Humira, Remicade, Enbrel, Simponi, Orencia, Cimzia, Actemra, and X
eljanz

o Chemotherapy



14 days prior to surgery:

? Stop most vitamins, herbals, and supplements including (but not limited to):

o Alpha lipoic acid, black cohosh, CoQ10, echinacea, eye vitamins, fish oil, fla
xseed oil, garlic, gingko biloba, ginseng, glucosamine/chondroitin, kava, Lovaza
, lutein, lysine, multivitamin, red yeast rice, KIKI-e, saw palmetto, [place]
s wort, turmeric, valerian root, Vascepa, Vitamin A, Vitamin B complex, Vitamin 
C, Vitamin E

? You DO NOT need to stop: iron, magnesium, potassium



7 days prior to surgery:

? Stop anti-inflammatory medications such as ibuprofen (Advil, Motrin), naproxen
(Aleve), [place], Excedrin, Midol, celecoxib (Celebrex), diclofenac (Optima
alfonso), diflunisal, etodolac, flurbiprofen, indomethacin, ketoprofen, ketorolac, m
eloxicam, nabumetone, and piroxicam



The day prior to surgery begin a clear liquid diet.  This includes water, Jell-O
, tea, coffee, broth, pop, clear juices (apple, cranberry, grape, orange witho
ut pulp), sports drinks, and popsicles.  You may have any color of the above dri
nks, but no milk or milk products. 



The day prior to surgery, drink one bottle of magnesium citrate at 2:00 p.m.  Ex
pect a laxative effect.  This laxative may be purchased at any pharmacy.  No pre
scription needed, but a printed prescription may be provided for your reference.



Do not drink alcohol within 24 hours of surgery.



Please do not eat or drink anything after midnight.  No gum, mints, hard candy, 
snacks, coffee, etc or chewing tobacco allowed after midnight before surgery.  Y
ou may brush your teeth but be sure to rinse and spit.

      (DIAL)

Please shower with an over the counter antibacterial soap the evening before or 
the morning of surgery.



If your surgery is scheduled as an outpatient, you must arrange to have someone 
drive you home and have someone with you 24 hours after anesthesia.



========================================================================



If you have any questions, please contact your provider's office at [phone]
. (Yenni, Admin) 



For emergencies during evenings, nights, weekends, and holidays, contact [facility]  and request they contact the on-call Urology
Resident at [phone].



=========================================================================







Electronically signed by Miranda Warner MD at 2020  8:02 AM CDT

documented in this encounter



Miscellaneous Notes

* Care Plan - Gabriela Dodge RN - 2020 11:31 AM CDT



Felix is out and voiding well on own.

Free of falls.

Jas Lewis discharged on 2020.

 .

Discharge instructions reviewed with patient.

Valuables returned: pt states has all belongins

Where Are Valuables Stored?: to [facility].

Home medications: 

 .to ben at  pharmacy

Functional assessment at discharge complete: Yes .



 



Electronically signed by Gabriela Dodge RN at 2020 12:02 PM CDT

* Anesthesia Post Op Day 1 - Heather Lozano CRNA - 2020  7:27 AM CDT



Formatting of this note might be different from the original.

Anesthesia Follow-Up Evaluation: Post-Procedure Day One



Name: Jas Lewis     MRN: [id number]     : 1950     Age: 69 y.o
.     Sex: male 

__________________________________________________________________________ 



Procedure Date: 2020 

Procedure: Procedure(s) with comments:

ROBOT ASSISTED LAPAROSCOPIC NEPHRECTOMY PARTIAL - CASE LENGTH 4 HOURS, PLEASE CL
IP PT IN SDS/PRE-POST, REQUEST FIREFLY, INTRA-OP SONO, ARGON, FROZEN SECTION REQ
UIRED

ULTRASOUND GUIDANCE - INTRAOPERATIVE



Physical Assessment

Height: 180.3 cm (71")  Weight: 92.2 kg (203 lb 4.2 oz)



Vital Signs (Last Filed in 24 hours)

BP: 111/60 ( 0038)

Temp: 37.1 C (98.7 F) ( 0038)

Pulse: 82 ( 0038)

Respirations: 16 PER MINUTE ( 0038)

SpO2: 93 % ( 0038)

SpO2 Pulse: 71 ( 1345)

Height: 180.3 cm (71") ( 1638)



Patient History 

Allergies

Allergies 

Allergen Reactions 

 Sulfa (Sulfonamide Antibiotics) HIVES 

 



Medications

Scheduled Meds:atenoloL (TENORMIN) tablet 50 mg, 50 mg, Oral, QDAY

bacitracin topical ointment, , Topical, BID

ceFAZolin (ANCEF) IVP 1 g, 1 g, Intravenous, Q8H*

docusate (COLACE) oral solution 100 mg, 100 mg, Oral, BID

doxazosin (CARDURA) tablet 4 mg, 4 mg, Oral, QDAY

pantoprazole DR (PROTONIX) tablet 40 mg, 40 mg, Oral, QDAY(21)

polyethylene glycol 3350 (MIRALAX) packet 17 g, 1 packet, Oral, QDAY

senna (SENOKOT) oral syrup 17.6 mg, 17.6 mg, Oral, BID



Continuous Infusions:

PRN and Respiratory Meds:acetaminophen Q6H PRN, fentaNYL citrate PF Q1H PRN, hyo
scyamine Q4H PRN, ondansetron Q6H PRN **OR** ondansetron (ZOFRAN) IV Q6H PRN, ox
yCODONE Q4H PRN



Diagnostic Tests

Hematology: 

Lab Results 

Component Value Date 

 HGB 12.1 2020 

 HCT 35.3 2020 

 PLTCT 134 2020 

 WBC 7.7 2020 

 MCV 86.8 2020 

 MCH 29.8 2020 

 MCHC 34.3 2020 

 MPV 8.3 2020 

 RDW 14.6 2020 

  



General Chemistry: 

Lab Results 

Component Value Date 

  2020 

 K 4.1 2020 

  2020 

 CO2 24 2020 

 GAP 5 2020 

 BUN 20 2020 

 CR 1.58 2020 

  2020 

 CA 7.7 2020 

 ALBUMIN 3.4 2020 

 TOTBILI 1.2 2020 

 

Coagulation: No results found for: PT, PTT, INR



Follow-Up Assessment

Patient location during evaluation: floor



Anesthetic Complications: 

Anesthetic complications: The patient did not experience any anesthestic complic
ations. 



 Pain:

Score: 1



Management:adequate

 

Level of Consciousness: awake and alert

 Hydration:acceptable

 

Airway Patency: patent

 Respiratory Status: acceptable

 

Cardiovascular Status:acceptable

 Regional/Neuroaxial: 



 







Electronically signed by Heather Lozano CRNA at 2020  7:27 AM CDT

* Care Plan - Amada Sellers RN - 2020  6:24 AM CDT





Problem: Infection, Risk of, Urinary Catheter-Associated Urinary Tract Infection

Goal: Absence of urinary catheter-associated infection

Outcome: Goal Ongoing

Flowsheets (Taken 2020 0623)

Absence of urinary catheter-associated infections:

 Urinary catheter discontinuation

 Assess for signs and sypmtoms of catheter-associated urinary tract infection

Note: Phillipey pulled at 0620 

 

Problem: Falls, High Risk of

Goal: Absence of falls-Adult Patient

Outcome: Goal Ongoing

Flowsheets (Taken 2020 0623)

Absence of falls-Adult Patient:

 Complete Fall Risk Assessment.

 Implement fall risk bundle.

 Consider additional interventions if patient is confused, has gait/balance prob
lems and on high risk medications.

 Provide safe ambulation.

 Provde safe environment.

 Provide fall prevention strategies.

 

Problem: Discharge Planning

Goal: Participation in plan of care

Outcome: Goal Ongoing

Flowsheets (Taken 2020 0623)

Participation in Plan of Care: Involve patient/caregiver in care planning decisi
on making

Goal: Knowledge regarding plan of care

Outcome: Goal Ongoing

Flowsheets (Taken 2020 0623)

Knowledge regarding plan of care:

 Provide admission education to parent/caregiver

 Provide procedural and treatment education

 Provide infection prevention education

 Provide medication management education

 Provide fall prevention education

 Provide plan of care education

Goal: Prepared for discharge

Outcome: Goal Ongoing

Flowsheets (Taken 2020 0623)

Prepared for discharge:

 Complete ADL ability assessment

 Collaborate with multidisciplinary team for hospital discharge coordination

 Provide safe use medical equipment education

 Provide diet and oral health education

 



Electronically signed by Amada Sellers RN at 2020  6:25 AM CDT

* Procedures (Immed Post or Bedside) - Miranda Warner MD - 2020 12:10 PM 
  CDT



Formatting of this note might be different from the original.



 Brief Operative Note



Name: Jas Lewis is a 69 y.o. male     : 1950             MR
N#: [id number]

DATE OF OPERATION: 2020



Date:  2020    



Preoperative Dx: 

Left renal mass [N28.89]



Post-op Diagnosis 

   * Left renal mass [N28.89]



Procedure(s) (LRB):

ROBOT ASSISTED LAPAROSCOPIC NEPHRECTOMY PARTIAL (Left)

ULTRASOUND GUIDANCE - INTRAOPERATIVE (Left)



Anesthesia Type: General



Surgeon(s) and Role:

   * Miranda Warner MD - Primary

   * Param Hobson MD - Resident - Assisting



Findings: Left 4 cm posterior mid/inferior pole renal mass abutting renal artery
, significant endophytic component. Complex hilar anatomy with branching artery 
and vein. Able to perform partial nephrectomy without vascular injury, collectin
g system entry noted and oversewn. Hemostatic renorrhaphy. EMILY drain placed. Fole
y. 



Estimated Blood Loss: 50 ml



Specimen(s) Removed/Disposition: 

ID Type Source Tests Collected by Time Destination 

1 : PERITUMOR FAT Tissue Kidney, Left SURGICAL PATHOLOGY          Miranda Warner MD 2020 1059  

2 : DEEP MARGIN Tissue Kidney, Left SURGICAL PATHOLOGY          Miranda Warner MD 2020 1108  

3 : LEFT RENAL MASS Tissue Kidney, Left SURGICAL PATHOLOGY          Nick Warner MD 2020 1131  



Complications:  None



Implants: None



Drains: EMILY drain LLQ, Felix 



Disposition:  PACU - stable



Param Hobson MD

Pager [phone] 



ATTESTATION



I performed this procedure with a resident.



Staff name:  Miranda Warner MD Date:  2020 







Electronically signed by Miranda Warner MD at 2020  1:10 PM CDT

* Operative Report (Direct Entry) - Miranda Warner MD - 2020  8:39 AM CDT



Formatting of this note might be different from the original.

OPERATIVE REPORT



Name: Jas Lewis is a 69 y.o. male     : 1950             MR
N#: [id number]



DATE OF OPERATION: 2020



Surgeon(s) and Role:

   * Miranda Warner MD - Primary

   * Param Hobson MD - Resident - Assisting

 

Preoperative Diagnosis:  

Left renal mass [N28.89]



Post-op Diagnosis 

   * Left renal mass [N28.89]



Procedure(s) (LRB):

ROBOT ASSISTED LAPAROSCOPIC NEPHRECTOMY PARTIAL (Left)

ULTRASOUND GUIDANCE - INTRAOPERATIVE (Left)



Anesthesia Type: General



Indications: 70 yo M with history of lymphoma, who was found to have an enhancin
g left renal mass, presents for robotic partial nephrectomy. 



FINDINGS:  

1 late branching artery, and 1 early branching vein; artery  clamped with 24-min
stacy warm ischemia time; 4 cm posterior LEFT midpole mass excised with grossly ne
gative margins; hemostatic renorrhaphy. 



OPERATIVE DETAILS: 

The patient was identified and informed consent was obtained.  The patient was b
rought to the operative suite and placed supine on the table.  A time-out proced
ure was performed per protocol.  Preoperative antibiotics were administered.  Se
quential compression devices were operational on bilateral lower extremities.  A
fter smooth induction of general anesthesia, the patient was moved to the latera
l position with care taken to pad all pressure points including the use of an ax
illary roll and then secured to the table.  The abdomen and flank were prepped a
nd draped in the usual sterile fashion.



Veress was needle placed in the left upper quadrant and drop test confirmed plac
ement.  The abdomen was insufflated 15 mm of pneumoperitoneum.  An 8 mm robotic 
non-bladed trocar was placed at the umbilical level just lateral to the rectus m
uscle.  Camera was inserted and no injuries were noted.  This permitted advancem
ent of our two 8-mm assistant ports without difficulty into the left upper quadr
ant inferior to the costal margin and in the left lower quadrant medial and supe
rior to the anterior superior iliac spine. An AirSeal trocar, and a 10-mm assist
ant port were then place between these ports.  The robotic camera was advanced a
nd there was obvious midline omental adhesions that were taken down bluntly with
 the [place].  No bowel injuries were identified, but he did have some bowel adh
esions to the midline. The left colon was also tented up on the left side inferi
ellen. The robot was then docked. 



We began by taking down the White line of [place] and the lateral attachments of gina vu left colon and this occurred without damage to the bowel. The colon was mobil
ized superiorly past the splenocolic attachments, and the spleen was easily medi
alized away from the kidney. The mesentery of the colon was swept away and media
lized leaving Gerota's fascia visible. The kidney was immediately visualized, we
 then dissected into the retroperitoneal fat and identified the ureter, and we t
racked the ureter up to the renal pelvis. The hilar vessels were then easily teresa
ntified medial and anterior to the left renal pelvis There was 1 late branching 
artery, and one early branching renal vein, which were dissected cleanly.  The u
reter was identified and tented upwards, away from the hilum.  After the hilum w
as adequately mobilized, we then turned our attention identifying the mass itsel
f.  Gerota fascia was incised and the kidney was exposed. The patient did have s
ignificantly adherent fat. The entire kidney had to be mobilized and medialized 
in order to identify the posterior mid/inferior pole mass. Of note a branch of gina vu renal artery was abutting but not involving the tumor which was partially exo
phytic. We felt we could avoid this vessel and we proceed with plans to perform 
a partial nephrectomy. 



Once the mass was identified, we were able to sho out its margins and performed
 an intraoperative ultrasound to verify its depth and laterality.  The mass was 
significantly endophytic and appeared to abut the collecting system.  It was kofi
y near the renal hilum and ureter which were identified and avoided throughout t
he case.  



Once this was completed, we administered 12.5 g of mannitol and clamped our blayne
l hilum with 2 bulldog clamps on the main artery.  We did not clamp the vein.  



We excised the mass using a combination of sharp dissection and electrocautery. 
 We were able to trace the contour of the mass away from the renal surface itsel
f and coagulated any bleeding vessels as they occurred.  We did enter the [facility]
ting system on the deepest aspect of the resection.  After the mass was excised,
 it was placed in an endocatch bag for later removal.  It had grossly negative m
argins.  We also took a deep margin and sent to pathology for frozen section.  T
he margin was negative for carcinoma.



We then ran a 2-0 V-loc suture along the base of the defect to close the collect
ing system.  We then placed a Surgicel bolster into the defect and closed the pa
renchyma over it using 2-0 V-loc suture with Hem-o-lyndsey clips renorrhaphy.  This 
required a total of 3 sutures.  Once this was done, we took the bulldog clamps o
ff the renal artery and noted no significant oozing or bleeding.  5 cc of Flosea
l and a sheet of Surgicel were placed over the defect.  Floseal  was also placed
 at the renal hilum, which was atraumatic.  Gerota fascia was then re-approximat
ed with a 2-0 V-loc suture.



Total warm ischemia time was 24 minutes.  

  

A Alexander-Washington drain was brought in through one of the camera ports after closi
ng the 12 mm ports with a Estevan-Jordan device and on 0-Vicryl suture.  The ro
bot was then undocked.  We then moved away from the field.  The mass was able to
 be brought out through the assistant port site.  After the fascia had been clos
ed, the skin was irrigated and closed using 4-0 Monocryl.  Local anesthetic was 
applied.  The patient was then cleaned, dressed, smoothly extubated, and dischar
ged to the postanesthesia care unit in stable condition.  



Estimated Blood Loss:  150 ml



Specimen(s) Removed/Disposition: 

ID Type Source Tests Collected by Time Destination 

1 : PERITUMOR FAT Tissue Kidney, Left SURGICAL PATHOLOGY          Miranda Warner MD 2020 1059  

2 : DEEP MARGIN Tissue Kidney, Left SURGICAL PATHOLOGY          Miranda Warner MD 2020 1108  

3 : LEFT RENAL MASS Tissue Kidney, Left SURGICAL PATHOLOGY          Nick Warner MD 2020 1131  



Complications:  None



Implants: None



Drains: Felix Catheter: 40 mL and Alexander-Washington Drain: #1 = 10 mL



Disposition:  PACU - stable



Param Hobson MD

Pager [phone] 



ATTESTATION



I performed this procedure with a resident.



Staff name:  Miranda Warner MD Date:  2020 







Electronically signed by Miranda Warner MD at 2020  8:47 AM CDT

documented in this encounter



Plan of Treatment





Not on filedocumented as of this encounter



Goals





     



            Goal       Patient    Associated  Recent Progress  Patient-Stat  Aut

hor



                     Goal Type           Problems            ed? 

 

     



                 Resume normal activities  [facility]        No              Lizette Zacarias RN



documented as of this encounter



Procedures





                                        Comments



                 Procedure Name  Priority        Date/Time       Associated Diag

nosis 

 

                                        



Results for this procedure are in the results section.



                     HC CREATININE-FLUID  Routine             2020  



                                         6:50 AM CDT  

 

                                        



Results for this procedure are in the results section.



                     HC CBC,AUTOMATED    Routine             2020  



                                         4:38 AM CDT  

 

                                        



Results for this procedure are in the results section.



                     HC BASIC METABOLIC PANEL  Routine             2020  



                                         4:38 AM CDT  

 

                                        



Results for this procedure are in the results section.



                 HC FROZEN SECTION #1  Routine         2020      Left blayne

l mass 



                                         10:59 AM CDT  

 

                                         



                     ULTRASOUND GUIDANCE -   2020          Left renal mass

 



                           INTRAOPERATIVE            7:45 AM CDT  

 

                                         



                     ROBOT ASSISTED      2020          Left renal mass 



                           LAPAROSCOPIC NEPHRECTOMY   7:45 AM CDT  



                                         PARTIAL    

 

                                        



Results for this procedure are in the results section.



                     HC COMPREHENSIVE    STAT                2020  



                           METABOLIC PANEL           7:34 AM CDT  

 

                                        



Results for this procedure are in the results section.



                     HC BLOOD TYPING, ABO  STAT                2020  



                           CONFIRM 91                6:55 AM CDT  

 

                                        



Results for this procedure are in the results section.



                     HC CBC,AUTOMATED    Routine             2020  



                                         6:45 AM CDT  

 

                                        



Results for this procedure are in the results section.



                     HC ABO GROUP        Routine             2020  



                                         6:45 AM CDT  

 

                                        



Results for this procedure are in the results section.



                           PATHOLOGY INTEROPERATIVE   2020  



                           REPORT SCAN               12:00 AM CDT  

 

                                        



Results for this procedure are in the results section.



                           TELEMETRY STRIPS-SCAN     2020  



                                         12:00 AM CDT  

 

                                        



Results for this procedure are in the results section.



                     EXTERNAL COVID-19   STAT                05/15/2020  



                           (SARS-COV-2)              7:15 AM CDT  



documented in this encounter



Results

* BASIC METABOLIC PANEL (06/15/2020 11:08 AM CDT)



    



              Component    Value        Ref Range    Performed At  Pathologist



                                         Signature

 

    



                 Sodium          136 (L)         137 - 147 MMOL/L  [facility] 

 

    



                 Potassium       4.3             3.5 - 5.1 MMOL/L  [facility] 

 

    



                 Chloride        106             98 - 110 MMOL/L  [facility] 

 

    



                 CO2             24              21 - 30 MMOL/L  [facility] 

 

    



                 Anion Gap       6               3 - 12          [facility] 

 

    



                 Glucose         81              70 - 100 MG/DL  [facility] 

 

    



                 Blood Urea      15              7 - 25 MG/DL    [facility] 



                                         Nitrogen    

 

    



                 Creatinine      1.56 (H)        0.4 - 1.24 MG/DL  [facility] 

 

    



                 Calcium         9.1             8.5 - 10.6 MG/DL  [facility] 

 

    



                 eGFR Non        44 (L)          >60 mL/min      [facility] 



                                              Comment:   



                           [employer]                  The eGFR is not validated f

or   



                                         use in drug dosing   



                                         adjustments. Continue to use   



                                         estimated creatinine   



                                         clearance per dosing reference   



                                         text. Please contact the   



                                         Clinical Pharmacist for   



                                         questions.   

 

    



                 eGFR     54 (L)          >60 mL/min      [facility] 



                           [employer]                  Comment:   



                                         The eGFR is not validated for   



                                         use in drug dosing   



                                         adjustments. Continue to use   



                                         estimated creatinine   



                                         clearance per dosing reference   



                                         text. Please contact the   



                                         Clinical Pharmacist for   



                                         questions.   











                                         Specimen

 





                                         Blood







   



                 Performing Organization  Address         City[facility]  Ph

one Number

 

   



                      [facility]         [address] [place] [address] 





* DRAIN FLUID CREATININE (2020  6:50 AM CDT)



    



              Component    Value        Ref Range    Performed At  Pathologist



                                         Signature

 

    



                 Drain Fluid     1.80            mg/dL           [facility] 



                           Creatinine                Comment:   



                                         Body fluid to serum creatinine   



                                         ratios >1.0 suggest the   



                                         specimen may be   



                                         contaminated with urine   











                                         Specimen

 





                                         Drainage







   



                 Performing Organization  Address         [facility]  Ph

one Number

 

   



                      [facility]         [address] [place] [address] 





* BASIC METABOLIC PANEL (2020  4:38 AM CDT)



    



              Component    Value        Ref Range    Performed At  Pathologist



                                         Signature

 

    



                 Sodium          134 (L)         137 - 147 MMOL/L  [facility] 

 

    



                 Potassium       4.1             3.5 - 5.1 MMOL/L  [facility] 

 

    



                 Chloride        105             98 - 110 MMOL/L  [facility] 

 

    



                 CO2             24              21 - 30 MMOL/L  [facility] 

 

    



                 Anion Gap       5               3 - 12          [facility] 

 

    



                 Glucose         100             70 - 100 MG/DL  [facility] 

 

    



                 Blood Urea      20              7 - 25 MG/DL    [facility] 



                                         Nitrogen    

 

    



                 Creatinine      1.58 (H)        0.4 - 1.24 MG/DL  [facility] 

 

    



                 Calcium         7.7 (L)         8.5 - 10.6 MG/DL   [facility] 

 

    



                 eGFR Non        44 (L)          >60 mL/min       [facility] 



                                              Comment:   



                           [employer]                  The eGFR is not validated f

or   



                                         use in drug dosing   



                                         adjustments. Continue to use   



                                         estimated creatinine   



                                         clearance per dosing reference   



                                         text. Please contact the   



                                         Clinical Pharmacist for   



                                         questions.   

 

    



                 eGFR     53 (L)          >60 mL/min      [facility] 



                           [employer]                  Comment:   



                                         The eGFR is not validated for   



                                         use in drug dosing   



                                         adjustments. Continue to use   



                                         estimated creatinine   



                                         clearance per dosing reference   



                                         text. Please contact the   



                                         Clinical Pharmacist for   



                                         questions.   











                                         Specimen

 





                                         Blood







   



                 Performing Organization  Address         [facility]  Ph

one Number

 

   



                      [facility]         [address] [place] [address] 





* CBC (2020  4:38 AM CDT)



    



              Component    Value        Ref Range    Performed At  Pathologist



                                         Signature

 

    



                 White Blood     7.7             4.5 - 11.0 K/UL   [facility] 



                                         Cells    

 

    



                 RBC             4.07 (L)        4.4 - 5.5 M/UL  [facility] 

 

    



                 Hemoglobin      12.1 (L)        13.5 - 16.5 GM/DL   [facility] 

 

    



                 Hematocrit      35.3 (L)        40 - 50 %        [facility] 

 

    



                 MCV             86.8            80 - 100 FL      [facility] 

 

    



                 MCH             29.8            26 - 34 PG       [facility] 

 

    



                 MCHC            34.3            32.0 - 36.0 G/DL  [facility] 

 

    



                 RDW             14.6            11 - 15 %        [facility] 

 

    



                 Platelet Count  134 (L)         150 - 400 K/UL   [facility] 

 

    



                 MPV             8.3             7 - 11 FL        [facility] 











                                         Specimen

 





                                         Blood







   



                 Performing Organization  Address         City/[facility]

one Number

 

   



                     [facility]         [address] [place] [address] 





* SURGICAL PATHOLOGY          (2020 10:59 AM CDT)



    



              Component    Value        Ref Range    Performed At  Pathologist



                                         Signature

 

    



                     PATHOLOGY           [facility]   [facility] 



                           REPORT                    [facility]   



                                         [email or web address]   



                                         Department of Pathology and   



                                         Laboratory Medicine   



                                         [address] [place] [address]   



                                         Surgical Pathology Office:   



                                         [phone] Fax:   



                                         [phone]   



                                         SURGICAL PATHOLOGY REPORT   



                                         NAME: JAS LEWIS SURG   



                                         PATH #: [id number] MR #:   



                                         [id number] SPECIMEN   



                                         CLASS: SR BILLING #:   



                                         [id number] ALT ID #:   



                                         LOCATION: [facility] DATE OF   



                                         PROCEDURE: 2020 AGE: 69   



                                         SEX: M DATE RECEIVED:   



                                         2020 :   



                                         1950 TIME RECEIVED:   



                                         11:14 PHYSICIAN:   



                                         MIRANDA WARNER DATE OF   



                                         REPORT: 2020 COPY TO:   



                                         DATE OF PRINTIN2020   



                                         ##############################   



                                         ##############################   



                                         ############   



                                         Final Diagnosis:   



                                         A. Kidney, deep margin,   



                                         biopsy:    



                                         Negative for malignancy.   



                                         B. Soft tissue, peritumor fat,   



                                         biopsy:   



                                         Negative for malignancy.   



                                         C. Kidney, left, partial   



                                         nephrectomy:   



                                         Renal cell carcinoma, clear   



                                         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   



                                         2.7 cm, with   



                                         necrosis, confined to kidney.   



                                         Margins uninvolved.   



                                         Comment:   



                                         KIDNEY: Nephrectomy, Partial   



                                         or Radical   



                                         CAP Version: Kidney 4.0.1.0   



                                         Procedure   



                                         Partial nephrectomy   



                                         Specimen Laterality   



                                         Left   



                                         Tumor Site (select all that   



                                         apply)   



                                         Not specified   



                                         Tumor Size (largest tumor if   



                                         multiple)   



                                         Greatest dimension: 2.7 cm   



                                         Tumor Focality   



                                         Unifocal   



                                         Histologic Type   



                                         Clear cell renal cell   



                                         carcinoma   



                                         Sarcomatoid Features   



                                         Not identified   



                                         Rhabdoid Features   



                                         Not identified   



                                         Tumor Necrosis (any amount)   



                                         Present: <5%   



                                         Histologic Grade (WHO/ISUP)   



                                         G2: Nucleoli are conspicuous   



                                         and eosinophilic at x400   



                                         magnification   



                                         and visible but not prominent   



                                                  



                                         at x100 magnification.   



                                         Tumor Extension (select all   



                                         that apply)   



                                         Tumor limited to kidney   



                                         Tumor extension into other   



                                         organ(s)/structure(s)   



                                         (specify):   



                                         Margins (select all that   



                                         apply)   



                                         Margins uninvolved by invasive   



                                         carcinoma   



                                         Lymph-Vascular Invasion   



                                         (excluding renal vein and its   



                                         muscle containing segmental   



                                         branches and   



                                         inferior venacava)   



                                         Not identified   



                                         Pathologic Staging (pTNM)   



                                         TNM Descriptors (required only   



                                         if applicable) (select all   



                                         that apply)   



                                         Primary Tumor (pT)   



                                         pT1: Tumor 7 cm or less in   



                                         greatest dimension, limited to   



                                         the kidney   



                                         pT1a:   Tumor 4 cm or less   



                                         in greatest dimension, limited   



                                         to the   



                                         kidney   



                                         Regional Lymph Nodes (pN)   



                                         pNX:   Regional lymph   



                                         nodes cannot be assessed   



                                         No nodes submitted or found   



                                         Distant Metastasis (pM)   



                                         Not applicable   



                                         Pathologic Findings in   



                                         Nonneoplastic Kidney (select   



                                         all that apply)   



                                         Insufficient tissue (partial   



                                         nephrectomy specimen with <5   



                                         mm of   



                                         adjacent nonneoplastic kidney)   



                                         Significant pathologic   



                                         alterations   



                                         Glomerular disease (specify   



                                         type): Mild   



                                         Vascular disease (specify   



                                         type): Mild   



                                         Other Tumors and/or Tumor-like   



                                         Lesions (select all that   



                                         apply)   



                                         None   



                                         The pathologic stage assigned   



                                         here should be regarded as   



                                         provisional, as   



                                         it reflects only current   



                                         pathologic data and does not   



                                         incorporate full   



                                         knowledge of the patient's   



                                         clinical status and/or prior   



                                         pathology.   



                                         Pursuant to the Quality   



                                         Assurance Program at the   



                                         [facility] Pathology Department,   



                                         selected slides from this case   



                                         have been   



                                         concurrently reviewed by the   



                                         following pathologist:  Dr. Danna Avila who   



                                         agrees with the final   



                                         diagnosis.   



                                         Attestation:   



                                         By this signature, I attest   



                                         that I have personally   



                                         formulated the final   



                                         interpretation expressed in   



                                         this report and that the above   



                                         diagnosis is   



                                         based upon my examination of   



                                         the slides and/or other   



                                         material indicated in   



                                         this report.   



                                         +++Electronically Signed Out   



                                         By RAKAN DESAI MD on   



                                         2020+++   



                                               



                                         ksmare/2020   



                                              



                                         ##############################   



                                         ##############################   



                                         ############   



                                         Material Received:   



                                         A: deep margin   



                                         B: peritumor fat   



                                         C: left renal mass stitch at   



                                         deep margin   



                                         History:   



                                         69-year-old male with a   



                                         clinical history of a left   



                                         renal mass.   



                                         Gross Description:   



                                         A. Received fresh labeled with   



                                         the patient's name and "deep   



                                         margin" is a   



                                         0.7 x 0.5 x 0.3 cm fragment of   



                                         tan-brown soft tissue. The   



                                         specimen is   



                                         submitted entirely in cassette   



                                         A1FS for frozen and permanent   



                                         sectioning.   



                                         (sc)   



                                         B. Received in formalin   



                                         labeled with the patient's   



                                         name and "peritumor   



                                         fat" is a 2.8 x 2.3 x 0.3 cm   



                                         aggregate of yellow-tan   



                                         fibroadipose tissue.   



                                         The specimen is submitted   



                                         without sectioning in cassette   



                                         B1. (sc)   



                                         C. Fixative: Formalin   



                                         Labeled: "Left renal mass"   



                                         Measurement: 4.7 x 2.9 x 2.4   



                                         cm   



                                         Mass: 2.7 x 2.4 x 1.8 cm   



                                         Mass appearance: Tan-yellow to   



                                         tan-red, soft, hemorrhagic and   



                                         possibly   



                                         necrotic   



                                         Perinephric fat involved by   



                                         tumor: No   



                                         The resection margin is inked   



                                         black. The remainder of the   



                                         specimen is   



                                         inked blue.   



                                         Mass from inked resection   



                                         margin: 0.1 cm   



                                         Tissue submitted to   



                                         [facility]   



                                         Facility: No   



                                         Representative sections are   



                                         submitted as follows:   



                                         C1-C2   Representative   



                                         sections of mass to inked   



                                         margin.   



                                         C3   Additional   



                                         representative section of   



                                         tumor.   



                                         C4   Tumor nearest   



                                         perinephric fat (blue ink).   



                                         C5   Representative   



                                         section of uninvolved kidney.   



                                         (cjj)   



                                         sc/2020   



                                         Intraoperative Consultation:   



                                         A1FS, kidney, "deep margin",   



                                         biopsy:   



                                         Negative for malignancy.   



                                         MARINO BRIZUELA MD   











                                         Specimen

 





                                         Tissue - Kidney, Left

 





                                         Tissue - Kidney, Left

 





                                         Tissue - Kidney, Left







   



                 Performing Organization  Address         City/State/Zipcode  Ph

one Number

 

   



                     [facility]         [address] [place]

 [address] 





* COMPREHENSIVE METABOLIC PANEL (2020  7:34 AM CDT)



    



              Component    Value        Ref Range    Performed At  Pathologist



                                         Signature

 

    



                 Sodium          135 (L)         137 - 147 MMOL/L  [facility] 

 

    



                 Potassium       3.8             3.5 - 5.1 MMOL/L  [facility] 

 

    



                 Chloride        104             98 - 110 MMOL/L  [facility] 

 

    



                 Glucose         91              70 - 100 MG/DL  [facility] 

 

    



                 Blood Urea      18              7 - 25 MG/DL    [facility] 



                                         Nitrogen    

 

    



                 Creatinine      1.45 (H)        0.4 - 1.24 MG/DL  [facility] 

 

    



                 Calcium         8.7             8.5 - 10.6 MG/DL  [facility] 

 

    



                 Total Protein   8.7 (H)         6.0 - 8.0 G/DL  [facility] 

 

    



                 Total Bilirubin  1.2             0.3 - 1.2 MG/DL  [facility] 

 

    



                 Albumin         3.4 (L)         3.5 - 5.0 G/DL  [facility] 

 

    



                 Alk Phosphatase  58              25 - 110 U/L    [facility] 

 

    



                 AST (SGOT)      23              7 - 40 U/L      [facility] 

 

    



                 CO2             24              21 - 30 MMOL/L  [facility] 

 

    



                 ALT (SGPT)      29              7 - 56 U/L      [facility] 

 

    



                 Anion Gap       7               3 - 12          [facility] 

 

    



                 eGFR Non        48 (L)          >60 mL/min      [facility] 



                                              Comment:   



                           [employer]                  The eGFR is not validated f

or   



                                         use in drug dosing   



                                         adjustments. Continue to use   



                                         estimated creatinine   



                                         clearance per dosing reference   



                                         text. Please contact the   



                                         Clinical Pharmacist for   



                                         questions.   

 

    



                 eGFR     58 (L)          >60 mL/min       [facility] 



                           [employer]                  Comment:   



                                         The eGFR is not validated for   



                                         use in drug dosing   



                                         adjustments. Continue to use   



                                         estimated creatinine   



                                         clearance per dosing reference   



                                         text. Please contact the   



                                         Clinical Pharmacist for   



                                         questions.   











                                         Specimen

 





                                         Blood







   



                 Performing Organization  Address         [facility]  [facility]

one Number

 

   



                      [facility]         [address] [place] [address] 





* BLOOD TYPE CONFIRMATION - ORDER ONLY IF REQUESTED BY LAB (2020  6:55 AM 
  CDT)



    



              Component    Value        Ref Range    Performed At  Pathologist



                                         Signature

 

    



                     ABO/RH(D)           B POS                [facility] 











                                         Specimen

 





                                         Blood







   



                 Performing Organization  Address         City/[facility]

one Number

 

   



                      [facility]         [address] [place] [address] 





* TYPE & CROSSMATCH (2020  6:45 AM CDT)



    



              Component    Value        Ref Range    Performed At  Pathologist



                                         Signature

 

    



                     Units Ordered       4                    [facility] 

 

    



                     Crossmatch          2020,2359      [facility] 



                                         Expires    

 

    



                     Record Check        2ND TYPE REQUIRED    [facility] 

 

    



                     ABO/RH(D)           B POS                [facility] 

 

    



                     Antibody Screen     NEG                  [facility] 

 

    



                     Electronic          YES                  [facility] 



                                         Crossmatch    

 

    



                     Unit Number         [id number]        [facility] 

 

    



                     Blood Component     RBC,ADSOL,LEUKO REDUCED    [facility] 



                                         Type    

 

    



                     Unit Division       0                    [facility] 

 

    



                     Status OF Unit      REL FROM ALLOC      [facility] 

 

    



                     Transfusion         OK TO TRANSFUSE      [facility] 



                                         Status    

 

    



                     Crossmatch          COMPATIBLE,ELECTRONIC    [facility] 



                                         Result    

 

    



                     Unit Number         [id number]        [facility] 

 

    



                     Blood Component     RBC,ADSOL,LEUKO REDUCED    [facility] 



                                         Type    

 

    



                     Unit Division       0                    [facility] 

 

    



                     Status OF Unit      REL FROM ALLOC       [facility] 

 

    



                     Transfusion         OK TO TRANSFUSE      [facility] 



                                         Status    

 

    



                     Crossmatch          COMPATIBLE,ELECTRONIC    [facility] 



                                         Result    











                                         Specimen

 





                                         Blood







   



                 Performing Organization  Address         City/[facility]  Ph

one Number

 

   



                      [facility]         [address] [place] [address] 





* CBC (2020  6:45 AM CDT)



    



              Component    Value        Ref Range    Performed At  Pathologist



                                         Signature

 

    



                 White Blood     5.9             4.5 - 11.0 K/UL   [facility] 



                                         Cells    

 

    



                 RBC             4.89            4.4 - 5.5 M/UL   [facility] 

 

    



                 Hemoglobin      14.7            13.5 - 16.5 GM/DL  [facility] 

 

    



                 Hematocrit      42.8            40 - 50 %       [facility] 

 

    



                 MCV             87.6            80 - 100 FL     [facility] 

 

    



                 MCH             30.0            26 - 34 PG       [facility] 

 

    



                 MCHC            34.2            32.0 - 36.0 G/DL   [facility] 

 

    



                 RDW             14.5            11 - 15 %       [facility] 

 

    



                 Platelet Count  160             150 - 400 K/UL  [facility] 

 

    



                 MPV             8.3             7 - 11 FL        [facility] 











                                         Specimen

 





                                         Blood







   



                 Performing Organization  Address         [facility]  Ph

one Number

 

   



                      [facility]         [address] [place]

 [address] 





* TELEMETRY STRIPS-SCAN (2020 12:00 AM CDT)



 



                           Narrative                 Performed At

 

 



                                         This result has an attachment that is n

ot available. 



                                         Ordered by an unspecified provider. 





* PATHOLOGY INTEROPERATIVE REPORT SCAN (2020 12:00 AM CDT)



 



                           Narrative                 Performed At

 

 



                                         This result has an attachment that is n

ot available. 



                                         Ordered by an unspecified provider. 





* EXTERNAL COVID-19 (SARS-COV-2) (05/15/2020  7:15 AM CDT)



    



              Component    Value        Ref Range    Performed At  Pathologist



                                         Signature

 

    



                           Overall                   [facility] 



                           COVID-19                  LAB 



                                         (SARS-CoV-2)    



                                         Result    

 

    



                     Source COVID-19     Unknown             [facility] 



                           (SARS-CoV-2)              LAB 

 

    



                     COVID-19            Negative            [facility] 



                           (SARS-CoV-2)              LAB 



                                         RNA    

 

    



                           Pan-SARS RNA              [facility] 



                                         LAB 











                                         Specimen

 





                                         Nasopharyngeal Swab -



                                         Nasopharyngeal Swab







 



                           Narrative                 Performed At

 

 



                                         This result has an attachment that is n

ot available. 







   



                 Performing Organization  Address         City/[facility]  Ph

one Number

 

   



                                         [facility] LAB   





documented in this encounter



Visit Diagnoses









                                         Diagnosis

 





                                         Left renal mass



                                         Unspecified disorder of kidney and uret

er



documented in this encounter



Admitting Diagnoses









                                         Diagnosis

 





                                         Left renal mass



                                         Unspecified disorder of kidney and uret

er



documented in this encounter



Administered Medications





                Action Date     Dose            Rate            Site



                           Medication Order          MAR Action    

 

                2020  7:42 AM CDT 1,000 mg                         



                           acetaminophen (TYLENOL) tablet 1,000 mg  Given    



                                         1,000 mg, Oral, ONCE, 1 dose, 20 at 0745, To be given pre-op wit

h     



                                         a sip of water immediately upon arrival

     



                                         to [facility] (>30 minutes prior to scheduled 

    



                                         surgery time). TOTAL ACETAMINOPHEN DOSE

     



                                         NOT TO EXCEED 4 GM DAILY., Pre-Op     

 

  

 

                2020  9:13 AM  mg                           



                           acetaminophen (TYLENOL) tablet 650 mg  Given    



                                         650 mg, Oral, EVERY  6 HOURS PRN,     



                                         Starting 20 at 1634, Until 20 at 1331, Pain non-opioid: may b

e     



                                         used alone or in combination with opioi

d     



                                         analgesia, TOTAL ACETAMINOPHEN DOSE NOT

     



                                         TO EXCEED 4GM DAILY,     

 

                    650 mg                                   



                           Given                     2020   



                                         4:49 PM CDT   

 

  

 

                2020  8:43 AM CDT 50 mg                            



                           atenoloL (TENORMIN) tablet 50 mg  Given    



                                         50 mg, Oral, DAILY, First dose on 20 at 0900, Until Discontinued,   

  



                                         Hold for heart rate < 55 bpm or systoli

c     



                                         BP < 110,     

 

  

 

                2020  8:50 PM CDT                                  



                           bacitracin topical ointment  Given    



                                         Topical, TWICE DAILY, First dose on 20 at 2100, Until Discontinued,   

  



                                         Apply to tip of penis and catheter for 

    



                                         comfort,     

 

  

 

                2020 12:00 PM CDT 50 mL                            



                           bupivacaine PF (MARCAINE) 0.25 %  Given    



                                         injection     



                                         INTRA-PROCEDURE MED, Starting 20 at 1200, Until 20 at  

   



                                         1237, Intra-op     

 

  

 

                2020  8:44 AM CDT 1 g                              



                           ceFAZolin (ANCEF) IVP 1 g  Given    



                                         1 g, Intravenous, EVERY  8 HOURS, 3    

 



                                         doses, First dose on 20 at    

 



                                         1645, Last dose on 20 at 0845,

     



                                         IV PUSH -- RECONSTITUTE each 1 g vial b

y     



                                         adding 10 mL 0.9% NACL,     

 

                    1 g                                      



                           Given                     2020   



                                         12:25 AM CDT   

 

                    1 g                                      



                           Given                     2020   



                                         4:49 PM CDT   

 

  

 

                2020  8:50 PM  mg                           



                           docusate (COLACE) oral solution 100 mg  Given    



                                         100 mg, Oral, TWICE DAILY, First dose o

n     



                                         20 at 2100, Until Discontinued

,     



                                         Hold for loose stools,     

 

  

 

                2020  8:44 AM CDT 4 mg                             



                           doxazosin (CARDURA) tablet 4 mg  Given    



                                         4 mg, Oral, DAILY, First dose on 20 at 0900, Until Discontinued    

 

 

  

 

                2020  7:42 AM  mg                           



                           gabapentin (NEURONTIN) capsule 300 mg  Given    



                                         300 mg, Oral, ONCE, 1 dose, 20

     



                                         at 0745, To be given pre-op with a sip 

    



                                         of water immediately upon arrival to [facility]     



                                         (>30 minutes prior to scheduled surgery

     



                                         time)., Pre-Op     

 

  

 

                2020  6:40 AM CDT 1,000 mL        20 mL/hr         



                           lactated ringers infusion  Given - New    



                           1,000 mL, 1,000 mL, Intravenous, at 20  Bag    



                                         mL/hr, CONTINUOUS, Starting 20

     



                                         at 0615, Until 20 at 1634,    

 



                                         Pre-Op     

 

  

 

  



                                         ondansetron (ZOFRAN) injection 4 mg 



                                         4 mg, Intravenous, EVERY  6 HOURS PRN, 



                                         Starting 20 at 1634, Until 20 at 1331, Nausea/Vomiting 



                                         Injectable 

 

  

 

  



                                         ondansetron (ZOFRAN) tablet 4 mg 



                                         4 mg, Oral, EVERY  6 HOURS PRN, Startin

g 



                                         20 at 1634, Until 20 



                                         at 1331, Nausea/Vomiting PO 

 

  

 

                2020  7:42 AM CDT 5 mg                             



                           oxyCODONE (ROXICODONE) tablet 5 mg  Given    



                                         5 mg, Oral, ONCE, 1 dose, 20 a

t     



                                         0745, To be given pre-op with a sip of 

    



                                         water immediately upon arrival to [facility]  

   



                                         (>30 minutes prior to scheduled surgery

     



                                         time)., Pre-Op     

 

  

 

                2020 12:56 PM CDT 5 mg                             



                           oxyCODONE (ROXICODONE) tablet 5-10 mg  Given    



                                         5-10 mg, Oral, ONCE PRN, 1 dose,     



                                         Starting 20 at 1145, Until 20 at 1256, Pain PO, For Pain Scor

e     



                                         <4, PACU (only)     

 

  

 

                2020  4:50 AM CDT 5 mg                             



                           oxyCODONE (ROXICODONE) tablet 5-10 mg  Given    



                                         5-10 mg, Oral, EVERY  4 HOURS PRN,     



                                         Starting 20 at 1634, Until 20 at 1331, Pain PO     

 

                    5 mg                                     



                           Given                     2020   



                                         4:49 PM CDT   

 

  

 

                2020  8:44 AM CDT 17 g                             



                           polyethylene glycol 3350 (MIRALAX)  Given    



                                         packet 17 g     



                                         17 g (1 packet), Oral, DAILY, First dos

e     



                                         on 20 at 1645, Until     



                                         Discontinued, 8.5 GRAMS = 0.5 PACKET 17

     



                                         GRAMS = 1 PACKET 34 GRAMS = 2 PACKETS, 

    

 

                    17 g                                     



                           Given                     2020   



                                         4:49 PM CDT   

 

  

 

                2020  8:50 PM CDT 17.6 mg                          



                           senna (SENOKOT) oral syrup 17.6 mg  Given    



                                         17.6 mg, Oral, TWICE DAILY, First dose 

    



                                         on 20 at 2100, Until     



                                         Discontinued, Hold for loose stools,   

  

 

  

 

                2020 12:25 AM CDT                 100 mL/hr        



                           sodium chloride 0.9 %   infusion  Given - New    



                           1,000 mL, Intravenous, at 100 mL/hr,  Bag    



                                         CONTINUOUS, Starting 20 at    

 



                                         1315, Until 20 at 0610     

 

                                        100 mL/hr            



                           Given - New Bag           2020   



                                         1:56 PM CDT   

 

  



documented in this encounter

## 2020-08-11 VITALS — SYSTOLIC BLOOD PRESSURE: 170 MMHG | DIASTOLIC BLOOD PRESSURE: 92 MMHG

## 2020-08-11 VITALS — DIASTOLIC BLOOD PRESSURE: 96 MMHG | SYSTOLIC BLOOD PRESSURE: 167 MMHG

## 2020-08-11 VITALS — SYSTOLIC BLOOD PRESSURE: 173 MMHG | DIASTOLIC BLOOD PRESSURE: 90 MMHG

## 2020-08-11 VITALS — DIASTOLIC BLOOD PRESSURE: 89 MMHG | SYSTOLIC BLOOD PRESSURE: 172 MMHG

## 2020-08-11 VITALS — SYSTOLIC BLOOD PRESSURE: 175 MMHG | DIASTOLIC BLOOD PRESSURE: 92 MMHG

## 2020-08-11 VITALS — SYSTOLIC BLOOD PRESSURE: 173 MMHG | DIASTOLIC BLOOD PRESSURE: 98 MMHG

## 2020-08-11 VITALS — SYSTOLIC BLOOD PRESSURE: 182 MMHG | DIASTOLIC BLOOD PRESSURE: 94 MMHG

## 2020-08-11 VITALS — SYSTOLIC BLOOD PRESSURE: 165 MMHG | DIASTOLIC BLOOD PRESSURE: 85 MMHG

## 2020-08-11 LAB
ALBUMIN SERPL-MCNC: 3.1 GM/DL (ref 3.2–4.5)
ALP SERPL-CCNC: 61 U/L (ref 40–136)
ALT SERPL-CCNC: 19 U/L (ref 0–55)
BILIRUB SERPL-MCNC: 1 MG/DL (ref 0.1–1)
BUN/CREAT SERPL: 12
CALCIUM SERPL-MCNC: 8.5 MG/DL (ref 8.5–10.1)
CHLORIDE SERPL-SCNC: 108 MMOL/L (ref 98–107)
CO2 SERPL-SCNC: 22 MMOL/L (ref 21–32)
CREAT SERPL-MCNC: 1.9 MG/DL (ref 0.6–1.3)
GFR SERPLBLD BASED ON 1.73 SQ M-ARVRAT: 35 ML/MIN
GLUCOSE SERPL-MCNC: 100 MG/DL (ref 70–105)
MAGNESIUM SERPL-MCNC: 1.7 MG/DL (ref 1.6–2.4)
POTASSIUM SERPL-SCNC: 4.8 MMOL/L (ref 3.6–5)
PROT SERPL-MCNC: 8.2 GM/DL (ref 6.4–8.2)
SODIUM SERPL-SCNC: 134 MMOL/L (ref 135–145)

## 2020-08-11 RX ADMIN — ACETAMINOPHEN PRN MG: 500 TABLET ORAL at 00:13

## 2020-08-11 RX ADMIN — ACETAMINOPHEN PRN MG: 500 TABLET ORAL at 07:58

## 2020-08-11 RX ADMIN — SODIUM CHLORIDE, SODIUM LACTATE, POTASSIUM CHLORIDE, AND CALCIUM CHLORIDE SCH MLS/HR: 600; 310; 30; 20 INJECTION, SOLUTION INTRAVENOUS at 00:40

## 2020-08-11 RX ADMIN — SODIUM CHLORIDE, SODIUM LACTATE, POTASSIUM CHLORIDE, AND CALCIUM CHLORIDE SCH MLS/HR: 600; 310; 30; 20 INJECTION, SOLUTION INTRAVENOUS at 12:27

## 2020-08-11 NOTE — DISCHARGE INST-SIMPLE/STANDARD
Discharge Inst-Standard


Patient Instructions/Follow Up


Plan of Care/Instructions/FU:  


Please continue to take your medications as written. Please follow up with


your primary care doctor in the next week to follow up this hospital stay.


Activity as Tolerated:  Yes


Discharge Diet:  Cardiac Diet


Return to The Hospital For:  


Chest pain, shortness of breath, heart racing, passing out, fever, if you


feel you are getting worse.











ANABEL BUI MD              Aug 11, 2020 15:05

## 2020-08-11 NOTE — NUR
/94 Reported to Dr. Burnett. Orders given for losartan 25mg now. Instructed that pt is to 
discharge and patient to make followup appointment with Dr. Singh and Dr. Chaves in 1 week. 
Patient voiced understanding.

## 2020-08-11 NOTE — DIAGNOSTIC IMAGING REPORT
INDICATION: Left ankle fracture. Follow-up.



COMPARISON: None.



FINDINGS: Frontal and lateral radiographic views of the left

ankle were obtained. There is external radiopaque cast material,

which does degrade evaluation of underlying osseous structures.

Nondisplaced acute fracture of the distal fibula is present.

There may be acute fracture of the posterior distal tibia versus

overlapping of fibular fracture on the lateral view. Joint spaces

are maintained. No unexpected radiopaque foreign bodies are seen.



IMPRESSION:

1. Left ankle fractures as above.



Dictated by: 



  Dictated on workstation # [id number]

## 2020-08-11 NOTE — SHORT STAY SUMMARY-HOSPITALIST
History of Present Illness


HPI/Chief Complaint


Pt is a 69yoCM with a PMH of HTN, renal cancer s/p nephrectomy, and ?CLL who 

presented to OSH due to syncope and confusion. He states that he was out playing

golf yesterday and walked in the house and when he hit the cold air he passed 

out. He states after that he was kind of confused throughout the day and when he

wife came home from work she brought him to ER for evaluation. CT head was done 

and was negative. He was found to have an ankle fracture though and a mildly 

elevated creatinine. He was transferred here for admission. This morning he 

states he is doing well. He has been up and ambulating without issue and pain is

controlled. He is scheduled to have a stress test tomorrow with Dr Payton.


Source:  patient


Date Seen


20


Time Seen by a Provider:  12:44


Attending Physician


Sunny Mcdonald MD


PCP


Arnaldo Singh DO


Referring Physician





Date of Admission


Aug 10, 2020 at 21:15





Home Medications & Allergies


Home Medications


Reviewed patient Home Medication Reconciliation performed by pharmacy medication

reconciliations technician and/or nursing.


Patients Allergies have been reviewed.





Allergies





Allergies


Uncoded Allergies


  SULFA ( Allergy, Unknown, 14)








Past Medical-Social-Family Hx


Past Med/Social Hx:  Reviewed Nursing Past Med/Soc Hx


Patient Social History


Marrital Status:  


Alcohol Use:  Denies Use


Recreational Drug Use:  No


Smoking Status:  Former Smoker


Former Smoker, Quit:  1990


Type Used:  Pipe


Physical Abuse Screen:  No


Sexual Abuse:  No


Recent Foreign Travel:  No


Contact w/other who traveled:  No


Recent Hopitalizations:  No


Recent Infectious Disease Expo:  No





Immunizations Up To Date


Tetanus Booster (TDap):  Unknown


Date of Pneumonia Vaccine:  2017


Date of Influenza Vaccine:  Sep 21, 2016





Seasonal Allergies


Seasonal Allergies:  No





Past Medical History


Surgeries:  Tonsillectomy


Reproductive:  No


Sexually Transmitted Disease:  No


Hearing Impairment:  Hard of Hearing


Cancer:  Colon, Kidney


Did You Recieve Any Treatments:  Yes


What Type of Treatment Did You:  Surgical Intervention


History of Blood Disorders:  Yes





Family History


Reviewed Nursing Family Hx





Alzheimer's disease


  19 FATHER


Colon cancer


  19 FATHER


Hypertension


  G8 BROTHER


Parkinson's disease


  19 FATHER





Review of Systems


Constitutional:  chills, fever


Cardiovascular:  No chest pain, No edema, No Hx of Intervention, No 

palpitations; syncope





Physical Exam


Physical Exam


Vital Signs





Vital Signs - First Documented








 8/10/20 8/10/20





 21:25 21:58


 


Temp  37.8


 


Pulse  93


 


Resp  18


 


B/P (MAP)  175/86


 


Pulse Ox 98 


 


O2 Delivery Room Air 





Capillary Refill : Less Than 3 SecondsLess Than 3 Seconds


Height, Weight, BMI


Height: 5'11.00"


Weight: 205lbs. 0.0oz. 92.804776wl; 28.60 BMI


Method:


General Appearance:  No Apparent Distress, WD/WN


HEENT:  PERRL/EOMI, Moist Mucous Membranes


Neck:  Normal Inspection, Supple


Respiratory:  Lungs Clear, No Accessory Muscle Use, No Respiratory Distress


Cardiovascular:  Regular Rate, Rhythm, No Murmur


Gastrointestinal:  Normal Bowel Sounds, Non Tender, Soft


Extremity:  Other (left leg wrapped in ace bandage, good cap refill and pulse)


Neurologic/Psychiatric:  Alert, Oriented x3, Normal Mood/Affect


Skin:  Normal Color, Warm/Dry





Results


Results/Procedures


Labs


Patient resulted labs reviewed.


Imaging:  Reviewed Imaging Report


Imaging


                 [facility] [place], [place]





NAME:   JAS BRODERICK


[facility] REC#:   [id number]


ACCOUNT#:   [id number]


PT STATUS:   ADM IN


:   1950


PHYSICIAN:   ANABEL BUI MD


ADMIT DATE:   08/10/20/4TH


                                  ***Signed***


Date of Exam:20





ANKLE, LEFT, 2 VIEWS








INDICATION: Left ankle fracture. Follow-up.





COMPARISON: None.





FINDINGS: Frontal and lateral radiographic views of the left


ankle were obtained. There is external radiopaque cast material,


which does degrade evaluation of underlying osseous structures.


Nondisplaced acute fracture of the distal fibula is present.


There may be acute fracture of the posterior distal tibia versus


overlapping of fibular fracture on the lateral view. Joint spaces


are maintained. No unexpected radiopaque foreign bodies are seen.





IMPRESSION:


1. Left ankle fractures as above.





Dictated by: 





  Dictated on workstation # [id number]








Dict:   20 1438


Trans:   20 1705


[facility] [id number]





Interpreted by:     BRIAN MUNOZ MD


Electronically signed by: BRIAN MUNOZ MD 20 1705





Short Stay Diagnosis


Discharge Diagnosis-Short Stay


Admission Diagnosis


Syncope


Final Discharge Diagnosis


Syncope





Conclusion


Plan


Syncope


   Likely orthostasis given history


   Orthostatics negative but likely due to volume repletion


   Monitored on telemetry


   Has stress test ordered for tomorrow


      Discussed with Dr Payton who will follow up tomorrow with stress test





Ankle Fracture


   Repeat imaging after discussion with Dr Chaves


   Ortho consulted, appreciate recs


      Spoke with Dr Chaves who reviewed films and will follow up as an 

outpatient


   Pain controlled





CKD


   Creatine at baseline





Clinical Quality Measures


DVT/VTE Risk/Contraindication:


Risk Factor Score Per Nursin


RFS Level Per Nursing on Admit:  4+=Very High











ANABEL BUI MD              Aug 11, 2020 12:44

## 2020-08-11 NOTE — NUR
JAS BRODERICK demonstrates understanding of discharge instructions and accurately 
returns instructions upon questioning.  Copy of Post-Discharge Instructions and Medication 
Discharge Instructions given to Patient. JAS BRODERICK is able to manage continuing 
needs after discharge.  Patients belongings returned to patient. Skin dry and intact; no 
breakdown noted. Patient discharged from [facility] on 8/11 at 1730. JAS BRODERICK left floor 
via Wheelchair, accompanied by staff.

## 2020-08-11 NOTE — CONSULTATION REPORT
DATE OF SERVICE:  08/11/2020



INPATIENT CONSULTATION



REASON FOR CONSULTATION:

Left ankle fracture.



HISTORY OF PRESENT ILLNESS:

The patient is a 69-year-old gentleman who had a syncopal episode and fell

yesterday, he was found to have an ankle fracture for which I was consulted. 

Radiographs reveal a nondisplaced lateral malleolus fracture with no widening of

the mortise or syndesmosis.  I communicated with Dr. Cobb and recommended

continue with splint treatment.  We will see him as an outpatient.  No

intervention is needed at this time.



Thank you for the consultation.





Job ID: [id number]

DocumentID: [id number]

Dictated Date:  08/11/2020 16:13:18

Transcription Date: 08/11/2020 20:41:10

Dictated By: ALMA RODRIGUEZ MD

## 2020-08-13 NOTE — XMS REPORT
Continuity of Care Document

                             Created on: 2020



JAS BRODERICK

External Reference #: [id number]

: 1950

Sex: Male



Demographics





                          Home Phone                [phone]

 

                          Preferred Language        Unknown

 

                          Marital Status            Unknown

 

                          Mandaeism Affiliation     Unknown

 

                          Race                      Unknown

 

                          Ethnic Group              Unknown





Author





                          Organization              Unknown

 

                          Address                   Unknown

 

                          Phone                     Unavailable



              



Allergies

      



             Active           Description           Code           Type         

  Severity   

                Reaction           Onset           Reported/Identified          

 

Relationship to Patient                 Clinical Status        

 

             Yes           SULFACETAMIDE SODIUM-SULFUR                          

           

MODERATE           MODERATE                                              

  

        

 

             Yes           SULFACETAMIDE SODIUM-SULFUR                          

           

MODERATE           OTHER                                                 

  

     

 

           Yes           SULFA           SULFA                       Unknown    

       N/A 

                                2014                                    



                      



Medications

      



                Medication           Packaging           Start Date           St

op Date         

                    Route               Dosage              Sig        

 

                                      KETOROLAC VIAL INJ 60 MG/2CC (TORADOL VIAL

)                     MG  

             2017                                    

       

     ONCE&1112                  

 

                                      ORPHENADRINE INJ 60 MG/2CC (NORFLEX)      

               MG         

             2017                                    

             

ONCE&1112                  

 

                                                    NORMAL SALINE 1000CC IV BAG 

INJ 0.9 % (NS 1000CC IV BAG)            

             ml           2020                       

      

                                                    ONCE&1816                  

 

                                CLONIDINE TAB 0.1 MG (CATAPRES)                 

    MG                  

2020                                                 

      

ONCE&1914                  

 

                                                    NORMAL SALINE 1000CC IV BAG 

INJ 0.9 % (NS 1000CC IV BAG)            

             ml           08/10/2020           08/10/2020                       

      

                                                    ONCE&1830                  

 

                                                    Fentanyl citrate (PF) inj so

lution 50mcg/mL  1 mL VIAL              

             MCG           08/10/2020           08/10/2020                      

        

                                                    ONCE&1900                  

 

                                                    MAGNESIUM SULFATE VIAL INJ 1

 GM/2CC (MAG SULFATE 2CC VIAL)          

             GM           08/10/2020           08/10/2020                       

    

                                                    ONCE&1900                  

 

                                                    NORMAL SALINE 1000CC IV BAG 

INJ 0.9 % (NS 1000CC IV BAG)            

             ml           08/10/2020           08/10/2020                       

      

                                                    ONCE&1945                  

 

                                                    NORMAL SALINE 250CC IV BAG I

[place] 0.9 % (NS 250CC IV BAG)              

             ml           08/10/2020           08/10/2020                       

        

                                                    ONCE&2102                  



                                  



Problems

      



             Date Dx Coded           Attending           Type           Code    

       

Diagnosis                               Diagnosed By        

 

             1445           SOLANGE ALBERTO MD, Ot           E88.09 

          OT

DISORDERS OF PLASMA-PROTEIN METABOLI                    

 

             1445           SOLANGE ALBERTO MD           Ot           I10    

       

ESSENTIAL (PRIMARY) HYPERTENSION                    

 

             1445           SOLANGE ALBERTO MD           Ot           Z80.0  

         

FAMILY HISTORY OF MALIGNANT NEOPLASM OF                     

 

             1445           SOLANGE ALBERTO MD           Ot           Z80.41 

          

FAMILY HISTORY OF MALIGNANT NEOPLASM OF                     

 

             2010                        Ot           V16.0           FAMI

LY HX-GI 

MALIGNANCY                                       

 

             2010                        Ot           V76.51           SCR

EEN MAL NEOP-

COLON                                            

 

           2014                       Ot           550.92                 

           

   

 

           2014                       Ot           V72.63                 

           

   

 

           2014                       Ot           V72.81                 

           

   

 

           2014                       Ot           V74.8                  

           

  

 

           2014                       Ot           550.92                 

           

   

 

             2014           ROOSEVELT ALVA MD           Ot           211.4 

          

BENIGN NEOPL RECTUM/ANUS                         

 

             2014           ROOSEVELT ALVA MD           Ot           455.0 

          INT

HEMORRHOID W/O COMPL                             

 

             2014           ROOSEVELT ALVA MD           Ot           455.3 

          EXT

HEMORRHOID W/O COMPL                             

 

             2014           ROOSEVELT ALVA MD           Ot           530.11

           

REFLUX ESOPHAGITIS                               

 

             2014           ROOSEVELT ALVA MD           Ot           535.50

           

UNSP GASTRITIS   GASTRODUODENITIS W/O ME                    

 

             2014           ROOSEVELT ALVA MD           Ot           553.3 

          

DIAPHRAGMATIC HERNIA                             

 

             2014           ROOSEVELT ALVA MD           Ot           V10.05

           HX

OF COLONIC MALIGNANCY                            

 

             2016                        Ot           550.92           LYUDMILA

AT INGUINAL 

HERNIA                                           

 

             2016                        Ot           V72.63           PRE

-PROCEDURAL 

LABORATORY EXAMINATION                           

 

             2016                        Ot           V72.81           

EXAM-PRE-OPERATIVE CARDIOVASCULAR                    

 

             2016                        Ot           V74.8           SCRE

EN-BACTERIAL 

DIS NEC                                          

 

             2016                        Ot           550.92           LYUDMILA

AT INGUINAL 

HERNIA                                           

 

             2016           ROOSEVELT ALVA MD           Ot           V72.84

           

EXAM PRE-OPERATIVE NOS                           

 

             2016           ROOSEVELT ALVA MD           Ot           K21.9 

          

GASTRO-ESOPHAGEAL REFLUX DISEASE WITHOUT                    

 

             2016           ROOSEVELT ALVA MD           Ot           Z01.81

8           

ENCOUNTER FOR OTHER PREPROCEDURAL EXAMIN                    

 

             2016           ROOSEVELT ALVA MD           Ot           Z85.03

8           

PERSONAL HISTORY OF MALIGNANT NEOPLASM O                    

 

             2016           ROOSEVELT ALVA MD           Ot           Z86.01

0           

PERSONAL HISTORY OF COLONIC POLYPS                    

 

             2016                        Ot           550.92           LYUDMILA

AT INGUINAL 

HERNIA                                           

 

             2016                        Ot           V72.63           PRE

-PROCEDURAL 

LABORATORY EXAMINATION                           

 

             2016                        Ot           V72.81           

EXAM-PRE-OPERATIVE CARDIOVASCULAR                    

 

             2016                        Ot           V74.8           SCRE

EN-BACTERIAL 

DIS NEC                                          

 

             2016                        Ot           550.92           LYUDMILA

AT INGUINAL 

HERNIA                                           

 

             2016           ROOSEVELT ALVA MD           Ot           V72.84

           

EXAM PRE-OPERATIVE NOS                           

 

             2016           ROOSEVELT ALVA MD, Ot           I10   

        

ESSENTIAL (PRIMARY) HYPERTENSION                    

 

             2016           ROOSEVELT ALVA MD           Ot           K21.0 

          

GASTRO-ESOPHAGEAL REFLUX DISEASE WITH ES                    

 

             2016           ROOSEVELT ALVA MD           Ot           K29.70

           

GASTRITIS, UNSPECIFIED, WITHOUT BLEEDING                    

 

             2016           ROOSEVELT ALVA MD           Ot           K44.9 

          

DIAPHRAGMATIC HERNIA WITHOUT OBSTRUCTION                    

 

             2016           ROOSEVELT ALVA MD           Ot           K63.5 

          

POLYP OF COLON                                   

 

             2016           ROOSEVELT ALVA MD           Ot           K64.1 

          

SECOND DEGREE HEMORRHOIDS                        

 

             2016           ROOSEVELT ALVA MD           Ot           Z80.0 

          

FAMILY HISTORY OF MALIGNANT NEOPLASM OF                     

 

             2016           ROOSEVELT ALVA MD           Ot           Z85.03

8           

PERSONAL HISTORY OF MALIGNANT NEOPLASM O                    

 

             2016           ROOSEVELT ALVA MD           Ot           I10   

        

ESSENTIAL (PRIMARY) HYPERTENSION                    

 

             2016           ROOSEVELT ALVA MD           Ot           K21.0 

          

GASTRO-ESOPHAGEAL REFLUX DISEASE WITH ES                    

 

             2016           ROOSEVELT ALVA MD           Ot           K29.70

           

GASTRITIS, UNSPECIFIED, WITHOUT BLEEDING                    

 

             2016           ROOSEVELT ALVA MD           Ot           K44.9 

          

DIAPHRAGMATIC HERNIA WITHOUT OBSTRUCTION                    

 

             2016           ROOSEVELT ALVA MD           Ot           K63.5 

          

POLYP OF COLON                                   

 

             2016           ROOSEVELT ALVA MD           Ot           K64.1 

          

SECOND DEGREE HEMORRHOIDS                        

 

             2016           ROOSEVELT ALVA MD           Ot           Z80.0 

          

FAMILY HISTORY OF MALIGNANT NEOPLASM OF                     

 

             2016           ROOSEVELT ALVA MD           Ot           Z85.03

8           

PERSONAL HISTORY OF MALIGNANT NEOPLASM O                    

 

             2016           ROOSEVELT ALVA MD           Ot           I10   

        

ESSENTIAL (PRIMARY) HYPERTENSION                    

 

             2016           ROOSEVELT ALVA MD           Ot           K21.0 

          

GASTRO-ESOPHAGEAL REFLUX DISEASE WITH ES                    

 

             2016           ROOSEVELT ALVA MD           Ot           K29.70

           

GASTRITIS, UNSPECIFIED, WITHOUT BLEEDING                    

 

             2016           ROOSEVELT ALVA MD           Ot           K44.9 

          

DIAPHRAGMATIC HERNIA WITHOUT OBSTRUCTION                    

 

             2016           ROOSEVELT ALVA MD           Ot           K63.5 

          

POLYP OF COLON                                   

 

             2016           ROOSEVELT ALVA MD, Ot           K64.1 

          

SECOND DEGREE HEMORRHOIDS                        

 

             2016           ROOSEVELT ALVA MD, Ot           Z80.0 

          

FAMILY HISTORY OF MALIGNANT NEOPLASM OF                     

 

             2016           ROOSEVELT ALVA MD, Ot           Z85.03

8           

PERSONAL HISTORY OF MALIGNANT NEOPLASM O                    

 

             2017           Calos Osborn            848.8   

        OTHER 

SPECIFIED SITES OF SPRAINS AND STRAINS                    

 

             2017           Calos Osborn            S39.011A

           

STRAIN OF MUSCLE, FASCIA AND TENDON OF ABDOMEN, INIT ENCNTR                    

 

             2019                        W            466.0           ACUT

E BRONCHITIS   

                                                 

 

             2019                        W            J20.9           ACUT

E BRONCHITIS, 

UNSPECIFIED                                      

 

             2019                        W            401.9           UNSP

ECIFIED 

ESSENTIAL HYPERTENSION                           

 

             2019                        W            I10           ESSENT

IAL (PRIMARY) 

HYPERTENSION                                     

 

             2020           ROOSEVELT ALVA MD, Ot           V72.84

           

EXAM PRE-OPERATIVE NOS                           

 

             2020           SOLANGE ALBERTO MD           Ot           E88.09 

          OTH

DISORDERS OF PLASMA-PROTEIN METABOLI                    

 

             2020           SOLANGE ALBERTO MD           Ot           I10    

       

ESSENTIAL (PRIMARY) HYPERTENSION                    

 

             2020           SOLANGE ALBERTO MD           Ot           Z80.0  

         

FAMILY HISTORY OF MALIGNANT NEOPLASM OF                     

 

             2020           SOLANGE ALBERTO MD           Ot           Z80.41 

          

FAMILY HISTORY OF MALIGNANT NEOPLASM OF                     

 

             2020           SOLANGE ALBERTO MD           Ot           E88.09 

          OTH

DISORDERS OF PLASMA-PROTEIN METABOLI                    

 

             2020           SOLANGE ALBERTO MD           Ot           I10    

       

ESSENTIAL (PRIMARY) HYPERTENSION                    

 

             2020           SOLANGE ALBERTO MD           Ot           Z80.0  

         

FAMILY HISTORY OF MALIGNANT NEOPLASM OF                     

 

             2020           SOLANGE ALBERTO MD           Ot           Z80.41 

          

FAMILY HISTORY OF MALIGNANT NEOPLASM OF                     

 

             2020           SOLANGE ALBERTO MD           Ot           C85.90 

          

NON-HODGKIN LYMPHOMA, UNSPECIFIED, UNSPE                    

 

             2020           SOLANGE ALBERTO MD           Ot           N28.9  

         

DISORDER OF KIDNEY AND URETER, UNSPECIFI                    

 

             2020           SOLANGE ALBERTO MD           Ot           R91.1  

         

SOLITARY PULMONARY NODULE                        

 

             2020           SOLANGE ALBERTO MD           Ot           C85.90 

          

NON-HODGKIN LYMPHOMA, UNSPECIFIED, UNSPE                    

 

             2020           SOLANGE ALBERTO MD           Ot           N28.9  

         

DISORDER OF KIDNEY AND URETER, UNSPECIFI                    

 

             2020           SOLANGE ALBERTO MD           Ot           R91.1  

         

SOLITARY PULMONARY NODULE                        

 

             2020           SOLANGE ALBERTO MD           Ot           E88.09 

          OTH

DISORDERS OF PLASMA-PROTEIN METABOLI                    

 

             2020           SOLANGE ALBERTO MD           Ot           I10    

       

ESSENTIAL (PRIMARY) HYPERTENSION                    

 

             2020           SOLANGE ALBERTO MD           Ot           Z80.0  

         

FAMILY HISTORY OF MALIGNANT NEOPLASM OF                     

 

             2020           SOLANGE ALBERTO MD           Ot           Z80.41 

          

FAMILY HISTORY OF MALIGNANT NEOPLASM OF                     

 

             2020           SOLANGE ALBERTO MD           Ot           C85.90 

          

NON-HODGKIN LYMPHOMA, UNSPECIFIED, UNSPE                    

 

             2020           SOLANGE ALBERTO MD           Ot           N28.9  

         

DISORDER OF KIDNEY AND URETER, UNSPECIFI                    

 

             2020           SOLANGE ALBERTO MD           Ot           R91.1  

         

SOLITARY PULMONARY NODULE                        

 

             2020           SOLANGE ALBERTO MD           Ot           E88.09 

          OTH

DISORDERS OF PLASMA-PROTEIN METABOLI                    

 

             2020           SOLANGE ALBERTO MD           Ot           I10    

       

ESSENTIAL (PRIMARY) HYPERTENSION                    

 

             2020           SOLANGE ALBERTO MD           Ot           Z80.0  

         

FAMILY HISTORY OF MALIGNANT NEOPLASM OF                     

 

             2020           SOLANGE ALBERTO MD           Ot           Z80.41 

          

FAMILY HISTORY OF MALIGNANT NEOPLASM OF                     

 

             2020           SOLANGE ALBERTO MD           Ot           E88.09 

          OTH

DISORDERS OF PLASMA-PROTEIN METABOLI                    

 

             2020           SOLANGE ALBERTO MD           Ot           I10    

       

ESSENTIAL (PRIMARY) HYPERTENSION                    

 

             2020           SOLANGE ALBERTO MD           Ot           Z80.0  

         

FAMILY HISTORY OF MALIGNANT NEOPLASM OF                     

 

             2020           SOLANGE ALBERTO MD           Ot           Z80.41 

          

FAMILY HISTORY OF MALIGNANT NEOPLASM OF                     

 

             2020           SOLANGE ALBERTO MD           Ot           E88.09 

          OTH

DISORDERS OF PLASMA-PROTEIN METABOLI                    

 

             2020           SOLANGE ALBERTO MD           Ot           I10    

       

ESSENTIAL (PRIMARY) HYPERTENSION                    

 

             2020           SOLANGE ALBERTO MD           Ot           Z80.0  

         

FAMILY HISTORY OF MALIGNANT NEOPLASM OF                     

 

             2020           SOLANGE ALBERTO MD           Ot           Z80.41 

          

FAMILY HISTORY OF MALIGNANT NEOPLASM OF                     

 

             2020           SOLANGE ALBERTO MD           Ot           E88.09 

          OTH

DISORDERS OF PLASMA-PROTEIN METABOLI                    

 

             2020           SOLANGE ALBERTO MD           Ot           I10    

       

ESSENTIAL (PRIMARY) HYPERTENSION                    

 

             2020           SOLANGE ALBERTO MD           Ot           Z80.0  

         

FAMILY HISTORY OF MALIGNANT NEOPLASM OF                     

 

             2020           SOLANGE ALBERTO MD           Ot           Z80.41 

          

FAMILY HISTORY OF MALIGNANT NEOPLASM OF                     

 

             2020           SOLANGE ALBERTO MD           Ot           E88.09 

          OTH

DISORDERS OF PLASMA-PROTEIN METABOLI                    

 

             2020           REMY TINSLEY, SOLANGE           Ot           I10    

       

ESSENTIAL (PRIMARY) HYPERTENSION                    

 

             2020           REMY TINSLEY, SOLANGE           Ot           Z80.0  

         

FAMILY HISTORY OF MALIGNANT NEOPLASM OF                     

 

             2020           REMY TINSLEY, SOLANGE           Ot           Z80.41 

          

FAMILY HISTORY OF MALIGNANT NEOPLASM OF                     

 

             2020           LEISURE, MARIUM           W            H61.21

           

IMPACTED CERUMEN, RIGHT EAR                      

 

             2020           LEISURE, LYNMARKA           W            R42   

        

DIZZINESS AND GIDDINESS                          

 

             2020           LEISURE, LYNIETA           W            R55   

        

SYNCOPE AND COLLAPSE                             

 

             2020           LEISURE, LYNMARKA           W            Z00.8 

          

ENCOUNTER FOR OTHER GENERAL EXAMINATION                    

 

             2020           LEISURE, ANTONA           W            401.0 

          

MALIGNANT ESSENTIAL HYPERTENSION                    

 

             2020           LEISURE, LYNIETA           W            I10   

        

ESSENTIAL (PRIMARY) HYPERTENSION                    

 

             2020           LEISURE, ANTONA           W            J20.9 

          

ACUTE BRONCHITIS, UNSPECIFIED                    

 

             2020           LEISURE, MARIUM           W            S39.01

1A           

STRAIN OF MUSCLE, FASCIA AND TENDON OF ABDOMEN, INIT ENCNTR                    

 

             2020           ANUSHA SNYDER MD, Ot           C85.9

0           

NON-HODGKIN LYMPHOMA, UNSPECIFIED, UNSPE                    

 

             2020           ANUSHA SNYDER MD, Ot           I10  

         

ESSENTIAL (PRIMARY) HYPERTENSION                    

 

             2020           ANUSHA SNYDER MD           Ot           Z85.0

38           

PERSONAL HISTORY OF MALIGNANT NEOPLASM O                    

 

             2020           ANUSHA SNYDER MD           Ot           Z98.8

90           

OTHER SPECIFIED POSTPROCEDURAL STATES                    

 

             2020           EDITH FARLEY MD           Ot           C64.

9           

MALIGNANT NEOPLASM OF UNSP KIDNEY, EXCEP                    

 

             2020           EDITH FARLEY MD           Ot           E78.

2           

MIXED HYPERLIPIDEMIA                             

 

             2020           EDITH FARLEY MD           Ot           I10 

          

ESSENTIAL (PRIMARY) HYPERTENSION                    

 

             2020           EDITH FARLEY MD           Ot           C64.

9           

MALIGNANT NEOPLASM OF UNSP KIDNEY, EXCEP                    

 

             2020           EDITH FARLEY MD           Ot           E78.

2           

MIXED HYPERLIPIDEMIA                             

 

             2020           EDITH FARLEY MD           Ot           I10 

          

ESSENTIAL (PRIMARY) HYPERTENSION                    

 

             08/10/2020           SOLANGE ALBERTO MD           Ot           C85.90 

          

NON-HODGKIN LYMPHOMA, UNSPECIFIED, UNSPE                    

 

             08/10/2020           REMY MD, NARVAEZ           Ot           N28.9  

         

DISORDER OF KIDNEY AND URETER, UNSPECIFI                    

 

             08/10/2020           REMY TINSLEY, SOLANGE           Ot           R91.1  

         

SOLITARY PULMONARY NODULE                        

 

             08/10/2020           CLAUDIO TINSLEY, ANUSHA AUGUST Ot           C85.9

0           

NON-HODGKIN LYMPHOMA, UNSPECIFIED, UNSPE                    

 

             08/10/2020           CLAUDIO TINSLEY, ANUSHA AUGUST           Ot           I10  

         

ESSENTIAL (PRIMARY) HYPERTENSION                    

 

             08/10/2020           CLAUDIO TINSLEY, ANUSHA AUGUST           Ot           Z85.0

38           

PERSONAL HISTORY OF MALIGNANT NEOPLASM O                    

 

             08/10/2020           CLAUDIO TINSLEY, ANUSHA AUGUST Ot           Z98.8

90           

OTHER SPECIFIED POSTPROCEDURAL STATES                    

 

             08/10/2020           MIGUELITO TINSLEY, EDITH FERRARA Ot           C64.

9           

MALIGNANT NEOPLASM OF UNSP KIDNEY, EXCEP                    

 

             08/10/2020           MIGUELITO TINSLEY, EDITH FERRARA           Ot           E78.

2           

MIXED HYPERLIPIDEMIA                             

 

             08/10/2020           MIGUELITO TINSLEY, EDITH FERRARA           Ot           I10 

          

ESSENTIAL (PRIMARY) HYPERTENSION                    

 

             08/10/2020           LEISURE, LYNIETA           W            275.2 

          

DISORDERS OF MAGNESIUM METABOLISM                    

 

             08/10/2020           LEISURE, LYNIETA           W            276.51

           

DEHYDRATION                                      

 

             08/10/2020           LEISURE, LYNIETA           W            401.0 

          

MALIGNANT ESSENTIAL HYPERTENSION                    

 

             08/10/2020           LEISURE, LYNIETA           W            584.9 

          

ACUTE KIDNEY FAILURE, UNSPECIFIED                    

 

             08/10/2020           LEISURE, LYNIETA           W            780.2 

          

SYNCOPE AND COLLAPSE                             

 

             08/10/2020           LEISURE, LYNIETA           W            780.97

           

ALTERED MENTAL STATUS                            

 

             08/10/2020           LEISURE, LYNIETA           W            824.2 

          

FRACTURE OF LATERAL MALLEOLUS, CLOSED                    

 

             08/10/2020           LEISURE, LYNIETA           W            E83.42

           

HYPOMAGNESEMIA                                                                  

 

             08/10/2020           LEISURE, LYNIETA           W            E86.0 

          

DEHYDRATION                                                                     

 

             08/10/2020           LEISURE, LYNIETA           W            I10   

        

ESSENTIAL (PRIMARY) HYPERTENSION                                                

 

             08/10/2020           LEISURE, LYNIETA           W            N17.9 

          

ACUTE KIDNEY FAILURE, UNSPECIFIED                                               

 

             08/10/2020           LEISURE, LYNIETA           W            R41.0 

          

DISORIENTATION, UNSPECIFIED                                                     

 

             08/10/2020           LEISURE, LYNIETA           W            R55   

        

SYNCOPE AND COLLAPSE                             

 

             08/10/2020           LEISURE, LYNIETA           W            S82.65

XA           

NONDISP FX OF LATERAL MALLEOLUS OF LEFT FIBULA, INIT                            



                                                                                
                                                                                
                                                                                
                                                                     



Procedures

      



There is no data.                  



Results

      



                    Test                Result              Range        

 

                                        Urinalysis - 17 10:55         

 

                    Icotest             N/A                 Negative        

 

                    Urine Volume           Urine Volume Sufficient (10mL)       

              

 

                    Urine Yeast           No Yeast present                     

 

                    Urine-Appearance           Clear               Clear        

 

                    Urine-Bacteria           Negative                     

 

                    Urine-Bilirubin           Negative            Negative      

  

 

                    Urine-Blood           Negative            Negative        

 

                    Urine-Color           Yellow              Colorless-Lt. Price

ow        

 

                    Urine-Glucose           Negative            Negative        

 

                    Urine-Ketones           Negative            Negative        

 

                    Urine-Leukocytes           Negative            Negative     

   

 

                    Urine-Nitrite           Negative            Negative        

 

                          Urine-Other                Urine Saved if Culture Need

ed (48hrs from time of 

collection)                                      

 

                    Urine-pH            6.5                 5-8.5        

 

                    Urine-Protein           Negative            Negative        

 

                    Urine-RBC           Rare/HPF                     

 

                    Urine-Specific Gravity           1.010               1.000-1

.030        

 

                    Urine-WBC           Negative                     

 

                    Urobilinogen           0.2 E.U./dL            0.2-1.0       

 

 

                                        BMP - 17 11:57         

 

                    Anion Gap           11                  6-14        

 

                    BUN                 13 mg/dL            5-25        

 

                    Calcium             9.4 mg/dL           8.3-10.4        

 

                    Chloride            106 mmol/L                   

 

                    CO2                 27 mEq/L            22-33        

 

                    Creat               1.20 mg/dL           0.50-1.50        

 

                    eGFR                60 mL/min/1.73m2           >59        

 

                    Glucose             99 mg/dL                    

 

                    Osmo                289                 280-295        

 

                    Potassium           4.4 mmol/L           3.5-5.3        

 

                    Sodium              140 mmol/L           134-148        

 

                                        PSA Yearly Screen - 19 07:11      

   

 

                    PSA TOTAL           4.7 ng/mL           0.0-4.0        

 

                                        Lipid Panel - 01/10/20 07:33         

 

                    C/HDL               4.1                 3.7-6.7        

 

                    Cholesterol           147 mg/dL           100-240        

 

                    HDL                 36 mg/dL            30-85        

 

                    LDL-Calculated           92 mg/dL            0-100        

 

                    Trig                93 mg/dL                    

 

                    VLDL                19 mg/dL            0-42        

 

                                        Comprehensive Metabolic Panel - 20

 07:36         

 

                    Albumin             3.9 g/dL            3.6-5.1        

 

                    ALP                 75 U/L                      

 

                    ALT                 28 U/L              6-45        

 

                    Anion Gap           13                  6-14        

 

                    AST                 23 U/L              2-40        

 

                    BUN                 16 mg/dL            5-25        

 

                    Calcium             9.0 mg/dL           8.3-10.4        

 

                    Chloride            103 mmol/L                   

 

                    CO2                 24 mEq/L            22-33        

 

                    Creat               1.51 mg/dL           0.50-1.50        

 

                    eGFR                46 mL/min/1.73m2           >59        

 

                    Globulin            5.5 g/dL            2.3-3.5        

 

                    Glucose             93 mg/dL                    

 

                    Osmo                282                 280-295        

 

                    Potassium           4.2 mmol/L           3.5-5.3        

 

                    Sodium              136 mmol/L           134-148        

 

                    TBil                0.8 mg/dL           0.2-1.2        

 

                    TP                  9.4 g/dL            6.0-8.3        

 

                                        Protein Electro, Random Urine - 20

 11:01         

 

                    Protein,Total,Urine           7.9 mg/dL           Not Estab.

        

 

                    Please note:           Comment                      

 

                    Albumin, U           37.7 %                       

 

                    Alpha-1-Globulin, U           7.3 %                        

 

                    Alpha-2-Globulin, U           13.3 %                       

 

                    Beta Globulin, U           21.1 %                       

 

                    Gamma Globulin, U           20.6 %                       

 

                    M-Agustin, %           Not Observed %           Not Observed  

      

 

                    PDF                 .                            

 

                                        Protein Electro, Random Urine - 20

 11:01         

 

                    PROTEIN,TOTAL,URINE           7.9 MG/DL           NOT ESTAB.

        

 

                    ALBUMIN, U           37.7 %                       

 

                    ALPHA-1-GLOBULIN, U           7.3 %                        

 

                    ALPHA-2-GLOBULIN, U           13.3 %                       

 

                    BETA GLOBULIN, U           21.1 %                       

 

                    GAMMA GLOBULIN, U           20.6 %                       

 

                          PLEASE NOTE:              PROTEIN ELECTROPHORESIS SCAN

 WILL FOLLOW VIA COMPUTER, 

MAIL, OR.brCOURIER DELIVERY.                     

 

                    M-SPIKE, %           NOT OBSERVED %           NOT OBSERVED  

      

 

                    PDF                 .                            

 

                                        COVID19 - 05/15/20 07:15         

 

                    COVID19              NEGATIVE test performed at [facility]         

            

 

                                        Troponin I - 20 13:18         

 

                    Troponin            <0.020 ng/mL           0.0-0.4        

 

                                        [facility] - 07/10/20 06:56         

 

                    Anion Gap           9                   6-14        

 

                    BUN                 17 mg/dL            5-25        

 

                    Calcium             8.8 mg/dL           8.3-10.4        

 

                    Chloride            102 mmol/L                   

 

                    CO2                 27 mEq/L            22-33        

 

                    Creat               1.50 mg/dL           0.50-1.50        

 

                    eGFR                46 mL/min/1.73m2           >59        

 

                    Glucose             99 mg/dL                    

 

                    Osmo                279                 280-295        

 

                    Potassium           3.7 mmol/L           3.5-5.3        

 

                    Sodium              134 Testing performed at [facility] mmol/L  

         134-148       



 

                                        [facility] - 20 18:15         

 

                    BUN                 17 mg/dL            5-25        

 

                    Chloride            105 mmol/L                   

 

                    CO2                 25 mEq/L            22-33        

 

                    Creat               1.50 mg/dL           0.50-1.50        

 

                    eGFR                46 mL/min/1.73m2           >59        

 

                    Glucose             91 mg/dL                    

 

                    Ionized Ca           1.23 mmol/L           1.12-1.32        

 

                    Potassium           3.9 mmol/L           3.5-5.3        

 

                    Sodium              140 mmol/L           134-148        

 

                                        BMP - 20 08:01         

 

                    Anion Gap           11                  6-14        

 

                    BUN                 24 mg/dL            5-25        

 

                    Calcium             8.0 mg/dL           8.3-10.4        

 

                    Chloride            106 mmol/L                   

 

                    CO2                 21 mEq/L            22-33        

 

                    Creat               1.90 mg/dL           0.50-1.50        

 

                    eGFR                35 mL/min/1.73m2           >59        

 

                    Glucose             99 mg/dL                    

 

                    Osmo                281                 280-295        

 

                    Potassium           4.2 mmol/L           3.5-5.3        

 

                    Sodium              134 mmol/L           134-148        

 

                                        Thyroid Stimulating Hormone - 08/10/20 1

7:56         

 

                    TSH                 1.36 mIU/mL           0.32-5.00        

 

                                        Urinalysis - 08/10/20 19:46         

 

                    Icotest             N/A                 Negative        

 

                    Urine Casts           None Seen                     

 

                    Urine Crystals           None Seen                     

 

                    Urine Volume           Urine Volume Sufficient (10mL)       

              

 

                    Urine Yeast           No Yeast present                     

 

                    Urine-Appearance           Clear               Clear        

 

                    Urine-Bacteria           Trace                        

 

                    Urine-Bilirubin           Negative            Negative      

  

 

                    Urine-Blood           Negative            Negative        

 

                    Urine-Color           Yellow              Colorless-Lt. Price

ow        

 

                    Urine-Epithelial Cells           None                       

  

 

                    Urine-Glucose           Negative            Negative        

 

                    Urine-Ketones           Negative            Negative        

 

                    Urine-Leukocytes           Negative            Negative     

   

 

                    Urine-Mucus            Negative                     

 

                    Urine-Nitrite           Negative            Negative        

 

                    Urine-Other           Culture to follow                     

 

                    Urine-pH            6.0                 5-8.5        

 

                    Urine-Protein           Trace               Negative        

 

                    Urine-RBC           Rare/HPF                     

 

                    Urine-Specific Gravity           1.020               1.000-1

.030        

 

                    Urine-WBC           2-5/HPF                      

 

                    Urobilinogen           0.2 E.U./dL            0.2-1.0       

 

 

                                        Urine Culture - 08/10/20 19:46         

 

                          PRELIM CULTURE RESULTS           10,000-20,000 Gram Po

sitive Mixed Grace 

[id number] Skin Contaminant                     

 

                          MEDIA PLATED              Setup at 20:06 on 8/10/2020X

[id number] CC to [facility] 4th 

floor.                                           

 

                    CULTURE SOURCE           void                         

 

                                        Comprehensive metabolic panel - 20

 05:05         

 

                          Serum or plasma sodium measurement (moles/volume)     

      134 mmol/L          

                                        135-145        

 

                          Serum or plasma potassium measurement (moles/volume)  

         4.8 mmol/L       

                                        3.6-5.0        

 

                          Serum or plasma chloride measurement (moles/volume)   

        108 mmol/L        

                                                

 

                    Carbon dioxide           22 mmol/L           21-32        

 

                          Serum or plasma anion gap determination (moles/volume)

           4 mmol/L       

                                        5-14        

 

                          Serum or plasma urea nitrogen measurement (mass/volume

)           22 mg/dL      

                                        7-18        

 

                          Serum or plasma creatinine measurement (mass/volume)  

         1.90 mg/dL       

                                        0.60-1.30        

 

                    Serum or plasma urea nitrogen/creatinine mass ratio         

  12                  NRG 

      

 

                                        Serum or plasma creatinine measurement w

ith calculation of estimated glomerular 

filtration rate           35                        NRG        

 

                    Serum or plasma glucose measurement (mass/volume)           

100 mg/dL           

        

 

                    Serum or plasma calcium measurement (mass/volume)           

8.5 mg/dL           

8.5-10.1        

 

                          Serum or plasma total bilirubin measurement (mass/volu

me)           1.0 mg/dL   

                                        0.1-1.0        

 

                                        Serum or plasma alkaline phosphatase anna

surement (enzymatic activity/volume)    

                          61 U/L                            

 

                                        Serum or plasma aspartate aminotransfera

se measurement (enzymatic 

activity/volume)           19 U/L                    5-34        

 

                                        Serum or plasma alanine aminotransferase

 measurement (enzymatic activity/volume)

                          19 U/L                    0-55        

 

                    Serum or plasma protein measurement (mass/volume)           

8.2 g/dL            

6.4-8.2        

 

                    Serum or plasma albumin measurement (mass/volume)           

3.1 g/dL            

3.2-4.5        

 

                    CALCIUM CORRECTED           9.2 mg/dL           8.5-10.1    

    

 

                                        Magnesium - 20 05:05         

 

                    Magnesium           1.7 mg/dL           1.6-2.4        



                                                



Encounters

      



                ACCT No.           Visit Date/Time           Discharge          

 Status         

             Pt. Type           Provider           Facility           Loc./Unit 

          

Complaint        

 

             [id number]           2020 14:10:00                         

            

Document Registration                                                           

         

 

                    [id number]           08/10/2020 21:15:00            17:30:00        

                DIS             Inpatient           AMANDA TINSLEY, LONI YAÑEZ          

 [facility]           4TH                       SYNCOPAL EPISODE, RENAL

 

INSUFFICIENCY, ANKLE FX        

 

                    [id number]           2020 08:30:00            23:59:59        

                CLS             Outpatient           MIGUELITO TINSLEY, EDITH FERRARA         

  [facility]           CARD                      HTN        

 

                    [id number]           07/10/2020 14:05:00           07/10/2

020 23:59:59        

                CLS             Outpatient           ANUSHA SNYDER MD          

 [facility]           ONC                                

 

                    [id number]           2020 15:18:00            14:46:00        

                DIS             Outpatient           REMY TINSLEY, SOLANGE GILMORE

[facility]           ONC                                

 

                    [id number]           2020 08:21:00           

020 00:01:00        

                DIS             Outpatient           SOLANGE ALBERTO MD [facility]           ONC                                

 

                    [id number]           2020 07:45:00           

020 23:59:59        

                CLS             Outpatient           SOLANGE ALBERTO MD [facility]           RAD                       [place]        

 

                    [id number]           2016 07:42:00           

016 11:00:00        

                DIS             Outpatient           ROOSEVELT ALVA MD           

[facility]                       HX COLON CANCER; REFLUX

        

 

                    [id number]           2016 06:04:00           

016 12:00:00        

                DIS             Outpatient           ROOSEVELT ALVA MD           

[facility]           PREOP                     HX COLON CANCER;REFLUX 

       

 

                    [id number]           2014 07:51:00           

014 10:50:00        

                DIS             Outpatient           ROOSEVELT ALVA MD           

[facility]                       OCCULT BLOOD IN STOOLS;

 XH COLON 

CANCER        

 

                    [id number]           2014 06:20:00           

014 23:59:59        

                CLS             Outpatient           ROOSEVELT ALVA MD           

[facility]           PREOP                     OCCULT BLOOD IN STOOLS,

 HX COLON 

CANCER        

 

             [id number]           2020 06:42:00                        A

CT           

Outpatient                EDITH FARLEY MD           [facility]                 CARD                      HTN        

 

             [id number]           2011 05:40:00                         

            

Document Registration                                                           

         

 

             [id number]           2011 12:06:00                         

            

Document Registration                                                           

         

 

             [id number]           2010 09:55:00                         

            

Document Registration                                                           

         

 

                [id number]           08/10/2020 17:47:00           08/10/2020 20:35

:00           

DIS             Outpatient           MARIUM KENDALL                           

      

                                                 

 

                [id number]           2020 07:56:00           2020 23:59

:00           

DIS             Outpatient           EDITH FARLEY                              

      

                                                 

 

                [id number]           2020 16:33:00           2020 23:59

:00           

DIS             Outpatient           Kay Madrid                              

      

                                                 

 

                [id number]           2020 17:28:00           2020 20:35

:00           

DIS                 Outpatient           [facility]ENDALL [facility]                    [facility]                                 

 

                [id number]           07/10/2020 15:48:00           07/10/2020 23:59

:00           

DIS             Outpatient           Kay Madrid                              

      

                                                 

 

                [id number]           07/10/2020 06:51:00           07/10/2020 23:59

:00           

DIS             Outpatient           HeladioayCalos jaime                             

      

                                                 

 

                [id number]           2020 12:21:00           2020 14:15

:00           

DIS                 Outpatient           [facility]                    ER                                 

 

                [id number]           05/15/2020 07:11:00           05/15/2020 23:59

:00           

DIS             Outpatient           DucheneNabil                             

      

                                                 

 

                [id number]           2020 13:07:00           2020 23:59

:00           

DIS             Outpatient           Paoni, Arnaldo                                

      

                                                 

 

                [id number]           2020 10:58:00           2020 23:59

:00           

DIS             Outpatient           Paoni, Arnaldo                                

      

                                                 

 

                [id number]           2020 07:31:00           2020 23:59

:00           

DIS             Outpatient           Paoni, Arnaldo                                

      

                                                 

 

                [id number]           01/10/2020 07:29:00           01/10/2020 23:59

:00           

DIS             Outpatient           Paoni, Arnaldo                                

      

                                                 

 

                [id number]           2020 13:57:00           2020 23:59

:00           

DIS             Outpatient           Paoni, Arnaldo                                

      

                                                 

 

                [id number]           2019 14:27:00           2019 23:59:

00           DIS

             Outpatient           Paoni, Arnaldo                                   

     

     

 

                [id number]           2019 06:55:00           2019 23:59:

00           DIS

             Outpatient           Paoni, Arnaldo                                   

     

     

 

                [id number]           2017 10:45:00           2017 13:05:

00           DIS

                    Outpatient           [facility]   

                          ER                                 

 

             [id number]           2019 14:02:00                               

      Document 

Registration                                                                    

 

             [id number]           2019 09:32:00                               

      Document 

Registration                                                                    

 

             [id number]           2017 11:13:28                                 

    Document 

Registration                                                                    

 

             [id number]           2014 07:51:50                        [facility]   

        

Document Registration

## 2020-08-13 NOTE — XMS REPORT
Encounter Summary

                             Created on: 2020



Cas Lewis

External Reference #: [id number]

: 1950

Sex: Male



Demographics





                          Address                   [address] [place]  [address]

 

                          Home Phone                [phone]

 

                          Preferred Language        English

 

                          Marital Status            Unknown

 

                          [employer] Affiliation     [employer]

 

                          Race                      White

 

                          Ethnic Group              Not  or 





Author





                          Author                    [facility]

 

                          Organization              [facility]

 

                          Address                   Unknown

 

                          Phone                     Unavailable







Support





                Name            Relationship    Address         Phone

 

                Kendy Richardson ECON            Unknown         [phone]

19







Care Team Providers





                    Care Team Member Name Role                Phone

 

                    Arnaldo Singh         PCP                 [phone]

 

                    Tawil, Elias A MD   Unavailable         [phone]







Encounter Details





                          Care Team                 Description



                     Date                Type                Department  

 

                                        



D[place] Johnson                          Preop testing



                     2020          Orders Only         [facility]  



                                          [address]  



                                         [address]  



                                         [place]  



                                         [id number] [phone]  







Social History





                                        Date



                 Tobacco Use     Types           Packs/Day       Years Used 

 

                                        Quit: 



                           Former Smoker             Pipe   

 

    



                     Smokeless Tobacco: Former  Chew                Quit: 



                                         User   







                    Drinks/Week         oz/Week             Comments



                                         Alcohol Use   

 

                                                             



                                         Never   







  



                     Alcohol Habits      Answer              Date Recorded

 

  



                     How often do you have a drink containing alcohol?  Never   

            2020

 

  



                           How many drinks containing alcohol do you have on  No

t asked 



                                         a typical day when you are drinking?  

 

  



                           How often do you have six or more drinks on one  Not 

asked 



                                         occasion?  







 



                           Sex Assigned at Birth     Date Recorded

 

 



                           Male                      2020  6:16 AM CDT



documented as of this encounter



Functional Status





                                        Date of Assessment



                           Functional Status         Response 

 

                                        2020



                           Does the patient have a hearing impairment:  No 

 

                                        2020



                           Does the patient have a visual impairment:  No 

 

                                        2020



                           Does the patient have impaired ambulation:  No 

 

                                        2020



                           Does the patient have an activity of daily living  No

 



                                         (ADL) impairment:  

 

                                        2020



                           Does the patient have an instrumental activity of  No

 



                                         daily living (IADL) impairment:  







                                        Date of Assessment



                           Cognitive Status          Response 

 

                                        2020



                           Does the patient have a cognitive impairment:  No 



documented as of this encounter



Plan of Treatment





Not on filedocumented as of this encounter



Goals





     



            Goal       Patient    Associated  Recent Progress  Patient-Stat  Aut

hor



                     Goal Type           Problems            ed? 

 

     



                 Resume normal activities  Hospital        No              Lizette Zacarias RN



documented as of this encounter



Procedures





                                        Comments



                 Procedure Name  Priority        Date/Time       Associated Diag

nosis 

 

                                         



                 COVID-19 (SARS-COV-2) PCR  Routine         05/15/2020      Preo

p testing 



documented in this encounter



Results

* COVID-19 (SARS-COV-2) PCR (05/15/2020)



    



              Component    Value        Ref Range    Performed At  Pathologist



                                         Signature

 

    



                           COVID-19                   [facility] 



                                         (SARS-CoV-2)    



                                         PCR    

 

    



                           COVID-19                   [facility] 



                                         (SARS-CoV-2)    



                                         PCR Source    











                                         Specimen

 





                                         Nasopharyngeal Swab







 



                           Narrative                 Performed At

 

 



                                         This result has an attachment that is n

ot available. 







   



                 Performing Organization  Address         City/State/Zipcode  Ph

one Number

 

   



                      [facility]         [address] [place]

 [address] 





documented in this encounter



Visit Diagnoses









                                         Diagnosis

 





                                         Preop testing



                                         Preoperative examination, unspecified



documented in this encounter

## 2020-08-13 NOTE — XMS REPORT
Encounter Summary

                             Created on: 2020



Cas Lewis

External Reference #: [id number]

: 1950

Sex: Male



Demographics





                          Address                   [address] [place]  [address]

 

                          Home Phone                [phone]

 

                          Preferred Language        English

 

                          Marital Status            Unknown

 

                          Hinduism Affiliation     [facility]

 

                          Race                      White

 

                          Ethnic Group              Not  or 





Author





                          Author                    [facility]

 

                          Organization              [facility]

 

                          Address                   Unknown

 

                          Phone                     Unavailable







Support





                Name            Relationship    Address         Phone

 

                Kendy Richardson ECON            Unknown         [phone]

19







Care Team Providers





                    Care Team Member Name Role                Phone

 

                    Arnaldo Singh         PCP                 [phone]

 

                    Tawil, Elias A MD   Unavailable         [phone]







Encounter Details





                          Care Team                 Description



                     Date                Type                Department  

 

                                        



Unknown, Unknown, MD                     



                     05/15/2020          Orders Only         [facility]  



                                         [address] [address]  







Social History





                                        Date



                 Tobacco Use     Types           Packs/Day       Years Used 

 

                                        Quit: 



                           Former Smoker             Pipe   

 

    



                     Smokeless Tobacco: Former  Chew                Quit: 



                                         User   







                    Drinks/Week         oz/Week             Comments



                                         Alcohol Use   

 

                                                             



                                         Never   







  



                     Alcohol Habits      Answer              Date Recorded

 

  



                     How often do you have a drink containing alcohol?  Never   

            2020

 

  



                           How many drinks containing alcohol do you have on  No

t asked 



                                         a typical day when you are drinking?  

 

  



                           How often do you have six or more drinks on one  Not 

asked 



                                         occasion?  







 



                           Sex Assigned at Birth     Date Recorded

 

 



                                         Male 







                                        Date Recorded



                           COVID-19 Exposure         Response 

 

                                        2020  6:14 AM CDT



                           In the last month, have you been in contact with  No 

/ Unsure 



                                         someone who was confirmed or suspected 

to have  



                                         Coronavirus / COVID-19?  



documented as of this encounter



Plan of Treatment





Not on filedocumented as of this encounter



Goals





     



            Goal       Patient    Associated  Recent Progress  Patient-Stat  Aut

hor



                     Goal Type           Problems            ed? 

 

     



                 Resume normal activities  [facility]        No              Lizette Zacarias RN



documented as of this encounter



Visit Diagnoses

Not on filedocumented in this encounter

## 2020-08-13 NOTE — XMS REPORT
Encounter Summary

                             Created on: 2020



Cas Lewis

External Reference #: [id number]

: 1950

Sex: Male



Demographics





                          Address                   [address] [place]  [address]

 

                          Home Phone                [phone]

 

                          Preferred Language        English

 

                          Marital Status            Unknown

 

                          Adventism Affiliation     [facility]

 

                          Race                      White

 

                          Ethnic Group              Not  or 





Author





                          Author                    [facility]

 

                          Organization              [facility]

 

                          Address                   Unknown

 

                          Phone                     Unavailable







Support





                Name            Relationship    Address         Phone

 

                Kendy Richardson ECON            Unknown         [phone]

[phone]







Care Team Providers





                    Care Team Member Name Role                Phone

 

                    Arnaldo Singh         PCP                 [phone]

 

                    Tawil, Elias A MD   Unavailable         [phone]







Reason for Visit

* 



 



                           Reason                    Comments

 

 



                                         Kidney Mass 





* Consult, Test & Treat (Routine)



                          Referred By Contact       Referred To Contact



                 Status          Reason          Specialty       Diagnoses /  



                                         Procedures  

 

                                        



Tawil, Elias A, MD



[address] [place] [address]



Phone: [phone]



Fax: [phone]                       



[facility] - [facility] Exm/Proc [address] 



Phone: [phone]



Fax: [phone]



                     No Auth Needed      Oncology            Diagnoses  



                                         Renal Mass  











Encounter Details





                          Care Team                 Description



                     Date                Type                Department  

 

                                        



Nabil Lee MD



 [address]



[facility] [address]



[place] [address] [phone] (Fax)                      Left renal mass (Primary Dx); 

Dysuria



                     2020          Office Visit        [facility]  



                                          [address]  



                                         [address]  



                                         [place]  



                                         [address] [phone]  







Social History





                                        Date



                 Tobacco Use     Types           Packs/Day       Years Used 

 

                                        Quit: 



                           Former Smoker             Pipe   

 

    



                     Smokeless Tobacco: Former  Chew                Quit: 



                                         User   







                    Drinks/Week         oz/Week             Comments



                                         Alcohol Use   

 

                                                             



                                         Never   







  



                     Alcohol Habits      Answer              Date Recorded

 

  



                     How often do you have a drink containing alcohol?  Never   

            2020

 

  



                           How many drinks containing alcohol do you have on  No

t asked 



                                         a typical day when you are drinking?  

 

  



                           How often do you have six or more drinks on one  Not 

asked 



                                         occasion?  







 



                           Sex Assigned at Birth     Date Recorded

 

 



                                         Male 







                                        Date Recorded



                           COVID-19 Exposure         Response 

 

                                        2020  9:24 AM CDT



                           In the last month, have you been in contact with  No 

/ Unsure 



                                         someone who was confirmed or suspected 

to have  



                                         Coronavirus / COVID-19?  



documented as of this encounter



Last Filed Vital Signs





                    Reading             Time Taken          Comments



                                         Vital Sign   

 

                    124/65              2020  9:43 AM CDT  



                                         Blood Pressure   

 

                    80                  2020  9:43 AM CDT  



                                         Pulse   

 

                    -                   -                    



                                         Temperature   

 

                    -                   -                    



                                         Respiratory Rate   

 

                    -                   -                    



                                         Oxygen Saturation   

 

                    -                   -                    



                                         Inhaled Oxygen   



                                         Concentration   

 

                    93.4 kg (206 lb)    2020  9:43 AM CDT  



                                         Weight   

 

                    180.3 cm (5' 11")   2020  9:43 AM CDT  



                                         Height   

 

                    28.73               2020  9:43 AM CDT  



                                         Body Mass Index   



documented in this encounter



Patient Instructions

* Patient Instructions* 



 Tabatha Erazo MA - 2020 10:00 AM CDT







[facility] 

Pre-Operative Instructions



Surgical Procedure:  Robotic-assisted laparoscopic nephrectomy versus radical ne
phrectomy, intraoperative ultrasound  

Date of Surgery:  2020 

Arrival Time at the Admission Office (Main Lobby):  To be determined 



To ensure that your surgery can proceed without delay, you will be contacted by 
a phone triage nurse from the Pre-Oper[facility] Assessment [facility] (PAC) to complete 
this process.  



Please review the information given to you by your surgeon.



You will be called by the surgery staff with your day of surgery arrival time be
tween 2:30 - 4:30 PM the business day prior to surgery. If you have not heard fr
om them after 4:30 PM, please call [phone] to confirm your arrival time.



Pre-Operative Assessment and Instructions:



Once you speak to the nurse or are seen in the Pre-Operative Assessment Clinic, 
you will be given medication instructions. However, if surgery is within 2 weeks
, please read and follow the medication instructions below to prepare for surger
y before you speak with the phone triage nurse:



DO NOT STOP your blood thinner until you have contacted your prescribing provide
r or have been specifically instructed to do so.



Starting Now:

? Contact your provider who prescribes any of the following to develop a plan fo
r surgery:

o Blood thinners such as aspirin, Aggrenox, Brilinta, Effient, Eliquis, enoxapar
in (Lovenox), clopidogrel (Plavix), cilostazol, pentoxifylline (Trental), Pradax
a, Savaysa, ticlopidine, Xarelto, and warfarin (Coumadin)

o Immunosuppresants such as methotrexate, azathioprine, sulfasalazine, everolimu
s, sirolimus, Humira, Remicade, Enbrel, Simponi, Orencia, Cimzia, Actemra, and X
eljanz

o Chemotherapy



14 days prior to surgery:

? Stop most vitamins, herbals, and supplements including (but not limited to):

o Alpha lipoic acid, black cohosh, CoQ10, echinacea, eye vitamins, fish oil, fla
xseed oil, garlic, gingko biloba, ginseng, glucosamine/chondroitin, kava, Lovaza
, lutein, lysine, multivitamin, red yeast rice, KIKI-e, saw palmetto, [place]
s wort, turmeric, valerian root, Vascepa, Vitamin A, Vitamin B complex, Vitamin 
C, Vitamin E

? You DO NOT need to stop: iron, magnesium, potassium



7 days prior to surgery:

? Stop anti-inflammatory medications such as ibuprofen (Advil, Motrin), naproxen
(Aleve), [place], Excedrin, Midol, celecoxib (Celebrex), diclofenac (Gasquet
alfonso), diflunisal, etodolac, flurbiprofen, indomethacin, ketoprofen, ketorolac, m
eloxicam, nabumetone, and piroxicam



The day prior to surgery begin a clear liquid diet.  This includes water, Jell-O
, tea, coffee, broth, pop, clear juices (apple, cranberry, grape, orange witho
ut pulp), sports drinks, and popsicles.  You may have any color of the above dri
nks, but no milk or milk products. 



The day prior to surgery, drink one bottle of magnesium citrate at 2:00 p.m.  Ex
pect a laxative effect.  This laxative may be purchased at any pharmacy.  No pre
scription needed, but a printed prescription may be provided for your reference.



Do not drink alcohol within 24 hours of surgery.



Please do not eat or drink anything after midnight.  No gum, mints, hard candy, 
snacks, coffee, etc or chewing tobacco allowed after midnight before surgery.  Y
ou may brush your teeth but be sure to rinse and spit.

      (DIAL)

Please shower with an over the counter antibacterial soap the evening before or 
the morning of surgery.



If your surgery is scheduled as an outpatient, you must arrange to have someone 
drive you home and have someone with you 24 hours after anesthesia.



========================================================================



If you have any questions, please contact your provider's office at [phone]
. ([place], Admin) 



For emergencies during evenings, nights, weekends, and holidays, contact [facility]  and request they contact the on-call Urology
Resident at [phone].



=========================================================================





Electronically signed by Tabatha Erazo MA at 2020 11:02 AM CDT





documented in this encounter



Progress Notes

* Nabil Lee MD - 2020 10:00 AM CDT



Formatting of this note might be different from the original.

Subjective:

Cas Lewis is a 69 y.o. male who is referred by Dr. Tawil for further tiffanie
luation and treatment of left renal mass



HPI:

69 year old male with PMH of HYPERTENSION, GERD, BPH (for years), colon cancer 1
995 (with partial colectomy; receives colonoscopy every 4 years), new diagnosis 
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



During work up for lymphoplasmacytic lymphoma about 1 month ago when imaging was
done, left renal mass was noted, concerning for RCC.  Imaging also showed abnor
mality in bladder for which Urologist Dr.Tawil did cystoscopy 2 weeks ago and wa
s found to be non-concerning.  He has had elevated PSA in the past and has had 3
cystoscopies and 2 prostate biopsies which were non-revealing; last cystoscopy 
performed Dec 2019.  He continues to take Cardura for for BPH and HYPERTENSION. 
His BPH symptoms are no longer a concern; he denies dribbling, incomplete empty
ing, weak urinary stream.  He is on surveillance for lymphoplasmacytic lymphoma 
and has a f/u appt on 2020 for that.



He reports dysuria with initiation of voids that has been ongoing for months and
urinary frequency which started 2020 after he was asked to increased fluid 
intake by his PCP.  This has eased since he has been drinking less fluids.



No hematuria, unexpected weight loss, night sweats, apetite changes, nocturia

No FH of kidney cancer. No FH of genetic cancer syndromes.



ROS:

10 point ROS negative other than chronic hearing loss, runny nose, light-headedn
ess when going from sitting to standing position and nervousness/anxiety.



Active Ambulatory Problems 

  Diagnosis Date Noted 

 No Active Ambulatory Problems 



Resolved Ambulatory Problems 

  Diagnosis Date Noted 

 No Resolved Ambulatory Problems 



Past Medical History: 

Diagnosis Date 

 Colon polyps  

 Depression  

 Elevated PSA  

 Enlarged prostate  

 Hypertension  

 Kidney stones  

 Lymphoplasmacytoid lymphoma, CLL (HCC)  

 Malignant neoplasm of colon (HCC)  



Surgical History: 

Procedure Laterality Date 

 HX TONSILLECTOMY   

 COLON SURGERY  1995 

 COLECTOMY  1995 

 HX APPENDECTOMY  1995 

 HERNIA REPAIR   

 CYSTOURETHROSCOPY  2020 

 PROSTATE SURGERY   

 Biopsy  



Current Outpatient Medications on File Prior to Visit 

Medication Sig Dispense Refill 

 amitriptyline (ELAVIL) 25 mg tablet    

 atenoloL (TENORMIN) 50 mg tablet    

 doxazosin (CARDURA) 4 mg tablet    

 pantoprazole DR (PROTONIX) 40 mg tablet    



No current facility-administered medications on file prior to visit.  



Social History 



Socioeconomic History 

 Marital status:  

  Spouse name: Not on file 

 Number of children: Not on file 

 Years of education: Not on file 

 Highest education level: Not on file 

Occupational History 

 Not on file 

Tobacco Use 

 Smoking status: Former Smoker 

  Types: Pipe 

  Last attempt to quit:  

  Years since quittin.3 

 Smokeless tobacco: Former User 

  Types: Chew 

  Quit date:  

Substance and Sexual Activity 

 Alcohol use: Never 

  Frequency: Never 

 Drug use: Never 

 Sexual activity: Not Currently 

  Partners: Female 

Other Topics Concern 

 Not on file 

Social History Narrative 

 Not on file 



Family History 

Problem Relation Age of Onset 

 Cancer Mother  

     Endometrail  

 Hypertension Mother  

 Cancer Father  

 Cancer-Colon Father  

 Hypertension Father  

 Neurologic Disorder Father  

 Alzheimer's Father  

 Cancer-Skin Brother  

 Cancer Daughter  

 Cancer-Breast Daughter  

 Hypertension Maternal Grandfather  



Objective:



Vitals: 

 20 0943 

BP: 124/65 

BP Source: Arm, Left Upper 

Patient Position: Sitting 

Pulse: 80 

Weight: 93.4 kg (206 lb) 

Height: 180.3 cm (71") 

PainSc: Zero 



Body mass index is 28.73 kg/m.



General: No acute distress, non-labored breathing. Afebrile

Head: Normocephalic and atraumatic.  

Eyes: Conjunctivae are normal. No scleral icterus. 

Cardiovascular: Regular rate and rhythm

Pulmonary/Chest: Normal Effort, CTAB

Abd: surgical scar vertically below umbilicus from previous colectomy, Soft, NT,
ND

Musculoskeletal: Moves all extremities

Skin: Warm and dry.  

Neuro: Awake and alert.

Psychiatric: Normal mood and affect. 

Nursing note and vitals reviewed.



ALL OUTSIDE RECORDS WERE REVIEWED AND FILMS EXAMINED



Imaging:  3-16-20 CT Chest W Ab/Pel W/WO - Impression:  1.  Enhancing left renal
mass concerning for renal malignancy.  2.  3 mm upper lobe pulmonary nodule.  3.
 Apparent filling defect of the urinary bladder just to the right of midline.  
This potentially could reflect a primary urinary bladder lesion vs eccentric pr
otrusion of the prostate gland.  Correlation with cystoscopy is recommended.  





Assessment/Plan:

Cas Lewis was seen today for kidney mass.



Diagnoses and all orders for this visit:



Left renal mass

Discussed with patient treatment options for left renal mass including surveilla
nce, biopsy, nephrectomy and patient elected to proceed with partial nephrectomy
with the possibility of radial nephrectomy if deemed necessary.  Risk and benef
its discussed with patient.



Dysuria

-     CULTURE-URINE W/SENSITIVITY; Future; Expected date: 2020



Plan for patient to return on 2020 for robotic-assisted laparoscopic nephre
ctomy vs radical nephrectomy



Patient discussed with Dr. Jesus Monaco MD 

Family Medicine, PGY-3



ATTESTATION



I personally performed the key portions of the E/M visit, discussed case with re
sident and concur with resident documentation of history, physical exam, assessm
ent, and treatment plan unless otherwise noted.



Patient referred to me by Dr. Tawil for the findings of a 3.5 cm left renal mass
concerning for renal cell carcinoma.  Patient does have a long history of BPH a
nd urinary symptoms which have been fully evaluated by Dr. Tawil.  He denies sharon
ss hematuria.  The mass was discovered incidentally during evaluation of lymphop
lasmacytic lymphoma.  He also has a history of colon cancer s/p hemicolectomy wi
th no additional treatment needed.  He continues with colonoscopy.



We discussed the findings of his mass and the likelihood that this represents a 
renal cell carcinoma.  We discussed options of observation, biopsy, ablation, pa
rtial nephrectomy, and radical nephrectomy.  The mass is in a difficult location
near the hilar vessels and ureter.  Therefore, I discussed that ablation is not 
an option.  The patient would like treatment, so we focused on partial nephrect
danielle versus radical nephrectomy.  Pros and cons of each approach were discussed i
n depth.  After discussion, we elected to proceed with a LEFT robotic partial ne
phrectomy and possible radical nephrectomy.  I quoted him a 30% chance of needin
g a radical nephrectomy given the location.  Patient would like to proceed.  All
risks and benefits were discussed in depth.  All questions answered.  Consent on
chart.  We will proceed with surgery in the near future.  Complex counseling.



Staff name:  Nabil Lee MD Date:  2020 



Patient Instructions 



[facility] 

Pre-Operative Instructions



Surgical Procedure:  Robotic-assisted laparoscopic nephrectomy versus radical ne
phrectomy, intraoperative ultrasound  

Date of Surgery:  2020 

Arrival Time at the Admission Office (Main Lobby):  To be determined 



To ensure that your surgery can proceed without delay, you will be contacted by 
a phone triage nurse from the Pre-Operative Assessment Clinic (PAC) to complete 
this process.  



Please review the information given to you by your surgeon.



You will be called by the surgery staff with your day of surgery arrival time be
tween 2:30 - 4:30 PM the business day prior to surgery. If you have not heard fr
om them after 4:30 PM, please call [phone] to confirm your arrival time.



Pre-Operative Assessment and Instructions:



Once you speak to the nurse or are seen in the Pre-Operative Assessment Clinic, 
you will be given medication instructions. However, if surgery is within 2 weeks
, please read and follow the medication instructions below to prepare for surger
y before you speak with the phone triage nurse:



DO NOT STOP your blood thinner until you have contacted your prescribing provide
r or have been specifically instructed to do so.



Starting Now:

? Contact your provider who prescribes any of the following to develop a plan fo
r surgery:

o Blood thinners such as aspirin, Aggrenox, Brilinta, Effient, Eliquis, enoxapar
in (Lovenox), clopidogrel (Plavix), cilostazol, pentoxifylline (Trental), Pradax
a, Savaysa, ticlopidine, Xarelto, and warfarin (Coumadin)

o Immunosuppresants such as methotrexate, azathioprine, sulfasalazine, everolimu
s, sirolimus, Humira, Remicade, Enbrel, Simponi, Orencia, Cimzia, Actemra, and X
eljanz

o Chemotherapy



14 days prior to surgery:

? Stop most vitamins, herbals, and supplements including (but not limited to):

o Alpha lipoic acid, black cohosh, CoQ10, echinacea, eye vitamins, fish oil, fla
xseed oil, garlic, gingko biloba, ginseng, glucosamine/chondroitin, kava, Lovaza
, lutein, lysine, multivitamin, red yeast rice, KIKI-e, saw palmetto, [place]
s wort, turmeric, valerian root, Vascepa, Vitamin A, Vitamin B complex, Vitamin 
C, Vitamin E

? You DO NOT need to stop: iron, magnesium, potassium



7 days prior to surgery:

? Stop anti-inflammatory medications such as ibuprofen (Advil, Motrin), naproxen
(Aleve), [place], Excedrin, Midol, celecoxib (Celebrex), diclofenac (Gasquet
alfonso), diflunisal, etodolac, flurbiprofen, indomethacin, ketoprofen, ketorolac, m
eloxicam, nabumetone, and piroxicam



The day prior to surgery begin a clear liquid diet.  This includes water, Jell-O
, tea, coffee, broth, pop, clear juices (apple, cranberry, grape, orange witho
ut pulp), sports drinks, and popsicles.  You may have any color of the above dri
nks, but no milk or milk products. 



The day prior to surgery, drink one bottle of magnesium citrate at 2:00 p.m.  Ex
pect a laxative effect.  This laxative may be purchased at any pharmacy.  No pre
scription needed, but a printed prescription may be provided for your reference.



Do not drink alcohol within 24 hours of surgery.



Please do not eat or drink anything after midnight.  No gum, mints, hard candy, 
snacks, coffee, etc or chewing tobacco allowed after midnight before surgery.  Y
ou may brush your teeth but be sure to rinse and spit.

      (DIAL)

Please shower with an over the counter antibacterial soap the evening before or 
the morning of surgery.



If your surgery is scheduled as an outpatient, you must arrange to have someone 
drive you home and have someone with you 24 hours after anesthesia.



========================================================================



If you have any questions, please contact your provider's office at [phone]
. ([place], Admin) 



For emergencies during evenings, nights, weekends, and holidays, contact [facility]  and request they contact the on-call Urology
Resident at [phone].



=========================================================================







Electronically signed by Nabil Lee MD at 2020  8:02 AM CDT

documented in this encounter



Plan of Treatment





Not on filedocumented as of this encounter



Goals





     



            Goal       Patient    Associated  Recent Progress  Patient-Stat  Aut

hor



                     Goal Type           Problems            ed? 

 

     



                 Resume normal activities  Hospital        No              Lizette Zacarias RN



documented as of this encounter



Procedures





                                        Comments



                 Procedure Name  Priority        Date/Time       Associated Diag

nosis 

 

                                        



Results for this procedure are in the results section.



                 [facility] CULTURE-URINE  Routine         2020      Dysuria 



                                         11:00 AM CDT  



documented in this encounter



Results

* CULTURE-URINE W/SENSITIVITY (2020 11:00 AM CDT)



    



              Component    Value        Ref Range    Performed At  Pathologist



                                         Signature

 

    



                     Battery Name        URINE CULTURE       [facility] 

 

    



                     Specimen            URINE               [facility] 



                                         Description    

 

    



                     Special             NONE                [facility] 



                                         Requests    

 

    



                     Culture             NO GROWTH           [facility] 

 

    



                     Report Status       FINAL               [facility] 



                                         2020   











                                         Specimen

 





                                         Urine - Urine, Outpatient







   



                 Performing Organization  Address         [facility]  Ph

one Number

 

   



                     [facility]         [address] [place]

 [address] 





documented in this encounter



Visit Diagnoses









                                         Diagnosis

 





                                         Left renal mass



                                         Unspecified disorder of kidney and uret

er

 





                                         Dysuria



documented in this encounter

## 2020-08-13 NOTE — XMS REPORT
Encounter Summary

                             Created on: 2020



Cas Lewis

External Reference #: [id number]

: 1950

Sex: Male



Demographics





                          Address                   [address] [place]  [address]

 

                          Home Phone                [phone]

 

                          Preferred Language        English

 

                          Marital Status            Unknown

 

                          Evangelical Affiliation     [facility]

 

                          Race                      White

 

                          Ethnic Group              Not  or 





Author





                          Author                    [facility]

 

                          Organization              [facility]

 

                          Address                   Unknown

 

                          Phone                     Unavailable







Support





                Name            Relationship    Address         Phone

 

                Kendy Richardson ECON            Unknown         [phone]

[phone]







Care Team Providers





                    Care Team Member Name Role                Phone

 

                    Arnaldo Singh         PCP                 [phone]

 

                    Tawil, Elias A MD   Unavailable         [phone]







Reason for Visit

* 



  



                     Reason              Onset Date          Comments

 

  



                     Medical Question    2020          patient's daughter 

asking if pre-surgery Covid 19

testing can be



                                         done locally--pt lives 2 hrs away









Encounter Details





                          Care Team                 Description



                     Date                Type                Department  

 

                                        



Nabil Lee MD



 [address]



[facility] [address]



[place] [address] [phone] (Fax)                      Medical Question (patient's daughter ask

ing if pre-surgery 

Covid 19 testing can be done locally--pt lives 2 hrs away)



                     2020          Telephone           [facility]  



                                          [address]  



                                         [address]  



                                         [place]  



                                         [address] [phone]  







Social History





                                        Date



                 Tobacco Use     Types           Packs/Day       Years Used 

 

                                        Quit: 



                           Former Smoker             Pipe   

 

    



                     Smokeless Tobacco: Former  Chew                Quit: 



                                         User   







                    Drinks/Week         oz/Week             Comments



                                         Alcohol Use   

 

                                                             



                                         Never   







  



                     Alcohol Habits      Answer              Date Recorded

 

  



                     How often do you have a drink containing alcohol?  Never   

            2020

 

  



                           How many drinks containing alcohol do you have on  No

t asked 



                                         a typical day when you are drinking?  

 

  



                           How often do you have six or more drinks on one  Not 

asked 



                                         occasion?  







 



                           Sex Assigned at Birth     Date Recorded

 

 



                                         Male 







                                        Date Recorded



                           COVID-19 Exposure         Response 

 

                                        2020  9:24 AM CDT



                           In the last month, have you been in contact with  No 

/ Unsure 



                                         someone who was confirmed or suspected 

to have  



                                         Coronavirus / COVID-19?  



documented as of this encounter



Miscellaneous Notes

* Telephone Encounter - Kendy Gardner LPN - 2020 11:07 AM CDT



Pt daughter Kendy calling to see if pre-surgery Covid testing can be done lo
edie to pt as he lives 2 hrs away.  Attempted to return call but phone VM box was
full & I was unable to leave a message.



Order for Covid testing faxed to daughter at fax # [phone] with instruction
s that testing needs to be done within 48 hrs of surgery and [facility] must receive t
he results.



Electronically signed by Kendy Gardner LPN at 2020 11:12 AM CDT

documented in this encounter



Plan of Treatment





Not on filedocumented as of this encounter



Goals





     



            Goal       Patient    Associated  Recent Progress  Patient-Stat  Aut

hor



                     Goal Type           Problems            ed? 

 

     



                 Resume normal activities  Hospital        No              Lizette Zacarias RN



documented as of this encounter



Results

* COVID-19 (SARS-COV-2) PCR (05/15/2020)



    



              Component    Value        Ref Range    Performed At  Pathologist



                                         Signature

 

    



                           COVID-19                  [facility] 



                                         (SARS-CoV-2)    



                                         PCR    

 

    



                           COVID-19                   [facility] 



                                         (SARS-CoV-2)    



                                         PCR Source    











                                         Specimen

 





                                         Nasopharyngeal Swab







 



                           Narrative                 Performed At

 

 



                                         This result has an attachment that is n

ot available. 







   



                 Performing Organization  Address         City/State/Zipcode  Ph

one Number

 

   



                      [facility]         [address] [place]

 [address] 





documented in this encounter



Visit Diagnoses









                                         Diagnosis

 





                                         Preop testing



                                         Preoperative examination, unspecified



documented in this encounter

## 2020-08-13 NOTE — XMS REPORT
Encounter Summary

                             Created on: 2020



Cas Lewis

External Reference #: [id number]

: 1950

Sex: Male



Demographics





                          Address                   [address] [place]  [address]

 

                          Home Phone                [phone]

 

                          Preferred Language        English

 

                          Marital Status            Unknown

 

                          Restoration Affiliation     [facility]

 

                          Race                      White

 

                          Ethnic Group              Not  or 





Author





                          Author                    [facility]

 

                          Organization              [facility]

 

                          Address                   Unknown

 

                          Phone                     Unavailable







Support





                Name            Relationship    Address         Phone

 

                Kendy Richardson ECON            Unknown         [phone]

[phone]







Care Team Providers





                    Care Team Member Name Role                Phone

 

                    Arnaldo Singh         PCP                 [phone]

 

                    Tawil, Elias A MD   Unavailable         [phone]







Reason for Visit

* Auth/Cert



                          Referred By Contact       Referred To Contact



                 Status          Reason          Specialty       Diagnoses /  



                                         Procedures  

 

                                        



                                        







                                         Diagnoses  



                                         Left renal mass  



                                         Left renal mass  



                                         [N28.89]

  



                                         P  



                                         rocedures  



                                         AL LAPAROSCOPY  



                                         SURG PARTIAL  



                                         NEPHRECTOMY  



                                         AL ULTRASONIC  



                                         GUIDANCE  



                                         INTRAOPERATIVE  



                                         ROBOT ASSISTED  



                                         LAPAROSCOPIC  



                                         NEPHRECTOMY  



                                         PARTIAL  



                                         ULTRASOUND  



                                         GUIDANCE -  



                                         INTRAOPERATIVE  











Encounter Details





                          Care Team                 Description



                     Date                Type                Department  

 

                                        



Wallisch, William, MD



[address]



[place], [place] [id number] [phone] (Fax)                       



                     2020          Anesthesia          [facility] OR  



                                         [address] [id number] [phone]  







Anesthesia Record





                    Responsible Anesthesiologist Anesthesia Start Time Anesthesi

a Stop Time



                                         Procedure Name   

 

                    Wallisch, William, MD 20 0745       20 1239



                                         ROBOT ASSISTED   



                                         LAPAROSCOPIC NEPHRECTOMY   



                                         PARTIAL (Left Flank)   







   



                 Date            Time            Event           Comment

 

   



                     720                AN Equip Check 



                                            

 

   



                                         07  

 

   



                           0744                      Out of Pre 



                                         Procedure 

 

   



                           0745                      Anes Start 

 

   



                           0745                      In Room 

 

   



                           0746                      An Start Data 

 

   



                     0754                An Induction        The patient was ree

valuated immediately before moderate or

 deep sedation



                                         use and before anesthesia induction.

 

   



                           0757                      An Intubation 

 

   



                           0811                      Anesthesia 



                                         Ready 

 

   



                           0835                      Antibiotic 



                                         Given 

 

   



                           0839                      Proc Start 

 

   



                           1214                      An Extubation 

 

   



                           1236                      an stop data 

 

   



                     1238                Handoff to RN       I completed my SBAR

 handoff to the receiving nurse.

 

   



                           1239                      An Stop 











                                         Meds

 

 



                           Name                      Total

 

 



                           lidocaine (2%) 200 mg/10mL Injection  100 mg



                                         syringe 

 

 



                           propofol (DIPRIVAN) 200 mg/ 20 mL  160 mg



                                         injection (VIAL) 

 

 



                           rocuronium (ZEMURON) injection  160 mg

 

 



                           dexamethasone  (DECADRON)  4 mg/mL  4 mg



                                         injection 

 

 



                           phenylephrine (VERN-SYNEPHRINE) 0.1 mg/mL  400 mcg



                                         injection (SYRINGE) 

 

 



                           sugammadex (BRIDION) 100 mg/mL iv soln  200 mg

 

 



                           dextran 70/hypromellose (GENTEAL TEARS;  2 drop



                                         BION TEARS) ophthalmic solution 

 

 



                           HYDROmorphone  injection (DILAUDID)  2  1.75 mg



                                         mg syringe 

 

 



                           lidocaine PF 1% (10 mg/mL) injection  0 mL



                                         0.1-2 mL 

 

 



                           ePHEDrine 50 mg/mL 50 mg in sodium  20 mg



                                         chloride PF 0.9% 5 mL IV syringe 

 

 



                           ceFAZolin (ANCEF) injection  2 g

 

 



                           ketamine (KETALAR)  10 mg/mL injection  30 mg

 

 



                           mannitol 12.5 g/50 mL injection (VIAL)  12.5 g

 

 



                           dexMEDEtomidine (PRECEDEX) 20 mcg/5 mL  16 mcg



                                         (4 mcg/mL) injection 

 

 



                           ondansetron (ZOFRAN) injection 4 mg  4 mg

 

 



                           lactated ringers infusion  600 mL

 

 



                           electrolyte-A (PLASMA-LYTE A PH 7.4)  1,900 mL



                                         infusion 





* 



                                        Name

 

                                        O2 

 

                                        N2O Inspired

 

                                        N2O

 

                                         Isoflurane 

 

                                        Inspired Isoflurane 





* 



                                        No blood administrations on file.









                                        Removal



                     Type                Details             Placement 

 

                                         



                     Wounds              20; 1130; Left; Abdomen; Surgical

  20 1130 by Lee, 



                     (NOT for            Incision; 4 port sites  ANTONETTE Savage 



                                         Pressure   



                                         Injuries)   

 

                                        20 1038 by Mariama Edmondson



                     Peripheral          20; 0640; RN; L; Posterior;   0640 by Rodrick, 



                     IV                  Forearm; 18 G; N/A; 1; 20; 1038  

ANTONETTE Rodriguez 

 

                                        20 1214 by Carol Cueva SRNA



                     ETT                 20; 0757; Ventilated by mask with

  20 0757 by Anay, 



                           oral airway (2); Direct laryngoscopy,  RAYRAY Medina 



                                         Stylet; Single-Lumen, Cuffed; 7.5mm;  



                                         Mac; 4; Oral; 1-Full view of the  



                                         glottis; 1 insertion attempt;  



                                         Auscultation, ETCO2 Detector; 23  



                                         centimeters; atraumatic, oral mucosa  



                                         unchanged; 20; 1214  

 

                                        20 0845 by Gabriela Dodge RN



                     Peripheral          20; 0802; Provider; R; Hand; 18 G

;  20 0802 by RADHA Cueva                  2; 20; 0845   Carol, SRNA 

 

                                        20 0619 by Amada Sellers RN



                     Indwelling          20; 0830; Unit (Comment) (OR 3); 

 20 0830 by Lee, 



                     Urinary             16 FR; Regular (Two-way); 20; 061

9  ANTONETTE Savage 



                                         Catheter   

 

                                        20 1000 by Gabriela Dodge RN



                     Alexander             20; 1129; Left; Abdomen; 19 FR;  

20 1129 by Felix Howard               20; 1000      ANTONETTE Savage 



                                         Drain   



documented in this encounter



Social History





                                        Date



                 Tobacco Use     Types           Packs/Day       Years Used 

 

                                        Quit: 



                           Former Smoker             Pipe   

 

    



                     Smokeless Tobacco: Former  Chew                Quit: 



                                         User   







                    Drinks/Week         oz/Week             Comments



                                         Alcohol Use   

 

                                                             



                                         Never   







  



                     Alcohol Habits      Answer              Date Recorded

 

  



                     How often do you have a drink containing alcohol?  Never   

            2020

 

  



                           How many drinks containing alcohol do you have on  No

t asked 



                                         a typical day when you are drinking?  

 

  



                           How often do you have six or more drinks on one  Not 

asked 



                                         occasion?  







 



                           Sex Assigned at Birth     Date Recorded

 

 



                           Male                      2020  6:16 AM CDT







                                        Date Recorded



                           COVID-19 Exposure         Response 

 

                                        2020  6:14 AM CDT



                           In the last month, have you been in contact with  No 

/ Unsure 



                                         someone who was confirmed or suspected 

to have  



                                         Coronavirus / COVID-19?  



documented as of this encounter



OR Notes

* Anesthesia Postprocedure Evaluation - Marvin Key MD - 2020  1:53 PM
   CDT



Formatting of this note might be different from the original.

Post-Anesthesia Evaluation



Name: Cas Lewis      MRN: [id number]     : 1950     Age: 69 y.
o.     Sex: male 

__________________________________________________________________________ 



Procedure Date: 2020

Procedure(s) (LRB):

ROBOT ASSISTED LAPAROSCOPIC NEPHRECTOMY PARTIAL (Left)

ULTRASOUND GUIDANCE - INTRAOPERATIVE (Left)  



Surgeon: Surgeon(s):

Nabil Lee MD Penticuff, Justin, MD



Post-Anesthesia Vitals

BP: 145/85 ( 1345)

Pulse: 71 ( 1345)

Respirations: 18 PER MINUTE ( 1345)

SpO2: 92 % ( 1345)

SpO2 Pulse: 71 ( 1345) 

Vitals Value Taken Time 

/85 2020  1:45 PM 

Temp 37.1 C (98.8 F) 2020 12:40 PM 

Pulse 71 2020  1:45 PM 

Respirations 18 PER MINUTE 2020  1:45 PM 

SpO2 92 % 2020  1:45 PM 



Post Anesthesia Evaluation Note



Evaluation location: Pre/Post

Patient participation: recovered; patient participated in evaluation

Level of consciousness: alert



Pain score: 2

Pain management: adequate



Hydration: normovolemia

Temperature: 36.0C - 38.4C

Airway patency: adequate



Perioperative Events



   Post-op nausea and vomiting: no PONV



Postoperative Status

Cardiovascular status: hemodynamically stable

Respiratory status: spontaneous ventilation

Follow-up needed: none



Perioperative Events

Perioperative Event: No

Emergency Case Activation: No 



Electronically signed by Hung Bustos MD at 2020  2:12 PM CDT





Associated attestation - Hung Bustos MD - 2020  2:12 PM CDT



Formatting of this note might be different from the original.

ATTESTATION



Post-Anesthesia Evaluation Attestation: I reviewed and agree the indicated post-
anesthesia care was provided. I have reviewed key portions of the indicated post
 anesthesia care. I have examined the patient's vitals, physical status, and com
plications and agree with what is documented.



Staff name:  Hung Bustos MD Date:  2020 



* Anesthesia Preprocedure Evaluation - Wallisch, William, MD - 2020  7:04 
  AM CDT



Formatting of this note might be different from the original.

Anesthesia Pre-Procedure Evaluation



Name: Cas Lewis      MRN: [id number]     : 1950     Age: 69 y.
o.     Sex: male 

_________________________________________________________________________ 



Procedure Info: 

Procedure Information  

 Date/Time:  20 

 Procedures: 

    ROBOT ASSISTED LAPAROSCOPIC NEPHRECTOMY PARTIAL (Left ) - CASE LENGTH 4 HOUR
S, PLEASE CLIP PT IN SDS/PRE-POST, REQUEST FIREFLY, INTRA-OP SONO, ARGON, FROZEN
 SECTION REQUIRED

    ULTRASOUND GUIDANCE - INTRAOPERATIVE (Left ) 

 Location:  MAIN OR 03 / Main OR/Periop 

 Surgeon:  Nabil Lee MD 

 



Physical Assessment

Vital Signs (last filed in past 24 hours):

BP: 146/83 (657)

Temp: 36.7 C (98.1 F) (657)

Pulse: 62 (657)

Respirations: 20 PER MINUTE (657)

SpO2: 98 % (657)

Height: 182.9 cm (72") (657)

Weight: 92.2 kg (203 lb 3.2 oz) (657) 

 

Patient History 

Allergies 

Allergen Reactions 

 Sulfa (Sulfonamide Antibiotics) HIVES 

 



Current Medications  

Medication Directions 

amitriptyline (ELAVIL) 25 mg tablet  

atenoloL (TENORMIN) 50 mg tablet  

doxazosin (CARDURA) 4 mg tablet  

pantoprazole DR (PROTONIX) 40 mg tablet  



Review of Systems/Medical History



Patient summary reviewed

Nursing notes reviewed

Pertinent labs reviewed



PONV Screening: Non-smoker

No history of anesthetic complications

No family history of anesthetic complications



Airway - negative



Pulmonary - negative

    Not a current smoker



Cardiovascular 



    Exercise tolerance: >4 METS 

    Beta Blocker therapy: Yes (took this am)

    Beta blockers within 24 hours: Yes



    Hypertension, well controlled



GI/Hepatic/Renal 



    GERD, well controlled



Neuro/Psych 



    Psychiatric history

        Depression



Musculoskeletal 



    Back pain (lower back pain, mild)



Endocrine/Other 



    Malignancy



Constitution - negative

 Physical Exam



Airway Findings

    Mallampati: II

    TM distance: >3 FB

    Neck ROM: full

    Mouth opening: good



Dental Findings: 

    Upper dentures and lower dentures



Cardiovascular Findings: Negative

    Rhythm: regular      Rate: normal



Pulmonary Findings: Negative

    Breath sounds clear to auscultation.



Abdominal Findings: Negative



Neurological Findings: Negative

    Alert and oriented x 3



Constitutional findings: Negative

 



Diagnostic Tests

Hematology: 

Lab Results 

Component Value Date 

 HGB 14.7 2020 

 HCT 42.8 2020 

 PLTCT 160 2020 

 WBC 5.9 2020 

 MCV 87.6 2020 

 MCH 30.0 2020 

 MCHC 34.2 2020 

 MPV 8.3 2020 

 RDW 14.5 2020 

  



General Chemistry: No results found for: NA, K, CL, CO2, GAP, BUN, CR, GLU, CA, 
KETONES, ALBUMIN, LACTIC, OBSCA, MG, TOTBILI, TOTBILCB, PO4 

Coagulation: No results found for: PT, PTT, INR



Anesthesia Plan



ASA score: 3 

Plan: general

Induction method: intravenous

NPO status: acceptable



Informed Consent

Anesthetic plan and risks discussed with patient.

Use of blood products discussed with patient

Blood Consent: consented



Plan discussed with: anesthesiologist, SRNA and CRNA.





Electronically signed by Wallisch, William, MD at 2020  7:20 AM CDT

documented in this encounter



Plan of Treatment





Not on filedocumented as of this encounter



Goals





     



            Goal       Patient    Associated  Recent Progress  Patient-Stat  Aut

hor



                     Goal Type           Problems            ed? 

 

     



                 Resume normal activities  [facility]        No              Lizette Zacarias RN



documented as of this encounter



Visit Diagnoses

Not on filedocumented in this encounter



Administered Medications





                Action Date     Dose            Rate            Site



                           Medication Order          MAR Action    

 

                2020  8:36 AM CDT 2 g                              



                           ceFAZolin (ANCEF) injection  Given    



                                         INTRA-PROCEDURE MED, Starting 20 at 0836, Until 20 at  

   



                                         1247, Anesthesia Intra-op     

 

  

 

                2020  8:43 AM CDT 4 mg                             



                           dexamethasone (DECADRON) injection  Given    



                                         Intravenous, INTRA-PROCEDURE MED,     



                                         Starting 20 at 0843, Until 20 at 1247, Anesthesia Intra-op   

  

 

  

 

                2020 12:10 PM CDT 4 mcg                            



                           dexMEDEtomidine (PRECEDEX) injection  Given    



                                         INTRA-PROCEDURE MED, Starting 20 at 1144, Until 20 at  

   



                                         1247, Anesthesia Intra-op     

 

                    4 mcg                                    



                           Given                     2020   



                                         11:59 AM CDT   

 

                    4 mcg                                    



                           Given                     2020   



                                         11:50 AM CDT   

 

  

 

                2020  7:57 AM CDT 2 drops                          



                           dextran 70/hypromellose (GENTEAL TEARS)  Given    



                                         ophthalmic solution     



                                         INTRA-PROCEDURE MED, Starting 20 at 0757, Until 20 at  

   



                                         1247, Anesthesia Intra-op     

 

  

 

                2020 10:06 AM CDT                                  



                           electrolyte-A (PLASMA-LYTE A PH 7.4)  Given - New    



                           injection                 Bag    



                                         INTRA-PROCEDURE MED(CONT), Starting 20 at 0804, Until 20 at  

   



                                         1247, Anesthesia Intra-op     

 

                                                             



                           Given - New Bag           2020   



                                         8:04 AM CDT   

 

  

 

                2020 10:10 AM CDT 10 mg                            



                           ePHEDrine 50 mg/mL 50 mg in sodium  Bolus    



                                         chloride PF 0.9% 5 mL IV syringe     



                                         5 mL, INTRA-PROCEDURE MED(CONT),     



                                         Starting 20 at 0835, Until 20 at 1247, Anesthesia Intra-op   

  

 

                    10 mg                                    



                           Given - New Bag           2020   



                                         8:35 AM CDT   

 

  

 

                2020 12:46 PM CDT 0.25 mg                          



                           HYDROmorphone injection (DILAUDID)  Given    



                                         INTRA-PROCEDURE MED, Starting 20 at 0746, Until 20 at  

   



                                         1247, Anesthesia Intra-op     

 

                    0.25 mg                                  



                           Given                     2020   



                                         10:50 AM CDT   

 

                    0.25 mg                                  



                           Given                     2020   



                                         9:17 AM CDT   

 

  

 

                2020  8:43 AM CDT 10 mg                            



                           ketamine (KETALAR) injection  Given    



                                         INTRA-PROCEDURE MED, Starting 20 at 0843, Until 20 at  

   



                                         1247, Anesthesia Intra-op     

 

                    20 mg                                    



                           Given                     2020   



                                         8:11 AM CDT   

 

  

 

                2020  7:54 AM  mg                           



                           lidocaine (PF) injection  Given    



                                         INTRA-PROCEDURE MED, Starting 20 at 0754, Until 20 at  

   



                                         1247, Anesthesia Intra-op     

 

  

 

                2020 10:59 AM CDT 12.5 g                           



                           mannitol injection        Given    



                                         INTRA-PROCEDURE MED, Starting 20 at 1059, Until 20 at  

   



                                         1247, Anesthesia Intra-op     

 

  

 

                2020 12:12 PM CDT 4 mg                             



                           ondansetron (ZOFRAN) injection 4 mg  Given    



                                         4 mg, Intravenous, ONCE PRN, 1 dose,   

  



                                         Starting e 20 at 1145, Until 20 at 1212, Other...,     



                                         nausea/vomiting, First line agent., DO 

    



                                         NOT ADMINISTER if given within the last

     



                                         six hours., PACU (only)     

 

  

 

                2020  8:32 AM  mcg                          



                           phenylephrine in NS injection syringe  Given    



                                         Intravenous, INTRA-PROCEDURE MED,     



                                         Starting 20 at 0806, Until 20 at 1247, Anesthesia Intra-op   

  

 

                    100 mcg                                  



                           Given                     2020   



                                         8:20 AM CDT   

 

                    100 mcg                                  



                           Given                     2020   



                                         8:12 AM CDT   

 

  

 

                2020  7:54 AM  mg                           



                           propofol (DIPRIVAN) injection  Given    



                                         INTRA-PROCEDURE MED, Starting 20 at 0754, Until 20 at  

   



                                         1247, Anesthesia Intra-op     

 

  

 

                2020 11:40 AM CDT 10 mg                            



                           rocuronium injection      Given    



                                         Intravenous, INTRA-PROCEDURE MED,     



                                         Starting 20 at 0754, Until 20 at 1247, Anesthesia Intra-op   

  

 

                    20 mg                                    



                           Given                     2020   



                                         10:32 AM CDT   

 

                    10 mg                                    



                           Given                     2020   



                                         10:10 AM CDT   

 

  

 

                2020 12:10 PM  mg                           



                           sugammadex (BRIDION) injection  Given    



                                         Intravenous, INTRA-PROCEDURE MED,     



                                         Starting 20 at 1210, Until 20 at 1247, Anesthesia Intra-op   

  

 

  



documented in this encounter

## 2020-08-13 NOTE — XMS REPORT
Encounter Summary

                             Created on: 2020



Cas Lewis

External Reference #: [id number]

: 1950

Sex: Male



Demographics





                          Address                   [address]  [address]

 

                          Home Phone                [phone]

 

                          Preferred Language        English

 

                          Marital Status            Unknown

 

                          Judaism Affiliation     [facility]

 

                          Race                      White

 

                          Ethnic Group              Not  or 





Author





                          Author                    [facility]

 

                          Organization              [facility]

 

                          Address                   Unknown

 

                          Phone                     Unavailable







Support





                Name            Relationship    Address         Phone

 

                Kendy Richardson ECON            Unknown         [phone]

[phone]







Care Team Providers





                    Care Team Member Name Role                Phone

 

                    Arnaldo Singh         PCP                 [phone]

 

                    Tawil, Elias A MD   Unavailable         [phone]







Encounter Details





                          Care Team                 Description



                     Date                Type                Department  

 

                                                     



                           2020                Travel   







Social History





                                        Date



                 Tobacco Use     Types           Packs/Day       Years Used 

 

                                        Quit: 



                           Former Smoker             Pipe   

 

    



                     Smokeless Tobacco: Former  Chew                Quit: 



                                         User   







                    Drinks/Week         oz/Week             Comments



                                         Alcohol Use   

 

                                                             



                                         Never   







  



                     Alcohol Habits      Answer              Date Recorded

 

  



                     How often do you have a drink containing alcohol?  Never   

            2020

 

  



                           How many drinks containing alcohol do you have on  No

t asked 



                                         a typical day when you are drinking?  

 

  



                           How often do you have six or more drinks on one  Not 

asked 



                                         occasion?  







 



                           Sex Assigned at Birth     Date Recorded

 

 



                                         Male 







                                        Date Recorded



                           COVID-19 Exposure         Response 

 

                                        2020  9:24 AM CDT



                           In the last month, have you been in contact with  No 

/ Unsure 



                                         someone who was confirmed or suspected 

to have  



                                         Coronavirus / COVID-19?  



documented as of this encounter



Plan of Treatment





Not on filedocumented as of this encounter



Goals





     



            Goal       Patient    Associated  Recent Progress  Patient-Stat  Aut

hor



                     Goal Type           Problems            ed? 

 

     



                 Resume normal activities  [facility]              Lizette Zacarias RN



documented as of this encounter



Visit Diagnoses

Not on filedocumented in this encounter

## 2020-08-13 NOTE — XMS REPORT
Encounter Summary

                             Created on: 2020



Jas Lewis

External Reference #: [id number]

: 1950

Sex: Male



Demographics





                          Address                   [address] [place]  [address]

 

                          Home Phone                [phone]

 

                          Preferred Language        English

 

                          Marital Status            Unknown

 

                          Bahai Affiliation     [facility]

 

                          Race                      White

 

                          Ethnic Group              Not  or 





Author





                          Author                    [facility]

 

                          Organization              [facility]

 

                          Address                   Unknown

 

                          Phone                     Unavailable







Support





                Name            Relationship    Address         Phone

 

                Kendy Richardson ECON            Unknown         [phone]

[phone]







Care Team Providers





                    Care Team Member Name Role                Phone

 

                    Arnaldo Singh         PCP                 [phone]

 

                    Tawil, Elias A MD   Unavailable         [phone]







Reason for Visit

* Auth/Cert



                          Referred By Contact       Referred To Contact



                 Status          Reason          Specialty       Diagnoses /  



                                         Procedures  

 

                                        



                                        







                                         Diagnoses  



                                         Left renal mass  



                                         Left renal mass  



                                         [N28.89]

  



                                         P  



                                         rocedures  



                                         MD LAPAROSCOPY  



                                         SURG PARTIAL  



                                         NEPHRECTOMY  



                                         MD ULTRASONIC  



                                         GUIDANCE  



                                         INTRAOPERATIVE  



                                         ROBOT ASSISTED  



                                         LAPAROSCOPIC  



                                         NEPHRECTOMY  



                                         PARTIAL  



                                         ULTRASOUND  



                                         GUIDANCE -  



                                         INTRAOPERATIVE  











Encounter Details





                          Care Team                 Description



                     Date                Type                Department  

 

                                        



Miranda Warner MD



 [address]



[facility] [address]



[place] [address]



[phone] (Fax)                      ROBOT ASSISTED LAPAROSCOPIC NEPHRECTOMY 

PARTIAL



                     2020          Surgery             [facility] [place]  



                                         [address] [address]  



                                         [phone]  







Social History





                                        Date



                 Tobacco Use     Types           Packs/Day       Years Used 

 

                                        Quit: 



                           Former Smoker             Pipe   

 

    



                     Smokeless Tobacco: Former  Chew                Quit: 



                                         User   







                    Drinks/Week         oz/Week             Comments



                                         Alcohol Use   

 

                                                             



                                         Never   







  



                     Alcohol Habits      Answer              Date Recorded

 

  



                     How often do you have a drink containing alcohol?  Never   

            2020

 

  



                           How many drinks containing alcohol do you have on  No

t asked 



                                         a typical day when you are drinking?  

 

  



                           How often do you have six or more drinks on one  Not 

asked 



                                         occasion?  







 



                           Sex Assigned at Birth     Date Recorded

 

 



                           Male                      2020  6:16 AM CDT







                                        Date Recorded



                           COVID-19 Exposure         Response 

 

                                        2020  6:14 AM CDT



                           In the last month, have you been in contact with  No 

/ Unsure 



                                         someone who was confirmed or suspected 

to have  



                                         Coronavirus / COVID-19?  



documented as of this encounter



Last Filed Vital Signs





                    Reading             Time Taken          Comments



                                         Vital Sign   

 

                    143/62              2020  7:27 AM CDT  



                                         Blood Pressure   

 

                    77                  2020  7:27 AM CDT  



                                         Pulse   

 

                    37.5 C (99.5 F) 2020  7:27 AM CDT  



                                         Temperature   

 

                    -                   -                    



                                         Respiratory Rate   

 

                    93%                 2020  7:27 AM CDT  



                                         Oxygen Saturation   

 

                    -                   -                    



                                         Inhaled Oxygen   



                                         Concentration   

 

                    92.2 kg (203 lb 4.2 oz) 2020  4:38 PM CDT  



                                         Weight   

 

                    180.3 cm (5' 11")   2020  4:38 PM CDT  



                                         Height   

 

                    28.35               2020  4:38 PM CDT  



                                         Body Mass Index   



documented in this encounter



Functional Status





                                        Date of Assessment



                           Functional Status         Response 

 

                                        2020



                           Does the patient have a hearing impairment:  No 

 

                                        2020



                           Does the patient have a visual impairment:  No 

 

                                        2020



                           Does the patient have impaired ambulation:  No 

 

                                        2020



                           Does the patient have an activity of daily living  No

 



                                         (ADL) impairment:  

 

                                        2020



                           Does the patient have an instrumental activity of  No

 



                                         daily living (IADL) impairment:  







                                        Date of Assessment



                           Cognitive Status          Response 

 

                                        2020



                           Does the patient have a cognitive impairment:  No 



documented as of this encounter



Discharge Instructions

* Pre-Anesthesia Patient Instructions* 



 Nicky Alvarado RN - 2020 11:12 AM CDT



GENERAL INFORMATION



Before you come to the hospital

 Make arrangements for a responsible adult to drive you home and stay with you
 for 24 hours following surgery.

 Bath/Shower Instructions

 Take a bath or shower with antibacterial soap the night before or the morning
 of your procedure. Use clean towels.

 Put on clean clothes after bath or shower.  Avoid using lotion and oils.

 Sleep on clean sheets if bath or shower is done the night before procedure.

 Leave money, credit cards, jewelry, and any other valuables at home. The [facility] is not responsible for the loss or breakage of persona
l items.

 Remove nail polish, makeup and all jewelry (including piercings) before comin
g to the hospital.

 The morning of your procedure:

 brush your teeth and tongue

 do not smoke

 do not shave the area where you will have surgery



What to bring to the hospital

 ID/ Insurance Card

 Medical Device card

 Official documents for legal guardianship 

 Copy of your Living Will, Advanced Directives, and/or Durable Power of Attorn
ey 

 Small bag with a few personal belongings

 Walker,cane, or motorized scooter

 Cases for glasses/hearing aids/contact lens (bring solutions for contacts)

 Dress in clean, loose, comfortable clothing 



Eating or drinking before surgery

 Do not eat or drink anything after 11:00 p.m. the day before your procedure (
including gum, mints, candy, or chewing tobacco) OR follow the specific instruct
ions you were given by your Surgeon.

 You may have WATER ONLY up to 2 hours before arriving at the hospital.

 

Other instructions

Notify your surgeon if:

 you become ill with a cough, fever, sore throat, nausea, vomiting or flu-like
 symptoms

 you have any open wounds/sores that are red, painful, draining, or are new si
nce you last saw  the doctor

 you need to cancel your procedure

 You will receive a call with your surgery arrival time from between 2:30pm an
d 4:30pm the last business day before your procedure.  If you do not receive a c
all, please call [phone] before 4:30pm or [phone] after 4:30pm.



Notify us at [facility]: [phone]

 if you need to cancel your procedure

 if you are going to be late



[place] [address]



 Park in the  Parking Garage, located directly across from the main entrance
 to the hospital.

  parking is available  from 7 AM to 4 PM Monday through Friday.

 Enter through the ground floor [facility] entrance and check in at the Inf
ormation Desk in the lobby.

 They will validate your parking ticket and direct you to the next location.

 If you are a woman between the ages of 10 and 55, and have not had a hysterec
evy, you will be asked for a urine sample prior to surgery.  Please do not urin
ate before arriving in the Surgery Waiting Room.  Once there, check in and let t
he attendant know if you need to provide a sample.



For the safety of all patients, visitors and staff as we work to contain COVID-1
9, we must dramatically restrict patient visitors. 

For most patients, no visitors are allowed. Exceptions include:

? 1 parent or guardian for patients younger than 18 

? 1 support person for labor and delivery patients 

? 1 support person for patients with disabilities or impairments needing assista
nce 

? 1 support person or  for patients undergoing outpatient treatment or pro
cedures 

? Support persons for patients nearing end of life



You will need to have a COVID19 test performed 2 days prior to your surgery.  Alexei stuart will be contacted with the date and time of the COVID19 test.  Your test will 
be performed at a drive-thru clinic at the [facility].  If you are also plannin
g to have lab work drawn while at the [facility], please do so first and then have y
our COVID19 test completed second.



[facility]

[email or web address]

[place] [address]



Please bring a cell phone, your photo ID and your insurance card.

Please use the bathroom prior to your trip to the [facility] for your test.



Once arrived at the [facility] clinic, follow the signage/cones to a
 parking spot.  Park and call [phone] to check in.  The team will come out 
to you.  You do not have to get out of your vehicle.  Our team members will viral
ect the test sample using a swab and will be wearing all of the necessary person
al protective equipment, so you do not need to wear a mask.  Once the test is pe
rformed it will be important to self quarantine at home until your surgery.  Ple
ase stay at home, wash your hands frequently, and practice physical distancing. 





Electronically signed by Nicky Alvarado RN at 2020 11:12 AM CDT





* Pre-Anesthesia Medication Instructions* 



 Nicky Alvarado RN - 2020 11:14 AM CDT



Formatting of this note might be different from the original.



YOUR MEDICATIONS:



 amitriptyline (ELAVIL) 25 mg tablet  

 atenoloL (TENORMIN) 50 mg tablet  

 doxazosin (CARDURA) 4 mg tablet  

 pantoprazole DR (PROTONIX) 40 mg tablet  



 

YOUR  MEDICATION INSTRUCTIONS FOR SURGERY:



Before surgery

Stop taking any vitamins, herbals, and natural supplements 14 days before surger
y:



Stop the following medications 7 days before surgery:

 Anti-inflammatory medications such as ibuprofen (Advil, Motrin) and naproxen 
(Aleve)

You may use acetaminophen (Tylenol)



Morning of surgery

On the morning of surgery, do NOT take these medications:

 Remaining vitamins/supplements

 Ointments/creams/lotions/oils/powders/deodorant



On the morning of surgery, take ONLY these medications with a sip (1-2 ounces) o
f water:

 Amitriptyline

 Atenolol

 Doxazosin

 Pantoprazole



Other information

Before surgery, please contact ANTONETTE Sequeira, with any medicine updates or question
s.

 E-mail:  [email or web address]



Before going home from the hospital, please ask your doctor when you should re-s
tart your medicines that were stopped before surgery.



Electronically signed by Nicky Alvarado RN at 2020 11:14 AM CDT





documented in this encounter



Medications at Time of Discharge





                          Start Date                End Date



                 Medication      Sig             Dispensed       Refills  

 

                          2020                 



                     acetaminophen (TYLENOL)  Take two            0  



                           325 mg tablet             tablets by    



                                         mouth every 6    



                                         hours as    



                                         needed.    

 

                          2020                 



                     amitriptyline (ELAVIL) 25  Take 25 mg by       0  



                           mg tablet                 mouth at    



                                         bedtime    



                                         daily.    

 

                          2020                 



                     atenoloL (TENORMIN) 50 mg  Take 50 mg by       0  



                           tablet                    mouth daily.    

 

                          2020                 



                     doxazosin (CARDURA) 4 mg  Take 4 mg by        0  



                           tablet                    mouth daily.    

 

                          2020                 



                 oxyCODONE (ROXICODONE) 5  Take one        30 tablet       0  



                           mg tablet                 tablet to two    



                                         tablets by    



                                         mouth every 4    



                                         hours as    



                                         needed    

 

                          2020                 



                     pantoprazole DR     Take 40 mg by       0  



                           (PROTONIX) 40 mg tablet   mouth daily.    

 

                          2020                 



                 polyethylene glycol 3350  Take            527 g           3  



                           (MIRALAX) 17 gram/dose    seventeen g    



                           powder                    by mouth    



                                         daily.    

 

                          2020                 



                 senna/docusate (SENNA-S)  Take one        30 tablet       0  



                           8.6/50 mg tablet          tablet by    



                                         mouth twice    



                                         daily.    



documented as of this encounter



Progress Notes

* Miranda Warner MD - 2020 11:31 AM CDT



I called patient with pathology results.  Pathological T1a clear cell renal cell
 carcinoma, grade 2 with necrosis, margins negative.  He expressed understanding
 of the pathology.  He is doing well.  Pain nearly completely resolved.  Eating 
well and he is having normal bowel activity.  He will keep follow-up as schedule
d.



Electronically signed by Miranda Warner MD at 2020  6:22 PM CDT

* Miranda Warner MD - 2020  5:29 AM CDT



Formatting of this note might be different from the original.

Urology Progress Note 2020 



ASSESSMENT:

Jas Lewis is a 69 y.o. Male with history of lymphoma, left renal ma
ss s/p left robotic partial nephrectomy on 2020.  LOS: 0 days 



PLAN: Will discuss plan with staff surgeon - Dr. Warner



- Pain control: PO pain

- Diet/FEN: Regular diet, SLIVF

- GI: Bowel regimen, zofran prn

- : Felix out, trial of void; Cr 1.58 (1.45)

- Heme/ID: Afebrile. Hgb, WBC appropriate

- OOB/Amb 

- PTA: Atenolol, doxazosin

- Dispo: Discharge planning today; f/u 3 weeks with BMP



Prophylaxis Review:

-  DVT: SCD's, contraindictated (partial nephrectomy)

-  GI: Yes

-  Catheter: Yes

-  Abx: Yes - Kae-operative Luigi Mcclain M.D.

Urology PGY-2

Please page urology on call with questions



ATTESTATION



I personally performed the key portions of the E/M visit, discussed case with re
sident and concur with resident documentation of history, physical exam, assessm
ent, and treatment plan unless otherwise noted.



Patient doing well. No issues overnight.  Tolerating diet.  Pain controlled.  Am
bulating.



Abdomen soft, appropriate

Incisions c/d/i

Felix removed

EMILY with serosanginous fluid



A/P: s/p LEFT robotic partial nephrectomy doing well

-- increase activity

-- diet as tolerated

-- monitor EMILY and send EMILY creatinine -- D/C if normal

-- D/C planning.  F/U 3 weeks with BMP



Staff name:  Miranda Warner MD Date:  2020 



________________________________________________________________________ 

SUBJECTIVE: 

No acute events overnight. Pain is well-controlled. Patient denies nausea, denie
s vomiting, is ambulating. Patient has not passed flatus, has not had a bowel mo
vement. Patient is tolerating Diet Clear Liquid

Advance Diet as Tolerated.



OBJECTIVE:

                 Vital Signs: Most Recent                Vital Signs: Past 24 Ho
urs 

BP: 111/60 (38)

Temp: 37.1 C (98.7 F) (38)

Pulse: 82 (38)

Respirations: 16 PER MINUTE (38)

SpO2: 93 % (38)

Height: 180.3 cm (71") (1638)

Weight: 92.2 kg (203 lb 4.2 oz) (1638)  BP: (111-155)/(60-90) 

Temp:  [36.3 C (97.3 F)-37.1 C (98.8 F)] 

Pulse:  [62-82] 

Respirations:  [13 PER MINUTE-21 PER MINUTE] 

SpO2:  [91 %-99 %]  



Physical Exam:

General: Alert & oriented, no acute distress

Pulm: Equal chest rise, non-labored

CV: Regular rate and rhythm

Extremities: No edema, SCD's in place

Abd: Soft, appropriately tender to palpation, non-distended

Incisions/Wounds: Appropriately tender to palpation. Portsite and extraction sit
e incisions C/D/I with dermabond 

: Felix catheter in place draining pale salmon urine



Intake/Output:

Date 20 - 20 0720 07 - 20 0700 

Shift 0844-6344 1345-0014 24 Hour Total 1901-0700 24 Hour Total 

INTAKE 

P.O. 240 0 240    

I.V.(mL/kg/hr) 2500(2.3) 682 3182    

Shift Total(mL/kg) 2740(29.7) 682(7.4) 3422(37.1)    

OUTPUT 

Urine(mL/kg/hr) 465(0.4) 1350 1815    

  Urine 115  115    

  Urine Output (ml) (Indwelling Urinary Catheter 20 0830 16 FR) 350 1350 1
700    

Drains 105 80 185    

  Drain Output (ml) ([place] Drain 20 1129 Left Abdomen) 105 80 185 
  

Other 150  150    

  Estimated Blood Loss 150  150    

Shift Total(mL/kg) 720(7.8) 1430(15.5) 2150(23.3)    

NET 2020 1272    

Weight (kg) 92.2 92.2 92.2 92.2 92.2 92.2 



 



Labs:

                   Hematology                               Chemistry 

Recent Labs 

  20

0438 

WBC 7.7 

HGB 12.1* 

PLTCT 134* 

 Recent Labs 

  20

0734 

* 

K 3.8 

 

CO2 24 

BUN 18 

CR 1.45* 

GFR 48* 

GLU 91 

CA 8.7 

 



ACTIVE PROBLEMS:

Principal Problem:

  Left renal mass







Electronically signed by Miranda Warner MD at 2020  8:35 AM CDT

* Zaida Padilla RN - 2020  6:05 PM CDT



Patient arrived to room # (807) via cart accompanied by transport. Patient trans
ferred to the bed with assistance. Bedside safety checks completed. Initial zelda
ent assessment completed. Refer to flowsheet for details.



Admission skin assessment completed with: ANTONETTE Diaz



Pressure injury present on arrival?: No



1. Head/Face/Neck: No

2. Trunk/Back: No

3. Upper Extremities: No

4. Lower Extremities: No

5. Pelvic/Coccyx: No

6. Assessed for device associated injury? Yes

7. Malnutrition Screening Tool (Nursing Nutrition Assessment) Completed? No



See Doc Flowsheet for additional wound details. 



INTERVENTIONS: 

Falls bundle initiated. 



Electronically signed by Zaida Padilla RN at 2020  6:06 PM CDT

* Pretty Capellan RN - 2020  3:22 PM CDT



Attempted to edie patient for pre procedure covid screening, but no answer. Voice
mail and phone number left for patient to call return call.



Electronically signed by Pretty Capellan, RN at 2020  3:25 PM CDT

* Nicky Alvarado RN - 2020 11:17 AM CDT



PAC phone triage completed with patient for surgery on 20 with Dr. Warner.
Medications, allergies and medical history reviewed and updated in chart.  He d
enies CP, SOA, dizziness, numbness, tingling, N/V/D, cough, fever, flu-like symp
toms, exposure to anyone ill, positive for COVID-19 or waiting for results of sa
me.  He denies travel outside of [place] or [place] in last 15 days.  He denies PMH of com
plications from anesthesia.  Denies URI symptoms at this time. No PAC visit karol
cated.



Preop and medication instructions reviewed with patient. No vitamins or suppleme
nts for 14 days and no NSAIDS for 7 days before surgery. 

NPO after 11 pm the night before surgery but ok to drink water until 2 hours bef
ore arrival at hospital. Patient verbalized understanding and copy of instructio
ns will be mailed to his home.



Patient informed of visitor policy and expressed understanding.





Electronically signed by Nicky Alvarado RN at 2020 11:19 AM CDT

documented in this encounter



H&P Notes

* Miranda Warner MD - 2020  6:42 AM CDT



Formatting of this note might be different from the original.

History and Physical Update Note



Allergies:  Sulfa (sulfonamide antibiotics)



Lab/Radiology/Other Diagnostic Tests:

24-hour labs:  No results found for this visit on 20 (from the past 24 angélica
r(s)).

Point of Care Testing:  (Last 24 hours):

 



ASSESSMENT: 69 y.o. male with history of cT1a LEFT renal mass who presents for L
EFT robot-assisted laparoscopic partial nephrectomy, possible radical, possible 
open. The patient has an allergy to sulfa medications. The patient does not take
anticoagulation or antiplatelet agents.





PLAN:

> To OR for LEFT robot-assisted laparoscopic partial nephrectomy, possible 
radical, possible open

> 2g Ancef preoperatively

> Risk and benefits of operative procedure were again discussed with the patient
in detail, as in clinic; all questions were answered. Informed consent was obtai
marbella and placed in the chart.



Discussed plan of care with staff surgeon, Dr. Warner, who directed plan of car
e



I have examined the patient, and there are no significant changes in their condi
tion, from the previous H&P performed on 20.



Lencho Pathak MD

Pager  [phone]



ATTESTATION



I personally performed the key portions of the E/M visit, discussed case with re
sident and concur with resident documentation of history, physical exam, assessm
ent, and treatment plan unless otherwise noted.



Patient here for a left robotic versus open partial nephrectomy and possible rad
ical nephrectomy.  The mass is in a difficult location.  He has also had multipl
e prior abdominal surgeries, so he understands increased risks of needing an ope
n approach or radical nephrectomy.  All risks and benefits were discussed in dep
th.  All questions answered.  Consent on chart.  Patient marked. Ready for OR.



Staff name:  Miranda Warner MD Date:  2020 



--------------------------------------------------------------------------------
------------------------------------------------------------



Electronically signed by Miranda Warner MD at 2020  7:27 AM CDT

* Miranda Warner MD - 2020 10:00 AM CDT



Formatting of this note might be different from the original.

Subjective:

Jas Lewis is a 69 y.o. male who is referred by Dr. Tawil for further tiffanie
luation and treatment of left renal mass



HPI:

69 year old male with PMH of HYPERTENSION, GERD, BPH (for years), colon cancer 1
995 (with partial colectomy; receives colonoscopy every 4 years), new diagnosis 
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



During work up for lymphoplasmacytic lymphoma about 1 month ago when imaging was
done, left renal mass was noted, concerning for RCC.  Imaging also showed abnor
mality in bladder for which Urologist Dr.Tawil did cystoscopy 2 weeks ago and wa
s found to be non-concerning.  He has had elevated PSA in the past and has had 3
cystoscopies and 2 prostate biopsies which were non-revealing; last cystoscopy 
performed Dec 2019.  He continues to take Cardura for for BPH and HYPERTENSION. 
His BPH symptoms are no longer a concern; he denies dribbling, incomplete empty
ing, weak urinary stream.  He is on surveillance for lymphoplasmacytic lymphoma 
and has a f/u appt on 2020 for that.



He reports dysuria with initiation of voids that has been ongoing for months and
urinary frequency which started 2020 after he was asked to increased fluid 
intake by his PCP.  This has eased since he has been drinking less fluids.



No hematuria, unexpected weight loss, night sweats, apetite changes, nocturia

No FH of kidney cancer. No FH of genetic cancer syndromes.



ROS:

10 point ROS negative other than chronic hearing loss, runny nose, light-headedn
ess when going from sitting to standing position and nervousness/anxiety.



Active Ambulatory Problems 

  Diagnosis Date Noted 

 No Active Ambulatory Problems 



Resolved Ambulatory Problems 

  Diagnosis Date Noted 

 No Resolved Ambulatory Problems 



Past Medical History: 

Diagnosis Date 

 Colon polyps  

 Depression  

 Elevated PSA  

 Enlarged prostate  

 Hypertension  

 Kidney stones  

 Lymphoplasmacytoid lymphoma, CLL (HCC)  

 Malignant neoplasm of colon (HCC)  



Surgical History: 

Procedure Laterality Date 

 HX TONSILLECTOMY   

 COLON SURGERY  1995 

 COLECTOMY  1995 

 HX APPENDECTOMY  1995 

 HERNIA REPAIR   

 CYSTOURETHROSCOPY  2020 

 PROSTATE SURGERY   

 Biopsy  



Current Outpatient Medications on File Prior to Visit 

Medication Sig Dispense Refill 

 amitriptyline (ELAVIL) 25 mg tablet    

 atenoloL (TENORMIN) 50 mg tablet    

 doxazosin (CARDURA) 4 mg tablet    

 pantoprazole DR (PROTONIX) 40 mg tablet    



No current facility-administered medications on file prior to visit.  



Social History 



Socioeconomic History 

 Marital status:  

  Spouse name: Not on file 

 Number of children: Not on file 

 Years of education: Not on file 

 Highest education level: Not on file 

Occupational History 

 Not on file 

Tobacco Use 

 Smoking status: Former Smoker 

  Types: Pipe 

  Last attempt to quit:  

  Years since quittin.3 

 Smokeless tobacco: Former User 

  Types: Chew 

  Quit date:  

Substance and Sexual Activity 

 Alcohol use: Never 

  Frequency: Never 

 Drug use: Never 

 Sexual activity: Not Currently 

  Partners: Female 

Other Topics Concern 

 Not on file 

Social History Narrative 

 Not on file 



Family History 

Problem Relation Age of Onset 

 Cancer Mother  

     Endometrail  

 Hypertension Mother  

 Cancer Father  

 Cancer-Colon Father  

 Hypertension Father  

 Neurologic Disorder Father  

 Alzheimer's Father  

 Cancer-Skin Brother  

 Cancer Daughter  

 Cancer-Breast Daughter  

 Hypertension Maternal Grandfather  



Objective:



Vitals: 

 20 0943 

BP: 124/65 

BP Source: Arm, Left Upper 

Patient Position: Sitting 

Pulse: 80 

Weight: 93.4 kg (206 lb) 

Height: 180.3 cm (71") 

PainSc: Zero 



Body mass index is 28.73 kg/m.



General: No acute distress, non-labored breathing. Afebrile

Head: Normocephalic and atraumatic.  

Eyes: Conjunctivae are normal. No scleral icterus. 

Cardiovascular: Regular rate and rhythm

Pulmonary/Chest: Normal Effort, CTAB

Abd: surgical scar vertically below umbilicus from previous colectomy, Soft, NT,
ND

Musculoskeletal: Moves all extremities

Skin: Warm and dry.  

Neuro: Awake and alert.

Psychiatric: Normal mood and affect. 

Nursing note and vitals reviewed.



ALL OUTSIDE RECORDS WERE REVIEWED AND FILMS EXAMINED



Imaging:  3-16-20 CT Chest W Ab/Pel W/WO - Impression:  1.  Enhancing left renal
mass concerning for renal malignancy.  2.  3 mm upper lobe pulmonary nodule.  3.
 Apparent filling defect of the urinary bladder just to the right of midline.  
This potentially could reflect a primary urinary bladder lesion vs eccentric pr
otrusion of the prostate gland.  Correlation with cystoscopy is recommended.  





Assessment/Plan:

Jas Lewis was seen today for kidney mass.



Diagnoses and all orders for this visit:



Left renal mass

Discussed with patient treatment options for left renal mass including surveilla
nce, biopsy, nephrectomy and patient elected to proceed with partial nephrectomy
with the possibility of radial nephrectomy if deemed necessary.  Risk and benef
its discussed with patient.



Dysuria

-     CULTURE-URINE W/SENSITIVITY; Future; Expected date: 2020



Plan for patient to return on 2020 for robotic-assisted laparoscopic nephre
ctomy vs radical nephrectomy



Patient discussed with Dr. Jesus Monaco MD 

Family Medicine, PGY-3



ATTESTATION



I personally performed the key portions of the E/M visit, discussed case with re
heracliont and concur with resident documentation of history, physical exam, assessm
ent, and treatment plan unless otherwise noted.



Patient referred to me by Dr. Tawil for the findings of a 3.5 cm left renal mass
concerning for renal cell carcinoma.  Patient does have a long history of BPH a
nd urinary symptoms which have been fully evaluated by Dr. Tawil.  He denies sharon
ss hematuria.  The mass was discovered incidentally during evaluation of lymphop
lasmacytic lymphoma.  He also has a history of colon cancer s/p hemicolectomy wi
th no additional treatment needed.  He continues with colonoscopy.



We discussed the findings of his mass and the likelihood that this represents a 
renal cell carcinoma.  We discussed options of observation, biopsy, ablation, pa
rtial nephrectomy, and radical nephrectomy.  The mass is in a difficult location
near the hilar vessels and ureter.  Therefore, I discussed that ablation is not 
an option.  The patient would like treatment, so we focused on partial nephrect
danielle versus radical nephrectomy.  Pros and cons of each approach were discussed i
n depth.  After discussion, we elected to proceed with a LEFT robotic partial ne
phrectomy and possible radical nephrectomy.  I quoted him a 30% chance of needin
g a radical nephrectomy given the location.  Patient would like to proceed.  All
risks and benefits were discussed in depth.  All questions answered.  Consent on
chart.  We will proceed with surgery in the near future.  Complex counseling.



Staff name:  Miranda Warner MD Date:  2020 



Patient Instructions 



[facility] 

Pre-Operative Instructions



Surgical Procedure:  Robotic-assisted laparoscopic nephrectomy versus radical ne
phrectomy, intraoperative ultrasound  

Date of Surgery:  2020 

Arrival Time at the Admission Office (Main Lobby):  To be determined 



To ensure that your surgery can proceed without delay, you will be contacted by 
a phone triage nurse from the Pre-Operative Assessment Clinic (P[facility]) to complete 
this process.  



Please review the information given to you by your surgeon.



You will be called by the surgery staff with your day of surgery arrival time be
tween 2:30 - 4:30 PM the business day prior to surgery. If you have not heard fr
om them after 4:30 PM, please call [phone] to confirm your arrival time.



Pre-Operative Assessment and Instructions:



Once you speak to the nurse or are seen in the Pre-Operative Assessment Clinic, 
you will be given medication instructions. However, if surgery is within 2 weeks
, please read and follow the medication instructions below to prepare for surger
y before you speak with the phone triage nurse:



DO NOT STOP your blood thinner until you have contacted your prescribing provide
r or have been specifically instructed to do so.



Starting Now:

? Contact your provider who prescribes any of the following to develop a plan fo
r surgery:

o Blood thinners such as aspirin, Aggrenox, Brilinta, Effient, Eliquis, enoxapar
in (Lovenox), clopidogrel (Plavix), cilostazol, pentoxifylline (Trental), Pradax
a, Savaysa, ticlopidine, Xarelto, and warfarin (Coumadin)

o Immunosuppresants such as methotrexate, azathioprine, sulfasalazine, everolimu
s, sirolimus, Humira, Remicade, Enbrel, Simponi, Orencia, Cimzia, Actemra, and X
eljanz

o Chemotherapy



14 days prior to surgery:

? Stop most vitamins, herbals, and supplements including (but not limited to):

o Alpha lipoic acid, black cohosh, CoQ10, echinacea, eye vitamins, fish oil, fla
xseed oil, garlic, gingko biloba, ginseng, glucosamine/chondroitin, kava, Lovaza
, lutein, lysine, multivitamin, red yeast rice, KIKI-e, saw palmetto, [place]
s wort, turmeric, valerian root, Vascepa, Vitamin A, Vitamin B complex, Vitamin 
C, Vitamin E

? You DO NOT need to stop: iron, magnesium, potassium



7 days prior to surgery:

? Stop anti-inflammatory medications such as ibuprofen (Advil, Motrin), naproxen
(Aleve), Toyin-Calypso, Excedrin, Midol, celecoxib (Celebrex), diclofenac ([place]
alfonso), diflunisal, etodolac, flurbiprofen, indomethacin, ketoprofen, ketorolac, m
eloxicam, nabumetone, and piroxicam



The day prior to surgery begin a clear liquid diet.  This includes water, Jell-O
, tea, coffee, broth, pop, clear juices (apple, cranberry, grape, orange witho
ut pulp), sports drinks, and popsicles.  You may have any color of the above dri
nks, but no milk or milk products. 



The day prior to surgery, drink one bottle of magnesium citrate at 2:00 p.m.  Ex
pect a laxative effect.  This laxative may be purchased at any pharmacy.  No pre
scription needed, but a printed prescription may be provided for your reference.



Do not drink alcohol within 24 hours of surgery.



Please do not eat or drink anything after midnight.  No gum, mints, hard candy, 
snacks, coffee, etc or chewing tobacco allowed after midnight before surgery.  Y
ou may brush your teeth but be sure to rinse and spit.

      (DIAL)

Please shower with an over the counter antibacterial soap the evening before or 
the morning of surgery.



If your surgery is scheduled as an outpatient, you must arrange to have someone 
drive you home and have someone with you 24 hours after anesthesia.



========================================================================



If you have any questions, please contact your provider's office at [phone]
. (Yenni, Admin) 



For emergencies during evenings, nights, weekends, and holidays, contact [facility]  and request they contact the on-call Urology
Resident at [phone].



=========================================================================







Electronically signed by Miranda Warner MD at 2020  8:02 AM CDT

documented in this encounter



Miscellaneous Notes

* Care Plan - Gabriela Dodge RN - 2020 11:31 AM CDT



Felix is out and voiding well on own.

Free of falls.

Jas Lewis discharged on 2020.

 .

Discharge instructions reviewed with patient.

Valuables returned: pt states has all belongins

Where Are Valuables Stored?: to [facility].

Home medications: 

 .to ben at  pharmacy

Functional assessment at discharge complete: Yes .



 



Electronically signed by Gabriela Dodge RN at 2020 12:02 PM CDT

* Anesthesia Post Op Day 1 - Heather Lozano CRNA - 2020  7:27 AM CDT



Formatting of this note might be different from the original.

Anesthesia Follow-Up Evaluation: Post-Procedure Day One



Name: Jas Lewis     MRN: [id number]     : 1950     Age: 69 y.o
.     Sex: male 

__________________________________________________________________________ 



Procedure Date: 2020 

Procedure: Procedure(s) with comments:

ROBOT ASSISTED LAPAROSCOPIC NEPHRECTOMY PARTIAL - CASE LENGTH 4 HOURS, PLEASE CL
IP PT IN SDS/PRE-POST, REQUEST FIREFLY, INTRA-OP SONO, ARGON, FROZEN SECTION REQ
UIRED

ULTRASOUND GUIDANCE - INTRAOPERATIVE



Physical Assessment

Height: 180.3 cm (71")  Weight: 92.2 kg (203 lb 4.2 oz)



Vital Signs (Last Filed in 24 hours)

BP: 111/60 ( 0038)

Temp: 37.1 C (98.7 F) ( 0038)

Pulse: 82 ( 0038)

Respirations: 16 PER MINUTE ( 003)

SpO2: 93 % ( 0038)

SpO2 Pulse: 71 ( 1345)

Height: 180.3 cm (71") ( 1638)



Patient History 

Allergies

Allergies 

Allergen Reactions 

 Sulfa (Sulfonamide Antibiotics) HIVES 

 



Medications

Scheduled Meds:atenoloL (TENORMIN) tablet 50 mg, 50 mg, Oral, QDAY

bacitracin topical ointment, , Topical, BID

ceFAZolin (ANCEF) IVP 1 g, 1 g, Intravenous, Q8H*

docusate (COLACE) oral solution 100 mg, 100 mg, Oral, BID

doxazosin (CARDURA) tablet 4 mg, 4 mg, Oral, QDAY

pantoprazole DR (PROTONIX) tablet 40 mg, 40 mg, Oral, QDAY(21)

polyethylene glycol 3350 (MIRALAX) packet 17 g, 1 packet, Oral, QDAY

senna (SENOKOT) oral syrup 17.6 mg, 17.6 mg, Oral, BID



Continuous Infusions:

PRN and Respiratory Meds:acetaminophen Q6H PRN, fentaNYL citrate PF Q1H PRN, hyo
scyamine Q4H PRN, ondansetron Q6H PRN **OR** ondansetron (ZOFRAN) IV Q6H PRN, ox
yCODONE Q4H PRN



Diagnostic Tests

Hematology: 

Lab Results 

Component Value Date 

 HGB 12.1 2020 

 HCT 35.3 2020 

 PLTCT 134 2020 

 WBC 7.7 2020 

 MCV 86.8 2020 

 MCH 29.8 2020 

 MCHC 34.3 2020 

 MPV 8.3 2020 

 RDW 14.6 2020 

  



General Chemistry: 

Lab Results 

Component Value Date 

  2020 

 K 4.1 2020 

  2020 

 CO2 24 2020 

 GAP 5 2020 

 BUN 20 2020 

 CR 1.58 2020 

  2020 

 CA 7.7 2020 

 ALBUMIN 3.4 2020 

 TOTBILI 1.2 2020 

 

Coagulation: No results found for: PT, PTT, INR



Follow-Up Assessment

Patient location during evaluation: floor



Anesthetic Complications: 

Anesthetic complications: The patient did not experience any anesthestic complic
ations. 



 Pain:

Score: 1



Management:adequate

 

Level of Consciousness: awake and alert

 Hydration:acceptable

 

Airway Patency: patent

 Respiratory Status: acceptable

 

Cardiovascular Status:acceptable

 Regional/Neuroaxial: 



 







Electronically signed by Heather Lozano CRNA at 2020  7:27 AM CDT

* Care Plan - Amada Sellers RN - 2020  6:24 AM CDT





Problem: Infection, Risk of, Urinary Catheter-Associated Urinary Tract Infection

Goal: Absence of urinary catheter-associated infection

Outcome: Goal Ongoing

Flowsheets (Taken 2020 0623)

Absence of urinary catheter-associated infections:

 Urinary catheter discontinuation

 Assess for signs and sypmtoms of catheter-associated urinary tract infection

Note: Grant pulled at 0620 

 

Problem: Falls, High Risk of

Goal: Absence of falls-Adult Patient

Outcome: Goal Ongoing

Flowsheets (Taken 2020 0623)

Absence of falls-Adult Patient:

 Complete Fall Risk Assessment.

 Implement fall risk bundle.

 Consider additional interventions if patient is confused, has gait/balance prob
lems and on high risk medications.

 Provide safe ambulation.

 Provde safe environment.

 Provide fall prevention strategies.

 

Problem: Discharge Planning

Goal: Participation in plan of care

Outcome: Goal Ongoing

Flowsheets (Taken 2020 06)

Participation in Plan of Care: Involve patient/caregiver in care planning decisi
on making

Goal: Knowledge regarding plan of care

Outcome: Goal Ongoing

Flowsheets (Taken 2020 06)

Knowledge regarding plan of care:

 Provide admission education to parent/caregiver

 Provide procedural and treatment education

 Provide infection prevention education

 Provide medication management education

 Provide fall prevention education

 Provide plan of care education

Goal: Prepared for discharge

Outcome: Goal Ongoing

Flowsheets (Taken 2020 06)

Prepared for discharge:

 Complete ADL ability assessment

 Collaborate with multidisciplinary team for hospital discharge coordination

 Provide safe use medical equipment education

 Provide diet and oral health education

 



Electronically signed by Amada Sellers RN at 2020  6:25 AM CDT

* Procedures (Immed Post or Bedside) - Miranda Warner MD - 2020 12:10 PM 
  CDT



Formatting of this note might be different from the original.



 Brief Operative Note



Name: Jas Lewis is a 69 y.o. male     : 1950             MR
N#: [id number]

DATE OF OPERATION: 2020



Date:  2020    



Preoperative Dx: 

Left renal mass [N28.89]



Post-op Diagnosis 

   * Left renal mass [N28.89]



Procedure(s) (LRB):

ROBOT ASSISTED LAPAROSCOPIC NEPHRECTOMY PARTIAL (Left)

ULTRASOUND GUIDANCE - INTRAOPERATIVE (Left)



Anesthesia Type: General



Surgeon(s) and Role:

   * Miranda Warner MD - Primary

   * Param Hobson MD - Resident - Assisting



Findings: Left 4 cm posterior mid/inferior pole renal mass abutting renal artery
, significant endophytic component. Complex hilar anatomy with branching artery 
and vein. Able to perform partial nephrectomy without vascular injury, collectin
g system entry noted and oversewn. Hemostatic renorrhaphy. EMILY drain placed. Fole
y. 



Estimated Blood Loss: 50 ml



Specimen(s) Removed/Disposition: 

ID Type Source Tests Collected by Time Destination 

1 : PERITUMOR FAT Tissue Kidney, Left SURGICAL PATHOLOGY          Miranda Warner MD 2020 1059  

2 : DEEP MARGIN Tissue Kidney, Left SURGICAL PATHOLOGY          Miranda Warner MD 2020 1108  

3 : LEFT RENAL MASS Tissue Kidney, Left SURGICAL PATHOLOGY          Nick Warner MD 2020 1131  



Complications:  None



Implants: None



Drains: EMILY drain LLQ, Felix 



Disposition:  PACU - stable



Param Hobson MD

Pager [phone] 



ATTESTATION



I performed this procedure with a resident.



Staff name:  Miranda Warner MD Date:  2020 







Electronically signed by Miranda Warner MD at 2020  1:10 PM CDT

* Operative Report (Direct Entry) - Miranda Warner MD - 2020  8:39 AM CDT



Formatting of this note might be different from the original.

OPERATIVE REPORT



Name: Jas Lewis is a 69 y.o. male     : 1950             MR
N#: [id number]



DATE OF OPERATION: 2020



Surgeon(s) and Role:

   * Miranda Warner MD - Primary

   * Param Hobson MD - Resident - Assisting

 

Preoperative Diagnosis:  

Left renal mass [N28.89]



Post-op Diagnosis 

   * Left renal mass [N28.89]



Procedure(s) (LRB):

ROBOT ASSISTED LAPAROSCOPIC NEPHRECTOMY PARTIAL (Left)

ULTRASOUND GUIDANCE - INTRAOPERATIVE (Left)



Anesthesia Type: General



Indications: 70 yo M with history of lymphoma, who was found to have an enhancin
g left renal mass, presents for robotic partial nephrectomy. 



FINDINGS:  

1 late branching artery, and 1 early branching vein; artery  clamped with 24-min
stacy warm ischemia time; 4 cm posterior LEFT midpole mass excised with grossly ne
gative margins; hemostatic renorrhaphy. 



OPERATIVE DETAILS: 

The patient was identified and informed consent was obtained.  The patient was b
rought to the operative suite and placed supine on the table.  A time-out proced
ure was performed per protocol.  Preoperative antibiotics were administered.  Se
quential compression devices were operational on bilateral lower extremities.  A
fter smooth induction of general anesthesia, the patient was moved to the latera
l position with care taken to pad all pressure points including the use of an ax
illary roll and then secured to the table.  The abdomen and flank were prepped a
nd draped in the usual sterile fashion.



Veress was needle placed in the left upper quadrant and drop test confirmed plac
ement.  The abdomen was insufflated 15 mm of pneumoperitoneum.  An 8 mm robotic 
non-bladed trocar was placed at the umbilical level just lateral to the rectus m
uscle.  Camera was inserted and no injuries were noted.  This permitted advancem
ent of our two 8-mm assistant ports without difficulty into the left upper quadr
ant inferior to the costal margin and in the left lower quadrant medial and supe
rior to the anterior superior iliac spine. An AirSeal trocar, and a 10-mm assist
ant port were then place between these ports.  The robotic camera was advanced a
nd there was obvious midline omental adhesions that were taken down bluntly with
 the Kitt[place].  No bowel injuries were identified, but he did have some bowel adh
esions to the midline. The left colon was also tented up on the left side inferi
ellen. The robot was then docked. 



We began by taking down the White line of [place] and the lateral attachments of t
mirella left colon and this occurred without damage to the bowel. The colon was mobil
ized superiorly past the splenocolic attachments, and the spleen was easily medi
alized away from the kidney. The mesentery of the colon was swept away and media
lized leaving Gerota's fascia visible. The kidney was immediately visualized, we
 then dissected into the retroperitoneal fat and identified the ureter, and we t
racked the ureter up to the renal pelvis. The hilar vessels were then easily teresa
ntified medial and anterior to the left renal pelvis There was 1 late branching 
artery, and one early branching renal vein, which were dissected cleanly.  The u
reter was identified and tented upwards, away from the hilum.  After the hilum w
as adequately mobilized, we then turned our attention identifying the mass itsel
f.  Gerota fascia was incised and the kidney was exposed. The patient did have s
ignificantly adherent fat. The entire kidney had to be mobilized and medialized 
in order to identify the posterior mid/inferior pole mass. Of note a branch of t
he renal artery was abutting but not involving the tumor which was partially exo
phytic. We felt we could avoid this vessel and we proceed with plans to perform 
a partial nephrectomy. 



Once the mass was identified, we were able to sho out its margins and performed
 an intraoperative ultrasound to verify its depth and laterality.  The mass was 
significantly endophytic and appeared to abut the collecting system.  It was kofi
y near the renal hilum and ureter which were identified and avoided throughout t
he case.  



Once this was completed, we administered 12.5 g of mannitol and clamped our blayne
l hilum with 2 bulldog clamps on the main artery.  We did not clamp the vein.  



We excised the mass using a combination of sharp dissection and electrocautery. 
 We were able to trace the contour of the mass away from the renal surface itsel
f and coagulated any bleeding vessels as they occurred.  We did enter the [facility]
ting system on the deepest aspect of the resection.  After the mass was excised,
 it was placed in an endocatch bag for later removal.  It had grossly negative m
argins.  We also took a deep margin and sent to pathology for frozen section.  T
he margin was negative for carcinoma.



We then ran a 2-0 V-loc suture along the base of the defect to close the collect
ing system.  We then placed a Surgicel bolster into the defect and closed the pa
renchyma over it using 2-0 V-loc suture with Hem-o-lyndsey clips renorrhaphy.  This 
required a total of 3 sutures.  Once this was done, we took the bulldog clamps o
ff the renal artery and noted no significant oozing or bleeding.  5 cc of Flosea
l and a sheet of Surgicel were placed over the defect.  Floseal  was also placed
 at the renal hilum, which was atraumatic.  Gerota fascia was then re-approximat
ed with a 2-0 V-loc suture.



Total warm ischemia time was 24 minutes.  

  

A Alexander-Washington drain was brought in through one of the camera ports after closi
ng the 12 mm ports with a Estevan-Jordan device and on 0-Vicryl suture.  The ro
bot was then undocked.  We then moved away from the field.  The mass was able to
 be brought out through the assistant port site.  After the fascia had been clos
ed, the skin was irrigated and closed using 4-0 Monocryl.  Local anesthetic was 
applied.  The patient was then cleaned, dressed, smoothly extubated, and dischar
ged to the postanesthesia care unit in stable condition.  



Estimated Blood Loss:  150 ml



Specimen(s) Removed/Disposition: 

ID Type Source Tests Collected by Time Destination 

1 : PERITUMOR FAT Tissue Kidney, Left SURGICAL PATHOLOGY          Miranda Warner MD 2020 1059  

2 : DEEP MARGIN Tissue Kidney, Left SURGICAL PATHOLOGY          Miranda Warner MD 2020 1108  

3 : LEFT RENAL MASS Tissue Kidney, Left SURGICAL PATHOLOGY          Nick Warner MD 2020 1131  



Complications:  None



Implants: None



Drains: Felix Catheter: 40 mL and Alexander-Washington Drain: #1 = 10 mL



Disposition:  PACU - stable



Param Hobsno MD

Pager [phone] 



ATTESTATION



I performed this procedure with a resident.



Staff name:  Miranda Warner MD Date:  2020 







Electronically signed by Miranda Warner MD at 2020  8:47 AM CDT

documented in this encounter



Plan of Treatment





Not on filedocumented as of this encounter



Goals





     



            Goal       Patient    Associated  Recent Progress  Patient-Stat  Aut

hor



                     Goal Type           Problems            ed? 

 

     



                 Resume normal activities  [facility]              Lizette Zacarias RN



documented as of this encounter



Procedures





                                        Comments



                 Procedure Name  Priority        Date/Time       Associated Diag

nosis 

 

                                        



Results for this procedure are in the results section.



                     HC CREATININE-FLUID  Routine             2020  



                                         6:50 AM CDT  

 

                                        



Results for this procedure are in the results section.



                     HC CBC,AUTOMATED    Routine             2020  



                                         4:38 AM CDT  

 

                                        



Results for this procedure are in the results section.



                     HC BASIC METABOLIC PANEL  Routine             2020  



                                         4:38 AM CDT  

 

                                        



Results for this procedure are in the results section.



                  FROZEN SECTION #1  Routine         2020      Left blayne

l mass 



                                         10:59 AM CDT  

 

                                         



                     ULTRASOUND GUIDANCE -   2020          Left renal mass

 



                           INTRAOPERATIVE            7:45 AM CDT  

 

                                         



                     ROBOT ASSISTED      2020          Left renal mass 



                           LAPAROSCOPIC NEPHRECTOMY   7:45 AM CDT  



                                         PARTIAL    

 

                                        



Results for this procedure are in the results section.



                     HC COMPREHENSIVE    STAT                2020  



                           METABOLIC PANEL           7:34 AM CDT  

 

                                        



Results for this procedure are in the results section.



                      BLOOD TYPING, ABO  STAT                2020  



                           CONFIRM 91                6:55 AM CDT  

 

                                        



Results for this procedure are in the results section.



                     HC CBC,AUTOMATED    Routine             2020  



                                         6:45 AM CDT  

 

                                        



Results for this procedure are in the results section.



                     HC ABO GROUP        Routine             2020  



                                         6:45 AM CDT  

 

                                        



Results for this procedure are in the results section.



                           PATHOLOGY INTEROPERATIVE   2020  



                           REPORT SCAN               12:00 AM CDT  

 

                                        



Results for this procedure are in the results section.



                           TELEMETRY STRIPS-SCAN     2020  



                                         12:00 AM CDT  

 

                                        



Results for this procedure are in the results section.



                     EXTERNAL COVID-19   STAT                05/15/2020  



                           (SARS-COV-2)              7:15 AM CDT  



documented in this encounter



Results

* BASIC METABOLIC PANEL (06/15/2020 11:08 AM CDT)



    



              Component    Value        Ref Range    Performed At  Pathologist



                                         Signature

 

    



                 Sodium          136 (L)         137 - 147 MMOL/L  [facility] 

 

    



                 Potassium       4.3             3.5 - 5.1 MMOL/L  [facility] 

 

    



                 Chloride        106             98 - 110 MMOL/L  [facility] 

 

    



                 CO2             24              21 - 30 MMOL/L  [facility] 

 

    



                 Anion Gap       6               3 - 12          [facility] 

 

    



                 Glucose         81              70 - 100 MG/DL  [facility] 

 

    



                 Blood Urea      15              7 - 25 MG/DL    [facility] 



                                         Nitrogen    

 

    



                 Creatinine      1.56 (H)        0.4 - 1.24 MG/DL  [facility] 

 

    



                 Calcium         9.1             8.5 - 10.6 MG/DL  [facility] 

 

    



                 eGFR Non        44 (L)          >60 mL/min      [facility] 



                                              Comment:   



                           [place]                  The eGFR is not validated f

or   



                                         use in drug dosing   



                                         adjustments. Continue to use   



                                         estimated creatinine   



                                         clearance per dosing reference   



                                         text. Please contact the   



                                         Clinical Pharmacist for   



                                         questions.   

 

    



                 eGFR     54 (L)          >60 mL/min      [facility] 



                           [employer]                  Comment:   



                                         The eGFR is not validated for   



                                         use in drug dosing   



                                         adjustments. Continue to use   



                                         estimated creatinine   



                                         clearance per dosing reference   



                                         text. Please contact the   



                                         Clinical Pharmacist for   



                                         questions.   











                                         Specimen

 





                                         Blood







   



                 Performing Organization  Address         City/[facility]  Ph

one Number

 

   



                      [facility]         [address] [place] [address] 





* DRAIN FLUID CREATININE (2020  6:50 AM CDT)



    



              Component    Value        Ref Range    Performed At  Pathologist



                                         [facility]

 

    



                 Drain Fluid     1.80            mg/dL            [facility] 



                           Creatinine                Comment:   



                                         Body fluid to serum creatinine   



                                         ratios >1.0 suggest the   



                                         specimen may be   



                                         contaminated with urine   











                                         Specimen

 





                                         Drainage







   



                 Performing Organization  Address         City/[facility]  Ph

one Number

 

   



                      [facility]         [address] [place] [address] 





* BASIC METABOLIC PANEL (2020  4:38 AM CDT)



    



              Component    Value        Ref Range    Performed At  Pathologist



                                         Signature

 

    



                 Sodium          134 (L)         137 - 147 MMOL/L  [facility] 

 

    



                 Potassium       4.1             3.5 - 5.1 MMOL/L  [facility] 

 

    



                 Chloride        105             98 - 110 MMOL/L  [facility] 

 

    



                 CO2             24              21 - 30 MMOL/L  [facility] 

 

    



                 Anion Gap       5               3 - 12          [facility] 

 

    



                 Glucose         100             70 - 100 MG/DL  [facility] 

 

    



                 Blood Urea      20              7 - 25 MG/DL    [facility] 



                                         Nitrogen    

 

    



                 Creatinine      1.58 (H)        0.4 - 1.24 MG/DL  [facility] 

 

    



                 Calcium         7.7 (L)         8.5 - 10.6 MG/DL  [facility] 

 

    



                 eGFR Non        44 (L)          >60 mL/min      [facility] 



                                              Comment:   



                           American                  The eGFR is not validated f

or   



                                         use in drug dosing   



                                         adjustments. Continue to use   



                                         estimated creatinine   



                                         clearance per dosing reference   



                                         text. Please contact the   



                                         Clinical Pharmacist for   



                                         questions.   

 

    



                 eGFR     53 (L)          >60 mL/min      [facility] 



                           [employer]                  Comment:   



                                         The eGFR is not validated for   



                                         use in drug dosing   



                                         adjustments. Continue to use   



                                         estimated creatinine   



                                         clearance per dosing reference   



                                         text. Please contact the   



                                         Clinical Pharmacist for   



                                         questions.   











                                         Specimen

 





                                         Blood







   



                 Performing Organization  Address         City/[facility]  Ph

one Number

 

   



                      [facility]         [address] [place]

 [address] 





* CBC (2020  4:38 AM CDT)



    



              Component    Value        Ref Range    Performed At  Pathologist



                                         Signature

 

    



                 White Blood     7.7             4.5 - 11.0 K/UL  [facility] 



                                         Cells    

 

    



                 RBC             4.07 (L)        4.4 - 5.5 M/UL  [facility] 

 

    



                 Hemoglobin      12.1 (L)        13.5 - 16.5 GM/DL  [facility] 

 

    



                 Hematocrit      35.3 (L)        40 - 50 %       [facility] 

 

    



                 MCV             86.8            80 - 100 FL     [facility] 

 

    



                 MCH             29.8            26 - 34 PG       [facility] 

 

    



                 MCHC            34.3            32.0 - 36.0 G/DL  [facility] 

 

    



                 RDW             14.6            11 - 15 %       [facility] 

 

    



                 Platelet Count  134 (L)         150 - 400 K/UL   [facility] 

 

    



                 MPV             8.3             7 - 11 FL       [facility] 











                                         Specimen

 





                                         Blood







   



                 Performing Organization  Address         City[facility]

one Number

 

   



                      [facility]         [address] [place]

 [address] 





* SURGICAL PATHOLOGY          (2020 10:59 AM CDT)



    



              Component    Value        Ref Range    Performed At  Pathologist



                                         Signature

 

    



                     PATHOLOGY           [facility]   [facility] 



                           REPORT                    [facility]   



                                         [email or web address]   



                                         Department of Pathology and   



                                         Laboratory Medicine   



                                         [address] [place] [id number]   



                                         Surgical Pathology Office:   



                                         [phone] Fax:   



                                         [phone]   



                                         SURGICAL PATHOLOGY REPORT   



                                         NAME: JAS LEWIS SURG   



                                         PATH #: [id number] MR #:   



                                         [id number] SPECIMEN   



                                         CLASS: SR BILLING #:   



                                         [id number] ALT ID #:   



                                         LOCATION: 8 DATE OF   



                                         PROCEDURE: 2020 AGE: 69   



                                         SEX: M DATE RECEIVED:   



                                         2020 :   



                                         1950 TIME RECEIVED:   



                                         11:14 PHYSICIAN:   



                                         MIRANDA WARNER DATE OF   



                                         REPORT: 2020 COPY TO:   



                                         DATE OF PRINTIN2020   



                                         ##############################   



                                         ##############################   



                                         ############   



                                         Final Diagnosis:   



                                         A. Kidney, deep margin,   



                                         biopsy:    



                                         Negative for malignancy.   



                                         B. Soft tissue, peritumor fat,   



                                         biopsy:   



                                         Negative for malignancy.   



                                         C. Kidney, left, partial   



                                         nephrectomy:   



                                         Renal cell carcinoma, clear   



                                         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   



                                         2.7 cm, with   



                                         necrosis, confined to kidney.   



                                         Margins uninvolved.   



                                         Comment:   



                                         KIDNEY: Nephrectomy, Partial   



                                         or Radical   



                                         CAP Version: Kidney 4.0.1.0   



                                         Procedure   



                                         Partial nephrectomy   



                                         Specimen Laterality   



                                         Left   



                                         Tumor Site (select all that   



                                         apply)   



                                         Not specified   



                                         Tumor Size (largest tumor if   



                                         multiple)   



                                         Greatest dimension: 2.7 cm   



                                         Tumor Focality   



                                         Unifocal   



                                         Histologic Type   



                                         Clear cell renal cell   



                                         carcinoma   



                                         Sarcomatoid Features   



                                         Not identified   



                                         Rhabdoid Features   



                                         Not identified   



                                         Tumor Necrosis (any amount)   



                                         Present: <5%   



                                         Histologic Grade (WHO/ISUP)   



                                         G2: Nucleoli are conspicuous   



                                         and eosinophilic at x400   



                                         magnification   



                                         and visible but not prominent   



                                                  



                                         at x100 magnification.   



                                         Tumor Extension (select all   



                                         that apply)   



                                         Tumor limited to kidney   



                                         Tumor extension into other   



                                         organ(s)/structure(s)   



                                         (specify):   



                                         Margins (select all that   



                                         apply)   



                                         Margins uninvolved by invasive   



                                         carcinoma   



                                         Lymph-Vascular Invasion   



                                         (excluding renal vein and its   



                                         muscle containing segmental   



                                         branches and   



                                         inferior venacava)   



                                         Not identified   



                                         Pathologic Staging (pTNM)   



                                         TNM Descriptors (required only   



                                         if applicable) (select all   



                                         that apply)   



                                         Primary Tumor (pT)   



                                         pT1: Tumor 7 cm or less in   



                                         greatest dimension, limited to   



                                         the kidney   



                                         pT1a:   Tumor 4 cm or less   



                                         in greatest dimension, limited   



                                         to the   



                                         kidney   



                                         Regional Lymph Nodes (pN)   



                                         pNX:   Regional lymph   



                                         nodes cannot be assessed   



                                         No nodes submitted or found   



                                         Distant Metastasis (pM)   



                                         Not applicable   



                                         Pathologic Findings in   



                                         Nonneoplastic Kidney (select   



                                         all that apply)   



                                         Insufficient tissue (partial   



                                         nephrectomy specimen with <5   



                                         mm of   



                                         adjacent nonneoplastic kidney)   



                                         Significant pathologic   



                                         alterations   



                                         Glomerular disease (specify   



                                         type): Mild   



                                         Vascular disease (specify   



                                         type): Mild   



                                         Other Tumors and/or Tumor-like   



                                         Lesions (select all that   



                                         apply)   



                                         None   



                                         The pathologic stage assigned   



                                         here should be regarded as   



                                         provisional, as   



                                         it reflects only current   



                                         pathologic data and does not   



                                         incorporate full   



                                         knowledge of the patient's   



                                         clinical status and/or prior   



                                         pathology.   



                                         Pursuant to the Quality   



                                         Assurance Program at the   



                                         [facility] Pathology Department,   



                                         selected slides from this case   



                                         have been   



                                         concurrently reviewed by the   



                                         following pathologist:  Dr. Danna Avila who   



                                         agrees with the final   



                                         diagnosis.   



                                         Attestation:   



                                         By this signature, I attest   



                                         that I have personally   



                                         formulated the final   



                                         interpretation expressed in   



                                         this report and that the above   



                                         diagnosis is   



                                         based upon my examination of   



                                         the slides and/or other   



                                         material indicated in   



                                         this report.   



                                         +++Electronically Signed Out   



                                         By RAKAN DESAI MD on   



                                         2020+++   



                                               



                                         ksw/2020   



                                              



                                         ##############################   



                                         ##############################   



                                         ############   



                                         Material Received:   



                                         A: deep margin   



                                         B: peritumor fat   



                                         C: left renal mass stitch at   



                                         deep margin   



                                         History:   



                                         69-year-old male with a   



                                         clinical history of a left   



                                         renal mass.   



                                         Gross Description:   



                                         A. Received fresh labeled with   



                                         the patient's name and "deep   



                                         margin" is a   



                                         0.7 x 0.5 x 0.3 cm fragment of   



                                         tan-brown soft tissue. The   



                                         specimen is   



                                         submitted entirely in cassette   



                                         A1FS for frozen and permanent   



                                         sectioning.   



                                         (sc)   



                                         B. Received in formalin   



                                         labeled with the patient's   



                                         name and "peritumor   



                                         fat" is a 2.8 x 2.3 x 0.3 cm   



                                         aggregate of yellow-tan   



                                         fibroadipose tissue.   



                                         The specimen is submitted   



                                         without sectioning in cassette   



                                         B1. (sc)   



                                         C. Fixative: Formalin   



                                         Labeled: "Left renal mass"   



                                         Measurement: 4.7 x 2.9 x 2.4   



                                         cm   



                                         Mass: 2.7 x 2.4 x 1.8 cm   



                                         Mass appearance: Tan-yellow to   



                                         tan-red, soft, hemorrhagic and   



                                         possibly   



                                         necrotic   



                                         Perinephric fat involved by   



                                         tumor: No   



                                         The resection margin is inked   



                                         black. The remainder of the   



                                         specimen is   



                                         inked blue.   



                                         Mass from inked resection   



                                         margin: 0.1 cm   



                                         Tissue submitted to   



                                         [facility] Core   



                                         Facility: No   



                                         Representative sections are   



                                         submitted as follows:   



                                         C1-C2   Representative   



                                         sections of mass to inked   



                                         margin.   



                                         C3   Additional   



                                         representative section of   



                                         tumor.   



                                         C4   Tumor nearest   



                                         perinephric fat (blue ink).   



                                         C5   Representative   



                                         section of uninvolved kidney.   



                                         (cjj)   



                                         sc/2020   



                                         Intraoperative Consultation:   



                                         A1FS, kidney, "deep margin",   



                                         biopsy:   



                                         Negative for malignancy.   



                                         MARINO BRIZUELA MD   











                                         Specimen

 





                                         Tissue - Kidney, Left

 





                                         Tissue - Kidney, Left

 





                                         Tissue - Kidney, Left







   



                 Performing Organization  Address         City/State/Zipcode  Ph

one Number

 

   



                     [facility]         [address] [place]

 [address] 





* COMPREHENSIVE METABOLIC PANEL (2020  7:34 AM CDT)



    



              Component    Value        Ref Range    Performed At  Pathologist



                                         Signature

 

    



                 Sodium          135 (L)         137 - 147 MMOL/L  [facility] 

 

    



                 Potassium       3.8             3.5 - 5.1 MMOL/L  [facility] 

 

    



                 Chloride        104             98 - 110 MMOL/L  [facility] 

 

    



                 Glucose         91              70 - 100 MG/DL  [facility] 

 

    



                 Blood Urea      18              7 - 25 MG/DL    [facility] 



                                         Nitrogen    

 

    



                 Creatinine      1.45 (H)        0.4 - 1.24 MG/DL  [facility] 

 

    



                 Calcium         8.7             8.5 - 10.6 MG/DL  [facility] 

 

    



                 Total Protein   8.7 (H)         6.0 - 8.0 G/DL  [facility] 

 

    



                 Total Bilirubin  1.2             0.3 - 1.2 MG/DL  [facility] 

 

    



                 Albumin         3.4 (L)         3.5 - 5.0 G/DL  [facility] 

 

    



                 Alk Phosphatase  58              25 - 110 U/L    [facility] 

 

    



                 AST (SGOT)      23              7 - 40 U/L      [facility] 

 

    



                 CO2             24              21 - 30 MMOL/L  [facility] 

 

    



                 ALT (SGPT)      29              7 - 56 U/L      [facility] 

 

    



                 Anion Gap       7               3 - 12          [facility] 

 

    



                 eGFR Non        48 (L)          >60 mL/min      [facility] 



                                              Comment:   



                           [employer]                  The eGFR is not validated f

or   



                                         use in drug dosing   



                                         adjustments. Continue to use   



                                         estimated creatinine   



                                         clearance per dosing reference   



                                         text. Please contact the   



                                         Clinical Pharmacist for   



                                         questions.   

 

    



                 eGFR     58 (L)          >60 mL/min       [facility] 



                           [employer]                  Comment:   



                                         The eGFR is not validated for   



                                         use in drug dosing   



                                         adjustments. Continue to use   



                                         estimated creatinine   



                                         clearance per dosing reference   



                                         text. Please contact the   



                                         Clinical Pharmacist for   



                                         questions.   











                                         Specimen

 





                                         Blood







   



                 Performing Organization  Address         City/[facility]

one Number

 

   



                      [facility]         [address] [place]

 [address] 





* BLOOD TYPE CONFIRMATION - ORDER ONLY IF REQUESTED BY LAB (2020  6:55 AM 
  CDT)



    



              Component    Value        Ref Range    Performed At  Pathologist



                                         Signature

 

    



                     ABO/RH(D)           B POS                [facility] 











                                         Specimen

 





                                         Blood







   



                 Performing Organization  Address         City/State/[facility]

one Number

 

   



                      [facility]         [address] [place] [address] 





* TYPE & CROSSMATCH (2020  6:45 AM CDT)



    



              Component    Value        Ref Range    Performed At  Pathologist



                                         Signature

 

    



                     Units Ordered       4                    [facility] 

 

    



                     Crossmatch          2020,2359      [facility] 



                                         Expires    

 

    



                     Record Check        2ND TYPE REQUIRED    [facility] 

 

    



                     ABO/RH(D)           B POS                [facility] 

 

    



                     Antibody Screen     NEG                  [facility] 

 

    



                     Electronic          YES                  [facility] 



                                         Crossmatch    

 

    



                     Unit Number         [id number]        [facility] 

 

    



                     Blood Component     RBC,ADSOL,LEUKO REDUCED   [facility] 



                                         Type    

 

    



                     Unit Division       0                    [facility] 

 

    



                     Status OF Unit      REL FROM ALLOC      [facility] 

 

    



                     Transfusion         OK TO TRANSFUSE     [facility] 



                                         Status    

 

    



                     Crossmatch          COMPATIBLE,ELECTRONIC    [facility] 



                                         Result    

 

    



                     Unit Number         [id number]        [facility] 

 

    



                     Blood Component     RBC,ADSOL,LEUKO REDUCED   [facility] 



                                         Type    

 

    



                     Unit Division       0                    [facility] 

 

    



                     Status OF Unit      REL FROM ALLOC      [facility] 

 

    



                     Transfusion         OK TO TRANSFUSE     [facility] 



                                         Status    

 

    



                     Crossmatch          COMPATIBLE,ELECTRONIC    [facility] 



                                         Result    











                                         Specimen

 





                                         Blood







   



                 Performing Organization  Address         City/[facility]

one Number

 

   



                      [facility]         [address] [place]

 [address] 





* CBC (2020  6:45 AM CDT)



    



              Component    Value        Ref Range    Performed At  Pathologist



                                         Signature

 

    



                 White Blood     5.9             4.5 - 11.0 K/UL  [facility] 



                                         Cells    

 

    



                 RBC             4.89            4.4 - 5.5 M/UL   [facility] 

 

    



                 Hemoglobin      14.7            13.5 - 16.5 GM/DL  [facility] 

 

    



                 Hematocrit      42.8            40 - 50 %        [facility] 

 

    



                 MCV             87.6            80 - 100 FL     [facility] 

 

    



                 MCH             30.0            26 - 34 PG      [facility] 

 

    



                 MCHC            34.2            32.0 - 36.0 G/DL   [facility] 

 

    



                 RDW             14.5            11 - 15 %        [facility] 

 

    



                 Platelet Count  160             150 - 400 K/UL   [facility] 

 

    



                 MPV             8.3             7 - 11 FL        [facility] 











                                         Specimen

 





                                         Blood







   



                 Performing Organization  Address         City/State/Zipcode  Ph

one Number

 

   



                      [facility]         [address] [place]

 [address] 





* TELEMETRY STRIPS-SCAN (2020 12:00 AM CDT)



 



                           Narrative                 Performed At

 

 



                                         This result has an attachment that is n

ot available. 



                                         Ordered by an unspecified provider. 





* PATHOLOGY INTEROPERATIVE REPORT SCAN (2020 12:00 AM CDT)



 



                           Narrative                 Performed At

 

 



                                         This result has an attachment that is n

ot available. 



                                         Ordered by an unspecified provider. 





* EXTERNAL COVID-19 (SARS-COV-2) (05/15/2020  7:15 AM CDT)



    



              Component    Value        Ref Range    Performed At  Pathologist



                                         Signature

 

    



                           Overall                   [facility] 



                           COVID-19                  LAB 



                                         (SARS-CoV-2)    



                                         Result    

 

    



                     Source COVID-19     Unknown             [facility] 



                           (SARS-CoV-2)              LAB 

 

    



                     COVID-19            Negative            [facility] 



                           (SARS-CoV-2)              LAB 



                                         RNA    

 

    



                           Pan-SARS RNA              [facility] 



                                         LAB 











                                         Specimen

 





                                         Nasopharyngeal Swab -



                                         Nasopharyngeal Swab







 



                           Narrative                 Performed At

 

 



                                         This result has an attachment that is n

ot available. 







   



                 Performing Organization  Address         City/[facility]/Zipcode  Ph

one Number

 

   



                                         [facility]   





documented in this encounter



Visit Diagnoses









                                         Diagnosis

 





                                         Left renal mass



                                         Unspecified disorder of kidney and uret

er



documented in this encounter



Admitting Diagnoses









                                         Diagnosis

 





                                         Left renal mass



                                         Unspecified disorder of kidney and uret

er



documented in this encounter



Administered Medications





                Action Date     Dose            Rate            Site



                           Medication Order          MAR Action    

 

                2020  7:42 AM CDT 1,000 mg                         



                           acetaminophen (TYLENOL) tablet 1,000 mg  Given    



                                         1,000 mg, Oral, ONCE, 1 dose, 20 at 0745, To be given pre-op wit

h     



                                         a sip of water immediately upon arrival

     



                                         to [facility] (>30 minutes prior to scheduled 

    



                                         surgery time). TOTAL ACETAMINOPHEN DOSE

     



                                         NOT TO EXCEED 4 GM DAILY., Pre-Op     

 

  

 

                2020  9:13 AM  mg                           



                           acetaminophen (TYLENOL) tablet 650 mg  Given    



                                         650 mg, Oral, EVERY  6 HOURS PRN,     



                                         Starting 20 at 1634, Until 20 at 1331, Pain non-opioid: may b

e     



                                         used alone or in combination with opioi

d     



                                         analgesia, TOTAL ACETAMINOPHEN DOSE NOT

     



                                         TO EXCEED 4GM DAILY,     

 

                    650 mg                                   



                           Given                     2020   



                                         4:49 PM CDT   

 

  

 

                2020  8:43 AM CDT 50 mg                            



                           atenoloL (TENORMIN) tablet 50 mg  Given    



                                         50 mg, Oral, DAILY, First dose on 20 at 0900, Until Discontinued,   

  



                                         Hold for heart rate < 55 bpm or systoli

c     



                                         BP < 110,     

 

  

 

                2020  8:50 PM CDT                                  



                           bacitracin topical ointment  Given    



                                         Topical, TWICE DAILY, First dose on 20 at 2100, Until Discontinued,   

  



                                         Apply to tip of penis and catheter for 

    



                                         comfort,     

 

  

 

                2020 12:00 PM CDT 50 mL                            



                           bupivacaine PF (MARCAINE) 0.25 %  Given    



                                         injection     



                                         INTRA-PROCEDURE MED, Starting 20 at 1200, Until 20 at  

   



                                         1237, Intra-op     

 

  

 

                2020  8:44 AM CDT 1 g                              



                           ceFAZolin (ANCEF) IVP 1 g  Given    



                                         1 g, Intravenous, EVERY  8 HOURS, 3    

 



                                         doses, First dose on 20 at    

 



                                         1645, Last dose on 20 at 0845,

     



                                         IV PUSH -- RECONSTITUTE each 1 g vial b

     



                                         adding 10 mL 0.9% NACL,     

 

                    1 g                                      



                           Given                     2020   



                                         12:25 AM CDT   

 

                    1 g                                      



                           Given                     2020   



                                         4:49 PM CDT   

 

  

 

                2020  8:50 PM  mg                           



                           docusate (COLACE) oral solution 100 mg  Given    



                                         100 mg, Oral, TWICE DAILY, First dose o

n     



                                         20 at 2100, Until Discontinued

,     



                                         Hold for loose stools,     

 

  

 

                2020  8:44 AM CDT 4 mg                             



                           doxazosin (CARDURA) tablet 4 mg  Given    



                                         4 mg, Oral, DAILY, First dose on 20 at 0900, Until Discontinued    

 

 

  

 

                2020  7:42 AM  mg                           



                           gabapentin (NEURONTIN) capsule 300 mg  Given    



                                         300 mg, Oral, ONCE, 1 dose, 20

     



                                         at 0745, To be given pre-op with a sip 

    



                                         of water immediately upon arrival to [facility]     



                                         (>30 minutes prior to scheduled surgery

     



                                         time)., Pre-Op     

 

  

 

                2020  6:40 AM CDT 1,000 mL        20 mL/hr         



                           lactated ringers infusion  Given - New    



                           1,000 mL, 1,000 mL, Intravenous, at 20  Bag    



                                         mL/hr, CONTINUOUS, Starting 20

     



                                         at 0615, Until 20 at 1634,    

 



                                         Pre-Op     

 

  

 

  



                                         ondansetron (ZOFRAN) injection 4 mg 



                                         4 mg, Intravenous, EVERY  6 HOURS PRN, 



                                         Starting 20 at 1634, Until 20 at 1331, Nausea/Vomiting 



                                         Injectable 

 

  

 

  



                                         ondansetron (ZOFRAN) tablet 4 mg 



                                         4 mg, Oral, EVERY  6 HOURS PRN, Startin

g 



                                         20 at 1634, Until 20 



                                         at 1331, Nausea/Vomiting PO 

 

  

 

                2020  7:42 AM CDT 5 mg                             



                           oxyCODONE (ROXICODONE) tablet 5 mg  Given    



                                         5 mg, Oral, ONCE, 1 dose, 20 a

t     



                                         0745, To be given pre-op with a sip of 

    



                                         water immediately upon arrival to [facility]  

   



                                         (>30 minutes prior to scheduled surgery

     



                                         time)., Pre-Op     

 

  

 

                2020 12:56 PM CDT 5 mg                             



                           oxyCODONE (ROXICODONE) tablet 5-10 mg  Given    



                                         5-10 mg, Oral, ONCE PRN, 1 dose,     



                                         Starting 20 at 1145, Until 20 at 1256, Pain PO, For Pain Scor

e     



                                         <4, PACU (only)     

 

  

 

                2020  4:50 AM CDT 5 mg                             



                           oxyCODONE (ROXICODONE) tablet 5-10 mg  Given    



                                         5-10 mg, Oral, EVERY  4 HOURS PRN,     



                                         Starting 20 at 1634, Until 20 at 1331, Pain PO     

 

                    5 mg                                     



                           Given                     2020   



                                         4:49 PM CDT   

 

  

 

                2020  8:44 AM CDT 17 g                             



                           polyethylene glycol 3350 (MIRALAX)  Given    



                                         packet 17 g     



                                         17 g (1 packet), Oral, DAILY, First dos

e     



                                         on 20 at 1645, Until     



                                         Discontinued, 8.5 GRAMS = 0.5 PACKET 17

     



                                         GRAMS = 1 PACKET 34 GRAMS = 2 PACKETS, 

    

 

                    17 g                                     



                           Given                     2020   



                                         4:49 PM CDT   

 

  

 

                2020  8:50 PM CDT 17.6 mg                          



                           senna (SENOKOT) oral syrup 17.6 mg  Given    



                                         17.6 mg, Oral, TWICE DAILY, First dose 

    



                                         on 20 at 2100, Until     



                                         Discontinued, Hold for loose stools,   

  

 

  

 

                2020 12:25 AM CDT                 100 mL/hr        



                           sodium chloride 0.9 %   infusion  Given - New    



                           1,000 mL, Intravenous, at 100 mL/hr,  Bag    



                                         CONTINUOUS, Starting 20 at    

 



                                         1315, Until 20 at 0610     

 

                                        100 mL/hr            



                           Given - New Bag           2020   



                                         1:56 PM CDT   

 

  



documented in this encounter

## 2020-08-13 NOTE — XMS REPORT
Encounter Summary

                             Created on: 2020



Cas Lewis

External Reference #: [id number]

: 1950

Sex: Male



Demographics





                          Address                   [address] [place]  [address]

 

                          Home Phone                [phone]

 

                          Preferred Language        English

 

                          Marital Status            Unknown

 

                          Faith Affiliation     [facility]

 

                          Race                      White

 

                          Ethnic Group              Not  or 





Author





                          Author                    [facility]

 

                          Organization              [facility]

 

                          Address                   Unknown

 

                          Phone                     Unavailable







Support





                Name            Relationship    Address         Phone

 

                Kendy Richardson ECON            Unknown         [phone]

19







Care Team Providers





                    Care Team Member Name Role                Phone

 

                    Arnaldo Singh         PCP                 [phone]

 

                    Tawil, Elias A MD   Unavailable         [phone]







Encounter Details





                          Care Team                 Description



                     Date                Type                Department  

 

                                        



Nabil Lee MD



 [address]



[facility] [address]



[place] [address] [phone] (Fax)                      Left renal mass (Primary Dx)



                     2020          Prep for Case       [facility]  



                                          [address]  



                                         [address]  



                                         [place]  



                                         [address] [phone]  







Social History





                                        Date



                 Tobacco Use     Types           Packs/Day       Years Used 

 

                                        Quit: 



                           Former Smoker             Pipe   

 

    



                     Smokeless Tobacco: Former  Chew                Quit: 



                                         User   







                    Drinks/Week         oz/Week             Comments



                                         Alcohol Use   

 

                                                             



                                         Never   







  



                     Alcohol Habits      Answer              Date Recorded

 

  



                     How often do you have a drink containing alcohol?  Never   

            2020

 

  



                           How many drinks containing alcohol do you have on  No

t asked 



                                         a typical day when you are drinking?  

 

  



                           How often do you have six or more drinks on one  Not 

asked 



                                         occasion?  







 



                           Sex Assigned at Birth     Date Recorded

 

 



                                         Male 







                                        Date Recorded



                           COVID-19 Exposure         Response 

 

                                        2020  9:24 AM CDT



                           In the last month, have you been in contact with  No 

/ Unsure 



                                         someone who was confirmed or suspected 

to have  



                                         Coronavirus / COVID-19?  



documented as of this encounter



Plan of Treatment





Not on filedocumented as of this encounter



Goals





     



            Goal       Patient    Associated  Recent Progress  Patient-Stat  Aut

hor



                     Goal Type           Problems            ed? 

 

     



                 Resume normal activities  Hospital        No              Lizette Zacarias RN



documented as of this encounter



Visit Diagnoses









                                         Diagnosis

 





                                         Left renal mass



                                         Unspecified disorder of kidney and uret

er



documented in this encounter

## 2020-08-13 NOTE — XMS REPORT
Encounter Summary

                             Created on: 2020



Cas Lewis

External Reference #: [id number]

: 1950

Sex: Male



Demographics





                          Address                   [address] [place]  [address]

 

                          Home Phone                [phone]

 

                          Preferred Language        English

 

                          Marital Status            Unknown

 

                          Christian Affiliation     [facility]

 

                          Race                      White

 

                          Ethnic Group              Not  or 





Author





                          Author                    [facility]

 

                          Organization              [facility]

 

                          Address                   Unknown

 

                          Phone                     Unavailable







Support





                Name            Relationship    Address         Phone

 

                Kendy Richardson ECON            Unknown         [phone]

19







Care Team Providers





                    Care Team Member Name Role                Phone

 

                    Arnaldo Singh         PCP                 [phone]

 

                    Tawil, Elias A MD   Unavailable         [phone]







Reason for Visit

* 



  



                     Reason              Onset Date          Comments

 

  



                           Results                   2020 









Encounter Details





                          Care Team                 Description



                     Date                Type                Department  

 

                                        



Latoya Arellano RN                     Results



                     2020          Telephone           [facility]  



                                         [address] [place] [address]  







Social History





                                        Date



                 Tobacco Use     Types           Packs/Day       Years Used 

 

                                        Quit: 



                           Former Smoker             Pipe   

 

    



                     Smokeless Tobacco: Former  Chew                Quit: 



                                         User   







                    Drinks/Week         oz/Week             Comments



                                         Alcohol Use   

 

                                                             



                                         Never   







  



                     Alcohol Habits      Answer              Date Recorded

 

  



                     How often do you have a drink containing alcohol?  Never   

            2020

 

  



                           How many drinks containing alcohol do you have on  No

t asked 



                                         a typical day when you are drinking?  

 

  



                           How often do you have six or more drinks on one  Not 

asked 



                                         occasion?  







 



                           Sex Assigned at Birth     Date Recorded

 

 



                           Male                      2020  6:16 AM CDT



documented as of this encounter



Functional Status





                                        Date of Assessment



                           Functional Status         Response 

 

                                        2020



                           Does the patient have a hearing impairment:  No 

 

                                        2020



                           Does the patient have a visual impairment:  No 

 

                                        2020



                           Does the patient have impaired ambulation:  No 

 

                                        2020



                           Does the patient have an activity of daily living  No

 



                                         (ADL) impairment:  

 

                                        2020



                           Does the patient have an instrumental activity of  No

 



                                         daily living (IADL) impairment:  







                                        Date of Assessment



                           Cognitive Status          Response 

 

                                        2020



                           Does the patient have a cognitive impairment:  No 



documented as of this encounter



Plan of Treatment





Not on filedocumented as of this encounter



Goals





     



            Goal       Patient    Associated  Recent Progress  Patient-Stat  Aut

hor



                     Goal Type           Problems            ed? 

 

     



                 Resume normal activities  Hospital        No              Lizette Zacarias RN



documented as of this encounter



Visit Diagnoses

Not on filedocumented in this encounter

## 2020-08-13 NOTE — XMS REPORT
Clinical Summary

                             Created on: 2020



Jas Lewis

External Reference #: [id number]

: 1950

Sex: Male



Demographics





                          Address                   [address]  [address]

 

                          Home Phone                [phone]

 

                          Preferred Language        English

 

                          Marital Status            Unknown

 

                          Yazdanism Affiliation     [facility]

 

                          Race                      White

 

                          Ethnic Group              Not  or 





Author





                          Author                    [facility]

 

                          Organization              [facility]

 

                          Address                   Unknown

 

                          Phone                     Unavailable







Support





                Name            Relationship    Address         Phone

 

                Kendy Richardson ECON            Unknown         [phone]

[phone]







Care Team Providers





                    Care Team Member Name Role                Phone

 

                    Arnaldo Singh         PCP                 [phone]

 

                    Tawil, Elias A MD   Unavailable         [phone]







Source Comments

Some departments are not documenting in the electronic medical record.  If you d
o not see the information that you expected, contact Release of Information in [facility] Health Information Management department at [phone] for further assistan
ce in locating additional records.[facility]



Allergies





                                        Comments



                 Active Allergy  Reactions       Severity        Noted Date 

 

                                         



                 Sulfa (Sulfonamide  HIVES           Medium          2014 



                                         Antibiotics)    







Medications





                          End Date                  Status



              Medication   Sig          Dispensed    Refills      Start  



                                         Date  

 

                                                    Active



                 amitriptyline (ELAVIL) 25  Take 25 mg by   0               //202  



                     mg tablet           mouth at            0  



                                         bedtime     



                                         daily.     

 

                                                    Active



                 atenoloL (TENORMIN) 50 mg  Take 50 mg by   0                 



                     tablet              mouth daily.        0  

 

                                                    Active



                 doxazosin (CARDURA) 4 mg  Take 4 mg by    0                 



                     tablet              mouth daily.        0  

 

                                                    Active



                 pantoprazole DR  Take 40 mg by   0                 



                     (PROTONIX) 40 mg tablet  mouth daily.        0  

 

                                                    Active



                 acetaminophen (TYLENOL)  Take two        0                 



                     325 mg tablet       tablets by          0  



                                         mouth every 6     



                                         hours as     



                                         needed.     

 

                                                    Active



              oxyCODONE (ROXICODONE) 5  Take one     30 tablet    0            0

  



                     mg tablet           tablet to two       0  



                                         tablets by     



                                         mouth every 4     



                                         hours as     



                                         needed     

 

                                                    Active



              senna/docusate (SENNA-S)  Take one     30 tablet    0            0

  



                     8.6/50 mg tablet    tablet by           0  



                                         mouth twice     



                                         daily.     

 

                                                    Active



              polyethylene glycol 3350  Take         527 g        3            0

  



                     (MIRALAX) 17 gram/dose  seventeen g         0  



                           powder                    by mouth     



                                         daily.     







Active Problems





 



                           Problem                   Noted Date

 

 



                           CKD (chronic kidney disease) stage 3, GFR 30-59 ml/mi

n  06/15/2020

 

 



                                         Overview:



                                         Baseline Cr ~1.5

 

 



                           Renal cell carcinoma of left kidney  2020

 

 



                                         Overview:



                                         Formatting of this note might be differ

ent from the original.



                                         LEFT renal mass s/p LEFT robotic partia

l nephrectomy on 2020 T1a clear



                                         cell renal cell carcinoma, grade 2 with

 necrosis, margins negative.





                                         Lab Results



                                         Component Value Date/Time



                                         CR 1.56 (H) 06/15/2020 11:08 AM



                                         CR 1.58 (H) 2020 04:38 AM



                                         CR 1.45 (H) 2020 07:34 AM





                                         





                                         L



                                         ast Assessment & Plan:



                                         69 y.o. male with history of lymphoma, 

LEFT renal mass s/p LEFT robotic



                                         partial nephrectomy on 2020 T1a cl

ear cell renal cell carcinoma, grade



                                         2 with necrosis, margins negative.





                                         Plan:



                                         - Patient plans to continue follow up s

urveillance for recurrence of mass



                                         with referring urologist Dr. Tawil give

n long distance to [facility]



                                         - Outlined typical surveillence plan to

 patient and family for pT1a masses



                                         with negative margins including imaging

 out to 3 years post-op and BMPs



                                         - He will transfer care back to Dr. Hamlin

il and follow up with [facility] prn.







Encounters





                          Care Team                 Description



                     Date                Type                Specialty  

 

                                        



Latoya Arellano RN                     Results



                     2020          Telephone           Urgent Care  

 

                                        



Dallas Johnson                          Preop testing



                     2020          Orders Only         Urology  

 

                                        



Miranda Lee MD                      Renal cell carcinoma of left kidney (HCC

); 

CKD (chronic kidney disease) stage 3, GFR 30-59 ml/min (HCC)



                     06/15/2020          Office Visit        Urology  

 

                                        



Miranda Lee MD                       



                     06/15/2020          Hospital            Lab  



                                         Encounter   

 

                                                     



                           06/15/2020                Travel   

 

                                        



Miranda Lee MD                      Results (pt calling for pathology result

s)



                     2020          Telephone           Urology  

 

                                        



Miranda Lee MD                      ROBOT ASSISTED LAPAROSCOPIC NEPHRECTOMY 

PARTIAL



                           2020                Surgery   

 

                                        



Wallisch, William, MD                    



                           2020                Anesthesia   



                                         Event   

 

                                        



Miranda Lee MD                      Renal cell carcinoma of left kidney (HCC

)



                           2020                Hospital   



                           -                         Encounter   



                                         2020                Travel   

 

                                        



Marian, MD Marian                     



                     05/15/2020          Orders Only         Lab  



from Last 3 Months



Family History





   



                 Medical History  Relation        Name            Comments

 

   



                           Cancer-Skin               Brother  

 

   



                           Cancer                    Daughter  

 

   



                           Cancer-Breast             Daughter  

 

   



                           Alzheimer's               Father  

 

   



                           Cancer                    Father  

 

   



                           Cancer-Colon              Father  

 

   



                           Hypertension              Father  

 

   



                           Neurologic Disorder       Father  

 

   



                           Hypertension              Maternal  



                                         Grandfather  

 

   



                     Cancer              Mother              Endometrail

 

   



                           Hypertension              Mother  







   



                 Relation        Name            Status          Comments

 

   



                                         Brother   

 

   



                                         Daughter   

 

   



                           Father                     

 

   



                                         Maternal Grandfather   

 

   



                                         Mother   







Social History





                                        Date



                 Tobacco Use     Types           Packs/Day       Years Used 

 

                                        Quit: 



                           Former Smoker             Pipe   

 

    



                     Smokeless Tobacco: Former  Chew                Quit: 



                                         User   







                    Drinks/Week         oz/Week             Comments



                                         Alcohol Use   

 

                                                             



                                         Never   







  



                     Alcohol Habits      Answer              Date Recorded

 

  



                     How often do you have a drink containing alcohol?  Never   

            2020

 

  



                           How many drinks containing alcohol do you have on  No

t asked 



                                         a typical day when you are drinking?  

 

  



                           How often do you have six or more drinks on one  Not 

asked 



                                         occasion?  







 



                           Sex Assigned at Birth     Date Recorded

 

 



                           Male                      2020  6:16 AM CDT







Last Filed Vital Signs





                    Reading             Time Taken          Comments



                                         Vital Sign   

 

                    116/71              06/15/2020  1:58 PM CDT  



                                         Blood Pressure   

 

                    76                  06/15/2020  1:58 PM CDT  



                                         Pulse   

 

                    37.5 C (99.5 F) 2020  7:27 AM CDT  



                                         Temperature   

 

                    -                   -                    



                                         Respiratory Rate   

 

                    93%                 2020  7:27 AM CDT  



                                         Oxygen Saturation   

 

                    -                   -                    



                                         Inhaled Oxygen   



                                         Concentration   

 

                    90.7 kg (200 lb)    06/15/2020  1:58 PM CDT  



                                         Weight   

 

                    180.3 cm (5' 11")   06/15/2020  1:58 PM CDT  



                                         Height   

 

                    27.89               06/15/2020  1:58 PM CDT  



                                         Body Mass Index   







Plan of Treatment





   



                 Health Maintenance  Due Date        Last Done       Comments

 

   



                           MEDICARE ANNUAL WELLNESS  1950  



                                         VISIT   

 

   



                           DTAP/TDAP VACCINES (1 -   1968  



                                         Tdap)   

 

   



                           HEPATITIS C SCREENING     1968  

 

   



                           PHYSICAL (COMPREHENSIVE)  1968  



                                         EXAM   

 

   



                           COLORECTAL CANCER         2000  



                                         SCREENING   

 

   



                           SHINGLES RECOMBINANT      2000  



                                         VACCINE (1 of 2)   

 

   



                           ABDOMINAL AORTIC ANEURYSM  2015  



                                         SCREENING   

 

   



                           PNEUMONIA (PPSV23)        2015  



                                         VACCINE (1 of 1 - PPSV23)   

 

   



                     INFLUENZA VACCINE   10/01/2020          10/23/2019 







Goals





     



            Goal       Patient    Associated  Recent Progress  Patient-Stat  Aut

hor



                     Goal Type           Problems            ed? 

 

     



                 Resume normal activities  [facility]        Lizette Gibson RN







Procedures





                                        Comments



                 Procedure Name  Priority        Date/Time       Associated Diag

nosis 

 

                                        



Results for this procedure are in the results section.



                 HC BASIC METABOLIC PANEL  Routine         06/15/2020      Left 

renal mass 



                                         11:08 AM CDT  

 

                                        



Results for this procedure are in the results section.



                     HC CREATININE-FLUID  Routine             2020  



                                         6:50 AM CDT  

 

                                        



Results for this procedure are in the results section.



                     HC BASIC METABOLIC PANEL  Routine             2020  



                                         4:38 AM CDT  

 

                                        



Results for this procedure are in the results section.



                     HC CBC,AUTOMATED    Routine             2020  



                                         4:38 AM CDT  

 

                                        



Results for this procedure are in the results section.



                 HC FROZEN SECTION #1  Routine         2020      Left blayne

l mass 



                                         10:59 AM CDT  

 

                                         



                     ULTRASOUND GUIDANCE -   2020          Left renal mass

 



                           INTRAOPERATIVE            7:45 AM CDT  

 

                                         



                     ROBOT ASSISTED      2020          Left renal mass 



                           LAPAROSCOPIC NEPHRECTOMY   7:45 AM CDT  



                                         PARTIAL    

 

                                        



Results for this procedure are in the results section.



                     HC COMPREHENSIVE    STAT                2020  



                           METABOLIC PANEL           7:34 AM CDT  

 

                                        



Results for this procedure are in the results section.



                     HC BLOOD TYPING, ABO  STAT                2020  



                           CONFIRM 91                6:55 AM CDT  

 

                                        



Results for this procedure are in the results section.



                     HC ABO GROUP        Routine             2020  



                                         6:45 AM CDT  

 

                                        



Results for this procedure are in the results section.



                     HC CBC,AUTOMATED    Routine             2020  



                                         6:45 AM CDT  

 

                                        



Results for this procedure are in the results section.



                           PATHOLOGY INTEROPERATIVE   2020  



                           REPORT SCAN               12:00 AM CDT  

 

                                        



Results for this procedure are in the results section.



                           TELEMETRY STRIPS-SCAN     2020  



                                         12:00 AM CDT  

 

                                        



Results for this procedure are in the results section.



                     EXTERNAL COVID-19   STAT                05/15/2020  



                           (SARS-COV-2)              7:15 AM CDT  

 

                                         



                 COVID-19 (SARS-COV-2) PCR  Routine         05/15/2020      Preo

p testing 



from Last 3 Months



Results

* BASIC METABOLIC PANEL (06/15/2020 11:08 AM CDT)



Only the most recent of 2 results within the time period is included.



    



              Component    Value        Ref Range    Performed At  Pathologist



                                         Signature

 

    



                 Sodium          136 (L)         137 - 147 MMOL/L  [facility] 

 

    



                 Potassium       4.3             3.5 - 5.1 MMOL/L  [facility] 

 

    



                 Chloride        106             98 - 110 MMOL/L  [facility] 

 

    



                 CO2             24              21 - 30 MMOL/L  [facility] 

 

    



                 Anion Gap       6               3 - 12          [facility] 

 

    



                 Glucose         81              70 - 100 MG/DL  [facility] 

 

    



                 Blood Urea      15              7 - 25 MG/DL    [facility] 



                                         Nitrogen    

 

    



                 Creatinine      1.56 (H)        0.4 - 1.24 MG/DL  [facility] 

 

    



                 Calcium         9.1             8.5 - 10.6 MG/DL  [facility] 

 

    



                 eGFR Non        44 (L)          >60 mL/min      [facility] 



                                              Comment:   



                           [employer]                  The eGFR is not validated f

or   



                                         use in drug dosing   



                                         adjustments. Continue to use   



                                         estimated creatinine   



                                         clearance per dosing reference   



                                         text. Please contact the   



                                         Clinical Pharmacist for   



                                         questions.   

 

    



                 eGFR     54 (L)          >60 mL/min      [facility] 



                           [employer]                  Comment:   



                                         The eGFR is not validated for   



                                         use in drug dosing   



                                         adjustments. Continue to use   



                                         estimated creatinine   



                                         clearance per dosing reference   



                                         text. Please contact the   



                                         Clinical Pharmacist for   



                                         questions.   











                                         Specimen

 





                                         Blood







   



                 Performing Organization  Address         City/[facility]/Counts include 234 beds at the [facility]

one Number

 

   



                      MAIN LAB         [address] [place] [address] 





* DRAIN FLUID CREATININE (2020  6:50 AM CDT)



    



              Component    Value        Ref Range    Performed At  Pathologist



                                         [facility]

 

    



                 Drain Fluid     1.80            mg/dL            [facility] 



                           Creatinine                Comment:   



                                         Body fluid to serum creatinine   



                                         ratios >1.0 suggest the   



                                         specimen may be   



                                         contaminated with urine   











                                         Specimen

 





                                         Drainage







   



                 Performing Organization  Address         City/State/Counts include 234 beds at the [facility]

one Number

 

   



                      MAIN LAB         [address] [place] [address] 





* CBC (2020  4:38 AM CDT)



Only the most recent of 2 results within the time period is included.



    



              Component    Value        Ref Range    Performed At  Pathologist



                                         [facility]

 

    



                 White Blood     7.7             4.5 - 11.0 K/UL   [facility] 



                                         Cells    

 

    



                 RBC             4.07 (L)        4.4 - 5.5 M/UL  [facility] 

 

    



                 Hemoglobin      12.1 (L)        13.5 - 16.5 GM/DL  [facility] 

 

    



                 Hematocrit      35.3 (L)        40 - 50 %       [facility] 

 

    



                 MCV             86.8            80 - 100 FL      [facility] 

 

    



                 MCH             29.8            26 - 34 PG       [facility] 

 

    



                 MCHC            34.3            32.0 - 36.0 G/DL   [facility] 

 

    



                 RDW             14.6            11 - 15 %       [facility] 

 

    



                 Platelet Count  134 (L)         150 - 400 K/UL   [facility] 

 

    



                 MPV             8.3             7 - 11 FL        [facility] 











                                         Specimen

 





                                         Blood







   



                 Performing Organization  Address         City/[facility]/Counts include 234 beds at the [facility]

one Number

 

   



                      MAIN LAB         [address] [place] [address] 





* SURGICAL PATHOLOGY          (2020 10:59 AM CDT)



    



              Component    Value        Ref Range    Performed At  Pathologist



                                         Signature

 

    



                     PATHOLOGY           [facility]   [employer] [facility] 



                           REPORT                    [facility]   



                                         [email or web address]   



                                         Department of Pathology and   



                                         Laboratory Medicine   



                                         [address] [place] [address]   



                                         Surgical Pathology Office:   



                                         [phone] Fax:   



                                         [phone]   



                                         SURGICAL PATHOLOGY REPORT   



                                         NAME: JAS LEWIS SURG   



                                         PATH #: [id number] MR #:   



                                         [id number] SPECIMEN   



                                         CLASS: SR BILLING #:   



                                         [id number] ALT ID #:   



                                         LOCATION: [facility] DATE OF   



                                         PROCEDURE: 2020 AGE: 69   



                                         SEX: M DATE RECEIVED:   



                                         2020 :   



                                         1950 TIME RECEIVED:   



                                         11:14 PHYSICIAN:   



                                         MIRANDA LEE  UROTEMITOPE DATE OF   



                                         REPORT: 2020 COPY TO:   



                                         DATE OF PRINTIN2020   



                                         ##############################   



                                         ##############################   



                                         ############   



                                         Final Diagnosis:   



                                         A. Kidney, deep margin,   



                                         biopsy:    



                                         Negative for malignancy.   



                                         B. Soft tissue, peritumor fat,   



                                         biopsy:   



                                         Negative for malignancy.   



                                         C. Kidney, left, partial   



                                         nephrectomy:   



                                         Renal cell carcinoma, clear   



                                         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   



                                         2.7 cm, with   



                                         necrosis, confined to kidney.   



                                         Margins uninvolved.   



                                         Comment:   



                                         KIDNEY: Nephrectomy, Partial   



                                         or Radical   



                                         CAP Version: Kidney 4.0.1.0   



                                         Procedure   



                                         Partial nephrectomy   



                                         Specimen Laterality   



                                         Left   



                                         Tumor Site (select all that   



                                         apply)   



                                         Not specified   



                                         Tumor Size (largest tumor if   



                                         multiple)   



                                         Greatest dimension: 2.7 cm   



                                         Tumor Focality   



                                         Unifocal   



                                         Histologic Type   



                                         Clear cell renal cell   



                                         carcinoma   



                                         Sarcomatoid Features   



                                         Not identified   



                                         Rhabdoid Features   



                                         Not identified   



                                         Tumor Necrosis (any amount)   



                                         Present: <5%   



                                         Histologic Grade (WHO/ISUP)   



                                         G2: Nucleoli are conspicuous   



                                         and eosinophilic at x400   



                                         magnification   



                                         and visible but not prominent   



                                                  



                                         at x100 magnification.   



                                         Tumor Extension (select all   



                                         that apply)   



                                         Tumor limited to kidney   



                                         Tumor extension into other   



                                         organ(s)/structure(s)   



                                         (specify):   



                                         Margins (select all that   



                                         apply)   



                                         Margins uninvolved by invasive   



                                         carcinoma   



                                         Lymph-Vascular Invasion   



                                         (excluding renal vein and its   



                                         muscle containing segmental   



                                         branches and   



                                         inferior venacava)   



                                         Not identified   



                                         Pathologic Staging (pTNM)   



                                         TNM Descriptors (required only   



                                         if applicable) (select all   



                                         that apply)   



                                         Primary Tumor (pT)   



                                         pT1: Tumor 7 cm or less in   



                                         greatest dimension, limited to   



                                         the kidney   



                                         pT1a:   Tumor 4 cm or less   



                                         in greatest dimension, limited   



                                         to the   



                                         kidney   



                                         Regional Lymph Nodes (pN)   



                                         pNX:   Regional lymph   



                                         nodes cannot be assessed   



                                         No nodes submitted or found   



                                         Distant Metastasis (pM)   



                                         Not applicable   



                                         Pathologic Findings in   



                                         Nonneoplastic Kidney (select   



                                         all that apply)   



                                         Insufficient tissue (partial   



                                         nephrectomy specimen with <5   



                                         mm of   



                                         adjacent nonneoplastic kidney)   



                                         Significant pathologic   



                                         alterations   



                                         Glomerular disease (specify   



                                         type): Mild   



                                         Vascular disease (specify   



                                         type): Mild   



                                         Other Tumors and/or Tumor-like   



                                         Lesions (select all that   



                                         apply)   



                                         None   



                                         The pathologic stage assigned   



                                         here should be regarded as   



                                         provisional, as   



                                         it reflects only current   



                                         pathologic data and does not   



                                         incorporate full   



                                         knowledge of the patient's   



                                         clinical status and/or prior   



                                         pathology.   



                                         Pursuant to the Quality   



                                         Assurance Program at the   



                                         [facility] Pathology Department,   



                                         selected slides from this case   



                                         have been   



                                         concurrently reviewed by the   



                                         following pathologist:  Dr. Danna Avila who   



                                         agrees with the final   



                                         diagnosis.   



                                         Attestation:   



                                         By this signature, I attest   



                                         that I have personally   



                                         formulated the final   



                                         interpretation expressed in   



                                         this report and that the above   



                                         diagnosis is   



                                         based upon my examination of   



                                         the slides and/or other   



                                         material indicated in   



                                         this report.   



                                         +++Electronically Signed Out   



                                         By RAKAN DESAI MD on   



                                         2020+++   



                                               



                                         rosie/2020   



                                              



                                         ##############################   



                                         ##############################   



                                         ############   



                                         Material Received:   



                                         A: deep margin   



                                         B: peritumor fat   



                                         C: left renal mass stitch at   



                                         deep margin   



                                         History:   



                                         69-year-old male with a   



                                         clinical history of a left   



                                         renal mass.   



                                         Gross Description:   



                                         A. Received fresh labeled with   



                                         the patient's name and "deep   



                                         margin" is a   



                                         0.7 x 0.5 x 0.3 cm fragment of   



                                         tan-brown soft tissue. The   



                                         specimen is   



                                         submitted entirely in cassette   



                                         A1FS for frozen and permanent   



                                         sectioning.   



                                         (sc)   



                                         B. Received in formalin   



                                         labeled with the patient's   



                                         name and "peritumor   



                                         fat" is a 2.8 x 2.3 x 0.3 cm   



                                         aggregate of yellow-tan   



                                         fibroadipose tissue.   



                                         The specimen is submitted   



                                         without sectioning in cassette   



                                         B1. (sc)   



                                         C. Fixative: Formalin   



                                         Labeled: "Left renal mass"   



                                         Measurement: 4.7 x 2.9 x 2.4   



                                         cm   



                                         Mass: 2.7 x 2.4 x 1.8 cm   



                                         Mass appearance: Tan-yellow to   



                                         tan-red, soft, hemorrhagic and   



                                         possibly   



                                         necrotic   



                                         Perinephric fat involved by   



                                         tumor: No   



                                         The resection margin is inked   



                                         black. The remainder of the   



                                         specimen is   



                                         inked blue.   



                                         Mass from inked resection   



                                         margin: 0.1 cm   



                                         Tissue submitted to   



                                         Biospecimen Repository Core   



                                         Facility: No   



                                         Representative sections are   



                                         submitted as follows:   



                                         C1-C2   Representative   



                                         sections of mass to inked   



                                         margin.   



                                         C3   Additional   



                                         representative section of   



                                         tumor.   



                                         C4   Tumor nearest   



                                         perinephric fat (blue ink).   



                                         C5   Representative   



                                         section of uninvolved kidney.   



                                         (cjj)   



                                         sc/2020   



                                         Intraoperative Consultation:   



                                         A1FS, kidney, "deep margin",   



                                         biopsy:   



                                         Negative for malignancy.   



                                         MARINO BRIZUELA MD   











                                         Specimen

 





                                         Tissue - Kidney, Left

 





                                         Tissue - Kidney, Left

 





                                         Tissue - Kidney, Left







   



                 Performing Organization  Address         City/State/Zipcode  Ph

one Number

 

   



                     [facility]         [address] [place]

 [address] 





* COMPREHENSIVE METABOLIC PANEL (2020  7:34 AM CDT)



    



              Component    Value        Ref Range    Performed At  Pathologist



                                         Signature

 

    



                 Sodium          135 (L)         137 - 147 MMOL/L  [facility] 

 

    



                 Potassium       3.8             3.5 - 5.1 MMOL/L  [facility] 

 

    



                 Chloride        104             98 - 110 MMOL/L  [facility] 

 

    



                 Glucose         91              70 - 100 MG/DL  [facility] 

 

    



                 Blood Urea      18              7 - 25 MG/DL    [facility] 



                                         Nitrogen    

 

    



                 Creatinine      1.45 (H)        0.4 - 1.24 MG/DL  [facility] 

 

    



                 Calcium         8.7             8.5 - 10.6 MG/DL  [facility] 

 

    



                 Total Protein   8.7 (H)         6.0 - 8.0 G/DL  [facility] 

 

    



                 Total Bilirubin  1.2             0.3 - 1.2 MG/DL  [facility] 

 

    



                 Albumin         3.4 (L)         3.5 - 5.0 G/DL  [facility] 

 

    



                 Alk Phosphatase  58              25 - 110 U/L    [facility] 

 

    



                 AST (SGOT)      23              7 - 40 U/L      [facility] 

 

    



                 CO2             24              21 - 30 MMOL/L  [facility] 

 

    



                 ALT (SGPT)      29              7 - 56 U/L       [facility] 

 

    



                 Anion Gap       7               3 - 12           [facility] 

 

    



                 eGFR Non        48 (L)          >60 mL/min       [facility] 



                                              Comment:   



                           [employer]                  The eGFR is not validated f

or   



                                         use in drug dosing   



                                         adjustments. Continue to use   



                                         estimated creatinine   



                                         clearance per dosing reference   



                                         text. Please contact the   



                                         Clinical Pharmacist for   



                                         questions.   

 

    



                 eGFR     58 (L)          >60 mL/min       [facility] 



                           [facility]                  Comment:   



                                         The eGFR is not validated for   



                                         use in drug dosing   



                                         adjustments. Continue to use   



                                         estimated creatinine   



                                         clearance per dosing reference   



                                         text. Please contact the   



                                         Clinical Pharmacist for   



                                         questions.   











                                         Specimen

 





                                         Blood







   



                 Performing Organization  Address         City/[facility]/Counts include 234 beds at the [facility]

one Number

 

   



                      [facility]         [address] [place] [address] 





* BLOOD TYPE CONFIRMATION - ORDER ONLY IF REQUESTED BY LAB (2020  6:55 AM 
  CDT)



    



              Component    Value        Ref Range    Performed At  Pathologist



                                         Signature

 

    



                     ABO/RH(D)           B POS                [facility] 











                                         Specimen

 





                                         Blood







   



                 Performing Organization  Address         City/State/Counts include 234 beds at the [facility]

one Number

 

   



                      [facility]         [address] [place] [address] 





* TYPE & CROSSMATCH (2020  6:45 AM CDT)



    



              Component    Value        Ref Range    Performed At  Pathologist



                                         Signature

 

    



                     Units Ordered       4                    [facility] 

 

    



                     Crossmatch          2020,2359      [facility] 



                                         Expires    

 

    



                     Record Check        2ND TYPE REQUIRED    [facility] 

 

    



                     ABO/RH(D)           B POS                [facility] 

 

    



                     Antibody Screen     NEG                  [facility] 

 

    



                     Electronic          YES                  [facility] 



                                         Crossmatch    

 

    



                     Unit Number         [id number]        [facility] 

 

    



                     Blood Component     RBC,ADSOL,LEUKO REDUCED    [facility] 



                                         Type    

 

    



                     Unit Division       0                    [facility] 

 

    



                     Status OF Unit      REL FROM ALLOC       [facility] 

 

    



                     Transfusion         OK TO TRANSFUSE      [facility] 



                                         Status    

 

    



                     Crossmatch          COMPATIBLE,ELECTRONIC    [facility] 



                                         Result    

 

    



                     Unit Number         [id number]        [facility] 

 

    



                     Blood Component     RBC,ADSOL,LEUKO REDUCED    [facility] 



                                         Type    

 

    



                     Unit Division       0                    [facility] 

 

    



                     Status OF Unit      REL FROM ALLOC       [facility] 

 

    



                     Transfusion         OK TO TRANSFUSE      [facility] 



                                         Status    

 

    



                     Crossmatch          COMPATIBLE,ELECTRONIC    [facility] 



                                         Result    











                                         Specimen

 





                                         Blood







   



                 Performing Organization  Address         City/[facility]/Counts include 234 beds at the [facility]

one Number

 

   



                      [facility]         [address] [place] [address] 





* PATHOLOGY INTEROPERATIVE REPORT SCAN (2020 12:00 AM CDT)



 



                           Narrative                 Performed At

 

 



                                         This result has an attachment that is n

ot available. 



                                         Ordered by an unspecified provider. 





* TELEMETRY STRIPS-SCAN (2020 12:00 AM CDT)



 



                           Narrative                 Performed At

 

 



                                         This result has an attachment that is n

ot available. 



                                         Ordered by an unspecified provider. 





* EXTERNAL COVID-19 (SARS-COV-2) (05/15/2020  7:15 AM CDT)



    



              Component    Value        Ref Range    Performed At  Pathologist



                                         Signature

 

    



                           Overall                   [facility] OUTSIDE 



                           COVID-19                  LAB 



                                         (SARS-CoV-2)    



                                         Result    

 

    



                     Source COVID-19     Unknown             [facility] 



                           (SARS-CoV-2)              LAB 

 

    



                     COVID-19            Negative            [facility] OUTSIDE 



                           (SARS-CoV-2)              LAB 



                                         RNA    

 

    



                           Pan-SARS RNA              [facility] OUTSIDE 



                                         LAB 











                                         Specimen

 





                                         Nasopharyngeal Swab -



                                         Nasopharyngeal Swab







 



                           Narrative                 Performed At

 

 



                                         This result has an attachment that is n

ot available. 







   



                 Performing Organization  Address         City/State/Zipcode  Ph

one Number

 

   



                                         [facility] LAB   





* COVID-19 (SARS-COV-2) PCR (05/15/2020)



    



              Component    Value        Ref Range    Performed At  Pathologist



                                         Signature

 

    



                           COVID-19                  [facility] 



                                         (SARS-CoV-2)    



                                         PCR    

 

    



                           COVID-19                  [facility] 



                                         (SARS-CoV-2)    



                                         PCR Source    











                                         Specimen

 





                                         Nasopharyngeal Swab







 



                           Narrative                 Performed At

 

 



                                         This result has an attachment that is n

ot available. 







   



                 Performing Organization  Address         City/State/Zipcode  Ph

one Number

 

   



                      [facility]         [address] [place]

 [address] 





from Last 3 Months



Insurance





                                        Type



            Payer      Benefit    Subscriber ID  Effective  Phone      Address 



                           Plan /                    Dates   



                                         Group     

 

                                        Medicare



                 MEDICARE        MEDICARE        [id number]     2015-P   



                           PART A AND                resent   



                                         B     

 

                                        PPO



                 [employer] LIFE [employer] CASUALTY  BANKERS         [id number]       20

20-P   



                           LIFE &                    resent   



                                         CASUALTY     







     



            Guarantor Name  Account    Relation to  Date of    Phone      Royal barrios Address



                     Type                Patient             Birth  

 

     



            Jas Lewis  Personal/F  Self       1950  [id number]

[address]               (Home)              [place] [address]







Advance Directives





                          Patient Representative    Explanation



                           Type                      Date Recorded  

 

                                                     



                                         Advance   



                                         Directive/DPOA   











                          Date Inactivated          Comments



                           Code Status               Date Activated  

 

                          2020  1:36 PM         



                           Full Code                 2020  4:34 PM  







  



                           Provider has discussed Code Status  Yes 



                                         w/Patient or Family?

## 2020-08-13 NOTE — XMS REPORT
Encounter Summary

                             Created on: 2020



Cas Lewis

External Reference #: [id number]

: 1950

Sex: Male



Demographics





                          Address                   [address]  [address]

 

                          Home Phone                [phone]

 

                          Preferred Language        English

 

                          Marital Status            Unknown

 

                          Ho[employer]ism Affiliation     [employer]

 

                          Race                      White

 

                          Ethnic Group              Not  or 





Author





                          Author                    [facility]

 

                          Organization              [facility]

 

                          Address                   Unknown

 

                          Phone                     Unavailable







Support





                Name            Relationship    Address         Phone

 

                Kendy Richardson ECON            Unknown         [phone]

19







Care Team Providers





                    Care Team Member Name Role                Phone

 

                    Arnaldo Singh         PCP                 [phone]

 

                    Tawil, Elias A MD   Unavailable         [phone]







Encounter Details





                          Care Team                 Description



                     Date                Type                Department  

 

                                                     



                           2020                Travel   







Social History





                                        Date



                 Tobacco Use     Types           Packs/Day       Years Used 

 

                                        Quit: 



                           Former Smoker             Pipe   

 

    



                     Smokeless Tobacco: Former  Chew                Quit: 



                                         User   







                    Drinks/Week         oz/Week             Comments



                                         Alcohol Use   

 

                                                             



                                         Never   







  



                     Alcohol Habits      Answer              Date Recorded

 

  



                     How often do you have a drink containing alcohol?  Never   

            2020

 

  



                           How many drinks containing alcohol do you have on  No

t asked 



                                         a typical day when you are drinking?  

 

  



                           How often do you have six or more drinks on one  Not 

asked 



                                         occasion?  







 



                           Sex Assigned at Birth     Date Recorded

 

 



                           Male                      2020  6:16 AM CDT







                                        Date Recorded



                           COVID-19 Exposure         Response 

 

                                        2020  6:14 AM CDT



                           In the last month, have you been in contact with  No 

/ Unsure 



                                         someone who was confirmed or suspected 

to have  



                                         Coronavirus / COVID-19?  



documented as of this encounter



Plan of Treatment





Not on filedocumented as of this encounter



Goals





     



            Goal       Patient    Associated  Recent Progress  Patient-Stat  Aut

hor



                     Goal Type           Problems            ed? 

 

     



                 Resume normal activities  Hospital        No              Lizette Zacarias RN



documented as of this encounter



Visit Diagnoses

Not on filedocumented in this encounter CHIEF COMPLAINT: post cath bradycardia      PAST MEDICAL & SURGICAL HISTORY:  DARLINE on CPAP  Chronic obstructive pulmonary disease, unspecified COPD type  HTN (hypertension), benign  Hyperlipidemia  S/P colonoscopy      HPI: 56 year male, h/o HTN , HLD, smoker, COPD, DARLINE,  complaining of worsening dyspnea, seen in my office, found to have stress echo with abnormal wall motion seen at peak exertion, exercise induced VPBs and exercise induced nonsustained VT ,  consistent with positive stress test.  KY interval on EKG prior to initiation of beta blocker was normal at 150ms, with LAFB, The patient subsequently underwent holter monitoring where he was found to have frequent ventricular ectopy, but no bradycardia.  He was started on  placed on metoprolol XL 25 mg daily, ASA, plavix and was referred for cath.  He has had no symptoms of     Prior to cardiac cath today, the patient had a KY interval of 206 ms, with LAHB.   He took trazadone and metoprolol  this am     At cath, he was found to have a 30% LeftMain and a 90% prox LAD lesion, and he underwent successful rotational atherectomy and stenting. Upon return to recovery, the patient was noted to have transient 2nd degree AV block type 1, as iwell as an episode  of CHB narrow complex junctional escape at 11:40:26 with resultant HR 30, episode around 5 seconds in duration asymptomatic . He also had  2-3 further similar events at that time. There was an  episode of APB followed by atrial  asystole and junctional escape, both narrow and wide complex escape at 14:29:30 , with a  resultant hear rate @20 BPM, lasting for approx 8 seconds, patient asymptomatic. At 15:30:08, the patient again had an episode of complete heart block with resultant wide complex escape, heart rate 20, lasting approx 5 seconds, asymptomatic .     He is currently in NSR@71 BPM.                   PREVIOUS DIAGNOSTIC TESTING:      ECHO  FINDINGS:    STRESS  FINDINGS:    CATHETERIZATION  FINDINGS:    ELECTROPHYSIOLOGY STUDY  FINDINGS:    CAROTID ULTRASOUND:  FINDINGS    VENOUS DUPLEX SCAN:  FINDINGS:    CHEST CT PULMONARY ANGIO with IV Contrast:  FINDINGS:  MEDICATIONS  (STANDING):  amLODIPine   Tablet 5 milliGRAM(s) Oral daily  aspirin enteric coated 81 milliGRAM(s) Oral daily  atorvastatin 40 milliGRAM(s) Oral at bedtime  buDESOnide 160 MICROgram(s)/formoterol 4.5 MICROgram(s) Inhaler 2 Puff(s) Inhalation two times a day  clopidogrel Tablet 75 milliGRAM(s) Oral daily  hydrochlorothiazide 25 milliGRAM(s) Oral daily  losartan 100 milliGRAM(s) Oral daily  montelukast 10 milliGRAM(s) Oral daily  sodium chloride 0.9% lock flush 3 milliLiter(s) IV Push every 8 hours  tiotropium 18 MICROgram(s) Capsule 1 Capsule(s) Inhalation daily  venlafaxine XR. 150 milliGRAM(s) Oral daily    MEDICATIONS  (PRN):  ALBUTerol    90 MICROgram(s) HFA Inhaler 2 Puff(s) Inhalation every 6 hours PRN Shortness of Breath and/or Wheezing      FAMILY HISTORY:  No pertinent family history in first degree relatives      SOCIAL HISTORY:    CIGARETTES:    ALCOHOL:    REVIEW OF SYSTEMS:    CONSTITUTIONAL: No fever, weight loss, chills, shakes, or fatigue  EYES: No eye pain, visual disturbances, or discharge  ENMT:  No difficulty hearing, tinnitus, vertigo; No sinus or throat pain  NECK: No pain or stiffness  BREASTS: No pain, masses, or nipple discharge  RESPIRATORY: No cough, wheezing, hemoptysis, or shortness of breath  CARDIOVASCULAR: No chest pain, dyspnea, palpitations, dizziness, syncope, paroxysmal nocturnal dyspnea, orthopnea, or arm or leg swelling  GASTROINTESTINAL: No abdominal  or epigastric pain, nausea, vomiting, hematemesis, diarrhea, constipation, melena or bright red blood.  GENITOURINARY: No dysuria, nocturia, hematuria, or urinary incontinence  NEUROLOGICAL: No headaches, memory loss, slurred speech, limb weakness, loss of strength, numbness, or tremors  SKIN: No itching, burning, rashes, or lesions   LYMPH NODES: No enlarged glands  ENDOCRINE: No heat or cold intolerance, or hair loss  MUSCULOSKELETAL: No joint pain or swelling, muscle, back, or extremity pain  PSYCHIATRIC: No depression, anxiety, or difficulty sleeping  HEME/LYMPH: No easy bruising or bleeding gums  ALLERY AND IMMUNOLOGIC: No hives or rash.      Vital Signs Last 24 Hrs  T(C): 36.6 (2019 09:55), Max: 36.8 (2019 07:47)  T(F): 97.9 (2019 09:55), Max: 98.2 (2019 07:47)  HR: 62 (2019 16:45) (51 - 71)  BP: 136/76 (2019 16:45) (113/69 - 157/73)  BP(mean): 101 (2019 07:47) (101 - 101)  RR: 16 (2019 16:45) (16 - 18)  SpO2: 99% (2019 16:45) (96% - 100%)  Daily Height in cm: 185.42 (2019 09:55)    Daily Weight in k.7 (2019 07:47)    - @ 07:01  -  - @ 19:32  --------------------------------------------------------  IN: 240 mL / OUT: 0 mL / NET: 240 mL          PHYSICAL EXAM:    GENERAL: In no apparent distress, well nourished, and hydrated.  HEAD:  Atraumatic, Normocephalic  EYES: EOMI, PERRLA, conjunctiva and sclera clear  ENMT: No tonsillar erythema, exudates, or enlargement; Moist mucous membranes, Good dentition, No lesions  NECK: Supple and normal thyroid.  No JVD or carotid bruit.  Carotid pulse is 2+ bilaterally.  HEART: Regular rate and rhythm; No murmurs, rubs, or gallops.  PULMONARY: Clear to auscultation and perfusion.  No rales, wheezing, or rhonchi bilaterally.  ABDOMEN: Soft, Nontender, Nondistended; Bowel sounds present  EXTREMITIES:  2+ Peripheral Pulses, No clubbing, cyanosis, or edema  LYMPH: No lymphadenopathy noted  NEUROLOGICAL: Grossly nonfocal          INTERPRETATION OF TELEMETRY:    ECG:    I&O's Detail    2019 07:  -  2019 19:32  --------------------------------------------------------  IN:    Oral Fluid: 240 mL  Total IN: 240 mL    OUT:  Total OUT: 0 mL    Total NET: 240 mL          LABS:                        12.7   5.6   )-----------( 187      ( 2019 08:01 )             40.9     08    141  |  105  |  18  ----------------------------<  96  3.4<L>   |  26  |  1.01    Ca    8.8      2019 08:01    TPro  6.6  /  Alb  4.2  /  TBili  0.6  /  DBili  x   /  AST  28  /  ALT  27  /  AlkPhos  75  04-08            BNP  I&O's Detail    2019 07:01  -  2019 19:32  --------------------------------------------------------  IN:    Oral Fluid: 240 mL  Total IN: 240 mL    OUT:  Total OUT: 0 mL    Total NET: 240 mL        Daily Height in cm: 185.42 (2019 09:55)    Daily Weight in k.7 (2019 07:47)    RADIOLOGY & ADDITIONAL STUDIES:

## 2020-08-13 NOTE — XMS REPORT
Encounter Summary

                             Created on: 2020



Cas Lewis

External Reference #: [id number]

: 1950

Sex: Male



Demographics





                          Address                   [address] [place]  [address]

 

                          Home Phone                [phone]

 

                          Preferred Language        English

 

                          Marital Status            Unknown

 

                          B[employer]ism Affiliation     [employer]

 

                          Race                      White

 

                          Ethnic Group              Not  or 





Author





                          Author                    [facility]

 

                          Organization              [facility]

 

                          Address                   Unknown

 

                          Phone                     Unavailable







Support





                Name            Relationship    Address         Phone

 

                Kendy Richardson ECON            Unknown         [phone]

19







Care Team Providers





                    Care Team Member Name Role                Phone

 

                    Arnaldo Singh         PCP                 [phone]

 

                    Tawil, Elias A MD   Unavailable         [phone]







Reason for Visit

* 



  



                     Reason              Onset Date          Comments

 

  



                           Navigation Assessment     2020 









Encounter Details





                          Care Team                 Description



                     Date                Type                Department  

 

                                        



Nabil Lee MD



 [address]



[facility] [address]



[place] [address]



[phone] (Fax)                      Navigation Assessment



                     2020          Telephone           [facility]  



                                         [facility] [address] [address]  



                                         [phone]  







Social History





                                        Date



                 Tobacco Use     Types           Packs/Day       Years Used 

 

                                         



                                         Never Assessed    







 



                           Sex Assigned at Birth     Date Recorded

 

 



                                         Male 



documented as of this encounter



Miscellaneous Notes

* Telephone Encounter - Ayleen Hoyt RN - 2020  2:12 PM CDT



Formatting of this note might be different from the original.

Navigation Intake Assessment Document



Patient Name:  Cas Lewis

:  1950

Insurance:   Medicare



Future Appointments 

Date Time Provider Department Center 

2020 10:00 AM Nabil Lee MD [facility] 



Diagnosis & Reason for Visit:  Left Renal Mass



Physician Info:

 Referring Physician:  Elias Tawil

 PCP:  Arnaldo Singh



Location of Films:  [facility] 



Location of Pathology:  None



History of Present Illness:  Mr. Lewis is a 69 year old gentleman referred by
Dr. Tawil for left renal mass concerning for renal cell carcinoma.  The mass is 
described as 3.5 x 3.3 cm in size and enhancing.  CT was ordered by Dr. Chin for
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  CT also identified an a
bnormality in the bladder but Dr. Tawil performed a cystoscopy and the abnormali
ty was noted to be the prostate protruding into the bladder.  Patient also has a
history of elevated PSA but prostate biopsy done in 2019 showed only b
enign tissue.  Dr. Chin's note references a remote history of colon cancer.  Ot
er medical history includes hypertension.  Patient formerly smoked a pipe for ab
out three years.



Imaging:  3-16-20 CT Chest W Ab/Pel W/WO - Impression:  1.  Enhancing left renal
mass concerning for renal malignancy.  2.  3 mm upper lobe pulmonary nodule.  3.
 Apparent filling defect of the urinary bladder just to the right of midline.  
This potentially could reflect a primary urinary bladder lesion vs eccentric pr
otrusion of the prostate gland.  Correlation with cystoscopy is recommended.  



Prior Treatment (XRT, Surgery, Chemotherapy):  None



Comments:  This RN spoke to patient who verbalized understanding of date, time a
nd location of appointment.  Gave direct contact information for further questio
ns or concerns.  Appointment information sent to patient via mail.  Patient noti
fied of zero visitor policy.  ANTONETTE Naranjo







Electronically signed by Ayleen Hoyt, RN at 2020  8:38 AM CDT

documented in this encounter



Plan of Treatment





Not on filedocumented as of this encounter



Goals





     



            Goal       Patient    Associated  Recent Progress  Patient-Stat  Aut

hor



                     Goal Type           Problems            ed? 

 

     



                 Resume normal activities  [facility]        No              Lizette Zacarias RN



documented as of this encounter



Visit Diagnoses

Not on filedocumented in this encounter

## 2020-08-13 NOTE — XMS REPORT
Encounter Summary

                             Created on: 2020



Cas Lewis

External Reference #: [id number]

: 1950

Sex: Male



Demographics





                          Address                   [address] [place]  [address]

 

                          Home Phone                [phone]

 

                          Preferred Language        English

 

                          Marital Status            Unknown

 

                          [employer] Affiliation     [facility]

 

                          Race                      White

 

                          Ethnic Group              Not  or 





Author





                          Author                    [facility] 

System

 

                          Organization              [facility]

 

                          Address                   Unknown

 

                          Phone                     Unavailable







Support





                Name            Relationship    Address         Phone

 

                Kendy Richardson ECON            Unknown         [phone]

[phone]







Care Team Providers





                    Care Team Member Name Role                Phone

 

                    Arnaldo Singh         PCP                 [phone]

 

                    Tawil, Elias A MD   Unavailable         [phone]







Encounter Details





                          Care Team                 Description



                     Date                Type                Department  

 

                                        



Nabil Lee MD



 [address]



[facility] [address]



[place] [address]



[phone] (Fax)                       



                     06/15/2020          [facility]                 [facility]  







Social History





                                        Date



                 Tobacco Use     Types           Packs/Day       Years Used 

 

                                        Quit: 



                           Former Smoker             Pipe   

 

    



                     Smokeless Tobacco: Former  Chew                Quit: 



                                         User   







                    Drinks/Week         oz/Week             Comments



                                         Alcohol Use   

 

                                                             



                                         Never   







  



                     Alcohol Habits      Answer              Date Recorded

 

  



                     How often do you have a drink containing alcohol?  Never   

            2020

 

  



                           How many drinks containing alcohol do you have on  No

t asked 



                                         a typical day when you are drinking?  

 

  



                           How often do you have six or more drinks on one  Not 

asked 



                                         occasion?  







 



                           Sex Assigned at Birth     Date Recorded

 

 



                           Male                      2020  6:16 AM CDT







                                        Date Recorded



                           COVID-19 Exposure         Response 

 

                                        6/15/2020 11:02 AM CDT



                           In the last month, have you been in contact with  No 

/ Unsure 



                                         someone who was confirmed or suspected 

to have  



                                         Coronavirus / COVID-19?  



documented as of this encounter



Functional Status





                                        Date of Assessment



                           Functional Status         Response 

 

                                        2020



                           Does the patient have a hearing impairment:  No 

 

                                        2020



                           Does the patient have a visual impairment:  No 

 

                                        2020



                           Does the patient have impaired ambulation:  No 

 

                                        2020



                           Does the patient have an activity of daily living  No

 



                                         (ADL) impairment:  

 

                                        2020



                           Does the patient have an instrumental activity of  No

 



                                         daily living (IADL) impairment:  







                                        Date of Assessment



                           Cognitive Status          Response 

 

                                        2020



                           Does the patient have a cognitive impairment:  No 



documented as of this encounter



Medications at Time of Discharge





                          Start Date                End Date



                 Medication      Sig             Dispensed       Refills  

 

                          2020                 



                     acetaminophen (TYLENOL)  Take two            0  



                           325 mg tablet             tablets by    



                                         mouth every 6    



                                         hours as    



                                         needed.    

 

                          2020                 



                     amitriptyline (ELAVIL) 25  Take 25 mg by       0  



                           mg tablet                 mouth at    



                                         bedtime    



                                         daily.    

 

                          2020                 



                     atenoloL (TENORMIN) 50 mg  Take 50 mg by       0  



                           tablet                    mouth daily.    

 

                          2020                 



                     doxazosin (CARDURA) 4 mg  Take 4 mg by        0  



                           tablet                    mouth daily.    

 

                          2020                 



                 oxyCODONE (ROXICODONE) 5  Take one        30 tablet       0  



                           mg tablet                 tablet to two    



                                         tablets by    



                                         mouth every 4    



                                         hours as    



                                         needed    

 

                          2020                 



                     pantoprazole DR     Take 40 mg by       0  



                           (PROTONIX) 40 mg tablet   mouth daily.    

 

                          2020                 



                 polyethylene glycol 3350  Take            527 g           3  



                           (MIRALAX) 17 gram/dose    seventeen g    



                           powder                    by mouth    



                                         daily.    

 

                          2020                 



                 senna/docusate (SENNA-S)  Take one        30 tablet       0  



                           8.6/50 mg tablet          tablet by    



                                         mouth twice    



                                         daily.    



documented as of this encounter



Plan of Treatment





Not on filedocumented as of this encounter



Goals





     



            Goal       Patient    Associated  Recent Progress  Patient-Stat  Aut

hor



                     Goal Type           Problems            ed? 

 

     



                 Resume normal activities  [facility]              Lizette Zacarias RN



documented as of this encounter



Procedures





                                        Comments



                 Procedure Name  Priority        Date/Time       Associated Diag

nosis 

 

                                        



Results for this procedure are in the results section.



                 HC BASIC METABOLIC PANEL  Routine         06/15/2020      Left 

renal mass 



                                         11:08 AM CDT  



documented in this encounter



Results

* BASIC METABOLIC PANEL (06/15/2020 11:08 AM CDT)



    



              Component    Value        Ref Range    Performed At  Pathologist



                                         Signature

 

    



                 Sodium          136 (L)         137 - 147 MMOL/L  [facility] 

 

    



                 Potassium       4.3             3.5 - 5.1 MMOL/L  [facility] 

 

    



                 Chloride        106             98 - 110 MMOL/L  [facility] 

 

    



                 CO2             24              21 - 30 MMOL/L  [facility] 

 

    



                 Anion Gap       6               3 - 12          [facility] 

 

    



                 Glucose         81              70 - 100 MG/DL  [facility] 

 

    



                 Blood Urea      15              7 - 25 MG/DL    [facility] 



                                         Nitrogen    

 

    



                 Creatinine      1.56 (H)        0.4 - 1.24 MG/DL  [facility] 

 

    



                 Calcium         9.1             8.5 - 10.6 MG/DL  [facility] 

 

    



                 eGFR Non        44 (L)          >60 mL/min      [facility] 



                                              Comment:   



                           [employer]                  The eGFR is not validated f

or   



                                         use in drug dosing   



                                         adjustments. Continue to use   



                                         estimated creatinine   



                                         clearance per dosing reference   



                                         text. Please contact the   



                                         Clinical Pharmacist for   



                                         questions.   

 

    



                 eGFR     54 (L)          >60 mL/min      [facility] 



                           [employer]                  Comment:   



                                         The eGFR is not validated for   



                                         use in drug dosing   



                                         adjustments. Continue to use   



                                         estimated creatinine   



                                         clearance per dosing reference   



                                         text. Please contact the   



                                         Clinical Pharmacist for   



                                         questions.   











                                         Specimen

 





                                         Blood







   



                 Performing Organization  Address         City/State/Zipcode  Ph

one Number

 

   



                     [facility]         [address] [place]

 [address] 





documented in this encounter



Visit Diagnoses









                                         Diagnosis

 





                                         Left renal mass



                                         Unspecified disorder of kidney and uret

er



documented in this encounter

## 2020-08-13 NOTE — XMS REPORT
Patient Summarization Differential

                             Created on: 2020



JAS BRODERICK

External Reference #: [id number]

: 1950

Sex: Male



Demographics





                          [address]                   [address] [facility]



 

                          Home Phone                [phone]

 

                          Preferred Language        English

 

                          Marital Status            Unknown

 

                          Congregational Affiliation     Unknown

 

                          Race                      White

 

                          Ethnic Group              Unknown





Author





                          Author                    [email or web address]

 

                          [facility]              [employer] [facility] [employer]

 

                          Address                   [address] [place] [address]



 

                          Phone                     [phone]







Care Team Providers





                    Care Team Member Name Role                Phone

 

                    JASON ANDRA S       Unavailable         [phone]

 

                    ROOSEVELT ALVA MD    Unavailable         Unavailable

 

                    PAONI, ANDRA         Unavailable         Unavailable

 

                    PAONI, ANDRA         Unavailable         Unavailable

 

                    PAONI, ANDRA         Unavailable         Unavailable

 

                    BROWN, TERRY      Unavailable         Unavailable

 

                    BROWN, TERRY      Unavailable         Unavailable

 

                    BROWN, TERRY      Unavailable         Unavailable

 

                    ROOSEVELT ALVA MD    Unavailable         Unavailable

 

                    SOLANGE ALBERTO MD     Unavailable         Unavailable

 

                    PAONI DO, ANDRA S    Unavailable         Unavailable

 

                    JOSE HONEYCUTT MD Unavailable         Unavailable

 

                    SOLANGE ALBERTO MD     Unavailable         Unavailable

 

                    PAONI DO, ANDRA S    Unavailable         Unavailable

 

                    DUCHENE, MIRANDA      Unavailable         Unavailable

 

                    DUCHENE, MIRANDA      Unavailable         Unavailable

 

                    DUCHENE, MIRANDA      Unavailable         Unavailable

 

                    ANUSHA SNYDER MD   Unavailable         Unavailable

 

                    HOWAYEK, JENNY      Unavailable         Unavailable

 

                    HOWAYEK, JENNY      Unavailable         Unavailable

 

                    HOWAYEK, JENNY      Unavailable         Unavailable

 

                    BROKOB, RD       Unavailable         Unavailable

 

                    BROKOB, RD       Unavailable         Unavailable

 

                    BROKOB, RD       Unavailable         Unavailable

 

                    LEISURE, LYNIETA    Unavailable         Unavailable

 

                    PAONI, ANDRA         Unavailable         Unavailable

 

                    PAONI, ANDRA         Unavailable         Unavailable

 

                    EDITH FARLEY MD  Unavailable         Unavailable

 

                    LONI PATEL MD  Unavailable         Unavailable

 

                    LONI PATEL MD  Unavailable         Unavailable

 

                    MIGUELITO, EDITH       Unavailable         Unavailable

 

                    EDITH FARLEY       Unavailable         Unavailable

 

                    EDITH FARLEY       Unavailable         Unavailable

 

                          Unavailable               Unavailable

 

                          Unavailable               Unavailable

 

                          Unavailable               Unavailable

 

                          Unavailable               Unavailable

 

                          Unavailable               Unavailable

 

                          Unavailable               Unavailable



                                            



Allergies

                      



      



           Allergy   Reported Allergen(s)  Allergy Type  Date of   Reaction(s)  

Care      

Facility



                     Classificati        Onset               Provider 



                                         on      

 

      



            Sulfonamides  Sulfonamides (Antibiotic)  Drug Allergy  2014   

JOSE     [facility]

Via



                     (antibiotic)        TANGELA Bal



                           (24 sources)              [facility]



                                         ([id number])

 

      



              Unclassified  SULFACETAMIDE    JENNY FERRIS



              (27 sources)  SODIUM-SULFUR    MODERATE     HOWAYEK      Available



                                         ([id number])



                                                                                
       



Encounters

                      



    



              Encounter Date  Encounter Type  Encounter Diagnosis  Care Provider

  Facility

 

    



                 Start:          Evaluation and   LONI PATEL MD  [facility]

isti



                     08-          management of       [facility]



                                         

                                         inpatient   



                                         End:    



                                         2020    

 

    



                 Start:          Patient encounter   EDITH FARLEY    [facility]

strict #1



                     2020          procedure           of [facility]



                                         

    



                                         End:    



                                         2020    

 

    



                 Start:          Patient encounter   EDITH FARLEY MD  [facility] 

Mally



                     2020          procedure           [facility]

 

    



                 Start:          Patient encounter   [facility]

strict #1



                     2020          procedure           of [facility]



                                         

    



                                         End:    



                                         2020    

 

    



                 Start:          Patient encounter   [facility]

strict #1



                     2020          procedure           of [facility]



                                         

    



                                         End:    



                                         2020    

 

    



                 Start:          Patient encounter   [facility] Di

strict #1



                     07-          procedure           of [facility]



                                         

    



                                         End:    



                                         07-    

 

    



                 Start:          Patient encounter   ANUSHA SNYDER MD  [facility] C

hristi



                     07-          procedure           [facility]

 

    



                 Start:          Patient encounter   [facility]

strict #1



                     07-          procedure           of [facility]



                                         

    



                                         End:    



                                         07-    

 

    



                 Start:          Patient encounter   NARVAEZ REMY MD  [facility]

isti



                     2020          procedure           [facility]

 

    



                 Start:          Patient encounter   ANUSHA SNYDER MD  [facility] C

hristi



                     2020          procedure           [facility]

 

    



                 Start:          Patient encounter   [facility] Di

strict #1



                     05-          procedure           of [facility]



                                         

    



                                         End:    



                                         05-    

 

    



                 Start:          Patient encounter   NARVAEZ REMY MD  [facility]

isti



                     2020          procedure           [facility]



                                         

    



                                         End:    



                                         2020    

 

    



                 Start:          Patient encounter   NARVAEZ REMY MD  [facility]

isti



                     2020          procedure           [facility]

 

    



                 Start:          Patient encounter   NARVAEZ REMY MD  [facility]

isti



                     2020          procedure           [facility]



                                         

    



                                         End:    



                                         2020    

 

    



                 Start:          Patient encounter   NARVAEZ REMY MD  [facility]

isti



                     2020          procedure           [facility]

 

    



                 Start:          Patient encounter   NARVAEZ REMY MD  [facility]

isti



                     2020          procedure           [facility]

 

    



                 Start:          Patient encounter   NARVAEZ REMY MD  [facility]

isti



                     2020          procedure           [facility]

 

    



                 Start:          Patient encounter   NARVAEZ REMY MD  [facility]

isti



                     2020          procedure           [facility]

 

    



                 Start:          Patient encounter   NARVAEZ REMY MD  [facility]

isti



                     2020          procedure           [facility]

 

    



                 Start:          Patient encounter   NARVAEZ REMY MD  [facility]

isti



                     2020          procedure           [facility]

 

    



                 Start:          Pre-operative   ROOSEVELT ALVA MD  [facility] Patel

guero



                     2020          examination,        [facility]



                           unspecified               ([id number])

 

    



                 Start:          Encounter for other  ROOSEVELT ALVA MD  [facility] C

hristi



                     2020          preprocedural       [facility]



                           examination               ([id number])

 

    



                 Start:          Patient encounter   NARVAEZ REMY MD  [facility]

isti



                     2020          procedure           [facility]

 

    



                 Start:          Patient encounter   [facility] Di

strict #1



                     2020          procedure           of [facility]



                                         

                                         ([id number])



                                         End:    



                                         2020    

 

    



                 Start:          Patient encounter   [facility] Di

strict #1



                     2020          procedure           of [facility]



                                         

                                         ([id number])



                                         End:    



                                         2020    

 

    



                 Start:          Patient encounter   [facility] Di

strict #1



                     2020          procedure           of [facility]



                                         

                                         ([id number])



                                         End:    



                                         2020    

 

    



                 Start:          Patient encounter   [facility] Di

strict #1



                     01-          procedure           of [facility]



                                         

                                         ([id number])



                                         End:    



                                         01-    

 

    



                 Start:          Patient encounter   [facility] Di

strict #1



                     2020          procedure           of [facility]



                                         

                                         ([id number])



                                         End:    



                                         2020    

 

    



                 Start:          Patient encounter   [facility] Di

strict #1



                     2019          procedure           of [facility]



                                         

                                         ([id number])



                                         End:    



                                         2019    

 

    



                 Start:          Patient encounter   [facility] Di

strict #1



                     2019          procedure           of [facility]



                                         

                                         ([id number])



                                         End:    



                                         2019    

 

    



                 Start:          Patient encounter   [facility] Di

strict #1



                     2019          procedure           of [facility]



                                         

                                         ([id number])



                                         End:    



                                         2019    

 

    



                 Start:          Patient encounter   [facility] Di

strict #1



                     2019          procedure           of [facility]



                                         

                                         ([id number])



                                         End:    



                                         2019    

 

    



                 Start:          Patient encounter   NA NA           Not [facility]

le ([id number])



                           2017                procedure   



                                         

    



                                         End:    



                                         2017    

 

    



                 Start:          Patient encounter   ROOSEVELT ALVA MD  Not [facility]

ble ([id number])



                           2016                procedure   



                                         

    



                                         End:    



                                         2016    

 

    



                 Start:          Patient encounter   ROOSEVELT ALVA MD  [facility] 

risti



                     2016          procedure           [facility]



                                         

    



                                         End:    



                                         2016    

 

    



                 Start:          Patient encounter   ROOSEVELT ALVA MD  [facility]

risti



                     2016          procedure           [facility]



                                         

    



                                         End:    



                                         2016    

 

    



                 Start:          Patient encounter   ROOSEVELT ALVA MD  [facility]

risti



                     2014          [facility]



                                         

    



                                         End:    



                                         2014    

 

    



                 Start:          Patient encounter   JOSE HONEYCUTT MD  [facility]

 Mally



                     2010          procedure           [facility]



                                         

    



                                         End:    



                                         2010    

 

    



                     Encounter for other  [facility] 

#1



                           general examination       of [facility]



                                         ([id number])



                                                                                
                                                                                
                                                                                
                                                                                
                                                                                
                                                     



Medical Equipment

          No Information                                                        
 



Goals

          No Information                                                        
 



Immunizations

                      The data below is from unstructured sourcesNo immunization
records.No immunization records.No immunization records.                        
                                           



Interventions

          No Information                                                        
 



Medications

                      



    



              Medication   Drug         Dates        Sig          Sig (Original)



                           Class(es)                 (Normalized) 

 

    



                     cloNIDine           Central             Start:  



                     (1 source)          alpha-2             2020  



                           Adrenergic                

  



                           Agonist                   End:  



                                         2020  

 

    



                           Normal saline             Start:  



                           (1 source)                2020  



                                         

  



                                         End:  



                                         2020  



                                                                                
       



Payers

                      



   



                 Date            Payer           Normalized Payer  Policy ID

 

   



                           MEDICARE                  MEDICARE 

 

   



                           ALTERNATIVE RISK SERVICES  Unavailable / Unknown 



                                                                                
       



Plan of Treatment

                      The data below is from unstructured sources            



                          Discharge Date                   16 12:00pm     

           

 

                          Prescriptions                   See Medication Section

                



            



                          Discharge Date                   16 11:00am     

           

 

                          Instructions/Education Provided                   [facility]

NOSCOPY                   



EGD-ESOPHAGOGASTRODUODENOSCOPY                    

Gastritis (DC)                    

Hiatal Hernia (DC)                    

Ulcer and Gastritis Diet                                  

 

                          Prescriptions                   See Medication Section

                



No plan of care.                                                                
   



Problems

                      



     



            Problem    Problem    Date Last  Documented  Episodic/Chr  Provider



                 Classificati    Recorded        Date            onic 



                                         on     

 

     



              Abdominal    Diaphragmatic hernia without  08-   Episodic  

   TAKAAKI KIDO



                     hernia              obstruction or gangrene ;     MD



                                         

                                         Translations: [Diaphragmatic hernia    



                           (7 sources)               without mention of obstruct

ion or    



                                         gangrene]    

 

     



              Acute        Acute bronchitis, unspecified ;  2020   Episodi

c     TERRY



                     bronchitis          Translations: [Acute bronchitis]     BR

OWN



                                         (13 sources)     

 

     



              Cancer of    Personal history of other malignant  08-   Epi

sodic     TAKAAKI 

KIDO



                     colon               neoplasm of large intestine ;     MD



                           (12 sources)              Translations: [Personal his

tory of    



                                         malignant neoplasm of large    



                                         intestine]    

 

     



              Cancer of    Malignant neoplasm of unspecified  2020   Chron

ic      EDITH FARLEY



                     kidney and          kidney, except renal pelvis     MD



                                         renal pelvis     



                                         (3 sources)     

 

     



              Conditions   Dizziness and giddiness  2020   Episodic     LY

NIETA



                           associated                LEISURE



                                         with     



                                         dizziness or     



                                         vertigo     



                                         (10 sources)     

 

     



              Disorders of  Mixed hyperlipidemia  2020   Chronic      BASH

AR MIGUELITO



                           lipid                     MD



                                         metabolism     



                                         (3 sources)     

 

     



              Esophageal   Gastro-esophageal reflux disease  08-   Chroni

c      TAKAAKI 

KIDO



                     disorders           with esophagitis ; Translations:     MD



                           (8 sources)               [Reflux esophagitis]    

 

     



              Essential    Essential (primary) hypertension ;  08-   

magali      TAKAAKI 

KIDO



                     hypertension        Translations: [Unspecified     MD



                           (22 sources)              essential hypertension]    

 

     



              Gastritis    Gastritis, unspecified, without  08-   Episodi

c     TAKAAKI KIDO



                     and                 bleeding ; Translations:     MD



                           duodenitis                [Unspecified gastritis and 

   



                           (7 sources)               gastroduodenitis, without m

ention    



                                         of hemorrhage]    

 

     



              Hemorrhoids  Second degree hemorrhoids ;  08-   Episodic   

  TAKAAKI KIDO



                     (8 sources)         Translations: [Internal hemorrhoids    

 MD



                                         without mention of complication]    

 

     



              Non-Hodgkin`  Non-Hodgkin lymphoma, unspecified,  08-   Chr

ruby ALBERTO



                     s lymphoma          unspecified site     MD



                                         (9 sources)     

 

     



              Other and    Polyp of colon  08-   Episodic     TAKAAKI KID

O



                           unspecified               MD



                                         benign     



                                         neoplasm     



                                         (6 sources)     

 

     



                 Other and       Benign neoplasm of rectum and anal    Episodic 

       TAKAAKI KIDO



                     unspecified         canal               MD



                                         benign     



                                         neoplasm     



                                         (1 source)     

 

     



                 Other and       Personal history of colonic polyps    Episodic 

       TAKAAKI KIDO



                           unspecified               MD



                                         benign     



                                         neoplasm     



                                         (1 source)     

 

     



              Other        Disorder of kidney and ureter,  08-   Episodic

     SOLANGE ALBERTO



                     diseases of         unspecified         MD



                                         kidney and     



                                         ureters     



                                         (5 sources)     

 

     



              Other ear    Impacted cerumen, right ear  2020   Episodic   

  LYNIETA



                           and sense                 [facility]



                                         organ     



                                         disorders     



                                         (10 sources)     

 

     



              Other lower  Solitary pulmonary nodule  08-   Episodic     

SOLANGE ALBERTO



                           respiratory               MD



                                         disease     



                                         (5 sources)     

 

     



              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

ic      NARVAEZ REMY



                     nutritional;        metabolism, not elsewhere     MD



                           endocrine;                classified    



                                         and     



                                         metabolic     



                                         disorders     



                                         (22 sources)     

 

     



              Other        Special screening for malignant  08-   Episodi

c     JOSE



                     screening           neoplasms of colon     TANGELA TINSLEY



                                         for     



                                         suspected     



                                         conditions     



                                         (not mental     



                                         disorders or     



                                         infectious     



                                         disease)     



                                         (2 sources)     

 

     



              Residual     Family history of malignant  08-   Episodic   

  TAKAAKI KIDO



                     codes;              neoplasm of digestive organs     MD



                                         unclassified     



                                         (24 sources)     

 

     



              Residual     Family history of malignant  08-   Episodic   

  JOSE



                     codes;              neoplasm of gastrointestinal tract     

[place] MD



                                         unclassified     



                                         (2 sources)     

 

     



              Residual     Family history of malignant  2020   Episodic   

  NARVAEZ REMY



                     codes;              neoplasm of ovary     MD



                                         unclassified     



                                         (22 sources)     

 

     



              Residual     Other specified postprocedural  2020   Episodic

     ANUSHA TAHA



                     codes;              states              MD



                                         unclassified     



                                         (4 sources)     

 

     



              Sprains and  Strain of muscle, fascia and tendon  2020   Epi

sodic     JENNY



                     strains             of abdomen, initial encounter ;     JABARI LINTON



                           (10 sources)              Translations: [Other specif

ied    



                                         sites of sprains and strains]    

 

     



              Syncope      Syncope and collapse  2020   Episodic     LYNIE

TA



                           (9 sources)               [facility]



                                                                                
                                                                                
                                                                                
                                                                           



Procedures

                      The data below is from unstructured sourcesNo known 
history of procedures.                                                          
         



Results

                      



     



            Test Name  Value      Interpreta  Reference  Facilit    Date



                 tion            Range           y               Time

 

 



                                         laboratory



                                         on



                                         2020

 

     



            Albumin [Mass/Vol]  3.1 g/dL   Low        3.2-4.5    PENDING    08-1

1-2



                     g/dL                LOCATIO             020



                           [facility]                     01:05-0



                           ([id number])                   400

 

     



            ALP [Catalytic  61 U/L     Negative    U/L  PENDING    -2



                     activity/Vol]       LOCATIO             020



                           [facility]                     01:05-0



                           ([id number])                   400

 

     



            ALT [Catalytic  19 U/L     Negative   0-55 U/L   PENDING    -2



                     activity/Vol]       LOCATIO             020



                           [facility]                     01:05-0



                           ([id number])                   400

 

     



            Anion gap  4 mmol/L   Low        5-14       PENDING    -2



                 [Moles/Vol]     mmol/L          LOCATIO         020



                           [facility]                     01:05-0



                           ([id number])                   400

 

     



            AST [Catalytic  19 U/L     Negative   5-34 U/L   PENDING    -2



                     activity/Vol]       LOCATIO             020



                           [facility]                     01:05-0



                           ([id number])                   400

 

     



            Bilirubin [Mass/Vol]  1.0 mg/dL  Negative   0.1-1.0    PENDING    

-11-2



                     mg/dL               LOCATIO             020



                           [facility]                     01:05-0



                           ([id number])                   400

 

     



            Calcium [Mass/Vol]  8.5 mg/dL  Negative   8.5-10.1   PENDING    08-1

1-2



                     mg/dL               LOCATIO             020



                           [facility]                     01:05-0



                           ([id number])                   400

 

     



            Calcium [Mass/Vol]  9.2 mg/dL  Negative   8.5-10.1   PENDING    08-1

1-2



                     mg/dL               LOCATIO             020



                           [facility]                     01:05-0



                           ([id number])                   400

 

     



            Chloride [Moles/Vol]  108 mmol/L  High            PENDING    0

8-11-2



                     mmol/L              LOCATIO             020



                           [facility]                     01:05-0



                           ([id number])                   400

 

     



            CO2 [Moles/Vol]  22 mmol/L  Negative   21-32      PENDING    08-11-2



                     mmol/L              LOCATIO             020



                           [facility]                     :05-0



                           ([id number])                   400

 

     



            Creatinine  1.90 mg/dL  High       0.60-1.30  PENDING    08-11-2



                 [Mass/Vol]      mg/dL           LOCATIO         020



                           [facility]                     :05-0



                           ([id number])                   400

 

     



              Creatinine and  35           Invalid      PENDING      08--2



                 Glomerular      Interpreta      LOCATIO         020



                 filtration      tion Code       [facility]           01:050



                     rate.predicted panel     ([id number])             400



                                         - Serum, Plasma or     



                                         Blood     

 

     



            Glucose [Mass/Vol]  100 mg/dL  Negative        PENDING    08-1

1-2



                     mg/dL               LOCATIO             020



                           [facility]                     01:05-0



                           ([id number])                   400

 

     



            Magnesium [Mass/Vol]  1.7 mg/dL  Negative   1.6-2.4    PENDING    08

-11-2



                     mg/dL               LOCATIO             020



                           [facility]                     01:05-0



                           ([id number])                   400

 

     



            Potassium  4.8 mmol/L  Negative   3.6-5.0    PENDING    08-11-2



                 [Moles/Vol]     mmol/L          LOCATIO         020



                           [facility]                     :05-0



                           ([id number])                   400

 

     



            Protein [Mass/Vol]  8.2 g/dL   Negative   6.4-8.2    PENDING    08-1

1-2



                     g/dL                LOCATIO             020



                           [facility]                     01:05-0



                           ([id number])                   400

 

     



            Sodium [Moles/Vol]  134 mmol/L  Low        135-145    PENDING    08-

11-2



                     mmol/L              LOCATIO             020



                           [facility]                     01:05-0



                           ([id number])                   400

 

     



            Urea nitrogen  22 mg/dL   High       7-18 mg/dL  PENDING    08-11-2



                     [Mass/Vol]          LOCATIO             020



                           [facility]                     01:05-0



                           ([id number])                   400

 

     



              Urea         12 mg/mg     Invalid      PENDING      -2



                 nitrogen/Creatinine   Interpreta      LOCATIO         020



                 [Mass ratio]    tion Code       [facility]           01:05-0



                           ([id number])                   400

 

 



                                         laboratory



                                         on



                                         2020

 

     



            Anion gap  11 mmol/L  Invalid    6-14       [facility]    08-07-2



                 [Moles/Vol]     Interpreta      l               020



                     tion Code           Distric             04:-0



                           t #1 of                   [address] [place] [address] 



                                         () 

 

     



            Calcium [Mass/Vol]  8.0 mg/dL  Low        8.3-10.4   [facility]    08-0

7-2



                     mg/dL               l                   020



                           [facility]                   04:-0



                           t #1 of                   [address] [place] [address] 



                                         () 

 

     



            Chloride [Moles/Vol]  106 mmol/L  Invalid         [facility]    0

8-07-2



                 Interpreta      mmol/L          l               020



                     tion Code           Distric             04:-0



                           t #1 of                   [address] [place] [address] 



                                         () 

 

     



            Creatinine  1.90 mg/dL  High       0.50-1.50  [facility]    08-07-2



                 [Mass/Vol]      mg/dL           l               020



                           [facility]                   04:-0



                           t #1 of                   [address] [place] [address] 



                                         () 

 

     



            GFR/1.73 sq  35 mL/min/{1.73_m2}  Low        >59        [facility]    0

8-07-2



                 M.predicted MDRD    mL/min/1.7      l               020



                 (S/P/Bld) [Vol    3m2             [facility]         04:-0



                     rate/Area]          t #1 of             [address] [place] [address] 



                                         () 

 

     



            Glucose [Mass/Vol]  99 mg/dL   Invalid         [facility]    08-0

7-2



                 Interpreta      mg/dL           l               020



                     tion Code           Distric             04:-0



                           t #1 of                   [address] [place] [address] 



                                         () 

 

     



            HCO3 (P) [Moles/Vol]  21         Low        22-33      [facility]    08

-07-2



                     mEq/L               l                   020



                           [facility]                   04:01-0



                           t #1 of                   [address] [place] [address] 



                                         () 

 

     



            Osmolality Calc  281        Invalid    280-295    [facility]    08-07-2



                 [Osmolality]    Interpreta      l               020



                     tion Code           Distric             04:-0



                           t #1 of                   [address] [place] [address] 



                                         () 

 

     



            Potassium  4.2 mmol/L  Invalid    3.5-5.3    [facility]    08-07-2



              [Moles/Vol]   Interpreta   mmol/L       l            020



                     tion Code           Distric             04:-0



                           t #1 of                   [address] [place] [address] 



                                         () 

 

     



            Sodium [Moles/Vol]  134 mmol/L  Invalid    134-148    [facility]    



                 Interpreta      mmol/L          l               020



                     tion Code           Distric             04:



                           t #1 of                   [address] [place] [address] 



                                         () 

 

     



            Urea nitrogen  24 mg/dL   Invalid    5-25 mg/dL  [facility]    



                 [Mass/Vol]      Interpreta      l               020



                     tion Code           Distric             04:-



                           t #1 of                   [address] [place] [address] 



                                         () 

 

 



                                         laboratory



                                         on



                                         2020

 

     



            Basophils (Bld)  0.0 10*3/uL  Invalid    0.0-0.2    [facility]    



              [#/Vol]      Interpreta   K/uL         l            020



                     tion Code           Distric             14:15-0



                           t #1 of                   [address] [place] [address] 



                                         () 

 

     



            Basophils/100 WBC  0.40 %     Invalid    0.00-2.50  [facility]    



              (Bld)        Interpreta   %            l            020



                     tion Code           Distric             14:15-0



                           t #1 of                   [address] [place] [address] 



                                         () 

 

     



            Calcium.ionized ISE  1.23       Invalid    1.12-1.32  [facility]    



              [Moles/Vol]   Interpreta   mmol/L       l            020



                     tion Code           Distric             14:15-0



                           t #1 of                   [address] [place] [address] 



                                         () 

 

     



            Chloride [Moles/Vol]  105 mmol/L  Invalid         [facility]    0





                 Interpreta      mmol/L          l               020



                     tion Code           Distric             14:15-0



                           t #1 of                   [address] [place] [address] 



                                         () 

 

     



            Creatinine  1.50 mg/dL  Invalid    0.50-1.50  [facility]    



              [Mass/Vol]   Interpreta   mg/dL        l            020



                     tion Code           Distric             14:15-0



                           t #1 of                   [address] [place] [address] 



                                         ([id number]) 

 

     



            Eosinophils (Bld)  0.1 10*3/uL  Invalid    0.0-0.7    [facility]    



              [#/Vol]      Interpreta   K/uL         l            020



                     tion Code           Distric             14:15-0



                           t #1 of                   [address] [place] [address] 



                                         () 

 

     



            Eosinophils/100 WBC  1.3 %      Invalid    0.0-7.0 %  [facility]    



                 (Bld)           Interpreta      l               020



                     tion Code           Distric             14:15-0



                           t #1 of                   [address] [place] [address] 



                                         () 

 

     



            Erythrocyte  14.2 %     Invalid    11.6-14.8  [facility]    



              distribution width   Interpreta   %            l            020



                 (RBC) [Ratio]   tion Code       Distric         14:15-0



                           t #1 of                   [address] [place] [address] 



                                         () 

 

     



            GFR/1.73 sq  46 mL/min/{1.73_m2}  Low        >59        [facility]    0

7-11-2



                 M.predicted MDRD    mL/min/1.7      l               020



                 (S/P/Bld) [Vol    3m2             Dist[facility]         14:15-0



                     rate/Area]          t #1 of             [address] [place] [address] 



                                         () 

 

     



            Glucose [Mass/Vol]  91 mg/dL   Invalid         [facility]    -2



                 Interpreta      mg/dL           l               020



                     tion Code           Distric             14:15-0



                           t #1 of                   [address] [place] [address] 



                                         () 

 

     



            HCO3 (P) [Moles/Vol]  25         Invalid    22-33      [facility]    



                 Interpreta      mEq/L           l               020



                     tion Code           Distric             14:15-0



                           t #1 of                   [address] [place] [address] 



                                         () 

 

     



            Hematocrit (Bld)  38.6 %     Low        42.0-52.0  [facility]    



                 [Volume fraction]    %               l               020



                           [facility]                   14:15-0



                           t #1 of                   [address] [place] [address] 



                                         ([id number]) 

 

     



            Hemoglobin (Bld)  12.6 g/dL  Low        14.0-17.0  [facility]    -





                 [Mass/Vol]      g/dL            l               020



                           [facility]                   14:15-0



                           t #1 of                   [address] [place] [address] 



                                         () 

 

     



            Lymphocytes (Bld)  2.16 10*3/uL  Invalid    0.60-3.40  [facility]    



              [#/Vol]      Interpreta   K/uL         l            020



                     tion Code           Distric             14:15-0



                           t #1 of                   [address] [place] [address] 



                                         () 

 

     



            Lymphocytes/100 WBC  40.9 %     Invalid    10.0-50.0  [facility]    



              (Bld)        Interpreta   %            l            020



                     tion Code           Distric             14:15-0



                           t #1 of                   [address] [place] [address] 



                                         () 

 

     



            MCH (RBC) [Entitic  29.7 pg    Invalid    27.0-31.2  [facility]    

1-2



              mass]        Interpreta   pg           l            020



                     tion Code           Distric             14:15-0



                           t #1 of                   400



                                         [facility] 



                                         ([id number]) 

 

     



            MCHC (RBC)  32.6 g/dL  Invalid    32.0-36.0  [facility]    



              [Mass/Vol]   Interpreta   g/dL         l            020



                     tion Code           Distric             14:15-0



                           t #1 of                   400



                                         [facility] 



                                         ([id number]) 

 

     



            MCV (RBC) [Entitic  91.0 fL    Invalid    80.0-97.0  [facility]    

1-2



              vol]         Interpreta   fL           l            020



                     tion Code           Distric             14:15-0



                           t #1 of                   400



                                         [facility] 



                                         ([id number]) 

 

     



            Monocytes (Bld)  0.7 10*3/uL  Invalid    0.0-0.9    [facility]    



              [#/Vol]      Interpreta   K/uL         l            020



                     tion Code           Distric             14:15-0



                           t #1 of                   400



                                         [facility] 



                                         ([id number]) 

 

     



            Monocytes/100 WBC  12.9 %     High       0.0-12.0 %  [facility]    



                     (Bld)               l                   020



                           [facility]                   14:15-0



                           t #1 of                   400



                                         [facility] 



                                         ([id number]) 

 

     



            Neutrophils (Bld)  2.35 10*3/uL  Invalid    2.00-6.90  [facility]    



              [#/Vol]      Interpreta   K/uL         l            020



                     tion Code           Distric             14:15-0



                           t #1 of                   400



                                         [facility] 



                                         ([id number]) 

 

     



            Neutrophils/100 WBC  44.5 %     Invalid    37.0-80.0  [facility]    



              (Bld)        Interpreta   %            l            020



                     tion Code           Distric             14:15-0



                           t #1 of                   400



                                         [facility] 



                                         ([id number]) 

 

     



            Platelet mean volume  10.2 fL    High       7.4-10.0   [facility]    



                 (Bld) [Entitic vol]    fL              l               020



                           Distric                   14:15-0



                           t #1 of                   [address] [place] [address] 



                                         ([id number]) 

 

     



            Platelets (Bld)  166 10*3/uL  Invalid    150-400    [facility]    



              [#/Vol]      Interpreta   K/uL         l            020



                     tion Code           Distric             14:15-0



                           t #1 of                   [address] [place] [address] 



                                         ([id number]) 

 

     



            Potassium  3.9 mmol/L  Invalid    3.5-5.3    [facility]    



              [Moles/Vol]   Interpreta   mmol/L       l            020



                     tion Code           Distric             14:15-0



                           t #1 of                   [address] [place] [address] 



                                         ([id number]) 

 

     



            RBC (Bld) [#/Vol]  4.24 10*6/uL  Invalid    4.20-5.40  [facility]    



                 Interpreta      M/uL            l               020



                     tion Code           Distric             14:15-0



                           t #1 of                   [address] [place] [address] 



                                         ([id number]) 

 

     



            Sodium [Moles/Vol]  140 mmol/L  Invalid    134-148    [facility]    



                 Interpreta      mmol/L          l               020



                     tion Code           Distric             14:15-0



                           t #1 of                   [address] [place] [address] 



                                         ([id number]) 

 

     



            Urea nitrogen  17 mg/dL   Invalid    5-25 mg/dL  [facility]    



                 [Mass/Vol]      Interpreta      l               020



                     tion Code           Distric             14:15-0



                           t #1 of                   [address] [place] [address] 



                                         () 

 

     



            WBC (Bld) [#/Vol]  5.28 10*3/uL  Invalid    5.00-10.00  [facility]    0





                 Interpreta      K/uL            l               020



                     tion Code           Distric             14:15-0



                           t #1 of                   [address] [place] [address] 



                                         () 

 

 



                                         laboratory



                                         on



                                         2020-07-10

 

     



            Anion gap  9 mmol/L   Invalid    6-14       [facility]    07-10-2



                 [Moles/Vol]     Interpreta      l               020



                     tion Code           Distric             02:56-0



                           t #1 of                   [address] [place] [address] 



                                         ([id number]) 

 

     



            Calcium [Mass/Vol]  8.8 mg/dL  Invalid    8.3-10.4   [facility]    07-

0-2



                 Interpreta      mg/dL           l               020



                     tion Code           Distric             02:56-0



                           t #1 of                   [address] [place] [address] 



                                         ([id number]) 

 

     



            Chloride [Moles/Vol]  102 mmol/L  Invalid         [facility]    0

7-10-2



                 Interpreta      mmol/L          l               020



                     tion Code           Distric             02:56-0



                           t #1 of                   [address] [place] [address] 



                                         ([id number]) 

 

     



            Creatinine  1.50 mg/dL  Invalid    0.50-1.50  [facility]    07-10-2



              [Mass/Vol]   Interpreta   mg/dL        l            020



                     tion Code           Distric             02:56-0



                           t #1 of                   [address] [place] [address] 



                                         () 

 

     



            GFR/1.73 sq  46 mL/min/{1.73_m2}  Low        >59        [facility]    0

7-10-2



                 M.predicted MDRD    mL/min/1.7      l               020



                 (S/P/Bld) [Vol    3m2             Distric         02:56-0



                     rate/Area]          t #1 of             [address] [place] [address] 



                                         ([id number]) 

 

     



            Glucose [Mass/Vol]  99 mg/dL   Invalid         [facility]    07-1

0-2



                 Interpreta      mg/dL           l               020



                     tion Code           Distric             02:56-0



                           t #1 of                   [address] [place] [address] 



                                         ([id number]) 

 

     



            HCO3 (P) [Moles/Vol]  27         Invalid    22-33      [facility]    07

-10-2



                 Interpreta      mEq/L           l               020



                     tion Code           Distric             02:56-0



                           t #1 of                   [address] [place] [address] 



                                         ([id number]) 

 

     



            Osmolality Calc  279        Low        280-295    [facility]    07-10-2



                     [Osmolality]        l                   020



                           Distric                   02:56-0



                           t #1 of                   [address] [place] [address] 



                                         ([id number]) 

 

     



            Potassium  3.7 mmol/L  Invalid    3.5-5.3    [facility]    07-10-2



              [Moles/Vol]   Interpreta   mmol/L       l            020



                     tion Code           Distric             02:56-0



                           t #1 of                   [address] [place] [address] 



                                         ([id number]) 

 

     



            Sodium [Moles/Vol]  134 Testing performed at [facility]  Invalid    134-

148    [facility]   

 07-10-2



                 Interpreta      mmol/L          l               020



                     tion Code           Distric             02:56-0



                           t #1 of                   [address] [place] [address] 



                                         ([id number]) 

 

     



            Urea nitrogen  17 mg/dL   Invalid    5-25 mg/dL  [facility]    07-10-2



                 [Mass/Vol]      Interpreta      l               020



                     tion Code           Distric             02:56-0



                           t #1 of                   [address] [place] [address] 



                                         ([id number]) 

 

 



                                         not yet categorized



                                         on 2020-05-15

 

     



              COVID19      Negative     Invalid      [facility]      05-15-2



                     Interpreta          l                   020



                     tion Code           Distric             03:15-0



                           t #1 of                   [address] [place] [address] 



                                         ([id number]) 

 

 



                                         not yet categorized



                                         on 2020

 

     



              Laboratory report  .            Invalid      [facility]      



                     Interpreta          ([id number])             020



                           tion Code                 08:24-0



                                         500

 

 



                                         laboratory



                                         on



                                         2020

 

     



            Albumin Elph (U)  37.7       Invalid    %          [facility]    



                 [Mass fraction]   Interpreta      (62866)         020



                           tion Code                 08:21-0



                                         500

 

     



            Alpha 1 globulin  7.3        Invalid    %          [facility]    -

2



                 Elph (U) [Mass   Interpreta      (98722)         020



                     fraction]           tion Code           08:21-0



                                         500

 

     



            Alpha 2 globulin  13.3       Invalid    %          [facility]    22-

2



                 Elph (U) [Mass   Interpreta      (58376)         020



                     fraction]           tion Code           08:21-0



                                         500

 

     



            Beta globulin Elph  21.1       Invalid    %          [facility]    01-2

2-2



                 (U) [Mass fraction]   Interpreta      (88009)         020



                           tion Code                 08:21-0



                                         500

 

     



            Gamma globulin Elph  20.6       Invalid    %          [facility]    

22-2



                 (U) [Mass fraction]   Interpreta      (07409)         020



                           tion Code                 08:21-0



                                         500

 

     



            Protein.monoclonal  Not Observed  Invalid    Not        [facility]    0

22-2



              Elph (U) [Mass   Interpreta   Observed %   (56125)      020



                     fraction]           tion Code           08:21-0



                                         500

 

 



                                         laboratory



                                         on



                                         2020

 

     



            Protein (U)  7.9 mg/dL  Invalid    Not Estab.  [facility]    



              [Mass/Vol]   Interpreta   mg/dL        (66807)      020



                           tion Code                 01:47-0



                                         500

 

 



                                         not yet categorized



                                         on 2020

 

     



            ALBUMIN, U  37.7       Invalid    %          [facility]    



                     Interpreta          l                   020



                     tion Code           Distric             06:-



                           t #1 of                   500



                                         [facility] 



                                         ([id number]) 

 

     



            ALPHA-1-GLOBULIN, U  7.3        Invalid    %          [facility]    



                     Interpreta          l                   020



                     tion Code           Distric             06:



                           t #1 of                   500



                                         [facility] 



                                         ([id number]) 

 

     



            ALPHA-2-GLOBULIN, U  13.3       Invalid    %          [facility]    



                     Interpreta          l                   020



                     tion Code           Distric             06:



                           t #1 of                   500



                                         [facility] 



                                         ([id number]) 

 

     



            BETA GLOBULIN, U  21.1       Invalid    %          [facility]    



                     Interpreta          l                   020



                     tion Code           Distric             06:



                           t #1 of                   500



                                         [facility] 



                                         ([id number]) 

 

     



            GAMMA GLOBULIN, U  20.6       Invalid    %          [facility]    



                     Interpreta          l                   020



                     tion Code           Distric             06:-



                           t #1 of                   500



                                         [facility] 



                                         ([id number]) 

 

     



            M-SPIKE, %  NOT OBSERVED  Invalid    NOT        [facility]    



                 Interpreta      OBSERVED %      l               020



                     tion Code           Distric             06:



                           t #1 of                   500



                                         [facility] 



                                         () 

 

     



              PDF          .            Invalid      [facility]      



                     Interpreta          l                   020



                     tion Code           Distric             06:



                           t #1 of                   500



                                         [facility] 



                                         () 

 

     



              PLEASE NOTE:  PROTEIN ELECTROPHORESIS SCAN WILL FOLLOW VIA  [facility] [place]

d      [facility]      





                 COMPUTER, MAIL, OR\.br\ DELIVERY.  Interpreta      l    

           020



                     tion Code           Distric             06:



                           t #1 of                   [address] [place] [address] 



                                         () 

 

 



                                         laboratory



                                         on



                                         2020

 

     



              Laboratory comment  Comment      Invalid      [facility]      



                 Yordan (Report)    Interpreta      ([id number])         020



                           tion Code                 21:05



                                         500

 

     



            Protein (U)  7.9 mg/dL  Invalid    NOT ESTAB.  [facility]    



              [Mass/Vol]   Interpreta   MG/DL        l            020



                     tion Code           Distric             :



                           t #1 of                   [address] [place] [address] 



                                         () 

 

 



                                         laboratory



                                         on



                                         2020

 

     



            Albumin BCG dye  3.9        Invalid    3.6-5.1    [facility]    



              [Mass/Vol]   Interpreta   g/dL         l            020



                     tion Code           Distric             02:36-0



                           t #1 of                   500



                                         [facility] 



                                         ([id number]) 

 

     



            ALP [Catalytic  75 U/L     Invalid     U/L  [facility]    



                 activity/Vol]   Interpreta      l               020



                     tion Code           Distric             02:36-0



                           t #1 of                   [address] [place] [address] 



                                         ([id number]) 

 

     



            ALT [Catalytic  28 U/L     Invalid    6-45 U/L   [facility]    



                 activity/Vol]   Interpreta      l               020



                     tion Code           Distric             02:36-0



                           t #1 of                   [address] [place] [address] 



                                         ([id number]) 

 

     



            Anion gap  13 mmol/L  Invalid    6-14       [facility]    



                 [Moles/Vol]     Interpreta      l               020



                     tion Code           Distric             02:36-0



                           t #1 of                   [address] [place] [address] 



                                         ([id number]) 

 

     



            AST [Catalytic  23 U/L     Invalid    2-40 U/L   [facility]    



                 activity/Vol]   Interpreta      l               020



                     tion Code           Distric             02:36-0



                           t #1 of                   [address] [place] [address] 



                                         () 

 

     



            Bilirubin [Mass/Vol]  0.8 mg/dL  Invalid    0.2-1.2    [facility]    



                 Interpreta      mg/dL           l               020



                     tion Code           Distric             02:36-0



                           t #1 of                   [address] [place] [address] 



                                         ([id number]) 

 

     



            Calcium [Mass/Vol]  9.0 mg/dL  Invalid    8.3-10.4   [facility]    

7-2



                 Interpreta      mg/dL           l               020



                     tion Code           Distric             02:36-0



                           t #1 of                   [address] [place] [address] 



                                         () 

 

     



            Chloride [Moles/Vol]  103 mmol/L  Invalid         [facility]    0

-17-2



                 Interpreta      mmol/L          l               020



                     tion Code           Distric             02:36-0



                           t #1 of                   [address] [place] [address] 



                                         () 

 

     



            Creatinine  1.51 mg/dL  High       0.50-1.50  [facility]    -2



                 [Mass/Vol]      mg/dL           l               020



                           Distric                   02:36-0



                           t #1 of                   [address] [place] [address] 



                                         () 

 

     



            GFR/1.73 sq  46 mL/min/{1.73_m2}  Low        >59        [facility]    0

-17-2



                 M.predicted MDRD    mL/min/1.7      l               020



                 (S/P/Bld) [Vol    3m2             Distric         02:36-0



                     rate/Area]          t #1 of             [address] [place] [address] 



                                         ([id number]) 

 

     



            Globulin (S)  5.5 g/dL   High       2.3-3.5    [facility]    -2



                 [Mass/Vol]      g/dL            l               020



                           Distric                   02:36-0



                           t #1 of                   [address] [place] [address] 



                                         ([id number]) 

 

     



            Glucose [Mass/Vol]  93 mg/dL   Invalid         [facility]    

7-2



                 Interpreta      mg/dL           l               020



                     tion Code           Distric             02:36-0



                           t #1 of                   [address] [place] [address] 



                                         ([id number]) 

 

     



            HCO3 (P) [Moles/Vol]  24         Invalid    22-33      [facility]    -2



                 Interpreta      mEq/L           l               020



                     tion Code           Distric             02:36-0



                           t #1 of                   [address] [place] [address] 



                                         () 

 

     



            Osmolality Calc  282        Invalid    280-295    [facility]    2



                 [Osmolality]    Interpreta      l               020



                     tion Code           Distric             02:36-0



                           t #1 of                   [address] [place] [address] 



                                         ([id number]) 

 

     



            Potassium  4.2 mmol/L  Invalid    3.5-5.3    [facility]    -2



              [Moles/Vol]   Interpreta   mmol/L       l            020



                     tion Code           Distric             02:36-0



                           t #1 of                   [address] [place] [address] 



                                         ([id number]) 

 

     



            Protein [Mass/Vol]  9.4 g/dL   High       6.0-8.3    [facility]    01-

7-2



                     g/dL                l                   020



                           Distric                   02:36-0



                           t #1 of                   [address] [place] [address] 



                                         () 

 

     



            Sodium [Moles/Vol]  136 mmol/L  Invalid    134-148    [facility]    



                 Interpreta      mmol/L          l               020



                     tion Code           Distric             02:36-0



                           t #1 of                   [address] [place] [address] 



                                         () 

 

     



            Urea nitrogen  16 mg/dL   Invalid    5-25 mg/dL  [facility]    



                 [Mass/Vol]      Interpreta      l               020



                     tion Code           Distric             02:36-0



                           t #1 of                   [address] [place] [address] 



                                         () 

 

 



                                         laboratory



                                         on



                                         2020-01-10

 

     



            Albumin BCG dye  3.7        Invalid    3.6-5.1    [facility]    01-10-2



              [Mass/Vol]   Interpreta   g/dL         l            020



                     tion Code           Distric             02:33-0



                           t #1 of                   [address] [place] [address] 



                                         () 

 

     



            ALP [Catalytic  75 U/L     Invalid     U/L  [facility]    01-10-2



                 activity/Vol]   Interpreta      l               020



                     tion Code           Distric             02:33-0



                           t #1 of                   [address] [place] [address] 



                                         () 

 

     



            ALT [Catalytic  23 U/L     Invalid    6-45 U/L   [facility]    01-10-2



                 activity/Vol]   Interpreta      l               020



                     tion Code           Distric             02:33-0



                           t #1 of                   [address] [place] [address] 



                                         () 

 

     



            Anion gap  14 mmol/L  Invalid    6-14       [facility]    01-10-2



                 [Moles/Vol]     Interpreta      l               020



                     tion Code           Distric             02:33-0



                           t #1 of                   [address] [place] [address] 



                                         ([id number]) 

 

     



            AST [Catalytic  23 U/L     Invalid    2-40 U/L   [facility]    01-10-2



                 activity/Vol]   Interpreta      l               020



                     tion Code           Distric             02:33-0



                           t #1 of                   [address] [place] [address] 



                                         () 

 

     



            Bilirubin [Mass/Vol]  0.6 mg/dL  Invalid    0.2-1.2    [facility]    01

-10-2



                 Interpreta      mg/dL           l               020



                     tion Code           Distric             02:33-0



                           t #1 of                   [address] [place] [address] 



                                         () 

 

     



            Calcium [Mass/Vol]  8.7 mg/dL  Invalid    8.3-10.4   [facility]    01-1

0-2



                 Interpreta      mg/dL           l               020



                     tion Code           Distric             02:33-0



                           t #1 of                   [address] [place] [address] 



                                         ([id number]) 

 

     



            Chloride [Moles/Vol]  104 mmol/L  Invalid         [facility]    0

-10-2



                 Interpreta      mmol/L          l               020



                     tion Code           Distric             02:33-0



                           t #1 of                   [address] [place] [address] 



                                         ([id number]) 

 

     



            Cholesterol  147 mg/dL  Invalid    100-240    [facility]    10-2



              [Mass/Vol]   Interpreta   mg/dL        l            020



                     tion Code           Distric             02:33-0



                           t #1 of                   [address] [place] [address] 



                                         () 

 

     



            Cholesterol in HDL  36 mg/dL   Invalid    30-85      [facility]    01-1

0-2



              [Mass/Vol]   Interpreta   mg/dL        l            020



                     tion Code           Distric             02:33-0



                           t #1 of                   [address] [place] [address] 



                                         () 

 

     



            Cholesterol in LDL  92 mg/dL   Invalid    0-100      [facility]    01-1

0-2



              [Mass/Vol]   Interpreta   mg/dL        l            020



                     tion Code           Distric             02:33-0



                           t #1 of                   [address] [place] [address] 



                                         ([id number]) 

 

     



            Cholesterol in VLDL  19 mg/dL   Invalid    0-42 mg/dL  [facility]    01

-10-2



                 [Mass/Vol]      Interpreta      l               020



                     tion Code           Distric             02:33-0



                           t #1 of                   [address] [place] [address] 



                                         ([id number]) 

 

     



            Cholesterol.total/Ch  4.1 {ratio}  Invalid    3.7-6.7    [facility]    

01-10-2



                 olesterol in HDL   Interpreta      l               020



                 [Mass ratio]    tion Code       Distric         02:33-0



                           t #1 of                   [address] [place] [address] 



                                         ([id number]) 

 

     



            Creatinine  1.46 mg/dL  Invalid    0.50-1.50  [facility]    01-10-2



              [Mass/Vol]   Interpreta   mg/dL        l            020



                     tion Code           Distric             02:33-0



                           t #1 of                   [address] [place] [address] 



                                         () 

 

     



            GFR/1.73 sq  48 mL/min/{1.73_m2}  Low        >59        [facility]    0

1-10-2



                 M.predicted MDRD    mL/min/1.7      l               020



                 (S/P/Bld) [Vol    3m2             [facility]         02:33-0



                     rate/Area]          t #1 of             [address] [place] [address] 



                                         ([id number]) 

 

     



            Globulin (S)  5.7 g/dL   High       2.3-3.5    [facility]    01-10-2



                 [Mass/Vol]      g/dL            l               020



                           Distric                   02:33-0



                           t #1 of                   [address] [place] [address] 



                                         ([id number]) 

 

     



            Glucose [Mass/Vol]  97 mg/dL   Invalid         [facility]    -

0-2



                 Interpreta      mg/dL           l               020



                     tion Code           Distric             02:33-0



                           t #1 of                   [address] [place] [address] 



                                         () 

 

     



            HCO3 (P) [Moles/Vol]  23         Invalid    22-33      [facility]    01

-10-2



                 Interpreta      mEq/L           l               020



                     tion Code           Distric             02:33-0



                           t #1 of                   [address] [place] [address] 



                                         () 

 

     



            Osmolality Calc  284        Invalid    280-295    [facility]    01-10-2



                 [Osmolality]    Interpreta      l               020



                     tion Code           Distric             02:33-0



                           t #1 of                   [address] [place] [address] 



                                         () 

 

     



            Potassium  4.3 mmol/L  Invalid    3.5-5.3    [facility]    01-10-



              [Moles/Vol]   Interpreta   mmol/L       l            020



                     tion Code           Distric             02:33-0



                           t #1 of                   [address] [place] [address] 



                                         () 

 

     



            Protein [Mass/Vol]  9.4 g/dL   High       6.0-8.3    [facility]    -

0-2



                     g/dL                l                   020



                           Distric                   02:33-0



                           t #1 of                   [address] [place] [address] 



                                         () 

 

     



            Sodium [Moles/Vol]  137 mmol/L  Invalid    134-148    [facility]    01-

10-2



                 Interpreta      mmol/L          l               020



                     tion Code           Distric             02:33-0



                           t #1 of                   [address] [place] [address] 



                                         () 

 

     



            Triglyceride  93 mg/dL   Invalid         [facility]    01-10-2



              [Mass/Vol]   Interpreta   mg/dL        l            020



                     tion Code           Distric             02:33-0



                           t #1 of                   [address] [place] [address] 



                                         () 

 

     



            Urea nitrogen  16 mg/dL   Invalid    5-25 mg/dL  [facility]    01-10-2



                 [Mass/Vol]      Interpreta      l               020



                     tion Code           Distric             02:33-0



                           t #1 of                   [address] [place] [address] 



                                         () 

 

 



                                         other



                                         on 2019

 

     



            Albumin BCG dye  3.7        Invalid    3.6-5.1    [facility]    -2



              [Mass/Vol]   Interpreta   g/dL         l            019



                     tion Code           Distric             08:11-0



                           t #1 of                   [address] [place] [address] 



                                         ([id number]) 

 

     



            Cholesterol in VLDL  16 mg/dL   Invalid    0-42 mg/dL  [facility]    



                 [Mass/Vol]      Interpreta      l               019



                     tion Code           Distric             08:11-0



                           t #1 of                   [address] [place] [address] 



                                         ([id number]) 

 

     



            Cholesterol.total/Ch  4.3 {ratio}  Invalid    3.7-6.7    [facility]    





                 olesterol in HDL   Interpreta      l               019



                 [Mass ratio]    tion Code       Distric         08:11-0



                           t #1 of                   [address] [place] [address] 



                                         ([id number]) 

 

     



            GFR/1.73 sq  49 mL/min/{1.73_m2}  Low        >59        [facility]    0

-2



                 M.predicted MDRD    mL/min/1.7      l               019



                 (S/P/Bld) [Vol    3m2             Dist[facility]         08:11-0



                     rate/Area]          t #1 of             [address] [place] [address] 



                                         ([id number]) 

 

     



            Globulin (S)  4.6 g/dL   High       2.3-3.5    [facility]    



                 [Mass/Vol]      g/dL            l               019



                           Distric                   08:11-0



                           t #1 of                   [address] [place] [address] 



                                         (24027) 

 

     



            HCO3 (P) [Moles/Vol]  23         Invalid    22-33      [facility]    



                 Interpreta      mEq/L           l               019



                     tion Code           Distric             08:11-0



                           t #1 of                   [address] [place] [address] 



                                         (73536) 

 

     



            Osmolality Calc  286        Invalid    280-295    [facility]    



                 [Osmolality]    Interpreta      l               019



                     tion Code           Distric             08:11-0



                           t #1 of                   [address] [place] [address] 



                                         ([id number]) 

 

     



            Prostate specific Ag  4.7        High       0.0-4.0    [facility]    



                 DL <= 0.01 ng/mL    ng/mL           l               019



                     [Mass/Vol]          Distric             08:11-0



                           t #1 of                   [address] [place] [address] 



                                         ([id number]) 

 

 



                                         metabolic panel



                                         on



                                         2019

 

     



            ALP [Catalytic  74 U/L     Invalid     U/L  [facility]    



                 activity/Vol]   Interpreta      l               019



                     tion Code           Distric             08:11-0



                           t #1 of                   [address] [place] [address] 



                                         () 

 

     



            ALT [Catalytic  19 U/L     Invalid    6-45 U/L   [facility]    



                 activity/Vol]   Interpreta      l               019



                     tion Code           Distric             08:11-0



                           t #1 of                   [address] [place] [address] 



                                         ([id number]) 

 

     



            Anion gap  12 mmol/L  Invalid    6-14       [facility]    -2



                 [Moles/Vol]     Interpreta      l               019



                     tion Code           Distric             08:11-0



                           t #1 of                   [address] [place] [address] 



                                         () 

 

     



            AST [Catalytic  20 U/L     Invalid    2-40 U/L   [facility]    



                 activity/Vol]   Interpreta      l               019



                     tion Code           Distric             08:11-0



                           t #1 of                   [address] [place] [address] 



                                         ([id number]) 

 

     



            Bilirubin [Mass/Vol]  1.1 mg/dL  Invalid    0.2-1.2    [facility]    



                 Interpreta      mg/dL           l               019



                     tion Code           Distric             08:11-0



                           t #1 of                   [address] [place] [address] 



                                         () 

 

     



            Calcium [Mass/Vol]  8.9 mg/dL  Invalid    8.3-10.4   [facility]    06-

7-2



                 Interpreta      mg/dL           l               019



                     tion Code           Distric             08:11-0



                           t #1 of                   [address] [place] [address] 



                                         (26007) 

 

     



            Chloride [Moles/Vol]  106 mmol/L  Invalid         [facility]    0

-2



                 Interpreta      mmol/L          l               019



                     tion Code           Distric             08:11-0



                           t #1 of                   [address] [place] [address] 



                                         (65514) 

 

     



            Creatinine  1.42 mg/dL  Invalid    0.50-1.50  [facility]    -2



              [Mass/Vol]   Interpreta   mg/dL        l            019



                     tion Code           Distric             08:11-0



                           t #1 of                   [address] [place] [address] 



                                         (23371) 

 

     



            Glucose [Mass/Vol]  94 mg/dL   Invalid         [facility]    06-2

7-2



                 Interpreta      mg/dL           l               019



                     tion Code           Distric             08:11-0



                           t #1 of                   [address] [place] [address] 



                                         (69053) 

 

     



            Potassium  4.0 mmol/L  Invalid    3.5-5.3    [facility]    -2



              [Moles/Vol]   Interpreta   mmol/L       l            019



                     tion Code           Distric             08:11-0



                           t #1 of                   [address] [place] [address] 



                                         (27053) 

 

     



            Protein [Mass/Vol]  8.3 g/dL   Invalid    6.0-8.3    [facility]    06-2

7-2



                 Interpreta      g/dL            l               019



                     tion Code           Distric             08:11-0



                           t #1 of                   [address] [place] [address] 



                                         (13047) 

 

     



            Sodium [Moles/Vol]  137 mmol/L  Invalid    134-148    [facility]    -2



                 Interpreta      mmol/L          l               019



                     tion Code           Distric             08:11-0



                           t #1 of                   [address] [place] [address] 



                                         (18680) 

 

     



            Urea nitrogen  22 mg/dL   Invalid    5-25 mg/dL  [facility]    -2



                 [Mass/Vol]      Interpreta      l               019



                     tion Code           Distric             08:11-0



                           t #1 of                   [address] [place] [address] 



                                         (23891) 

 

 



                                         cardiac



                                         on



                                         2019

 

     



            Cholesterol  133 mg/dL  Invalid    100-240    [facility]    -2



              [Mass/Vol]   Interpreta   mg/dL        l            019



                     tion Code           Distric             08:11-0



                           t #1 of                   [address] [place] [address] 



                                         (36477) 

 

     



            Cholesterol in HDL  31 mg/dL   Invalid    30-85      [facility]    06-2

7-2



              [Mass/Vol]   Interpreta   mg/dL        l            019



                     tion Code           Distric             08:11-0



                           t #1 of                   [address] [place] [address] 



                                         (06711) 

 

     



            Cholesterol in LDL  86 mg/dL   Invalid    0-100      [facility]    06-2

7-2



              [Mass/Vol]   Interpreta   mg/dL        l            019



                     tion Code           Distric             08:11-0



                           t #1 of                   [address] [place] [address] 



                                         (24215) 

 

     



            Triglyceride  81 mg/dL   Invalid         [facility]    -2



              [Mass/Vol]   Interpreta   mg/dL        l            019



                     tion Code           Distric             08:11-0



                           t #1 of                   [address] [place] [address] 



                                         (10320) 

 

 



                                         urinalysis



                                         on



                                         2017

 

     



            Clarity (U)  Clear      Invalid    Clear      Not        



                     Interpreta          [facility]             017



                     tion Code           le                  11:55-0



                           (64339)                   400

 

     



            Color (U)  Yellow     Invalid    Colorless-  Not        



                 Interpreta      Lt. Yellow      [facility]         017



                     tion Code           le                  11:55-0



                           (90350)                   400

 

     



            Leukocyte esterase  Negative   Invalid    Negative   Not        

5-2



                 Test strip Ql (U)   Interpreta      [facility]         017



                     tion Code           le                  11:55-0



                           (13277)                   400

 

     



            Protein (U)  Negative   Invalid    Negative   Not        



                 [Mass/Vol]      Interpreta      [facility]         017



                     tion Code           le                  11:55-0



                           (25624)                   400

 

     



              RBC LM.HPF (Urine  Rare/HPF     Abnormal     Not          



                     sed) [#/Area]       [facility]             017



                           le                        11:55-0



                           (74212)                   400

 

     



            Specific gravity (U)  1.010      Invalid    1.000-1.03  Not        0





              [Rel density]   Interpreta   0            [facility]      017



                     tion Code           le                  11:55-0



                           (69149)                   400

 

     



              WBC LM.HPF (Urine  Negative     Invalid      Not          



                 sed) [#/Area]   Interpreta      [facility]         017



                     tion Code           le                  11:55-0



                           (86497)                   400

 

 



                                         other



                                         on 2017

 

     



              Bacteria LM Ql  Negative     Invalid      Not          



                 (Urine sed)     Interpreta      [facility]         017



                     tion Code           le                  11:55-0



                           (79113)                   400

 

     



            Bilirubin Confirm Ql  N/A        Abnormal   Negative   Not        



                     (U)                 [facility]             017



                           le                        11:55-0



                           (65530)                   400

 

     



            Bilirubin Ql (U)  Negative   Invalid    Negative   Not        



                     Interpreta          [facility]             017



                     tion Code           le                  11:55-0



                           (63099)                   400

 

     



            Erythrocyte  13.4 %     Invalid    11.6-14.8  Not        08-25-2



              distribution width   Interpreta   %            [facility]      017



                 (RBC) [Ratio]   tion Code       le              12:57-0



                           (85759)                   400

 

     



            GFR/1.73 sq  60 mL/min/{1.73_m2}  Invalid    >59        Not        0





              M.predicted MDRD   Interpreta   mL/min/1.7   [facility]      017



              (S/P/Bld) [Vol   tion Code    3m2          le           12:57-0



                     rate/Area]          (27382)             400

 

     



            Glucose Test strip  Negative   Invalid    Negative   Not        08-2

5-2



                 (U) [Mass/Vol]   Interpreta      [facility]         017



                     tion Code           le                  11:55-0



                           (22608)                   400

 

     



            HCO3 (P) [Moles/Vol]  27         Invalid    22-33      Not        08

-25-2



                 Interpreta      mEq/L           [facility]         017



                     tion Code           le                  12:57-0



                           (69367)                   400

 

     



            Hemoglobin Ql (U)  Negative   Invalid    Negative   Not        08-25

-2



                     Interpreta          [facility]             017



                     tion Code           le                  11:55-0



                           (55366)                   400

 

     



            Ketones (U)  Negative   Invalid    Negative   Not        



                 [Mass/Vol]      Interpreta      [facility]         017



                     tion Code           le                  11:55-0



                           (28715)                   400

 

     



            MCHC (RBC)  32.4 g/dL  Invalid    32.0-36.0  Not        



              [Mass/Vol]   Interpreta   g/dL         [facility]      017



                     tion Code           le                  12:57-0



                           (04066)                   400

 

     



            Nitrite Ql (U)  Negative   Invalid    Negative   Not        



                     Interpreta          [facility]             017



                     tion Code           le                  11:55-0



                           (06190)                   400

 

     



            Osmolality Calc  289        Invalid    280-295    Not        



                 [Osmolality]    Interpreta      [facility]         017



                     tion Code           le                  12:57-0



                           (26016)                   400

 

     



            pH (U)     6.5 [pH]   Invalid    5-8.5      Not        



                     Interpreta          [facility]             017



                     tion Code           le                  11:55-0



                           (14673)                   400

 

     



            Platelet mean volume  11.1 fL    High       7.4-10.0   Not        



                 (Bld) [Entitic vol]    fL              [facility]         017



                           le                        12:57-0



                           (04281)                   400

 

     



              Urine Volume  Urine Volume Sufficient (10mL)  Invalid      Not    

      



                     Interpreta          [facility]             017



                     tion Code           le                  11:55-0



                           (27120)                   400

 

     



            Urobilinogen Qn (U)  0.2 {Lori'U}/dL  Abnormal   0.2-1.0    Not  

      



                           [facility]                   017



                           le                        11:55-0



                           (26679)                   400

 

     



              Yeast.budding Ql  No Yeast present  Invalid      Not          08-2

5-2



                 (Urine sed)     Interpreta      [facility]         017



                     tion Code           le                  11:55-0



                           (08486)                   400

 

     



                 Urine Saved if Culture Needed (48hrs from  Abnormal        Not 

            



                     time of collection)    [facility]             017



                           le                        11:55-0



                           (40184)                   400

 

 



                                         metabolic panel



                                         on



                                         2017

 

     



            Anion gap  11 mmol/L  Invalid    6-14       Not        



                 [Moles/Vol]     Interpreta      [facility]         017



                     tion Code           le                  12:57-0



                           (21437)                   400

 

     



            Calcium [Mass/Vol]  9.4 mg/dL  Invalid    8.3-10.4   Not        



                 Interpreta      mg/dL           [facility]         017



                     tion Code           le                  12:57-0



                           (12699)                   400

 

     



            Chloride [Moles/Vol]  106 mmol/L  Invalid         Not        0





                 Interpreta      mmol/L          [facility]         017



                     tion Code           le                  12:57-0



                           (33243)                   400

 

     



            Creatinine  1.20 mg/dL  Invalid    0.50-1.50  Not        



              [Mass/Vol]   Interpreta   mg/dL        [facility]      017



                     tion Code           le                  12:57-0



                           ([id number])                   400

 

     



            Glucose [Mass/Vol]  99 mg/dL   Invalid         Not        08

5-2



                 Interpreta      mg/dL           [facility]         017



                     tion Code           le                  12:57-0



                           ([id number])                   400

 

     



            Potassium  4.4 mmol/L  Invalid    3.5-5.3    Not        



              [Moles/Vol]   Interpreta   mmol/L       [facility]      017



                     tion Code           le                  12:57-0



                           ([id number])                   400

 

     



            Sodium [Moles/Vol]  140 mmol/L  Invalid    134-148    Not        



                 Interpreta      mmol/L          [facility]         017



                     tion Code           le                  12:57-0



                           ([id number])                   400

 

     



            Urea nitrogen  13 mg/dL   Invalid    5-25 mg/dL  Not        



                 [Mass/Vol]      Interpreta      [facility]         017



                     tion Code           le                  12:57-0



                           (04944)                   400

 

 



                                         hematology



                                         on



                                         2017

 

     



            Basophils (Bld)  0.0 10*3/uL  Invalid    0.0-0.2    Not        



              [#/Vol]      Interpreta   K/uL         [facility]      017



                     tion Code           le                  12:57-0



                           ([id number])                   400

 

     



            Basophils/100 WBC  0.30 %     Invalid    0.00-2.50  Not        



              (Bld)        Interpreta   %            [facility]      017



                     tion Code           le                  12:57-0



                           ([id number])                   400

 

     



            Eosinophils (Bld)  0.0 10*3/uL  Invalid    0.0-0.7    Not        



              [#/Vol]      Interpreta   K/uL         [facility]      017



                     tion Code           le                  12:57-0



                           ([id number])                   400

 

     



            Eosinophils/100 WBC  0.5 %      Invalid    0.0-7.0 %  Not        



                 (Bld)           Interpreta      [facility]         017



                     tion Code           le                  12:57-0



                           (55096)                   400

 

     



            Hematocrit (Bld)  45.1 %     Invalid    42.0-52.0  Not        



              [Volume fraction]   Interpreta   %            [facility]      017



                     tion Code           le                  12:57-0



                           (31437)                   400

 

     



            Hemoglobin (Bld)  14.6 g/dL  Invalid    14.0-17.0  Not        



              [Mass/Vol]   Interpreta   g/dL         [facility]      017



                     tion Code           le                  12:57-0



                           ([id number])                   400

 

     



            Lymphocytes (Bld)  1.51 10*3/uL  Invalid    0.60-3.40  Not        



              [#/Vol]      Interpreta   K/uL         [facility]      017



                     tion Code           le                  12:57-0



                           ([id number])                   400

 

     



            Lymphocytes/100 WBC  25.5 %     Invalid    10.0-50.0  Not        -2



              (Bld)        Interpreta   %            [facility]      017



                     tion Code           le                  12:57-0



                           (08921)                   400

 

     



            MCH (RBC) [Entitic  28.7 pg    Invalid    27.0-31.2  Not        -

5-2



              mass]        Interpreta   pg           [facility]      017



                     tion Code           le                  12:57-0



                           ([id number])                   400

 

     



            MCV (RBC) [Entitic  88.6 fL    Invalid    80.0-97.0  Not        -

5-2



              vol]         Interpreta   fL           [facility]      017



                     tion Code           le                  12:57-0



                           ([id number])                   400

 

     



            Monocytes (Bld)  0.6 10*3/uL  Invalid    0.0-0.9    Not        



              [#/Vol]      Interpreta   K/uL         [facility]      017



                     tion Code           le                  12:57-0



                           ([id number])                   400

 

     



            Monocytes/100 WBC  9.6 %      Invalid    0.0-12.0 %  Not        

5-2



                 (Bld)           Interpreta      [facility]         017



                     tion Code           le                  12:57-0



                           (48825)                   400

 

     



            Neutrophils (Bld)  3.78 10*3/uL  Invalid    2.00-6.90  Not        



              [#/Vol]      Interpreta   K/uL         [facility]      017



                     tion Code           le                  12:57-0



                           (22663)                   400

 

     



            Neutrophils/100 WBC  64.1 %     Invalid    37.0-80.0  Not        -2



              (Bld)        Interpreta   %            [facility]      017



                     tion Code           le                  12:57-0



                           ([id number])                   400

 

     



            Platelets (Bld)  188 10*3/uL  Invalid    150-400    Not        2



              [#/Vol]      Interpreta   K/uL         [facility]      017



                     tion Code           le                  12:57-0



                           ([id number])                   400

 

     



            RBC (Bld) [#/Vol]  5.09 10*6/uL  Invalid    4.20-5.40  Not        



                 Interpreta      M/uL            [facility]         017



                     tion Code           le                  12:57-0



                           ([id number])                   400

 

     



            WBC (Bld) [#/Vol]  5.91 10*3/uL  Invalid    5.00-10.00  Not        0





                 Interpreta      K/uL            [facility]         017



                     tion Code           le                  12:57-0



                           ([id number])                   400



                                                                                
                                                                                
                                                                                
                                                                                
                                                                                
                                                                                
                                                                                
                                                                                
                                                                                
                                                                                
                                                                                
                                                                                
                                                                                
                                                                                
                                                                                
                                                                                
                                                                                
                                                                                
                                                                                
                                                                                
                                                                                
                                                                                
                                                                                
                                                                                
                                                                                
                                                                                
                                                



Social History

          No Information                                                        
  



Vital Signs

                      The data below is from unstructured sources            



                    Vital                   Response                   Date/Time

                

 

                    Height (Feet)                   5 feet                    11:42am     

           

 

                    Height (Inches)                   11.00 inches              

     2016 

11:42am                

 

                          Height (Calculated Centimeters)                   180.

387037 cm                 

                                        2016 11:42am                

 

                    Weight (Pounds)                   205 pounds                

   2016 

11:42am                

 

                    Weight (Ounces)                   0.0 oz                   1

2016 11:42am   

             

 

                    Weight (Calculated Grams)                   99907.44 gm     

              

2016 11:42am                

 

                          Weight (Calculated Kilograms)                   92.986

437 kilograms             

                                        2016 11:42am                

 

                    Calculated BMI                   28.6                    11:42am      

          



            



                    Vital                   Response                   Date/Time

                

 

                          Temperature (Fahrenheit)                   97.8 degree

s F (97.6 - 99.5)         

                                        2016 11:00am                

 

                          Temperature (Calculated Celsius)                   36.

01993 degrees C (36.4 - 

37.5)                                   2016 11:00am                

 

                    Temperature Source                   Tympanic               

    2016 

11:00am                

 

                    Pulse Rate (adult)                   69 bpm (60 - 90)       

            

2016 11:00am                

 

                    Respiratory Rate                   20 bpm (12 - 24)         

          2016

 11:00am                

 

                    O2 Sat by Pulse Oximetry                   97 % (88 - 100)  

                 

2016 11:00am                

 

                    Blood Pressure                   130/71 mm Hg               

    2016 

11:00am                

 

                     Blood Pressure Mean                   110 mm Hg            

       2016 

8:28am                

 

                    Pain                                                       

 

                     Numeric Pain Scale                   0-No Pain             

      2016 

11:00am                

 

                     Pain Intensity                   0                   2016 10:45am        

        

 

                    Height (Feet)                   5 feet                    8:25am      

          

 

                    Height (Inches)                   11.00 inches              

     2016 

8:25am                

 

                          Height (Calculated Centimeters)                   180.

379809 cm                 

                                        2016 8:25am                

 

                    Weight (Pounds)                   205 pounds                

   2016 8:25am

                

 

                    Weight (Ounces)                   0.0 oz                   1

2016 8:25am    

            

 

                    Weight (Calculated Grams)                   52038.44 gm     

              

2016 8:25am                

 

                          Weight (Calculated Kilograms)                   92.986

437 kilograms             

                                        2016 8:25am                

 

                    Calculated BMI                   28.6                   11/2

3/2016 8:25am       

         



            



                    Vital                   Response                   Date/Time

                

 

                          Temperature (Fahrenheit)                   97.5 degree

s F (97.6 - 99.5)         

                                                        

 

                          Temperature (Calculated Celsius)                   36.

53456 degrees C (36.4 - 

37.5)                                                   

 

                    Temperature Source                   Tympanic               

                    

 

 

                    Pulse Rate (adult)                   58 bpm (60 - 90)       

                    

        

 

                    Respiratory Rate                   18 bpm (12 - 24)         

                    

      

 

                    O2 Sat by Pulse Oximetry                   93 % (88 - 100)  

                    

             

 

                    Blood Pressure                   116/66 mm Hg               

                    

 

                    Pain                                                       

 

                     Pain Intensity                   0                         

           

 

                    Height (Feet)                   5 feet                      

              

 

                    Height (Inches)                   11.00 inches              

                    

  

 

                          Height (Calculated Centimeters)                   180.

234238 cm                 

                                                        

 

                    Weight (Pounds)                   205 pounds                

                    

 

                    Weight (Calculated Grams)                   54618.437 gm    

                    

            

 

                          Weight (Calculated Kilograms)                   92.986

437 kilograms             

                                                        

 

                    Calculated BMI                   28.59                      

              



            



                    Vital                   Response                   Date/Time

                

 

                          Temperature (Fahrenheit)                   97.5 degree

s F (97.6 - 99.5)         

                                                        

 

                          Temperature (Calculated Celsius)                   36.

99361 degrees C (36.4 - 

37.5)                                                   

 

                    Temperature Source                   Tympanic               

                    

 

 

                    Pulse Rate (adult)                   58 bpm (60 - 90)       

                    

        

 

                    Respiratory Rate                   18 bpm (12 - 24)         

                    

      

 

                    O2 Sat by Pulse Oximetry                   93 % (88 - 100)  

                    

             

 

                    Blood Pressure                   116/66 mm Hg               

                    

 

                    Pain                                                       

 

                     Pain Intensity                   0                         

           

 

                    Height (Feet)                   5 feet                      

              

 

                    Height (Inches)                   11.00 inches              

                    

  

 

                          Height (Calculated Centimeters)                   180.

021724 cm                 

                                                        

 

                    Weight (Pounds)                   205 pounds                

                    

 

                    Weight (Calculated Grams)                   93679.437 gm    

                    

            

 

                          Weight (Calculated Kilograms)                   92.986

437 kilograms             

                                                        

 

                    Calculated BMI                   28.59                      

              



                                                                    



Functional Status

                      The data below is from unstructured sourcesNo functional 
status results.No functional status results.No functional status results.No 
functional status results.No functional status results.                         
                                           



Mental Status

          No Information                                                        
  



Advance Directives

                      



                    Directive                   Response                   Recor

ded Date/Time       

         

 

                    Advance Directives                   No                    11:43am      

          

 

                    Health Care Power of                    No          

         16 

11:43am                

 

                    Organ Donor                   No                   16 

11:43am             

   

 

                    Resuscitation Status                   Full Code            

       16 

11:43am                



            



                    Directive                   Response                   Recor

ded Date/Time       

         

 

                    Advance Directives                   No                    8:26am       

         

 

                    Health Care Power of                    No          

         16 

8:26am                

 

                    Organ Donor                   No                   16 

8:26am              

  

 

                    Resuscitation Status                   Full Code            

       16 

8:26am                



            



                    Directive                   Response                   Recor

ded Date/Time       

         

 

                    Advance Directives                   No                    8:12am       

         

 

                    Health Care Power of                    No          

         14 

8:12am                

 

                    Organ Donor                   No                   14 

8:12am              

  

 

                    Resuscitation Status                   Full Code            

       14 

8:12am                



                                                                    



Discharge Instructions

          No hospital discharge instructions.                          

Patient Instructions              

Physician Instructions              

              

New, Converted, or Re-Newed RX: RX on Chart              

Follow Up 3 years              

              

Activity as tolerated              

              

High Fiber Diet 25g or more per day              

              

Avoid Alcohol, Caffeine, Spicy Greasy and Acid foods.              

              

Drink 64 fluid oz or more of fluids per day.              

              

Symptoms to Report: Fever over 101 degree F, Nausea/Vomiting               

If any problems/questions: Contact your physician or go to Emergency Room       
       

              

            No hospital discharge instructions.                                 
                         



Additional Source Comments

          This clinical document has been generated using [employer] 
software that has been certified by the Office of the National Coordinator for 
Health Information Technology (ONC 15.99.04.3023.Diam.31.00.0.939045) and the 
National Committee for  (NCQA, as an eMeasure certified 
technology).            

            

FOR RECORDS PERTAINING TO PATIENTS WHO ARE OR HAVE BEEN ENROLLED IN A CHEMICAL D
EPENDENCY/SUBSTANCE ABUSE PROGRAM, SOME INFORMATION MAY BE OMITTED. This clinica
l summary was aggregated from multiple sources.  Caution should be exercised in 
using it in the provision of clinical care. This summary normalizes information 
from multiple sources, and as a consequence, information in this document may ma
terially change the coding, format and clinical context of patient data. In olesya
tion, data may be omitted in some cases. CLINICAL DECISIONS SHOULD BE BASED ON T
HE PRIMARY CLINICAL RECORDS. [employer] provides no warranty or guara
ntee of the accuracy or completeness of information in this document.The followi
ng information is based on time limited clinical information

## 2020-08-13 NOTE — XMS REPORT
Encounter Summary

                             Created on: 2020



Cas Lewis

External Reference #: [id number]

: 1950

Sex: Male



Demographics





                          Address                   [address] [place]  [address]

 

                          Home Phone                [phone]

 

                          Preferred Language        English

 

                          Marital Status            Unknown

 

                          Yarsani Affiliation     [facility]

 

                          Race                      White

 

                          Ethnic Group              Not  or 





Author





                          Author                    [facility]

 

                          Organization              [facility]

 

                          Address                   Unknown

 

                          Phone                     Unavailable







Support





                Name            Relationship    Address         Phone

 

                Kendy Richardson ECON            Unknown         [phone]

19







Care Team Providers





                    Care Team Member Name Role                Phone

 

                    Arnaldo Singh         PCP                 [phone]

 

                    Tawil, Elias A MD   Unavailable         [phone]







Reason for Visit

* 



  



                     Reason              Onset Date          Comments

 

  



                     Results             2020          pt calling for path

ology results









Encounter Details





                          Care Team                 Description



                     Date                Type                Department  

 

                                        



Nabil Lee MD



 [address]



[facility] [address]



[place] [address] [phone] (Fax)                      Results (pt calling for pathology result

s)



                     2020          Telephone           [facility]  



                                          [address]  



                                         [address]  



                                         [place]  



                                         [address] [phone]  







Social History





                                        Date



                 Tobacco Use     Types           Packs/Day       Years Used 

 

                                        Quit: 



                           Former Smoker             Pipe   

 

    



                     Smokeless Tobacco: Former  Chew                Quit: 



                                         User   







                    Drinks/Week         oz/Week             Comments



                                         Alcohol Use   

 

                                                             



                                         Never   







  



                     Alcohol Habits      Answer              Date Recorded

 

  



                     How often do you have a drink containing alcohol?  Never   

            2020

 

  



                           How many drinks containing alcohol do you have on  No

t asked 



                                         a typical day when you are drinking?  

 

  



                           How often do you have six or more drinks on one  Not 

asked 



                                         occasion?  







 



                           Sex Assigned at Birth     Date Recorded

 

 



                           Male                      2020  6:16 AM CDT







                                        Date Recorded



                           COVID-19 Exposure         Response 

 

                                        2020  6:14 AM CDT



                           In the last month, have you been in contact with  No 

/ Unsure 



                                         someone who was confirmed or suspected 

to have  



                                         Coronavirus / COVID-19?  



documented as of this encounter



Functional Status





                                        Date of Assessment



                           Functional Status         Response 

 

                                        2020



                           Does the patient have a hearing impairment:  No 

 

                                        2020



                           Does the patient have a visual impairment:  No 

 

                                        2020



                           Does the patient have impaired ambulation:  No 

 

                                        2020



                           Does the patient have an activity of daily living  No

 



                                         (ADL) impairment:  

 

                                        2020



                           Does the patient have an instrumental activity of  No

 



                                         daily living (IADL) impairment:  







                                        Date of Assessment



                           Cognitive Status          Response 

 

                                        2020



                           Does the patient have a cognitive impairment:  No 



documented as of this encounter



Miscellaneous Notes

* Telephone Encounter - Kendy Gardner LPN - 2020  3:54 PM CDT



Pt calling for pathology results



Electronically signed by Knedy Gardner LPN at 2020  3:54 PM CDT

documented in this encounter



Plan of Treatment





Not on filedocumented as of this encounter



Goals





     



            Goal       Patient    Associated  Recent Progress  Patient-Stat  Aut

hor



                     Goal Type           Problems            ed? 

 

     



                 Resume normal activities  [facility]        No              Lizette Zacarias RN



documented as of this encounter



Visit Diagnoses

Not on filedocumented in this encounter

## 2020-08-13 NOTE — XMS REPORT
Encounter Summary

                             Created on: 2020



Cas Lewis

External Reference #: [id number]

: 1950

Sex: Male



Demographics





                          Address                   [address] [place]  [address]

 

                          Home Phone                [phone]

 

                          Preferred Language        English

 

                          Marital Status            Unknown

 

                          Restoration Affiliation     [facility]

 

                          Race                      White

 

                          Ethnic Group              Not  or 





Author





                          Author                    [facility]

 

                          Organization              [facility]

 

                          Address                   Unknown

 

                          Phone                     Unavailable







Support





                Name            Relationship    Address         Phone

 

                Kendy Richardson ECON            Unknown         [phone]

19







Care Team Providers





                    Care Team Member Name Role                Phone

 

                    Arnaldo Singh         PCP                 [phone]

 

                    Tawil, Elias A MD   Unavailable         [phone]







Encounter Details





                          Care Team                 Description



                     Date                Type                Department  

 

                                                     



                           06/15/2020                Travel   







Social History





                                        Date



                 Tobacco Use     Types           Packs/Day       Years Used 

 

                                        Quit: 



                           Former Smoker             Pipe   

 

    



                     Smokeless Tobacco: Former  Chew                Quit: 



                                         User   







                    Drinks/Week         oz/Week             Comments



                                         Alcohol Use   

 

                                                             



                                         Never   







  



                     Alcohol Habits      Answer              Date Recorded

 

  



                     How often do you have a drink containing alcohol?  Never   

            2020

 

  



                           How many drinks containing alcohol do you have on  No

t asked 



                                         a typical day when you are drinking?  

 

  



                           How often do you have six or more drinks on one  Not 

asked 



                                         occasion?  







 



                           Sex Assigned at Birth     Date Recorded

 

 



                           Male                      2020  6:16 AM CDT







                                        Date Recorded



                           COVID-19 Exposure         Response 

 

                                        6/15/2020 11:02 AM CDT



                           In the last month, have you been in contact with  No 

/ Unsure 



                                         someone who was confirmed or suspected 

to have  



                                         Coronavirus / COVID-19?  



documented as of this encounter



Functional Status





                                        Date of Assessment



                           Functional Status         Response 

 

                                        2020



                           Does the patient have a hearing impairment:  No 

 

                                        2020



                           Does the patient have a visual impairment:  No 

 

                                        2020



                           Does the patient have impaired ambulation:  No 

 

                                        2020



                           Does the patient have an activity of daily living  No

 



                                         (ADL) impairment:  

 

                                        2020



                           Does the patient have an instrumental activity of  No

 



                                         daily living (IADL) impairment:  







                                        Date of Assessment



                           Cognitive Status          Response 

 

                                        2020



                           Does the patient have a cognitive impairment:  No 



documented as of this encounter



Plan of Treatment





Not on filedocumented as of this encounter



Goals





     



            Goal       Patient    Associated  Recent Progress  Patient-Stat  Aut

hor



                     Goal Type           Problems            ed? 

 

     



                 Resume normal activities  Hospital        No              Lizette Zacarias RN



documented as of this encounter



Visit Diagnoses

Not on filedocumented in this encounter

## 2020-08-13 NOTE — XMS REPORT
Encounter Summary

                             Created on: 2020



Csa Lewis

External Reference #: [id number]

: 1950

Sex: Male



Demographics





                          Address                   [address] [place]  [address]

 

                          Home Phone                [phone]

 

                          Preferred Language        English

 

                          Marital Status            Unknown

 

                          Mu-ism Affiliation     [facility]R

 

                          Race                      White

 

                          Ethnic Group              Not  or 





Author





                          Author                    [facility]

 

                          Organization              [facility]

 

                          Address                   Unknown

 

                          Phone                     Unavailable







Support





                Name            Relationship    Address         Phone

 

                Kendy Richardson ECON            Unknown         [phone]

19







Care Team Providers





                    Care Team Member Name Role                Phone

 

                    Arnaldo Singh         PCP                 [phone]

 

                    Tawil, Elias A MD   Unavailable         [phone]







Reason for Visit

* 



 



                           Reason                    Comments

 

 



                                         Kidney Cancer 









Encounter Details





                          Care Team                 Description



                     Date                Type                Department  

 

                                        



Nabil Lee MD



 [address]



[facility] [address]



[place] [address] [phone] (Fax)                      Renal cell carcinoma of left kidney (HCC

); 

CKD (chronic kidney disease) stage 3, GFR 30-59 ml/min (HCC)



                     06/15/2020          Office Visit        [facility]  



                                          [address]  



                                         [address]  



                                         [place]  



                                         [address] [phone]  







Social History





                                        Date



                 Tobacco Use     Types           Packs/Day       Years Used 

 

                                        Quit: 



                           Former Smoker             Pipe   

 

    



                     Smokeless Tobacco: Former  Chew                Quit: 



                                         User   







                    Drinks/Week         oz/Week             Comments



                                         Alcohol Use   

 

                                                             



                                         Never   







  



                     Alcohol Habits      Answer              Date Recorded

 

  



                     How often do you have a drink containing alcohol?  Never   

            2020

 

  



                           How many drinks containing alcohol do you have on  No

t asked 



                                         a typical day when you are drinking?  

 

  



                           How often do you have six or more drinks on one  Not 

asked 



                                         occasion?  







 



                           Sex Assigned at Birth     Date Recorded

 

 



                           Male                      2020  6:16 AM CDT







                                        Date Recorded



                           COVID-19 Exposure         Response 

 

                                        6/15/2020 11:02 AM CDT



                           In the last month, have you been in contact with  No 

/ Unsure 



                                         someone who was confirmed or suspected 

to have  



                                         Coronavirus / COVID-19?  



documented as of this encounter



Last Filed Vital Signs





                    Reading             Time Taken          Comments



                                         Vital Sign   

 

                    116/71              06/15/2020  1:58 PM CDT  



                                         Blood Pressure   

 

                    76                  06/15/2020  1:58 PM CDT  



                                         Pulse   

 

                    -                   -                    



                                         Temperature   

 

                    -                   -                    



                                         Respiratory Rate   

 

                    -                   -                    



                                         Oxygen Saturation   

 

                    -                   -                    



                                         Inhaled Oxygen   



                                         Concentration   

 

                    90.7 kg (200 lb)    06/15/2020  1:58 PM CDT  



                                         Weight   

 

                    180.3 cm (5' 11")   06/15/2020  1:58 PM CDT  



                                         Height   

 

                    27.89               06/15/2020  1:58 PM CDT  



                                         Body Mass Index   



documented in this encounter



Functional Status





                                        Date of Assessment



                           Functional Status         Response 

 

                                        2020



                           Does the patient have a hearing impairment:  No 

 

                                        2020



                           Does the patient have a visual impairment:  No 

 

                                        2020



                           Does the patient have impaired ambulation:  No 

 

                                        2020



                           Does the patient have an activity of daily living  No

 



                                         (ADL) impairment:  

 

                                        2020



                           Does the patient have an instrumental activity of  No

 



                                         daily living (IADL) impairment:  







                                        Date of Assessment



                           Cognitive Status          Response 

 

                                        2020



                           Does the patient have a cognitive impairment:  No 



documented as of this encounter



Progress Notes

* Nabil Lee MD - 06/15/2020  2:00 PM CDT



Formatting of this note might be different from the original.

Date of Service: 6/15/2020 



Subjective:      Follow-up kidney cancer.  

 

History of Present Illness

Cas Lewis is a 69 y.o. male with history of lymphoma, LEFT renal ma
ss s/p LEFT robotic partial nephrectomy on 2020 T1a clear cell renal cell c
arcinoma, grade 2 with necrosis, margins negative. Patient has done well post-op
eratively and is not having any abdominal pain. He has normal bowel function. No
voiding complaints or issues. He states that he is back to regular activities.



Medical History: 

Diagnosis Date 

 Colon polyps  

 Depression  

 Elevated PSA  

 Enlarged prostate  

 Hypertension  

 Kidney stones  

 Lymphoplasmacytoid lymphoma, CLL (HCC)  

 Malignant neoplasm of colon (HCC)  



Surgical History: 

Procedure Laterality Date 

 HX TONSILLECTOMY   

 COLON SURGERY  1995 

 COLECTOMY  1995 

 HX APPENDECTOMY  1995 

 HERNIA REPAIR  2011 

 CYSTOURETHROSCOPY  2020 

 ROBOT ASSISTED LAPAROSCOPIC NEPHRECTOMY PARTIAL Left 2020 

 Performed by Nabil Lee MD at [facility] OR 

 ULTRASOUND GUIDANCE - INTRAOPERATIVE Left 2020 

 Performed by Nabil Lee MD at [facility] OR 

 PROSTATE SURGERY   

 Biopsy  



Family History 

Problem Relation Age of Onset 

 Cancer Mother  

     Endometrail  

 Hypertension Mother  

 Cancer Father  

 Cancer-Colon Father  

 Hypertension Father  

 Neurologic Disorder Father  

 Alzheimer's Father  

 Cancer-Skin Brother  

 Cancer Daughter  

 Cancer-Breast Daughter  

 Hypertension Maternal Grandfather  



Current Outpatient Medications 

Medication Sig Dispense Refill 

 acetaminophen (TYLENOL) 325 mg tablet Take two tablets by mouth every 6 hour
s as needed.   

 amitriptyline (ELAVIL) 25 mg tablet Take 25 mg by mouth at bedtime daily.   

 atenoloL (TENORMIN) 50 mg tablet Take 50 mg by mouth daily.   

 doxazosin (CARDURA) 4 mg tablet Take 4 mg by mouth daily.   

 oxyCODONE (ROXICODONE) 5 mg tablet Take one tablet to two tablets by mouth e
very 4 hours as needed 30 tablet 0 

 pantoprazole DR (PROTONIX) 40 mg tablet Take 40 mg by mouth daily.   

 polyethylene glycol 3350 (MIRALAX) 17 gram/dose powder Take seventeen g by m
outh daily. 527 g 3 

 senna/docusate (SENNA-S) 8.6/50 mg tablet Take one tablet by mouth twice mary
ly. 30 tablet 0 



No current facility-administered medications for this visit.  



Allergies 

Allergen Reactions 

 Sulfa (Sulfonamide Antibiotics) HIVES 



Social History 



Socioeconomic History 

 Marital status:  

  Spouse name: Not on file 

 Number of children: Not on file 

 Years of education: Not on file 

 Highest education level: Not on file 

Occupational History 

 Not on file 

Tobacco Use 

 Smoking status: Former Smoker 

  Types: Pipe 

  Last attempt to quit:  

  Years since quittin.4 

 Smokeless tobacco: Former User 

  Types: Chew 

  Quit date:  

Substance and Sexual Activity 

 Alcohol use: Never 

  Frequency: Never 

 Drug use: Never 

 Sexual activity: Not Currently 

  Partners: Female 

Other Topics Concern 

 Not on file 

Social History Narrative 

 Not on file 



Review of Systems 

Constitutional: Positive for fatigue. Negative for activity change, appetite nadira
nge, chills, diaphoresis, fever and unexpected weight change. 

HENT: Negative for congestion, hearing loss, mouth sores and sinus pressure.  

Eyes: Negative for visual disturbance. 

Respiratory: Negative for apnea, cough, chest tightness and shortness of breath.
 

Cardiovascular: Negative for chest pain, palpitations and leg swelling. 

Gastrointestinal: Negative for abdominal distention, abdominal pain, blood in st
ool, constipation, nausea and vomiting. 

Genitourinary: Negative for decreased urine volume, difficulty urinating, discha
rge, dysuria, enuresis, flank pain, frequency, genital sores, hematuria, penile 
pain, penile swelling, scrotal swelling, testicular pain and urgency. 

Musculoskeletal: Negative for arthralgias, back pain, gait problem and myalgias.


Skin: Negative for rash and wound. 

Neurological: Negative for dizziness, tremors, seizures, syncope, weakness, ligh
t-headedness, numbness and headaches. 

Hematological: Negative for adenopathy. Does not bruise/bleed easily. 

Psychiatric/Behavioral: Negative for decreased concentration and dysphoric mood.
The patient is not nervous/anxious.  



Objective:       

 acetaminophen (TYLENOL) 325 mg tablet Take two tablets by mouth every 6 hour
s as needed. 

 amitriptyline (ELAVIL) 25 mg tablet Take 25 mg by mouth at bedtime daily. 

 atenoloL (TENORMIN) 50 mg tablet Take 50 mg by mouth daily. 

 doxazosin (CARDURA) 4 mg tablet Take 4 mg by mouth daily. 

 oxyCODONE (ROXICODONE) 5 mg tablet Take one tablet to two tablets by mouth e
very 4 hours as needed 

 pantoprazole DR (PROTONIX) 40 mg tablet Take 40 mg by mouth daily. 

 polyethylene glycol 3350 (MIRALAX) 17 gram/dose powder Take seventeen g by m
outh daily. 

 senna/docusate (SENNA-S) 8.6/50 mg tablet Take one tablet by mouth twice mary
ly. 



Vitals: 

 06/15/20 1358 

BP: 116/71 

BP Source: Arm, Left Upper 

Patient Position: Sitting 

Pulse: 76 

Weight: 90.7 kg (200 lb) 

Height: 180.3 cm (71") 

PainSc: Zero 



Body mass index is 27.89 kg/m. 



Physical Exam 

Constitutional: He is oriented to person, place, and time. He appears well-devel
oped and well-nourished. No distress. 

HENT: 

Head: Normocephalic and atraumatic. 

Eyes: EOM are normal. Right eye exhibits no discharge. Left eye exhibits no disc
harge. 

Neck: Normal range of motion. No JVD present. 

Cardiovascular: Normal rate and regular rhythm. 

Pulmonary/Chest: Effort normal and breath sounds normal. No stridor. No respirat
ory distress. He has no wheezes. 

Abdominal: Soft. He exhibits no distension. There is no abdominal tenderness. 

Genitourinary:    Genitourinary Comments: Lap incisions well healing. No CVA ten
derness. No ecchymoses.  



Musculoskeletal: Normal range of motion.    

   General: No edema. 

Neurological: He is alert and oriented to person, place, and time. Coordination 
normal. 

Skin: Skin is warm. He is not diaphoretic. No erythema. No pallor. 

Psychiatric: He has a normal mood and affect. His behavior is normal. Judgment a
nd thought content normal. 

Vitals reviewed.



Basic Metabolic Profile  

Lab Results 

Component Value Date/Time 

  (L) 06/15/2020 11:08 AM 

 K 4.3 06/15/2020 11:08 AM 

 CA 9.1 06/15/2020 11:08 AM 

  06/15/2020 11:08 AM 

 CO2 24 06/15/2020 11:08 AM 

 GAP 6 06/15/2020 11:08 AM 

 Lab Results 

Component Value Date/Time 

 BUN 15 06/15/2020 11:08 AM 

 CR 1.56 (H) 06/15/2020 11:08 AM 

 GLU 81 06/15/2020 11:08 AM 

 



Pathology (2020)

Final Diagnosis: 

A. Kidney, deep margin, biopsy:  

Negative for malignancy. 

B. Soft tissue, peritumor fat, biopsy: 

Negative for malignancy. 

C. Kidney, left, partial nephrectomy: 

Renal cell carcinoma, clear cell type, ISUP/WHO grade 2, 2.7 cm, with 

necrosis, confined to kidney. 

Margins uninvolved. 

 



Assessment and Plan:

Problem 

Ckd (Chronic Kidney Disease) Stage 3, Gfr 30-59 Ml/Min ([facility]) 

 Baseline Cr ~1.5

 

Renal Cell Carcinoma of Left Kidney (Hcc) 

 LEFT renal mass s/p LEFT robotic partial nephrectomy on 2020 T1a clear alonso
l renal cell carcinoma, grade 2 with necrosis, margins negative. 



Lab Results 

Component Value Date/Time 

 CR 1.56 (H) 06/15/2020 11:08 AM 

 CR 1.58 (H) 2020 04:38 AM 

 CR 1.45 (H) 2020 07:34 AM 



 



Renal cell carcinoma of left kidney (HCC)

69 y.o. male with history of lymphoma, LEFT renal mass s/p LEFT robotic partial 
nephrectomy on 2020 T1a clear cell renal cell carcinoma, grade 2 with necro
sis, margins negative. 



Plan:

- Patient plans to continue follow up surveillance for recurrence of mass with [facility] urologist Dr. Tawil given long distance to [facility] 

- Outlined typical surveillence plan to patient and family for pT1a masses with 
negative margins including imaging out to 3 years post-op and BMPs

- He will transfer care back to Dr. Tawil and follow up with [facility] prn.



Patient seen and discussed with Dr. Lee, who directed plan of care.



Marvin Tay MD

Urology Resident



 

ATTESTATION



I personally performed the key portions of the E/M visit, discussed case with re
sident and concur with resident documentation of history, physical exam, assessm
ent, and treatment plan unless otherwise noted.



Staff name:  Nabil Lee MD Date:  2020 



 



Electronically signed by Nabil Lee MD at 2020  5:13 PM CDT

documented in this encounter



Plan of Treatment





Not on filedocumented as of this encounter



Goals





     



            Goal       Patient    Associated  Recent Progress  Patient-Stat  Aut

hor



                     Goal Type           Problems            ed? 

 

     



                 Resume normal activities  [facility] [employer]

Lizette kirkland RN



documented as of this encounter



Visit Diagnoses









                                         Diagnosis

 





                                         Renal cell carcinoma of left kidney (HC

C)

 





                                         CKD (chronic kidney disease) stage 3, G

FR 30-59 ml/min (HCC)



                                         Chronic kidney disease, Stage III (mode

rate)





* Assessment & Plan Note - Marvin Tay MD - 06/15/2020 10:00 PM CDT



Associated Problem(s): Renal cell carcinoma of left kidney (HCC)



69 y.o. male with history of lymphoma, LEFT renal mass s/p LEFT robotic partial 
nephrectomy on 2020 T1a clear cell renal cell carcinoma, grade 2 with necro
sis, margins negative. 



Plan:

- Patient plans to continue follow up surveillance for recurrence of mass with [facility] urologist Dr. Tawil given long distance to [facility] 

- Outlined typical surveillence plan to patient and family for pT1a masses with 
negative margins including imaging out to 3 years post-op and BMPs

- He will transfer care back to Dr. Tawil and follow up with [facility] prn.



Electronically signed by Marvin Tay MD at 06/15/2020 10:03 PM CDT

documented in this encounter

## 2020-08-13 NOTE — XMS REPORT
Encounter Summary

                             Created on: 2020



Cas Lewis

External Reference #: [id number]

: 1950

Sex: Male



Demographics





                          Address                   [address] [place]  [address]

 

                          Home Phone                [phone]

 

                          Preferred Language        English

 

                          Marital Status            Unknown

 

                          Temple Affiliation     [facility]R

 

                          Race                      White

 

                          Ethnic Group              Not  or 





Author





                          Author                    [facility]

 

                          Organization              [facility]

 

                          Address                   Unknown

 

                          Phone                     Unavailable







Support





                Name            Relationship    Address         Phone

 

                Kendy Richardson ECON            Unknown         [phone]

19







Care Team Providers





                    Care Team Member Name Role                Phone

 

                          PCP                       Unavailable







Encounter Details





                          Care Team                 Description



                     Date                Type                Department  

 

                                                     



                     2020          [facility]  



                           Encounter                 [facility]  



                                         [address] [address]  



                                         [phone]  







Social History





                                        Date



                 Tobacco Use     Types           Packs/Day       Years Used 

 

                                         



                                         Never Assessed    







 



                           Sex Assigned at Birth     Date Recorded

 

 



                                         Male 



documented as of this encounter



Medications at Time of Discharge





                          Start Date                End Date



                 Medication      Sig             Dispensed       Refills  

 

                          2020                 



                     amitriptyline (ELAVIL) 25  Take 25 mg by       0  



                           mg tablet                 mouth at    



                                         bedtime    



                                         daily.    

 

                          2020                 



                     doxazosin (CARDURA) 4 mg  Take 4 mg by        0  



                           tablet                    mouth daily.    

 

                          2020                 



                     pantoprazole DR     Take 40 mg by       0  



                           (PROTONIX) 40 mg tablet   mouth daily.    



documented as of this encounter



Plan of Treatment





Not on filedocumented as of this encounter



Goals





     



            Goal       Patient    Associated  Recent Progress  Patient-Stat  Aut

hor



                     Goal Type           Problems            ed? 

 

     



                 Resume normal activities  Hospital        No              Lizette Zacarias RN



documented as of this encounter



Procedures





                                        Comments



                 Procedure Name  Priority        Date/Time       Associated Diag

nosis 

 

                                        



Results for this procedure are in the results section.



                     CT CHEST/ABD/PEL EXTERNAL  Routine             2020  



                           IMAGING                   12:00 AM CDT  



documented in this encounter



Results

* CT CHEST/ABD/PEL EXTERNAL IMAGING (2020 12:00 AM CDT)







                                         Specimen

 









 



                           Narrative                 Performed At

 

 



                                         This order has been auto finalized and 

does not contain a result. 





documented in this encounter



Visit Diagnoses

Not on filedocumented in this encounter

## 2020-08-13 NOTE — XMS REPORT
Continuity of Care Document

                             Created on: 2020



JAS LEWIS

External Reference #: [id number]

: 1950

Sex: Male



Demographics





                          Address                   [address] [place]  [address]

 

                          Home Phone                [phone]

 

                          Preferred Language        English

 

                          Marital Status            Unknown

 

                          Sabianism Affiliation     Unknown

 

                          Race                      Unknown

 

                          Ethnic Group              Unknown





Author





                          JAS Yanez

 

                          Organization              [facility]

 

                          Address                   Unknown

 

                          Phone                     Unavailable







Care Team Providers





                    Care Team Member Name Role                Phone

 

                    [facility]               Unavailable         Unavailable



                                    



Problems

                    



                    Problem                         Status                      

   Onset Date       

                          Classification                         Date Reported  

         

                          Comments                         Source               

     

 

                          CKD (chronic kidney disease) stage 3, GFR 30-59 ml/min

                         

Active                         06/15/2020                                       

                    2020                                                  

The 

[facility],                    

 

                          Renal cell carcinoma of left kidney                   

      Active              

                    2020

                                                    [facility],                    

 

                          Renal cell carcinoma of left kidney (HCC)             

            Active        

                                             Diagnosis                         

2020                                                   [facility],                    

 

                                        CKD (chronic kidney disease) stage 3, GF

R 30-59 ml/min (HCC)                    

                    Active                                                  Diag

nosis           

                    2020                                                  

[facility] 

[facility],                    

 

                    Preop testing                         Active                

                    

                          Diagnosis                         2020          

             

                                                    [facility],             

      

 

                    Left renal mass                         Active              

                    

                          Diagnosis                         2020          

           

                                                    [facility],           

        

 

                    Dysuria                         Active                      

                    

                    Diagnosis                         2020                

             

                                        [facility]

,                   





                                                                                
                                                                                
      



Medications

                    



                    Medication                         Details                  

       Route        

                          Status                         Patient Instructions   

          

                          Ordering Provider                         Order Date  

               

                                        Source                    

 

                          acetaminophen (TYLENOL) 325 mg tablet                 

        Take two tablets 

by mouth every 6 hours as needed.                                               

                    Active                                                      

                  

                                                    [facility],            

       

 

                          amitriptyline (ELAVIL) 25 mg tablet                   

      Take 25 mg by mouth 

at bedtime daily.                                                   Active      

 

                                                                                

   

                                        [facility]

,                    

 

                          atenoloL (TENORMIN) 50 mg tablet                      

   Take 50 mg by mouth 

daily.                                                   Active                 

 

                                                                                

              

                                        [facility]

,                    

 

                          doxazosin (CARDURA) 4 mg tablet                       

  Take 4 mg by mouth 

daily.                                                   Active                 

 

                                                                                

              

                                        [facility]

,                    

 

                          oxyCODONE (ROXICODONE) 5 mg tablet                    

     Take one tablet to 

two tablets by mouth every 4 hours as needed                                    

                    Active                                                      

       

                                                    [facility], 

                  

 

                          pantoprazole DR (PROTONIX) 40 mg tablet               

          Take 40 mg by 

mouth daily.                                                   Active           

 

                                                                                

        

                                        [facility]

,                    

 

                          polyethylene glycol 3350 (MIRALAX) 17 gram/dose powder

                         

Take seventeen g by mouth daily.                                                

                    Active                                                      

                   

                                                    [facility]

[facility],             

      

 

                          senna/docusate (SENNA-S) 8.6/50 mg tablet             

            Take one 

tablet by mouth twice daily.                                                   

Active                                                                          

 

                                                    [facility],              

     



                                                                                
                                                                                
                                  



Allergies, Adverse Reactions, Alerts

                    



                    Substance                         Category                  

       Reaction     

                          Severity                         Reaction type        

       

                    Status                         Date Reported                

         

Comments                                Source                    

 

                          SULFA (SULFONAMIDE ANTIBIOTICS)                       

                          

                    HIVES                                                  Drug 

Allergy             

                    Active                         2014                   

         

                                        [facility],     

               



                                                        



Immunizations

                    



                    Immunization                         Date Given             

            Site    

                          Status                         Last Updated           

      

                          Comments                         Source               

     

 

                    Evaluated Forecast                         2020       

                    

                          completed                                             

    

                                                table.evaluated-forecast {      

  border-collapse: collapse;        

font-family: Mad[place], Helvetica, sans-serif;    }    .evaluated-forecast th, 
.evaluated-forecast td {        paddinpx 8px;    }    .evaluated-forecast 
thead th {        background: #[id number];        text-transform: lowercase;        
text-align: left;        font-size: 15px;        color: #fff;    }    
.evaluated-forecast tr {        border: 1px solid #[id number];    }    .evaluated-
forecast tbody tr {        border-bottom: 1px solid #[id number];    }    .evaluated-
forecast tbody tr:nth-child(odd) {        background: #[id number];    }    .e
valuated-forecast tbody th, .evaluated-forecast tbody tr td {        border-
right: 1px solid #[id number];    }    .evaluated-forecast tfoot th {        
background: #[id number];        text-align: left;        font-weight: normal;       
font-size: 10px;        color: #fff;    }    .evaluated-forecast tr *:nth-
child(3), .evaluated-forecast tr *:nth-child(4) {        text-align: right;    }
       

 

 

 

                          MARIETTA (Varivax)             1951    

 

                          DTaP,                   1957    

 

                          Zoster Subunit (Shingrix)         2000    

 

                          Influenza IIV4 MDV         2020    

 

                          Polio,                  RU26301-0^Too Old^LN    

 

                          MMR                       HT87208-5^Immune^LN    

 

                          Hib,                    GA31825-5^Too Old^LN    

 

                          Hep B, UF                 EU77436-9^Too Old^LN    

 

                          Hep A, UF                 JC02765-4^Too Old^LN    

 

                          Pneumococcal, UF          MH37543-5^Complete^LN    

 

                          Rotavirus, UF             VN80160-2^Too Old^LN    

 

                          Meningococcal, UF         AU96166-5^Too Old^LN    

 

                          HPV, UF                   DG25581-8^Too Old^LN    



                        [id number],                                                 
                              Influenza IIV3 High                        
10/23/2019                        Left Posterolateral fat of Upper Arm          
             Not Given                                                          
             [id number],                                                            
                   Influenza IIV3 High                        10/02/2018        
               Left Posterolateral fat of Upper Arm                        Not 
Given                                                                        
[id number],                                                                         
      Influenza IIV3 High                        10/24/2017                     
  Right Posterolateral fat of Upper Arm                        Not Given        
                                                               [id number],          
                                                                     PPSV23 
(Pneumovax 23)                        10/24/2017                        Left 
Posterolateral fat of Upper Arm                        Not Given                
                                                       [id number],                  
                                                             Influenza IIV3 High
                       10/18/2016                        Left Posterolateral fat
of Upper Arm                        Not Given                                   
                                    [id number],                                     
                                          PCV-13 (Prevnar)                      
 10/18/2016                        Right Posterolateral fat of Upper Arm        
               Not Given                                                        
               [id number],                                                          
                     Influenza IIV4 MDV                        2015       
                                        Not Given                               
                                        [id number],                                 
                                              Influenza IIV4 PFree              
         10/02/2014                                                Not Given    
                                                                   [id number],      
                                                                         
Influenza IIV3 MDV                        2013                            
                   Not Given                                                    
                   [id number],                                                      
                         Influenza TIV                        10/14/2010        
                                       Not Given                                
                                       [id number],                                  
                                             Influenza TIV                      
 2009                                                Not Given            
                                                           [id number],              
                                                                 Influenza TIV  
                     10/20/2008                                                
Not Given                                                                       
[id number],                                                                         
      Influenza TIV                        10/05/2007                           
                    Not Given                                                   
                    [id number],                                                     
                          Influenza TIV                        2006       
                                        Not Given                               
                                        [id number],                                 
                                              Influenza TIV                     
  2005                                                Not Given           
                                                            [id number],             
                                                                  Influenza TIV 
                      10/12/2001                                                
Not Given                                                                       
[id number],                                                                         
      PPSV23 (Pneumovax 23)                        2000                   
                            Not Given                                           
                            [id number],                                             
                                  Influenza TIV                        
2000                                                Not Given             
                                                          [id number],               
                                                                Td              
         2000                                                Not Given    
                                                                   [id number],      
                                                                         
Influenza TIV                        10/22/1999                                 
              Not Given                                                         
              [id number],                                                           
                    PPSV23 (Pneumovax 23)                        10/09/1998     
                                          Not Given                             
                                          [id number],                               
                                                Influenza TIV                   
    10/09/1998                                                Not Given         
                                                              [id number],           
                                                                    Influenza 
TIV                        10/17/1997                                           
    Not Given                                                                   
    [id number],                                                                     
                                                                                
                                                                                
                                                                                
                                                                                
                                                      



Results





                    Order Name                         Results                  

       Value        

                          Reference Range                         Date          

          

                    Interpretation                         Comments             

            

Source                    

 

                    BASIC METABOLIC PANEL                         Sodium        

      136                        

                    137 - 147                         06/15/2020                

                  

                                                    [facility] 

[facility],                    

 

                    BASIC METABOLIC PANEL                         Potassium     

      4.3                     

                    3.5 - 5.1                         06/15/2020                

               

                                                    [facility] 

[facility],                    

 

                    BASIC METABOLIC PANEL                         Chloride      

      106                      

                    98 - 110                         06/15/2020                 

                

                                                    [facility] 

[facility],                    

 

                BASIC METABOLIC PANEL                         CO2             24

                           

21 - 30                         06/15/2020                                      

                                                    [facility],   

                

 

                    BASIC METABOLIC PANEL                         Anion Gap     

      6                       

                    3 - 12                         06/15/2020                   

                 

                                                    [facility], 

                  

 

                    BASIC METABOLIC PANEL                         Glucose       

      81                        

                    70 - 100                         06/15/2020                 

                  

                                                    [facility],

                   

 

                    BASIC METABOLIC PANEL                         Blood Urea Nit

rogen 15            

                    7 - 25                         06/15/2020                   

      

                                                    [facility],                    

 

                    BASIC METABOLIC PANEL                         Creatinine    

      1.56                   

                    0.4 - 1.24                         06/15/2020               

             

                                                    [facility],                    

 

                    BASIC METABOLIC PANEL                         Calcium       

      9.1                       

                    8.5 - 10.6                         06/15/2020               

                 

                                                    [facility],                    

 

                    BASIC METABOLIC PANEL                         eGFR Non Afric

an American 44 

mL/min                          60                         06/15/2020           

                                                    The eGFR is not validated fo

r use in drug 

dosing adjustments.  Continue to use estimated creatinine clearance per dosing 
reference text.  Please contact the Clinical Pharmacist for questions.          
                                        [facility]

,                    

 

                    BASIC METABOLIC PANEL                         eGFR  A

merican 54 mL/min   

                          60                         06/15/2020                 

    

                                                    The eGFR is not validated fo

r use in drug dosing 

adjustments.  Continue to use estimated creatinine clearance per dosing 
reference text.  Please contact the Clinical Pharmacist for questions.          
                                        [facility]

,                    

 

                    BASIC METABOLIC PANEL                         Lab Interpreta

tion  Abnormal       

                                             06/15/2020                         

 

                                                    [facility],                    

 

                    DRAIN FLUID CREATININE                         Drain Fluid C

reatinine 1.80 mg/dL

                                                    2020                  

  

                                                    Body fluid to serum creatini

ne ratios 1.0 suggest 

the specimen may be contaminated with urine                          [facility] 

[facility],                    

 

                    TYPE & CROSSMATCH                         Units Ordered     

  4                       

                                             2020                         

                 

                                                    [facility],       

            

 

                    TYPE & CROSSMATCH                         Crossmatch Expires

  2020,2359    

                                                    2020                  

     

                                                                      [facility],                    

 

                    TYPE & CROSSMATCH                         ABO/RH(D)         

  B POS                       

                                             2020                         

                 

                                                    [facility],       

            

 

                    TYPE & CROSSMATCH                         Antibody Screen   

  NEG                   

                                             2020                         

             

                                                    [facility]

[facility],   

                

 

                    TYPE & CROSSMATCH                         Unit Number       

  [id number]             

                                             2020                         

       

                                                    [facility] 

System,                    

 

                    TYPE & CROSSMATCH                         Blood Component Ty

pe RBC,ADSOL,LEUKO 

REDUCED                                                     2020          

 

                                                                      [facility],                    

 

                    TYPE & CROSSMATCH                         Unit Division     

  0                       

                                             2020                         

                 

                                                    [facility],       

            

 

                    TYPE & CROSSMATCH                         Transfusion Status

  OK TO TRANSFUSE    

                                                    2020                  

     

                                                                      [facility],                    

 

                    TYPE & CROSSMATCH                         Crossmatch Result 

  

COMPATIBLE,ELECTRONIC                                                     

2020                                                                      

 

                                        [facility]

,                    

 

                    BASIC METABOLIC PANEL                         Sodium        

      134                        

                    137 - 147                         2020                

                  

                                                    [facility] 

System,                    

 

                    BASIC METABOLIC PANEL                         Potassium     

      4.1                     

                    3.5 - 5.1                         2020                

               

                                                    [facility] 

[facility],                    

 

                    BASIC METABOLIC PANEL                         Chloride      

      105                      

                    98 - 110                         2020                 

                

                                                    [facility] 

[facility],                    

 

                BASIC METABOLIC PANEL                         CO2             24

                           

21 - 30                         2020                                      

                                                    [facility],   

                

 

                    BASIC METABOLIC PANEL                         Anion Gap     

      5                       

                    3 - 12                         2020                   

                 

                                                    [facility], 

                  

 

                    BASIC METABOLIC PANEL                         Glucose       

      100                       

                    70 - 100                         2020                 

                 

                                                    [facility] 

[facility],                    

 

                    BASIC METABOLIC PANEL                         Blood Urea Nit

rogen 20            

                    7 - 25                         2020                   

      

                                                    [facility],                    

 

                    BASIC METABOLIC PANEL                         Creatinine    

      1.58                   

                    0.4 - 1.24                         2020               

             

                                                    [facility] 

[facility],                    

 

                    BASIC METABOLIC PANEL                         Calcium       

      7.7                       

                    8.5 - 10.6                         2020               

                 

                                                    [facility] 

[facility],                    

 

                    BASIC METABOLIC PANEL                         eGFR Non Afric

an American 44 

mL/min                          60                         2020           

                                                    The eGFR is not validated fo

r use in drug 

dosing adjustments.  Continue to use estimated creatinine clearance per dosing 
reference text.  Please contact the Clinical Pharmacist for questions.          
                                        [facility]

,                    

 

                    BASIC METABOLIC PANEL                         eGFR  A

merican 53 mL/min   

                          60                         2020                 

    

                                                    The eGFR is not validated fo

r use in drug dosing 

adjustments.  Continue to use estimated creatinine clearance per dosing 
reference text.  Please contact the Clinical Pharmacist for questions.          
                                        [facility]

,                    

 

                    BASIC METABOLIC PANEL                         Lab Interpreta

tion  Abnormal       

                                             2020                         

 

                                                    [facility],                    

 

                CBC                         White Blood Cells 7.7               

            4.5 

- 11.0                         2020                                       

                                                    [facility],    

               

 

                CBC                         RBC             4.07                

           4.4 - 5.5        

                    2020                                                  

    

                                        [facility]

,                   



 

                CBC                         Hemoglobin      12.1                

           13.5 - 

16.5                         2020                                         

                                                    [facility],      

             

 

                CBC                         Hematocrit      35.3 %              

            40 - 50  

                          2020                                            

    

                                                    [facility],             

      

 

                CBC                         MCV             86.8                

           80 - 100         

                    2020                                                  

     

                                        [facility]

,                    

 

                CBC                         MCH             29.8 PG             

             26 - 34        

                    2020                                                  

    

                                        [facility]

,                   



 

                CBC                         MCHC            34.3                

           32.0 - 36.0     

                    2020                                                  

 

                                        [facility]

,                

   

 

                CBC                         RDW             14.6 %              

            11 - 15         

                    2020                                                  

     

                                        [facility]

,                    

 

                CBC                         Platelet Count  134                 

          150 - 

400                         2020                                          

                                                    [facility],       

            

 

                CBC                         MPV             8.3                 

          7 - 11            

                    2020                                                  

        

                                        [facility]

,                    

 

                    CBC                         Lab Interpretation  Abnormal    

                     

                                             2020                         

                   

                                                    [facility],         

          

 

                    COMPREHENSIVE METABOLIC PANEL                         Sodium

              135                

                    137 - 147                         2020                

          

                                                    [facility],                    

 

                    COMPREHENSIVE METABOLIC PANEL                         Potass

ium           3.8             

                          3.5 - 5.1                         2020          

             

                                                                      [facility],                    

 

                    COMPREHENSIVE METABOLIC PANEL                         Chlori

de            104              

                    98 - 110                         2020                 

        

                                                    [facility],                    

 

                    COMPREHENSIVE METABOLIC PANEL                         Glucos

e             91                

                    70 - 100                         2020                 

          

                                                    [facility],                    

 

                    COMPREHENSIVE METABOLIC PANEL                         Blood 

Urea Nitrogen 18    

                          7 - 25                         2020             

    

                                                                      [facility],                    

 

                    COMPREHENSIVE METABOLIC PANEL                         Creati

nine          1.45           

                          0.4 - 1.24                         2020         

           

                                                                      [facility],                    

 

                    COMPREHENSIVE METABOLIC PANEL                         Calciu

m             8.7               

                          8.5 - 10.6                         2020         

               

                                                                      [facility],                    

 

                    COMPREHENSIVE METABOLIC PANEL                         Total 

Protein       8.7         

                          6.0 - 8.0                         2020          

         

                                                                      [facility],                    

 

                    COMPREHENSIVE METABOLIC PANEL                         Total 

Bilirubin     1.2       

                          0.3 - 1.2                         2020          

       

                                                                      [facility],                    

 

                    COMPREHENSIVE METABOLIC PANEL                         Albumi

n             3.4               

                    3.5 - 5.0                         2020                

         

                                                    [facility],                    

 

                    COMPREHENSIVE METABOLIC PANEL                         Alk Ph

osphatase     58 U/L    

                          25 - 110                         2020           

     

                                                                      [facility],                    

 

                    COMPREHENSIVE METABOLIC PANEL                         AST (S

GOT)          23 U/L         

                          7 - 40                         2020             

          

                                                                      [facility],                    

 

                    COMPREHENSIVE METABOLIC PANEL                         CO2   

              24                    

                    21 - 30                         2020                  

              

                                                    [facility],                    

 

                    COMPREHENSIVE METABOLIC PANEL                         ALT (S

GPT)          29 U/L         

                          7 - 56                         2020             

          

                                                                      [facility],                    

 

                    COMPREHENSIVE METABOLIC PANEL                         Anion 

Gap           7               

                    3 - 12                         2020                   

         

                                                    [facility],                    

 

                    COMPREHENSIVE METABOLIC PANEL                         eGFR N

on African American 

48 mL/min                          60                         2020        

                                                    The eGFR is not validated fo

r use in 

drug dosing adjustments.  Continue to use estimated creatinine clearance per 
dosing reference text.  Please contact the Clinical Pharmacist for questions.   
                                        [facility]

,                 

  

 

                    COMPREHENSIVE METABOLIC PANEL                         eGFR A

frican American 58 

mL/min                          60                         2020           

                                                    The eGFR is not validated fo

r use in drug 

dosing adjustments.  Continue to use estimated creatinine clearance per dosing 
reference text.  Please contact the Clinical Pharmacist for questions.          
                                        [facility]

,                    

 

                    COMPREHENSIVE METABOLIC PANEL                         Lab In

terpretation  

Abnormal                                                     2020         

 

                                                                      [facility],                    

 

                                        BLOOD TYPE CONFIRMATION - ORDER ONLY IF 

REQUESTED BY LAB                        

                ABO/RH(D)       B POS                                           

         2020   

                                                                       [facility] 

[facility],                    

 

                CBC                         White Blood Cells 5.9               

            4.5 

- 11.0                         2020                                       

                                                    [facility],    

               

 

                CBC                         RBC             4.89                

           4.4 - 5.5        

                    2020                                                  

    

                                        [facility]

,                   



 

                CBC                         Hemoglobin      14.7                

           13.5 - 

16.5                         2020                                         

                                                    [facility],      

             

 

                CBC                         Hematocrit      42.8 %              

            40 - 50  

                          2020                                            

    

                                                    [facility],             

      

 

                CBC                         MCV             87.6                

           80 - 100         

                    2020                                                  

     

                                        [facility]

,                    

 

                CBC                         MCH             30.0 PG             

             26 - 34        

                    2020                                                  

    

                                        [facility]

,                   



 

                CBC                         MCHC            34.2                

           32.0 - 36.0     

                    2020                                                  

 

                                        [facility]

,                

   

 

                CBC                         RDW             14.5 %              

            11 - 15         

                    2020                                                  

     

                                        [facility]

,                    

 

                CBC                         Platelet Count  160                 

          150 - 

400                         2020                                          

                                                    [facility],       

            

 

                CBC                         MPV             8.3                 

          7 - 11            

                    2020                                                  

        

                                        [facility]

,                    

 

                    CULTURE-URINE W/SENSITIVITY                         CULTURE-

URINE W/SENSITIVITY 

URINE                                                     2020            

 

                                                                      [facility],                    

 

                    CULTURE-URINE W/SENSITIVITY                         CULTURE-

URINE W/SENSITIVITY 

NONE                                                     2020             

 

                                                                      [facility],                    



                                                                                
                                                                                
                                                                                
                                                                                
                                                                                
                                                                                
                                                                                
                                                                                
                                                



Pathology Reports





                                        No Data Provided for This Section       

             



                            



Diagnostic Reports

            



                                        No Data Provided for This Section       

             



                                                            



Consultation Notes

                    



                    Results                         Value                       

  Date              

                                        Source                    

 

                          Progress Note                         Nabil Warner MD - 06/15/2020  2:00 PM 

CDTFormatting of this note might be different from the original.Date of Service:
6/15/2020 Subjective:      Follow-up kidney cancer.   History of Present Illness
Jas Lewis is a 69 y.o. male with history of lymphoma, LEFT renal 
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
carcinoma, grade 2 with necrosis, margins negative. Patient has done well post-
operatively and is not having any abdominal pain. He has normal bowel function. 
No voiding complaints or issues. He states that he is back to regular 
activities.Medical History: Diagnosis Date  Colon polyps   Depression   
Elevated PSA   Enlarged prostate   Hypertension   Kidney stones   
Lymphoplasmacytoid lymphoma, CLL (HCC)   Malignant neoplasm of colon (HCC)  
Surgical History: Procedure Laterality Date  HX TONSILLECTOMY    COLON 
SURGERY  1995  COLECTOMY  1995  HX APPENDECTOMY  1995  HERNIA REPAIR
   CYSTOURETHROSCOPY  2020  ROBOT ASSISTED LAPAROSCOPIC NEPHRECTOMY 
PARTIAL Left 2020  Performed by Nabil Warner MD at [facility] OR &#[id number]; 
ULTRASOUND GUIDANCE - INTRAOPERATIVE Left 2020  Performed by Nabil Warner MD at [facility] [place]  PROSTATE SURGERY    Biopsy  Family History Problem 
Relation Age of Onset  Cancer Mother       Endometrail   Hypertension Mother  
 Cancer Father   Cancer-Colon Father   Hypertension Father   Neurologic 
Disorder Father   Alzheimer's Father  &#[id number]; Cancer-Skin Brother   Cancer 
Daughter   Cancer-Breast Daughter   Hypertension Maternal Grandfather  Current
Outpatient Medications Medication Sig Dispense Refill  acetaminophen (TYLENOL) 
325 mg tablet Take two tablets by mouth every 6 hours as needed.    
amitriptyline (ELAVIL) 25 mg tablet Take 25 mg by mouth at bedtime daily.    
atenoloL (TENORMIN) 50 mg tablet Take 50 mg by mouth daily.    doxazosin 
(CARDURA) 4 mg tablet Take 4 mg by mouth daily.    oxyCODONE (ROXICODONE) 5 mg 
tablet Take one tablet to two tablets by mouth every 4 hours as needed 30 tablet
0  pantoprazole DR (PROTONIX) 40 mg tablet Take 40 mg by mouth daily.    
polyethylene glycol 3350 (MIRALAX) 17 gram/dose powder Take seventeen g by mouth
daily. 527 g 3 &#[id number]; senna/docusate (SENNA-S) 8.6/50 mg tablet Take one tablet
by mouth twice daily. 30 tablet 0 No current facility-administered medications 
for this visit.  Allergies Allergen Reactions  Sulfa (Sulfonamide Antibiotics) 
HIVES Social History Socioeconomic History  Marital status:    Spouse 
name: Not on file  Number of children: Not on file  Years of education: Not on
file  Highest education level: Not on file Occupational History  Not on file 
Tobacco Use  Smoking status: Former Smoker   Types: Pipe   Last attempt to 
quit:    Years since quittin.4  Smokeless tobacco: Former User   
Types: Chew   Quit date:  Substance and Sexual Activity  Alcohol use: Never
  Frequency: Never  Drug use: Never  Sexual activity: Not Currently   
Partners: Female Other Topics Concern  Not on file Social History Narrative  
Not on file Review of Systems Constitutional: Positive for fatigue. Negative for
activity change, appetite change, chills, diaphoresis, fever and unexpected 
weight change. HENT: Negative for congestion, hearing loss, mouth sores and 
sinus pressure.  Eyes: Negative for visual disturbance. Respiratory: Negative 
for apnea, cough, chest tightness and shortness of breath.  Cardiovascular: 
Negative for chest pain, palpitations and leg swelling. Gastrointestinal: 
Negative for abdominal distention, abdominal pain, blood in stool, constipation,
nausea and vomiting. Genitourinary: Negative for decreased urine volume, 
difficulty urinating, discharge, dysuria, enuresis, flank pain, frequency, kulwinder
horace sores, hematuria, penile pain, penile swelling, scrotal swelling, testicular
pain and urgency. Musculoskeletal: Negative for arthralgias, back pain, gait 
problem and myalgias. Skin: Negative for rash and wound. Neurological: Negative 
for dizziness, tremors, seizures, syncope, weakness, light-headedness, numbness 
and headaches. Hematological: Negative for adenopathy. Does not bruise/bleed 
easily. Psychiatric/Behavioral: Negative for decreased concentration and 
dysphoric mood. The patient is not nervous/anxious.  Objective:        
acetaminophen (TYLENOL) 325 mg tablet Take two tablets by mouth every 6 hours as
needed.  amitriptyline (ELAVIL) 25 mg tablet Take 25 mg by mouth at bedtime 
daily.  atenoloL (TENORMIN) 50 mg tablet Take 50 mg by mouth daily.  doxazosin
(CARDURA) 4 mg tablet Take 4 mg by mouth daily.  oxyCODONE (ROXICODONE) 5 mg 
tablet Take one tablet to two tablets by mouth every 4 hours as needed  
pantoprazole DR (PROTONIX) 40 mg tablet Take 40 mg by mouth daily.  
polyethylene glycol 3350 (MIRALAX) 17 gram/dose powder Take seventeen g by mouth
daily.  senna/docusate (SENNA-S) 8.6/50 mg tablet Take one tablet by mouth 
twice daily. Vitals:  06/15/20 1358 BP: 116/71 BP Source: Arm, Left Upper 
Patient Position: Sitting Pulse: 76 Weight: 90.7 kg (200 lb) Height: 180.3 cm 
(71") PainSc: Zero Body mass index is 27.89 kg/m . Physical Exam Constitutional:
He is oriented to person, place, and time. He appears well-developed and well-
nourished. No distress. HENT: Head: Normocephalic and atraumatic. Eyes: EOM are 
normal. Right eye exhibits no discharge. Left eye exhibits no discharge. Neck: 
Normal range of motion. No JVD present. Cardiovascular: Normal rate and regular 
rhythm. Pulmonary/Chest: Effort normal and breath sounds normal. No stridor. No 
respiratory distress. He has no wheezes. Abdominal: Soft. He exhibits no 
distension. There is no abdominal tenderness. Genitourinary:    Genitourinary 
Comments: Lap incisions well healing. No CVA tenderness. No ecchymoses.  
Musculoskeletal: Normal range of motion.       General: No edema. Neurological: 
He is alert and oriented to person, place, and time. Coordination normal. Skin: 
Skin is warm. He is not diaphoretic. No erythema. No pallor. Psychiatric: He has
a normal mood and affect. His behavior is normal. Judgment and thought content 
normal. Vitals reviewed.Basic Metabolic Profile  Lab Results Component Value 
Date/Time   (L) 06/15/2020 11:08 AM  K 4.3 06/15/2020 11:08 AM  CA 9.1 
06/15/2020 11:08 AM   06/15/2020 11:08 AM  CO2 24 06/15/2020 11:08 AM  GAP
6 06/15/2020 11:08 AM  Lab Results Component Value Date/Time  BUN 15 06/15/2020 
11:08 AM  CR 1.56 (H) 06/15/2020 11:08 AM  GLU 81 06/15/2020 11:08 AM  Pathology
(2020)Final Diagnosis: A. Kidney, deep margin, biopsy:   Negative for 
malignancy. B. Soft tissue, peritumor fat, biopsy: Negative for malignancy. C. 
Kidney, left, partial nephrectomy: Renal cell carcinoma, clear cell type, 
ISUP/WHO grade 2, 2.7 cm, with necrosis, confined to kidney. Margins uninvolved.
 Assessment and Plan:Problem Ckd (Chronic Kidney Disease) Stage 3, Gfr 30-59 
Ml/Min ([facility])  Baseline Cr ~1.5 Renal Cell Carcinoma of Left Kidney (Hcc)  LEFT 
renal mass s/p LEFT robotic partial nephrectomy on 2020 T1a clear cell 
renal cell carcinoma, grade 2 with necrosis, margins negative. Lab Results 
Component Value Date/Time  CR 1.56 (H) 06/15/2020 11:08 AM  CR 1.58 (H) 
2020 04:38 AM  CR 1.45 (H) 2020 07:34 AM  Renal cell carcinoma of 
left kidney (HCC)69 y.o. male with history of lymphoma, LEFT renal mass s/p LEFT
robotic partial nephrectomy on 2020 T1a clear cell renal cell carcinoma, 
grade 2 with necrosis, margins negative. Plan:- Patient plans to continue follow
up surveillance for recurrence of mass with referring urologist Dr. Tawil given 
long distance to [facility] - Outlined typical surveillence plan to patient and family
for pT1a masses with negative margins including imaging out to 3 years post-op 
and BMPs- He will transfer care back to Dr. Tawil and follow up with [facility] 
prn.Patient seen and discussed with Dr. Warner, who directed plan of 
care.Marvin Tay MDUrology Resident ATTESTATIONI personally performed the key 
portions of the E/M visit, discussed case with resident and concur with resident
documentation of history, physical exam, assessment, and treatment plan unless 
otherwise noted.Staff name:  Nabil Warner MD Date:  2020  Electronically
signed by Nabil Warner MD at 2020  5:13 PM CDT                         

06/15/2020                              [facility]

,    

               

 

                          Misc. Note                         Telephone Encounter

 - Kendy Gardner LPN 

- 2020  3:54 PM CDTPt calling for pathology resultsElectronically signed 
by Kendy Gardner LPN at 2020  3:54 PM CDT                         

2020                              [facility]

,    

               

 

                          Progress Note                         Nabil Warner MD - 2020 11:31 AM 

CDTI called patient with pathology results.  Pathological T1a clear cell renal 
cell carcinoma, grade 2 with necrosis, margins negative.  He expressed understa
nding of the pathology.  He is doing well.  Pain nearly completely resolved.  
Eating well and he is having normal bowel activity.  He will keep follow-up as 
scheduled.Electronically signed by Nabil Warner MD at 2020  6:22 PM CDT
                          2020                         [facility],                    

 

                          Progress Note                         Nabil Warner MD - 2020  5:29 AM 

CDTFormatting of this note might be different from the original.Urology Progress
Note 2020 ASSESSMENT:Jas Lewis is a 69 y.o. Male with history
of lymphoma, left renal mass s/p left robotic partial nephrectomy on 2020. 
LOS: 0 days PLAN: Will discuss plan with staff surgeon - Dr. Warner- Pain 
control: PO pain- Diet/FEN: Regular diet, SLIVF- GI: Bowel regimen, zofran prn- 
: Felix out, trial of void; Cr 1.58 (1.45)- Heme/ID: Afebrile. Hgb, WBC 
appropriate- OOB/Amb - PTA: Atenolol, doxazosin- Dispo: Discharge planning 
today; f/u 3 weeks with BMPProphylaxis Review:-  DVT: SCD's, contraindictated 
(partial nephrectomy)-  GI: Yes-  Catheter: Yes-  Abx: Yes - Kae-operative 
Rene Mcclain M.D.Urology PGY-2Please page urology on call with 
questionsATTESTATIONI personally performed the key portions of the E/M visit, 
discussed case with resident and concur with resident documentation of history, 
physical exam, assessment, and treatment plan unless otherwise noted.Patient 
doing well. No issues overnight.  Tolerating diet.  Pain controlled.  
Ambulating.Abdomen soft, appropriateIncisions c/d/iFoley removedJP with 
serosanginous fluidA/P: s/p LEFT robotic partial nephrectomy doing well-- 
increase activity-- diet as tolerated-- monitor EMILY and send EMILY creatinine -- D/C
if normal-- D/C planning.  F/U 3 weeks with BMPStaff name:  Nabil Warner MD 
Date:  2020 
________________________________________________________________________ 
SUBJECTIVE: No acute events overnight. Pain is well-controlled. Patient denies 
nausea, denies vomiting, is ambulating. Patient has not passed flatus, has not 
had a bowel movement. Patient is tolerating Diet Clear LiquidAdvance Diet as 
Tolerated.OBJECTIVE:                 Vital Signs: Most Recent                
Vital Signs: Past 24 Hours BP: 111/60 (38)Temp: 37.1  C (98.7  F) (38)Pulse: 82 (38)Respirations: 16 PER MINUTE (38)SpO2: 93 % 
(38)Height: 180.3 cm (71") (1638)Weight: 92.2 kg (203 lb 4.2 oz) 
(1638)  BP: (111-155)/(60-90) Temp:  [36.3  C (97.3  F)-37.1  C (98.8  F)]
Pulse:  [62-82] Respirations:  [13 PER MINUTE-21 PER MINUTE] SpO2:  [91 %-99 %] 
Physical Exam:General: Alert and oriented, no acute distressPulm: Equal chest 
rise, non-laboredCV: Regular rate and rhythmExtremities: No edema, SCD's in 
placeAbd: Soft, appropriately tender to palpation, non-
distendedIncisions/Wounds: Appropriately tender to palpation. Portsite and 
extraction site incisions C/D/I with dermabond : Felix catheter in place 
draining pale salmon urineIntake/Output:Date 20 07 - 20 0700 
20 07 - 20 0700 Shift 1900-5214 3346-5270 24 Hour Total 4833-80051900-0700 24 Hour Total INTAKE P.O. 240 0 240    I.V.(mL/kg/hr) 2500(2.3) 682 
3182    Shift Total(mL/kg) 2740(29.7) 682(7.4) 3422(37.1)    OUTPUT 
Urine(mL/kg/hr) 465(0.4) 1350 1815      Urine 115  115      Urine Output (ml) 
(Indwelling Urinary Catheter 20 0830 16 FR) 350 1350 1700    Drains 105 80
185      Drain Output (ml) ([place] Drain 20 1129 Left Abdomen) 105 
80 185    Other 150  150      Estimated Blood Loss 150  150    Shift 
Total(mL/kg) 720(7.8) 1430(15.5) 2150(23.3)    NET 2020 1272    Weight (kg)
92.2 92.2 92.2 92.2 92.2 92.2  Labs:                   Hematology               
               Chemistry Recent Labs    WBC 7.7 HGB 12.1* PLTCT 134*
 Recent Labs    * K 3.8  CO2 24 BUN 18 CR 1.45* GFR 48* 
GLU 91 CA 8.7  ACTIVE PROBLEMS:Principal Problem:  Left renal massElectronically
signed by Nabil Warner MD at 2020  8:35 AM CDT                         

2020                              [facility]

,    

               

 

                          Progress Note                         Zaida Padilla R N - 2020  6:05 PM 

CDTPatient arrived to room # (807) via cart accompanied by transport. Patient 
transferred to the bed with assistance. Bedside safety checks completed. Initial
patient assessment completed. Refer to flowsheet for details.Admission skin 
assessment completed with: Carmenza Diaz injury present on arrival?: No1. 
Head/Face/Neck: No2. Trunk/Back: No3. Upper Extremities: No4. Lower Extremities:
No5. Pelvic/Coccyx: No6. Assessed for device associated injury? Yes7. 
Malnutrition Screening Tool (Nursing Nutrition Assessment) Completed? NoSee Doc 
Flowsheet for additional wound details. INTERVENTIONS: Falls bundle initiated. 
Electronically signed by Zaida Padilla RN at 2020  6:06 PM CDT           
                          2020                         [facility],                    

 

                          Progress Note                         Nadine Capellan RN - 2020  3:22 

PM CDTAttempted to edie patient for pre procedure covid screening, but no answer.
Voicemail and phone number left for patient to call return call.Electronically 
signed by Pretty Capellan, RN at 2020  3:25 PM CDT                     
                          2020                         [facility], 

                  

 

                          Progress Note                         Nicky Alvarado,

 RN - 2020 11:17 AM 

CDTPAC phone triage completed with patient for surgery on 20 with Dr. Warner. Medications, allergies and medical history reviewed and updated in 
chart.  He denies CP, SOA, dizziness, numbness, tingling, N/V/D, cough, fever, 
flu-like symptoms, exposure to anyone ill, positive for COVID-19 or waiting for 
results of same.  He denies travel outside of [place] or [place] in last 15 days.  He 
denies PMH of complications from anesthesia.  Denies URI symptoms at this time. 
No PAC visit indicated.Preop and medication instructions reviewed with patient. 
No vitamins or supplements for 14 days and no NSAIDS for 7 days before surgery. 
NPO after 11 pm the night before surgery but ok to drink water until 2 hours 
before arrival at hospital. Patient verbalized understanding and copy of 
instructions will be mailed to his home.Patient informed of visitor policy and 
expressed understanding.Electronically signed by Nicky Alvarado RN at 
2020 11:19 AM CDT                         2020                      

                                        [facility]. Note                         Care Plan - Gabriela Dodge RN - 2020 

11:31 AM CDTFoley is out and voiding well on own.Free of falls.Jas Lewis discharged on 2020. .Discharge instructions reviewed with patient.
Valuables returned: pt states has all belonginsWhere Are Valuables Stored?: to 
[facility] Larry.Home medications:  .to ben at  pharmacyFunctional 
assessment at discharge complete: Yes . Electronically signed by Gabriela Dodge RN at 2020 12:02 PM CDT                         2020        

                                        [facility]. Note                         Anesthesia Post Op 

Day 1 - Heather Lozano CRNA - 2020  7:27 AM CDTFormatting of this note might be different from 
the original.Anesthesia Follow-Up Evaluation: Post-Procedure Day OneName: Jas Lewis     MRN: [id number]     : 1950     Age: 69 y.o.     Sex: 
male __________________________________________________________________________ 
Procedure Date: 2020 Procedure: Procedure(s) with comments:ROBOT ASSISTED 
LAPAROSCOPIC NEPHRECTOMY PARTIAL - CASE LENGTH 4 HOURS, PLEASE CLIP PT IN 
SDS/PRE-POST, REQUEST FIREFLY, INTRA-OP SONO, ARGON, FROZEN SECTION 
REQUIREDULTRASOUND GUIDANCE - INTRAOPERATIVEPhysical AssessmentHeight: 180.3 cm 
(71")  Weight: 92.2 kg (203 lb 4.2 oz)Vital Signs (Last Filed in 24 hours)BP: 
111/60 ( 0038)Temp: 37.1  C (98.7  F) (38)Pulse: 82 (38)Respirations: 16 PER MINUTE (38)SpO2: 93 % (38)SpO2 Pulse: 
71 ( 1345)Height: 180.3 cm (71") ( 1638)Patient History 
AllergiesAllergies Allergen Reactions &#8226; Sulfa (Sulfonamide Antibiotics) 
HIVES  MedicationsScheduled Meds:atenoloL (TENORMIN) tablet 50 mg, 50 mg, Oral, 
QDAYbacitracin topical ointment, , Topical, BIDceFAZolin (ANCEF) IVP 1 g, 1 g, 
Intravenous, Q8H*docusate (COLACE) oral solution 100 mg, 100 mg, Oral, 
BIDdoxazosin (CARDURA) tablet 4 mg, 4 mg, Oral, QDAYpantoprazole DR (PROTONIX) 
tablet 40 mg, 40 mg, Oral, QDAY(21)polyethylene glycol 3350 (MIRALAX) packet 17 
g, 1 packet, Oral, QDAYsenna (SENOKOT) oral syrup 17.6 mg, 17.6 mg, Oral, 
BIDContinuous Infusions:PRN and Respiratory Meds:acetaminophen Q6H PRN, fentaNYL
citrate PF Q1H PRN, hyoscyamine Q4H PRN, ondansetron Q6H PRN **OR** ondansetron 
(ZOFRAN) IV Q6H PRN, oxyCODONE Q4H PRNDiagnostic TestsHematology: Lab Results 
Component Value Date  HGB 12.1 2020  HCT 35.3 2020  PLTCT 134 
2020  WBC 7.7 2020  MCV 86.8 2020  MCH 29.8 2020  MCHC 
34.3 2020  MPV 8.3 2020  RDW 14.6 2020   General Chemistry: 
Lab Results Component Value Date   2020  K 4.1 2020   
2020  CO2 24 2020  GAP 5 2020  BUN 20 2020  CR 1.58 
2020   2020  CA 7.7 2020  ALBUMIN 3.4 2020  
TOTBILI 1.2 2020  Coagulation: No results found for: PT, PTT, INRFollow-Up
AssessmentPatient location during evaluation: floorAnesthetic Complications: 
Anesthetic complications: The patient did not experience any anesthestic 
complications.  Pain:Score: 1Management:adequate Level of Consciousness: awake 
and alert Hydration:acceptable Airway Patency: patent Respiratory Status: 
acceptable Cardiovascular Status:acceptable Regional/Neuroaxial:  Electronically
signed by Heather Lozano CRNA at 2020  7:27 AM CDT                     
                          2020                         [facility], 

                  

 

                          Misc. Note                         Care Plan - Amada Sellers RN - 2020

 6:24 AM CDTProblem: Infection, Risk of, Urinary Catheter-Associated Urinary 
Tract InfectionGoal: Absence of urinary catheter-associated infectionOutcome: G
oal OngoingFlowsheets (Taken 2020 06)Absence of urinary catheter-
associated infections: Urinary catheter discontinuation Assess for signs and 
sypmtoms of catheter-associated urinary tract infectionNote: Doley pulled at 
0620  Problem: Falls, High Risk ofGoal: Absence of falls-Adult PatientOutcome: 
Goal OngoingFlowsheets (Taken 2020 0623)Absence of falls-Adult Patient: 
Complete Fall Risk Assessment. Implement fall risk bundle. Consider additional 
interventions if patient is confused, has gait/balance problems and on high risk
medications. Provide safe ambulation. Provde safe environment. Provide fall 
prevention strategies. Problem: Discharge PlanningGoal: Participation in plan of
careOutcome: Goal OngoingFlowsheets (Taken 2020 0623)Participation in Plan 
of Care: Involve patient/caregiver in care planning decision makingGoal: 
Knowledge regarding plan of careOutcome: Goal OngoingFlowsheets (Taken 2020
0623)Knowledge regarding plan of care: Provide admission education to parent/
caregiver Provide procedural and treatment education Provide infection 
prevention education Provide medication management education Provide fall 
prevention education Provide plan of care educationGoal: Prepared for 
dischargeOutcome: Goal OngoingFlowsheets (Taken 2020 0623)Prepared for 
discharge: Complete ADL ability assessment Collaborate with multidisciplinary 
team for hospital discharge coordination Provide safe use medical equipment 
education Provide diet and oral health education Electronically signed by 
Amada Sellers RN at 2020  6:25 AM CDT                         

2020                              [facility]

,    

               

 

                          Misc. Note                         Procedures (Immed P

ost or Bedside) - Nabil Warner MD - 2020 12:10 PM CDTFormatting of this note might be different 
from the original. Brief Operative NoteName: Jas Lewis is a 69 y.o.
male     : 1950             MRN#: [id number] OF OPERATION: 
2020Date:  2020    Preoperative Dx: Left renal mass [N28.89]Post-op 
Diagnosis    * Left renal mass [N28.89]Procedure(s) (LRB):ROBOT ASSISTED 
LAPAROSCOPIC NEPHRECTOMY PARTIAL (Left)ULTRASOUND GUIDANCE - INTRAOPERATIVE 
(Left)Anesthesia Type: GeneralSurgeon(s) and Role:   * Nabil Warner MD - 
Primary   * Param Hobson MD - Resident - AssistingFindings: Left 4 cm 
posterior mid/inferior pole renal mass abutting renal artery, significant 
endophytic component. Complex hilar anatomy with branching artery and vein. Able
 to perform partial nephrectomy without vascular injury, collecting system entry
 noted and oversewn. Hemostatic renorrhaphy. EMILY drain placed. Felix. Estimated 
Blood Loss: 50 mlSpecimen(s) Removed/Disposition: ID Type Source Tests Collected
 by Time Destination 1 : PERITUMOR FAT Tissue Kidney, Left SURGICAL PATHOLOGY   
       Nabil Warner MD 2020 1059  2 : DEEP MARGIN Tissue Kidney, Left 
SURGICAL PATHOLOGY          Nabil Warner MD 2020 1108  3 : LEFT RENAL 
MASS Tissue Kidney, Left SURGICAL PATHOLOGY          Nabil Warner MD 
2020 1131  Complications:  NoneImplants: NoneDrains: EMILY drain LLQ, Felix 
Disposition:  PACU - stableJustin ROMAIN Hobsonager 2250 ATTESTATIONI performed
 this procedure with a resident.Staff name:  Nabil Warner MD Date:  2020
 Electronically signed by Nabil Warner MD at 2020  1:10 PM CDT         
                          2020                         [facility],                    

 

                          Misc. Note                         Operative Report (D

irect Entry) - Nabil Warner MD - 2020  8:39 AM CDTFormatting of this note might be different 
from the original.OPERATIVE REPORTName: Jas Lewis is a 69 y.o. male
     : 1950             MRN#: [id number] OF OPERATION: 
2020Surgeon(s) and Role:   * Nabil Warner MD - Primary   * Param Hobson MD - Resident - Assisting Preoperative Diagnosis:  Left renal mass 
[N28.89]Post-op Diagnosis    * Left renal mass [N28.89]Procedure(s) (LRB):ROBOT 
ASSISTED LAPAROSCOPIC NEPHRECTOMY PARTIAL (Left)ULTRASOUND GUIDANCE - 
INTRAOPERATIVE (Left)Anesthesia Type: GeneralIndications: 70 yo M with history 
of lymphoma, who was found to have an enhancing left renal mass, presents for 
robotic partial nephrectomy. FINDINGS:  1 late branching artery, and 1 early 
branching vein; artery  clamped with 24-minute warm ischemia time; 4 cm 
posterior LEFT midpole mass excised with grossly negative margins; hemostatic 
renorrhaphy. OPERATIVE DETAILS: The patient was identified and informed consent 
was obtained.  The patient was brought to the operative suite and placed supine 
on the table.  A time-out procedure was performed per protocol.  Preoperative 
antibiotics were administered.  Sequential compression devices were operational 
on bilateral lower extremities.  After smooth induction of general anesthesia, 
the patient was moved to the lateral position with care taken to pad all 
pressure points including the use of an axillary roll and then secured to the 
table.  The abdomen and flank were prepped and draped in the usual sterile 
fashion.Veress was needle placed in the left upper quadrant and drop test 
confirmed placement.  The abdomen was insufflated 15 mm of pneumoperitoneum.  An
 8 mm robotic non-bladed trocar was placed at the umbilical level just lateral 
to the rectus muscle.  Camera was inserted and no injuries were noted.  This 
permitted advancement of our two 8-mm assistant ports without difficulty into 
the left upper quadrant inferior to the costal margin and in the left lower 
quadrant medial and superior to the anterior superior iliac spine. An AirSeal 
trocar, and a 10-mm assistant port were then place between these ports.  The 
robotic camera was advanced and there was obvious midline omental adhesions that
 were taken down bluntly with the Kittner.  No bowel injuries were identified, 
but he did have some bowel adhesions to the midline. The left colon was also 
tented up on the left side inferiorly. The robot was then docked. We began by 
taking down the White line of Toldt and the lateral attachments of the left 
colon and this occurred without damage to the bowel. The colon was mobilized 
superiorly past the splenocolic attachments, and the spleen was easily 
medialized away from the kidney. The mesentery of the colon was swept away and 
medialized leaving Gerota's fascia visible. The kidney was immediately 
visualized, we then dissected into the retroperitoneal fat and identified the 
ureter, and we tracked the ureter up to the renal pelvis. The hilar vessels were
 then easily identified medial and anterior to the left renal pelvis There was 1
 late branching artery, and one early branching renal vein, which were dissected
 cleanly.  The ureter was identified and tented upwards, away from the hilum.  
After the hilum was adequately mobilized, we then turned our attention 
identifying the mass itself.  Gerota fascia was incised and the kidney was expo
sed. The patient did have significantly adherent fat. The entire kidney had to 
be mobilized and medialized in order to identify the posterior mid/inferior pole
 mass. Of note a branch of the renal artery was abutting but not involving the 
tumor which was partially exophytic. We felt we could avoid this vessel and we 
proceed with plans to perform a partial nephrectomy. Once the mass was 
identified, we were able to sho out its margins and performed an intraoperative
 ultrasound to verify its depth and laterality.  The mass was significantly 
endophytic and appeared to abut the collecting system.  It was very near the 
renal hilum and ureter which were identified and avoided throughout the case.  
Once this was completed, we administered 12.5 g of mannitol and clamped our 
renal hilum with 2 bulldog clamps on the main artery.  We did not clamp the 
vein.  We excised the mass using a combination of sharp dissection and 
electrocautery.  We were able to trace the contour of the mass away from the 
renal surface itself and coagulated any bleeding vessels as they occurred.  We 
did enter the collecting system on the deepest aspect of the resection.  After 
the mass was excised, it was placed in an endocatch bag for later removal.  It 
had grossly negative margins.  We also took a deep margin and sent to pathology 
for frozen section.  The margin was negative for carcinoma.We then ran a 2-0 V-
loc suture along the base of the defect to close the collecting system.  We then
 placed a Surgicel bolster into the defect and closed the parenchyma over it 
using 2-0 V-loc suture with Hem-o-lyndsey clips renorrhaphy.  This required a total 
of 3 sutures.  Once this was done, we took the bulldog clamps off the renal 
artery and noted no significant oozing or bleeding.  5 cc of Floseal and a sheet
 of Surgicel were placed over the defect.  Floseal  was also placed at the renal
 hilum, which was atraumatic.  Gerota fascia was then re-approximated with a 2-0
 V-loc suture.Total warm ischemia time was 24 minutes.    A Alexander-Washington drain 
was brought in through one of the camera ports after closing the 12 mm ports 
with a Estevan-Jordan device and on 0-Vicryl suture.  The robot was then 
undocked.  We then moved away from the field.  The mass was able to be brought 
out through the assistant port site.  After the fascia had been closed, the skin
 was irrigated and closed using 4-0 Monocryl.  Local anesthetic was applied.  
The patient was then cleaned, dressed, smoothly extubated, and discharged to the
 postanesthesia care unit in stable condition.  Estimated Blood Loss:  150 
mlSpecimen(s) Removed/Disposition: ID Type Source Tests Collected by Time 
Destination 1 : PERITUMOR FAT Tissue Kidney, Left SURGICAL PATHOLOGY          
Nabil Warner MD 2020 1059  2 : DEEP MARGIN Tissue Kidney, Left SURGICAL 
PATHOLOGY          Nabil Warner MD 2020 1108  3 : LEFT RENAL MASS Tissue
 Kidney, Left SURGICAL PATHOLOGY          Nabil Warner MD 2020 1131  
Complications:  NoneImplants: NoneDrains: Felix Catheter: 40 mL and Alexander-
Washington Drain: #1 = 10 mLDisposition:  [facility] Hobson, MDPager 2250
 ATTESTATIONI performed this procedure with a resident.Staff name:  Nabil Warner MD Date:  2020 Electronically signed by Nabil Warner MD at 
2020  8:47 AM CDT                         2020                      

                                        [facility]

,                    

 

                          Progress Note                         Nabil Warner MD - 2020 11:31 AM 

CDTI called patient with pathology results.  Pathological T1a clear cell renal 
cell carcinoma, grade 2 with necrosis, margins negative.  He expressed understa
nding of the pathology.  He is doing well.  Pain nearly completely resolved.  
Eating well and he is having normal bowel activity.  He will keep follow-up as 
scheduled.Electronically signed by Nabil Warner MD at 2020  6:22 PM CDT
                          2020                         [facility],                    

 

                          Progress Note                         Nabil Warner MD - 2020  5:29 AM 

CDTFormatting of this note might be different from the original.Urology Progress
 Note 2020 ASSESSMENT:Jas Lewis is a 69 y.o. Male with hist
ory of lymphoma, left renal mass s/p left robotic partial nephrectomy on 
2020.  LOS: 0 days PLAN: Will discuss plan with staff surgeon - Dr. Warner- Pain control: PO pain- Diet/FEN: Regular diet, SLIVF- GI: Bowel 
regimen, zofran prn- : Felix out, trial of void; Cr 1.58 (1.45)- Heme/ID: 
Afebrile. Hgb, WBC appropriate- OOB/Amb - PTA: Atenolol, doxazosin- Dispo: 
Discharge planning today; f/u 3 weeks with BMPProphylaxis Review:-  DVT: SCD's, 
contraindictated (partial nephrectomy)-  GI: Yes-  Catheter: Yes-  Abx: Yes - 
Kae-operative Rene Mcclain M.D.Urology PGY-2Please page urology on 
call with questionsATTESTATIONI personally performed the key portions of the E/M
 visit, discussed case with resident and concur with resident documentation of 
history, physical exam, assessment, and treatment plan unless otherwise 
noted.Patient doing well. No issues overnight.  Tolerating diet.  Pain 
controlled.  Ambulating.Abdomen soft, appropriateIncisions c/d/iFoley removedJP 
with serosanginous fluidA/P: s/p LEFT robotic partial nephrectomy doing well-- 
increase activity-- diet as tolerated-- monitor EMILY and send EMILY creatinine -- D/C
 if normal-- D/C planning.  F/U 3 weeks with BMPStaff name:  Nabil Warner MD 
Date:  2020 
________________________________________________________________________ 
SUBJECTIVE: No acute events overnight. Pain is well-controlled. Patient denies 
nausea, denies vomiting, is ambulating. Patient has not passed flatus, has not 
had a bowel movement. Patient is tolerating Diet Clear LiquidAdvance Diet as 
Tolerated.OBJECTIVE:                 Vital Signs: Most Recent                
Vital Signs: Past 24 Hours BP: 111/60 (38)Temp: 37.1  C (98.7  F) (38)Pulse: 82 (38)Respirations: 16 PER MINUTE (38)SpO2: 93 % 
(38)Height: 180.3 cm (71") (1638)Weight: 92.2 kg (203 lb 4.2 oz) 
(1638)  BP: (111-155)/(60-90) Temp:  [36.3  C (97.3  F)-37.1  C (98.8  F)]
Pulse:  [62-82] Respirations:  [13 PER MINUTE-21 PER MINUTE] SpO2:  [91 %-99 %] 
Physical Exam:General: Alert and oriented, no acute distressPulm: Equal chest 
rise, non-laboredCV: Regular rate and rhythmExtremities: No edema, SCD's in 
placeAbd: Soft, appropriately tender to palpation, non-
distendedIncisions/Wounds: Appropriately tender to palpation. Portsite and 
extraction site incisions C/D/I with dermabond : Felix catheter in place 
draining pale salmon urineIntake/Output:Date 20 - 20 - 20 07 Shift 2821-2474 8499-0678 24 Hour Total [id number] 24 Hour Total INTAKE P.O. 240 0 240    I.V.(mL/kg/hr) 2500(2.3) 682 
3182    Shift Total(mL/kg) 2740(29.7) 682(7.4) 3422(37.1)    OUTPUT 
Urine(mL/kg/hr) 465(0.4) 1350 1815      Urine 115  115      Urine Output (ml) 
(Indwelling Urinary Catheter 20 0830 16 FR) 350 1350 1700    Drains 105 80
185      Drain Output (ml) (Alexander Washington Drain 20 1129 Left Abdomen) 105 
80 185    Other 150  150      Estimated Blood Loss 150  150    Shift 
Total(mL/kg) 720(7.8) 1430(15.5) 2150(23.3)    NET 2020 1272    Weight (kg)
92.2 92.2 92.2 92.2 92.2 92.2  Labs:                   Hematology               
               Chemistry Recent Labs    WBC 7.7 HGB 12.1* PLTCT 134*
 Recent Labs    * K 3.8  CO2 24 BUN 18 CR 1.45* GFR 48* 
GLU 91 CA 8.7  ACTIVE PROBLEMS:Principal Problem:  Left renal massElectronically
signed by Nabil Warner MD at 2020  8:35 AM CDT                         

2020                              [facility]

,    

               

 

                          Progress Note                         Zaida Padilla R N - 2020  6:05 PM 

CDTPatient arrived to room # (807) via cart accompanied by transport. Patient 
transferred to the bed with assistance. Bedside safety checks completed. Initial
patient assessment completed. Refer to flowsheet for details.Admission skin 
assessment completed with: Carmenza Diaz injury present on arrival?: No1. 
Head/Face/Neck: No2. Trunk/Back: No3. Upper Extremities: No4. Lower Extremities:
No5. Pelvic/Coccyx: No6. Assessed for device associated injury? Yes7. 
Malnutrition Screening Tool (Nursing Nutrition Assessment) Completed? NoSee Doc 
Flowsheet for additional wound details. INTERVENTIONS: Falls bundle initiated. 
Electronically signed by Zaida Padilla RN at 2020  6:06 PM CDT           
                          2020                         [facility],                    

 

                          Progress Note                         Nadine Capellan RN - 2020  3:22 

PM CDTAttempted to edie patient for pre procedure covid screening, but no answer.
Voicemail and phone number left for patient to call return call.Electronically 
signed by Pretty Capellan RN at 2020  3:25 PM CDT                     
                          2020                         [facility], 

                  

 

                          Progress Note                         Nicky Alvarado,

 RN - 2020 11:17 AM 

CDTPAC phone triage completed with patient for surgery on 20 with Dr. Warner. Medications, allergies and medical history reviewed and updated in 
chart.  He denies CP, SOA, dizziness, numbness, tingling, N/V/D, cough, fever, 
flu-like symptoms, exposure to anyone ill, positive for COVID-19 or waiting for 
results of same.  He denies travel outside of [place] or [place] in last 15 days.  He 
denies PMH of complications from anesthesia.  Denies URI symptoms at this time. 
No PAC visit indicated.Preop and medication instructions reviewed with patient. 
No vitamins or supplements for 14 days and no NSAIDS for 7 days before surgery. 
NPO after 11 pm the night before surgery but ok to drink water until 2 hours 
before arrival at hospital. Patient verbalized understanding and copy of 
instructions will be mailed to his home.Patient informed of visitor policy and 
expressed understanding.Electronically signed by Nicky Alvarado RN at 
2020 11:19 AM CDT                         2020                      

                                        [facility]

,                    

 

                          Misc. Note                         Care Plan - Gabriela Dodge RN - 2020 

11:31 AM CDTFoley is out and voiding well on own.Free of falls.Jas Lewis discharged on 2020. .Discharge instructions reviewed with patient.
Valuables returned: pt states has all belonginsWhere Are Valuables Stored?: to 
gary Larry.Home medications:  .to pu at  pharmacyFunctional 
assessment at discharge complete: Yes . Electronically signed by Gabriela Dodge RN at 2020 12:02 PM CDT                         2020        

                                        [facility]

,                    

 

                          Misc. Note                         Anesthesia Post Op 

Day 1 - Heather Lozano CRNA - 2020  7:27 AM CDTFormatting of this note might be different from 
the original.Anesthesia Follow-Up Evaluation: Post-Procedure Day OneName: Jas Lewis     MRN: [id number]     : 1950     Age: 69 y.o.     Sex: 
male __________________________________________________________________________ 
Procedure Date: 2020 Procedure: Procedure(s) with comments:ROBOT ASSISTED 
LAPAROSCOPIC NEPHRECTOMY PARTIAL - CASE LENGTH 4 HOURS, PLEASE CLIP PT IN 
SDS/PRE-POST, REQUEST FIREFLY, INTRA-OP SONO, ARGON, FROZEN SECTION 
REQUIREDULTRASOUND GUIDANCE - INTRAOPERATIVEPhysical AssessmentHeight: 180.3 cm 
(71")  Weight: 92.2 kg (203 lb 4.2 oz)Vital Signs (Last Filed in 24 hours)BP: 
111/60 ( 0038)Temp: 37.1  C (98.7  F) (38)Pulse: 82 ( 
0038)Respirations: 16 PER MINUTE (38)SpO2: 93 % ( 003)SpO2 Pulse: 
71 ( 1345)Height: 180.3 cm (71") ( 1638)Patient History 
AllergiesAllergies Allergen Reactions &#8226; Sulfa (Sulfonamide Antibiotics) 
HIVES  MedicationsScheduled Meds:atenoloL (TENORMIN) tablet 50 mg, 50 mg, Oral, 
QDAYbacitracin topical ointment, , Topical, BIDceFAZolin (ANCEF) IVP 1 g, 1 g, 
Intravenous, Q8H*docusate (COLACE) oral solution 100 mg, 100 mg, Oral, 
BIDdoxazosin (CARDURA) tablet 4 mg, 4 mg, Oral, QDAYpantoprazole DR (PROTONIX) 
tablet 40 mg, 40 mg, Oral, QDAY(21)polyethylene glycol 3350 (MIRALAX) packet 17 
g, 1 packet, Oral, QDAYsenna (SENOKOT) oral syrup 17.6 mg, 17.6 mg, Oral, 
BIDContinuous Infusions:PRN and Respiratory Meds:acetaminophen Q6H PRN, fentaNYL
citrate PF Q1H PRN, hyoscyamine Q4H PRN, ondansetron Q6H PRN **OR** ondansetron 
(ZOFRAN) IV Q6H PRN, oxyCODONE Q4H PRNDiagnostic TestsHematology: Lab Results 
Component Value Date  HGB 12.1 2020  HCT 35.3 2020  PLTCT 134 
2020  WBC 7.7 2020  MCV 86.8 2020  MCH 29.8 2020  MCHC 
34.3 2020  MPV 8.3 2020  RDW 14.6 2020   General Chemistry: 
Lab Results Component Value Date   2020  K 4.1 2020   
2020  CO2 24 2020  GAP 5 2020  BUN 20 2020  CR 1.58 
2020   2020  CA 7.7 2020  ALBUMIN 3.4 2020  
TOTBILI 1.2 2020  Coagulation: No results found for: PT, PTT, INRFollow-Up
AssessmentPatient location during evaluation: floorAnesthetic Complications: 
Anesthetic complications: The patient did not experience any anesthestic 
complications.  Pain:Score: 1Management:adequate Level of Consciousness: awake 
and alert Hydration:acceptable Airway Patency: patent Respiratory Status: 
acceptable Cardiovascular Status:acceptable Regional/Neuroaxial:  Electronically
signed by Heather Lozano CRNA at 2020  7:27 AM CDT                     
                          2020                         [facility], 

                  

 

                          Misc. Note                         Care Plan - Amada Sellers RN - 2020

 6:24 AM CDTProblem: Infection, Risk of, Urinary Catheter-Associated Urinary 
Tract InfectionGoal: Absence of urinary catheter-associated infectionOutcome: G
oal OngoingFlowsheets (Taken 2020 0623)Absence of urinary catheter-
associated infections: Urinary catheter discontinuation Assess for signs and 
sypmtoms of catheter-associated urinary tract infectionNote: Doley pulled at 
0620  Problem: Falls, High Risk ofGoal: Absence of falls-Adult PatientOutcome: 
Goal OngoingFlowsheets (Taken 2020 0623)Absence of falls-Adult Patient: 
Complete Fall Risk Assessment. Implement fall risk bundle. Consider additional 
interventions if patient is confused, has gait/balance problems and on high risk
medications. Provide safe ambulation. Provde safe environment. Provide fall 
prevention strategies. Problem: Discharge PlanningGoal: Participation in plan of
careOutcome: Goal OngoingFlowsheets (Taken 2020)Participation in Plan 
of Care: Involve patient/caregiver in care planning decision makingGoal: 
Knowledge regarding plan of careOutcome: Goal OngoingFlowsheets (Taken 2020
06)Knowledge regarding plan of care: Provide admission education to parent/
caregiver Provide procedural and treatment education Provide infection 
prevention education Provide medication management education Provide fall 
prevention education Provide plan of care educationGoal: Prepared for 
dischargeOutcome: Goal OngoingFlowsheets (Taken 2020)Prepared for 
discharge: Complete ADL ability assessment Collaborate with multidisciplinary 
team for hospital discharge coordination Provide safe use medical equipment 
education Provide diet and oral health education Electronically signed by 
Amada Sellers RN at 2020  6:25 AM CDT                         

2020                              [facility]

,    

               

 

                          Misc. Note                         Procedures (Immed P

ost or Bedside) - Nabil Warner MD - 2020 12:10 PM CDTFormatting of this note might be different 
from the original. Brief Operative NoteName: Jas Lewis is a 69 y.o.
male     : 1950             MRN#: [id number] OF OPERATION: 
2020Date:  2020    Preoperative Dx: Left renal mass [N28.89]Post-op 
Diagnosis    * Left renal mass [N28.89]Procedure(s) (LRB):ROBOT ASSISTED 
LAPAROSCOPIC NEPHRECTOMY PARTIAL (Left)ULTRASOUND GUIDANCE - INTRAOPERATIVE 
(Left)Anesthesia Type: GeneralSurgeon(s) and Role:   * Nabil Warner MD - 
Primary   * Param Hobson MD - Resident - AssistingFindings: Left 4 cm 
posterior mid/inferior pole renal mass abutting renal artery, significant 
endophytic component. Complex hilar anatomy with branching artery and vein. Able
 to perform partial nephrectomy without vascular injury, collecting system entry
 noted and oversewn. Hemostatic renorrhaphy. EMILY drain placed. Felix. Estimated 
Blood Loss: 50 mlSpecimen(s) Removed/Disposition: ID Type Source Tests Collected
 by Time Destination 1 : PERITUMOR FAT Tissue Kidney, Left SURGICAL PATHOLOGY   
       Nabil Warner MD 2020 1059  2 : DEEP MARGIN Tissue Kidney, Left 
SURGICAL PATHOLOGY          Nabil Warner MD 2020 1108  3 : LEFT RENAL 
MASS Tissue Kidney, Left SURGICAL PATHOLOGY          Nabil Warner MD 
2020 1131  Complications:  NoneImplants: NoneDrains: EMILY drain LLQ, Felix 
Disposition:  PACU - stableJustin ROMAIN Hobsonager 2250 ATTESTATIONI performed
 this procedure with a resident.Staff name:  Nabil Warner MD Date:  2020
 Electronically signed by Nabil Warner MD at 2020  1:10 PM CDT         
                          2020                         [facility],                    

 

                          Misc. Note                         Operative Report (D

irect Entry) - Nabil Warner MD - 2020  8:39 AM CDTFormatting of this note might be different 
from the original.OPERATIVE REPORTName: Jas Lewis is a 69 y.o. male
     : 1950             MRN#: [id number] OF OPERATION: 
2020Surgeon(s) and Role:   * Nabil Warner MD - Primary   * Param Hobson MD - Resident - Assisting Preoperative Diagnosis:  Left renal mass 
[N28.89]Post-op Diagnosis    * Left renal mass [N28.89]Procedure(s) (LRB):ROBOT 
ASSISTED LAPAROSCOPIC NEPHRECTOMY PARTIAL (Left)ULTRASOUND GUIDANCE - 
INTRAOPERATIVE (Left)Anesthesia Type: GeneralIndications: 70 yo M with history 
of lymphoma, who was found to have an enhancing left renal mass, presents for 
robotic partial nephrectomy. FINDINGS:  1 late branching artery, and 1 early 
branching vein; artery  clamped with 24-minute warm ischemia time; 4 cm 
posterior LEFT midpole mass excised with grossly negative margins; hemostatic 
renorrhaphy. OPERATIVE DETAILS: The patient was identified and informed consent 
was obtained.  The patient was brought to the operative suite and placed supine 
on the table.  A time-out procedure was performed per protocol.  Preoperative 
antibiotics were administered.  Sequential compression devices were operational 
on bilateral lower extremities.  After smooth induction of general anesthesia, 
the patient was moved to the lateral position with care taken to pad all 
pressure points including the use of an axillary roll and then secured to the 
table.  The abdomen and flank were prepped and draped in the usual sterile 
fashion.Veress was needle placed in the left upper quadrant and drop test 
confirmed placement.  The abdomen was insufflated 15 mm of pneumoperitoneum.  An
 8 mm robotic non-bladed trocar was placed at the umbilical level just lateral 
to the rectus muscle.  Camera was inserted and no injuries were noted.  This 
permitted advancement of our two 8-mm assistant ports without difficulty into 
the left upper quadrant inferior to the costal margin and in the left lower 
quadrant medial and superior to the anterior superior iliac spine. An AirSeal 
trocar, and a 10-mm assistant port were then place between these ports.  The 
robotic camera was advanced and there was obvious midline omental adhesions that
 were taken down bluntly with the Kittner.  No bowel injuries were identified, 
but he did have some bowel adhesions to the midline. The left colon was also 
tented up on the left side inferiorly. The robot was then docked. We began by 
taking down the White line of Toldt and the lateral attachments of the left 
colon and this occurred without damage to the bowel. The colon was mobilized 
superiorly past the splenocolic attachments, and the spleen was easily 
medialized away from the kidney. The mesentery of the colon was swept away and 
medialized leaving Gerota's fascia visible. The kidney was immediately 
visualized, we then dissected into the retroperitoneal fat and identified the 
ureter, and we tracked the ureter up to the renal pelvis. The hilar vessels were
 then easily identified medial and anterior to the left renal pelvis There was 1
 late branching artery, and one early branching renal vein, which were dissected
 cleanly.  The ureter was identified and tented upwards, away from the hilum.  
After the hilum was adequately mobilized, we then turned our attention 
identifying the mass itself.  Gerota fascia was incised and the kidney was expo
sed. The patient did have significantly adherent fat. The entire kidney had to 
be mobilized and medialized in order to identify the posterior mid/inferior pole
 mass. Of note a branch of the renal artery was abutting but not involving the 
tumor which was partially exophytic. We felt we could avoid this vessel and we 
proceed with plans to perform a partial nephrectomy. Once the mass was 
identified, we were able to sho out its margins and performed an intraoperative
 ultrasound to verify its depth and laterality.  The mass was significantly 
endophytic and appeared to abut the collecting system.  It was very near the 
renal hilum and ureter which were identified and avoided throughout the case.  
Once this was completed, we administered 12.5 g of mannitol and clamped our 
renal hilum with 2 bulldog clamps on the main artery.  We did not clamp the 
vein.  We excised the mass using a combination of sharp dissection and 
electrocautery.  We were able to trace the contour of the mass away from the 
renal surface itself and coagulated any bleeding vessels as they occurred.  We 
did enter the collecting system on the deepest aspect of the resection.  After 
the mass was excised, it was placed in an endocatch bag for later removal.  It 
had grossly negative margins.  We also took a deep margin and sent to pathology 
for frozen section.  The margin was negative for carcinoma.We then ran a 2-0 V-
loc suture along the base of the defect to close the collecting system.  We then
 placed a Surgicel bolster into the defect and closed the parenchyma over it 
using 2-0 V-loc suture with Hem-o-lyndsey clips renorrhaphy.  This required a total 
of 3 sutures.  Once this was done, we took the bulldog clamps off the renal 
artery and noted no significant oozing or bleeding.  5 cc of Floseal and a sheet
 of Surgicel were placed over the defect.  Floseal  was also placed at the renal
 hilum, which was atraumatic.  Gerota fascia was then re-approximated with a 2-0
 V-loc suture.Total warm ischemia time was 24 minutes.    A Alexander-Washington drain 
was brought in through one of the camera ports after closing the 12 mm ports 
with a Estevan-Jordan device and on 0-Vicryl suture.  The robot was then 
undocked.  We then moved away from the field.  The mass was able to be brought 
out through the assistant port site.  After the fascia had been closed, the skin
 was irrigated and closed using 4-0 Monocryl.  Local anesthetic was applied.  
The patient was then cleaned, dressed, smoothly extubated, and discharged to the
 postanesthesia care unit in stable condition.  Estimated Blood Loss:  150 
mlSpecimen(s) Removed/Disposition: ID Type Source Tests Collected by Time 
Destination 1 : PERITUMOR FAT Tissue Kidney, Left SURGICAL PATHOLOGY          
Nabil Warner MD 2020 1059  2 : DEEP MARGIN Tissue Kidney, Left SURGICAL 
PATHOLOGY          Nabil Warner MD 2020 1108  3 : LEFT RENAL MASS Tissue
 Kidney, Left SURGICAL PATHOLOGY          Nabil Warner MD 2020 1131  
Complications:  NoneImplants: NoneDrains: Felix Catheter: 40 mL and Alexander-
Washington Drain: #1 = 10 mLDisposition:  PACU - ROMAIN Thapaager 2430
 ATTESTATIONI performed this procedure with a resident.Staff name:  Nabil Warner MD Date:  2020 Electronically signed by Nabil Warner MD at 
2020  8:47 AM CDT                         2020                      

                                        [facility]

,                    

 

                          OR Note                         Anesthesia Postprocedu

re Evaluation - Marvin Key MD - 2020  1:53 PM CDTFormatting of this note might be different 
from the original.Post-Anesthesia EvaluationName: Jas Lewis      
MRN: [id number]     : 1950     Age: 69 y.o.     Sex: male 
__________________________________________________________________________ 
Procedure Date: 2020Procedure(s) (LRB):ROBOT ASSISTED LAPAROSCOPIC 
NEPHRECTOMY PARTIAL (Left)ULTRASOUND GUIDANCE - INTRAOPERATIVE (Left)  Surgeon: 
Surgeon(s):Nabil Warner MDPenticuff, Justin, MDPost-Anesthesia VitalsBP: 
145/85 ( 1345)Pulse: 71 ( 1345)Respirations: 18 PER MINUTE ( 
1345)SpO2: 92 % ( 1345)SpO2 Pulse: 71 ( 1345) Vitals Value Taken Time 
/85 2020  1:45 PM Temp 37.1  C (98.8  F) 2020 12:40 PM Pulse 71 
2020  1:45 PM Respirations 18 PER MINUTE 2020  1:45 PM SpO2 92 % 
2020  1:45 PM Post Anesthesia Evaluation NoteEvaluation location: 
Pre/PostPatient participation: recovered; patient participated in 
evaluationLevel of consciousness: alertPain score: 2Pain management: 
adequateHydration: normovolemiaTemperature: 36.0 C - 38.4 CAirway patency: 
adequatePerioperative Events   Post-op nausea and vomiting: no PONVPostoperative
 StatusCardiovascular status: hemodynamically stableRespiratory status: 
spontaneous ventilationFollow-up needed: nonePerioperative EventsPerioperative 
Event: NoEmergency Case Activation: No Electronically signed by Hung Bustos MD at 2020  2:12 PM CDTAssociated attestation - Hung Bustos MD -
 2020  2:12 PM CDTFormatting of this note might be different from the 
original.ATTESTATIONPost-Anesthesia Evaluation Attestation: I reviewed and agree
 the indicated post-anesthesia care was provided. I have reviewed key portions 
of the indicated post anesthesia care. I have examined the patient's vitals, 
physical status, and complications and agree with what is documented.Staff name:
  Hung Bustos MD Date:  2020    

                                        [facility]

,                  

  

 

                          OR Note                         Anesthesia Preprocedur

e Evaluation - Wallisch, William, MD - 2020  7:04 AM CDTFormatting of this note might be different 
from the original.Anesthesia Pre-Procedure EvaluationName: Jas Cameron
tchell      MRN: [id number]     : 1950     Age: 69 y.o.     Sex: male 
_________________________________________________________________________ 
Procedure Info: Procedure Information   Date/Time:  20 0745  Procedures:  
   ROBOT ASSISTED LAPAROSCOPIC NEPHRECTOMY PARTIAL (Left ) - CASE LENGTH 4 
HOURS, PLEASE CLIP PT IN SDS/PRE-POST, REQUEST FIREFLY, INTRA-OP SONO, ARGON, 
FROZEN SECTION REQUIRED    ULTRASOUND GUIDANCE - INTRAOPERATIVE (Left )  
Location:  MAIN OR 03 / Main OR/Periop  Surgeon:  Nabil Warner MD  Physical 
AssessmentVital Signs (last filed in past 24 hours):BP: 146/83 (657)Temp:
 36.7  C (98.1  F) (657)Pulse: 62 (657)Respirations: 20 PER MINUTE
 (657)SpO2: 98 % (657)Height: 182.9 cm (72") (657)Weight: 
92.2 kg (203 lb 3.2 oz) (657)  Patient History Allergies Allergen 
Reactions  Sulfa (Sulfonamide Antibiotics) HIVES  Current Medications  
Medication Directions amitriptyline (ELAVIL) 25 mg tablet  atenoloL (TENORMIN) 
50 mg tablet  doxazosin (CARDURA) 4 mg tablet  pantoprazole DR (PROTONIX) 40 mg 
tablet  Review of Systems/Medical HistoryPatient summary reviewedNursing notes 
reviewedPertinent labs reviewedPONV Screening: Non-smokerNo history of 
anesthetic complicationsNo family history of anesthetic complicationsAirway - 
negativePulmonary - negative    Not a current smokerCardiovascular     Exercise 
tolerance: 4 METS     Beta Blocker therapy: Yes (took this am)    Beta blockers 
within 24 hours: Yes    Hypertension, well controlledGI/Hepatic/Renal     GERD, 
well controlledNeuro/Psych     Psychiatric history        De
pressionMusculoskeletal     Back pain (lower back pain, mild)Endocrine/Other    
 MalignancyConstitution - negative Physical ExamAirway Findings    Mallampati: 
II    TM distance: 3 FB    Neck ROM: full    Mouth opening: goodDental Findings:
     Upper dentures and lower denturesCardiovascular Findings: Negative    
Rhythm: regular      Rate: normalPulmonary Findings: Negative    Breath sounds 
clear to auscultation.Abdominal Findings: NegativeNeurological Findings: 
Negative    Alert and oriented x 3Constitutional findings: Negative Diagnostic 
TestsHematology: Lab Results Component Value Date  HGB 14.7 2020  HCT 42.8
 2020  PLTCT 160 2020  WBC 5.9 2020  MCV 87.6 2020  MCH 
30.0 2020  MCHC 34.2 2020  MPV 8.3 2020  RDW 14.5 2020  
 General Chemistry: No results found for: NA, K, CL, CO2, GAP, BUN, CR, GLU, CA,
 KETONES, ALBUMIN, LACTIC, OBSCA, MG, TOTBILI, TOTBILCB, PO4 Coagulation: No 
results found for: PT, PTT, INRAnesthesia PlanASA score: 3 Plan: gen
eralInduction method: intravenousNPO status: acceptableInformed 
ConsentAnesthetic plan and risks discussed with patient.Use of blood products 
discussed with patientBlood Consent: consentedPlan discussed with: 
anesthesiologist, SRNA and CRNA.Electronically signed by Wallisch, William, MD 
at 2020  7:20 AM CDT                         2020                   

                                        [facility]

,                    

 

                          Misc. Note                         Telephone Encounter

 - Kendy Gardner LPN 

- 2020 11:07 AM CDTPt daughter Kendy calling to see if pre-surgery 
Covid testing can be done local to pt as he lives 2 hrs away.  Attempted to retu
rn call but phone VM box was full and I was unable to leave a message.Order for 
Covid testing faxed to daughter at fax # [phone] with instructions that 
testing needs to be done within 48 hrs of surgery and [facility] must receive the 
results.Electronically signed by Kendy Gardner LPN at 2020 11:12 AM 
CDT                         2020                         [facility],                    

 

                          Progress Note                         Nabil Warner MD - 2020 10:00 AM 

CDTFormatting of this note might be different from the 
original.Subjective:Jas Lewis is a 69 y.o. male who is referred by Dr. Tawil for further evaluation and treatment of left renal massHPI:69 year old 
male with PMH of HYPERTENSION, GERD, BPH (for years), colon cancer  (with 
partial colectomy; receives colonoscopy every 4 years), new diagnosis of l
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 
mass.During work up for lymphoplasmacytic lymphoma about 1 month ago when 
imaging was done, left renal mass was noted, concerning for RCC.  Imaging also 
showed abnormality in bladder for which Urologist Dr.Tawil did cystoscopy 2 
weeks ago and was found to be non-concerning.  He has had elevated PSA in the 
past and has had 3 cystoscopies and 2 prostate biopsies which were non-
revealing; last cystoscopy performed Dec 2019.  He continues to take Cardura for
 for BPH and HYPERTENSION.  His BPH symptoms are no longer a concern; he denies 
dribbling, incomplete emptying, weak urinary stream.  He is on surveillance for 
lymphoplasmacytic lymphoma and has a f/u appt on 2020 for that.He reports 
dysuria with initiation of voids that has been ongoing for months and urinary 
frequency which started 2020 after he was asked to increased fluid intake by
 his PCP.  This has eased since he has been drinking less fluids.No hematuria, 
unexpected weight loss, night sweats, apetite changes, nocturiaNo FH of kidney 
cancer. No FH of genetic cancer syndromes.ROS:10 point ROS negative other than 
chronic hearing loss, runny nose, light-headedness when going from sitting to 
standing position and nervousness/anxiety.Active Ambulatory Problems   Diagnosis
 Date Noted  No Active Ambulatory Problems Resolved Ambulatory Problems   
Diagnosis Date Noted  No Resolved Ambulatory Problems Past Medical History: 
Diagnosis Date  Colon polyps   Depression   Elevated PSA   Enlarged prostate
   Hypertension  &#8226; Kidney stones   Lymphoplasmacytoid lymphoma, CLL 
(HCC)   Malignant neoplasm of colon (HCC)  Surgical History: Procedure 
Laterality Date  HX TONSILLECTOMY    COLON SURGERY  1995  COLECTOMY  
1995  HX APPENDECTOMY  1995  HERNIA REPAIR    CYSTOURETHROSCOPY  
2020  PROSTATE SURGERY    Biopsy  Current Outpatient Medications on File 
Prior to Visit Medication Sig Dispense Refill  amitriptyline (ELAVIL) 25 mg 
tablet     atenoloL (TENORMIN) 50 mg tablet     doxazosin (CARDURA) 4 mg 
tablet     pantoprazole DR (PROTONIX) 40 mg tablet    No current facility-
administered medications on file prior to visit.  Social History Socioeconomic 
History  Marital status:    Spouse name: Not on file  Number of 
children: Not on file  Years of education: Not on file  Highest education 
level: Not on file Occupational History  Not on file Tobacco Use  Smoking sta
tus: Former Smoker   Types: Pipe   Last attempt to quit:    Years since 
quittin.3  Smokeless tobacco: Former User   Types: Chew   Quit date:  
Substance and Sexual Activity  Alcohol use: Never   Frequency: Never  Drug 
use: Never  Sexual activity: Not Currently   Partners: Female Other Topics 
Concern  Not on file Social History Narrative  Not on file Family History 
Problem Relation Age of Onset  Cancer Mother       Endometrail   Hypertension 
Mother   Cancer Father   Cancer-Colon Father   Hypertension Father  &#8226; 
Neurologic Disorder Father   Alzheimer's Father   Cancer-Skin Brother   
Cancer Daughter   Cancer-Breast Daughter   Hypertension Maternal Grandfather  
Objective:Vitals:  20 0943 BP: 124/65 BP Source: Arm, Left Upper Patient 
Position: Sitting Pulse: 80 Weight: 93.4 kg (206 lb) Height: 180.3 cm (71") 
PainSc: Zero Body mass index is 28.73 kg/m .General: No acute distress, non-
labored breathing. AfebrileHead: Normocephalic and atraumatic.  Eyes: 
Conjunctivae are normal. No scleral icterus. Cardiovascular: Regular rate and 
rhythmPulmonary/Chest: Normal Effort, CTABAbd: surgical scar vertically below 
umbilicus from previous colectomy, Soft, NT, NDMusculoskeletal: Moves all 
extremitiesSkin: Warm and dry.  Neuro: Awake and alert.Psychiatric: Normal mood 
and affect. Nursing note and vitals reviewed.ALL OUTSIDE RECORDS WERE REVIEWED 
AND FILMS EXAMINEDImaging:  3-16-20 CT Chest W Ab/Pel W/WO - Impression:  1.  
Enhancing left renal mass concerning for renal malignancy.  2.  3 mm upper lobe 
pulmonary nodule.  3.  Apparent filling defect of the urinary bladder just to 
the right of midline.  This potentially could reflect a primary urinary bladder 
lesion vs eccentric protrusion of the prostate gland.  Correlation with 
cystoscopy is recommended.   Assessment/Plan:Jas Lewis was seen today 
for kidney mass.Diagnoses and all orders for this visit:Left renal massDiscussed
 with patient treatment options for left renal mass including surveillance, 
biopsy, nephrectomy and patient elected to proceed with partial nephrectomy with
 the possibility of radial nephrectomy if deemed necessary.  Risk and benefits 
discussed with patient.Dysuria-     CULTURE-URINE W/SENSITIVITY; Future; 
Expected date: 2020Plan for patient to return on 2020 for robotic-
assisted laparoscopic nephrectomy vs radical nephrectomyPatient discussed with 
Dr. Luis Monaco MD Family Medicine, PGY-3ATTESTATIONI 
personally performed the key portions of the E/M visit, discussed case with 
resident and concur with resident documentation of history, physical exam, 
assessment, and treatment plan unless otherwise noted.Patient referred to me by 
Dr. Tawil for the findings of a 3.5 cm left renal mass concerning for renal cell
 carcinoma.  Patient does have a long history of BPH and urinary symptoms which 
have been fully evaluated by Dr. Tawil.  He denies gross hematuria.  The mass 
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  He
 also has a history of colon cancer s/p hemicolectomy with no additional 
treatment needed.  He continues with colonoscopy.We discussed the findings of 
his mass and the likelihood that this represents a renal cell carcinoma.  We 
discussed options of observation, biopsy, ablation, partial nephrectomy, and ra
dical nephrectomy.  The mass is in a difficult location near the hilar vessels 
and ureter.  Therefore, I discussed that ablation is not an option.  The patient
 would like treatment, so we focused on partial nephrectomy versus radical 
nephrectomy.  Pros and cons of each approach were discussed in depth.  After 
discussion, we elected to proceed with a LEFT robotic partial nephrectomy and 
possible radical nephrectomy.  I quoted him a 30% chance of needing a radical 
nephrectomy given the location.  Patient would like to proceed.  All risks and 
benefits were discussed in depth.  All questions answered.  Consent on chart.  
We will proceed with surgery in the near future.  Complex counseling.Staff name:
  Nabil Warner MD Date:  2020 Patient Instructions [facility] Pre-Operative InstructionsSurgical Procedure:  Robotic-
assisted laparoscopic nephrectomy versus radical nephrectomy, intraoperative 
ultrasound  Date of Surgery:  2020 Arrival Time at the Admission Office 
(Main Lobby):  To be determined To ensure that your surgery can proceed without 
delay, you will be contacted by a phone triage nurse from the Pre-Operative 
Assessment Clinic (PAC) to complete this process.  Please review the information
 given to you by your surgeon.You will be called by the surgery staff with your 
day of surgery arrival time between 2:30 - 4:30 PM the business day prior to 
surgery. If you have not heard from them after 4:30 PM, please call [phone]
[phone] to confirm your arrival time.Pre-Operative Assessment and Instructions:Once
 you speak to the nurse or are seen in the Pre-Operative Assessment Clinic, you 
will be given medication instructions. However, if surgery is within 2 weeks, 
please read and follow the medication instructions below to prepare for surgery 
before you speak with the phone triage nurse:DO NOT STOP your blood thinner 
until you have contacted your prescribing provider or have been specifically 
instructed to do so.Starting Now:? Contact your provider who prescribes any of 
the following to develop a plan for surgery:o Blood thinners such as aspirin, 
Aggrenox, Brilinta, Effient, Eliquis, enoxaparin (Lovenox), clopidogrel 
(Plavix), cilostazol, pentoxifylline (Trental), Pradaxa, Savaysa, ticlopidine, 
Xarelto, and warfarin (Coumadin)o Immunosuppresants such as methotrexate, 
azathioprine, sulfasalazine, everolimus, sirolimus, Humira, Remicade, Enbrel, 
Simponi, Orencia, Cimzia, Actemra, and Xeljanzo [id number] days prior to 
surgery:? Stop most vitamins, herbals, and supplements including (but not 
limited to):o Alpha lipoic acid, black cohosh, CoQ10, echinacea, eye vitamins, 
fish oil, flaxseed oil, garlic, gingko biloba, ginseng, glucosamine/chondroitin,
 kava, Lovaza, lutein, lysine, multivitamin, red yeast rice, KIKI-e, saw 
palmetto, St. Johns wort, turmeric, valerian root, Vascepa, Vitamin A, Vitamin 
B complex, Vitamin C, Vitamin E? You DO NOT need to stop: iron, magnesium, 
potassium7 days prior to surgery:? Stop anti-inflammatory medications such as 
ibuprofen (Advil, Motrin), naproxen (Aleve), Toyin-Shepherd, Excedrin, Midol, 
celecoxib (Celebrex), diclofenac (Voltaren), diflunisal, etodolac, flurbiprofen,
 indomethacin, ketoprofen, ketorolac, meloxicam, nabumetone, and piroxicamThe 
day prior to surgery begin a clear liquid diet.  This includes water, Jell-O , 
tea, coffee, broth, pop, clear juices (apple, cranberry, grape, orange without 
pulp), sports drinks, and popsicles.  You may have any color of the above 
drinks, but no milk or milk products. The day prior to surgery, drink one bottle
 of magnesium citrate at 2:00 p.m.  Expect a laxative effect.  This laxative may
 be purchased at any pharmacy.  No prescription needed, but a printed 
prescription may be provided for your reference.Do not drink alcohol within 24 
hours of surgery.Please do not eat or drink anything after midnight.  No gum, 
mints, hard candy, snacks, coffee, etc or chewing tobacco allowed after midnight
 before surgery.  You may brush your teeth but be sure to rinse and spit.      
(DIAL)Please shower with an over the counter antibacterial soap the evening 
before or the morning of surgery.If your surgery is scheduled as an outpatient, 
you must arrange to have someone drive you home and have someone with you 24 
hours after 
anesthesia.=====================================================================

===If you have any questions, please contact your provider's office at 
[phone]. ([place], Admin) For emergencies during evenings, nights, weekends, 
and holidays, contact [facility]  and request 
they contact the on-call Urology Resident at 
[phone].===================================================================

======Electronically signed by Nabil Warner MD at 2020  8:02 AM CDT    
                          2020                         [facility],                    

 

                          Misc. Note                         Telephone Encounter

 - Ayleen Hoyt RN -

 2020  2:12 PM CDTFormatting of this note might be different from the 
original.Navigation Intake Assessment DocumentPatient Name:  Jas Lewis
:  1950Insurance:   MedicareFuture Appointments Date Time Provider 
Department Center 2020 10:00 AM Nabil Warner MD [facility] Urology Diagnosis
 and Reason for Visit:  Left Renal MassPhysician Info:  Referring Physician:  
Elias Tawil  PCP:  Arnaldo PaoniLocation of Films:  PACS Location of Pathology:  
NoneHistory of Present Illness:  Mr. Lewis is a 69 year old gentleman 
referred by Dr. Tawil for left renal mass concerning for renal cell carcinoma.  
The mass is described as 3.5 x 3.3 cm in size and enhancing.  CT was ordered by 
Dr. Chin for new diagnosis of lymphoma, currently on surveillance.  CT also 
identified an abnormality in the bladder but Dr. Tawil performed a cystoscopy 
and the abnormality was noted to be the prostate protruding into the bladder.  
Patient also has a history of elevated PSA but prostate biopsy done in 2019 showed only benign tissue.  Dr. Chin's note references a remote history of 
colon cancer.  Other medical history includes hypertension.  Patient formerly 
smoked a pipe for about three years.Imaging:  3-16-20 CT Chest W Ab/Pel W/WO - 
Impression:  1.  Enhancing left renal mass concerning for renal malignancy.  2. 
 3 mm upper lobe pulmonary nodule.  3.  Apparent filling defect of the urinary 
bladder just to the right of midline.  This potentially could reflect a primary 
urinary bladder lesion vs eccentric protrusion of the prostate gland.  Correlat
ion with cystoscopy is recommended.  Prior Treatment (XRT, Surgery, 
Chemotherapy):  NoneComments:  This RN spoke to patient who verbalized 
understanding of date, time and location of appointment.  Gave direct contact 
information for further questions or concerns.  Appointment information sent to 
patient via mail.  Patient notified of zero visitor policy.  ANTONETTE NaranjoElectronically signed by Ayleen Hoyt RN at 2020  8:38 AM CDT     
                          2020                         [facility],                    

 

                          COVID-19 (SARS-COV-2) PCR                         COVI

D-19 (SARS-CoV-2) PCRKU 

MAIN LAB                                                   [facility],                    

 

                          PATHOLOGY INTEROPERATIVE REPORT SCAN                  

       Ordered by an 

unspecified provider.                                                   The 

[facility],                    

 

                          EXTERNAL COVID-19 (SARS-COV-2)                        

 Overall COVID-19 

(SARS-CoV-2) ResultOTHER OUTSIDE LAB                                            

                                        [facility]

,                    

 

                          CT CHEST/ABD/PEL EXTERNAL IMAGING                     

    This order has been 

auto finalized and does not contain a result.                                   

                                        [facility]

,                    



                                                                                
                                                                                
                                                                                
                                                                                
                                                                                
                                                                                
                                                                    



Discharge Summaries

                    



                                        No Data Provided for This Section       

             



                                                                



History and Physicals

                    



                    Results                         Value                       

  Date              

                                        Source                    

 

                          HP Note                         Nabil Warner MD - 0

2020  6:42 AM 

CDTFormatting of this note might be different from the original.History and 
Physical Update NoteAllergies:  Sulfa (sulfonamide 
antibiotics)Lab/Radiology/Other Diagnostic Tests:24-hour labs:  No results found
 for this visit on 20 (from the past 24 hour(s)).Point of Care Testing:  
(Last 24 hours): ASSESSMENT: 69 y.o. male with history of cT1a LEFT renal mass 
who presents for LEFT robot-assisted laparoscopic partial nephrectomy, possible 
radical, possible open. The patient has an allergy to sulfa medications. The 
patient does not take anticoagulation or antiplatelet agents.  PLAN: To OR for 
LEFT robot-assisted laparoscopic partial nephrectomy, possible radical, possible
 open 2g Ancef preoperatively Risk and benefits of operative procedure were 
again discussed with the patient in detail, as in clinic; all questions were 
answered. Informed consent was obtained and placed in the chart. Discussed plan 
of care with staff surgeon, Dr. Warner, who directed plan of careI have 
examined the patient, and there are no significant changes in their condition, 
from the previous H&P performed on 20.Lencho Pathak, MDPager  
5302ATTESTATIONI personally performed the key portions of the E/M visit, 
discussed case with resident and concur with resident documentation of history, 
physical exam, assessment, and treatment plan unless otherwise noted.Patient 
here for a left robotic versus open partial nephrectomy and possible radical 
nephrectomy.  The mass is in a difficult location.  He has also had multiple 
prior abdominal surgeries, so he understands increased risks of needing an open 
approach or radical nephrectomy.  All risks and benefits were discussed in 
depth.  All questions answered.  Consent on chart.  Patient marked. Ready for 
OR.Staff name:  Nabil Warner MD Date:  2020 
--------------------------------------------------------------------------------

------------------------------------------------------------Electronically 
signed by Nabil Warner MD at 2020  7:27 AM CDT                         

2020                              [facility]

,    

                

 

                          HP Note                         Nabil Warner MD - 0

2020 10:00 AM 

CDTFormatting of this note might be different from the 
original.Subjective:Jas Lewis is a 69 y.o. male who is referred by Dr. Tawil for further evaluation and treatment of left renal massHPI:69 year old 
male with PMH of HYPERTENSION, GERD, BPH (for years), colon cancer  (with 
partial colectomy; receives colonoscopy every 4 years), new diagnosis of l
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 
mass.During work up for lymphoplasmacytic lymphoma about 1 month ago when 
imaging was done, left renal mass was noted, concerning for RCC.  Imaging also 
showed abnormality in bladder for which Urologist Dr.Tawil did cystoscopy 2 
weeks ago and was found to be non-concerning.  He has had elevated PSA in the 
past and has had 3 cystoscopies and 2 prostate biopsies which were non-
revealing; last cystoscopy performed Dec 2019.  He continues to take Cardura for
 for BPH and HYPERTENSION.  His BPH symptoms are no longer a concern; he denies 
dribbling, incomplete emptying, weak urinary stream.  He is on surveillance for 
lymphoplasmacytic lymphoma and has a f/u appt on 2020 for that.He reports 
dysuria with initiation of voids that has been ongoing for months and urinary 
frequency which started 2020 after he was asked to increased fluid intake by
 his PCP.  This has eased since he has been drinking less fluids.No hematuria, 
unexpected weight loss, night sweats, apetite changes, nocturiaNo FH of kidney 
cancer. No FH of genetic cancer syndromes.ROS:10 point ROS negative other than 
chronic hearing loss, runny nose, light-headedness when going from sitting to 
standing position and nervousness/anxiety.Active Ambulatory Problems   Diagnosis
 Date Noted  No Active Ambulatory Problems Resolved Ambulatory Problems   
Diagnosis Date Noted  No Resolved Ambulatory Problems Past Medical History: 
Diagnosis Date  Colon polyps   Depression   Elevated PSA   Enlarged prostate
   Hypertension  &#8226; Kidney stones   Lymphoplasmacytoid lymphoma, CLL 
(HCC)   Malignant neoplasm of colon (HCC)  Surgical History: Procedure 
Laterality Date  HX TONSILLECTOMY    COLON SURGERY  1995  COLECTOMY  
1995  HX APPENDECTOMY  1995  HERNIA REPAIR    CYSTOURETHROSCOPY  
2020  PROSTATE SURGERY    Biopsy  Current Outpatient Medications on File 
Prior to Visit Medication Sig Dispense Refill  amitriptyline (ELAVIL) 25 mg 
tablet     atenoloL (TENORMIN) 50 mg tablet     doxazosin (CARDURA) 4 mg 
tablet     pantoprazole DR (PROTONIX) 40 mg tablet    No current facility-
administered medications on file prior to visit.  Social History Socioeconomic 
History  Marital status:    Spouse name: Not on file  Number of 
children: Not on file  Years of education: Not on file  Highest education 
level: Not on file Occupational History  Not on file Tobacco Use  Smoking sta
tus: Former Smoker   Types: Pipe   Last attempt to quit:    Years since 
quittin.3  Smokeless tobacco: Former User   Types: Chew   Quit date:  
Substance and Sexual Activity  Alcohol use: Never   Frequency: Never  Drug 
use: Never  Sexual activity: Not Currently   Partners: Female Other Topics 
Concern  Not on file Social History Narrative  Not on file Family History 
Problem Relation Age of Onset  Cancer Mother       Endometrail   Hypertension 
Mother   Cancer Father   Cancer-Colon Father   Hypertension Father  &#8226; 
Neurologic Disorder Father   Alzheimer's Father   Cancer-Skin Brother   
Cancer Daughter   Cancer-Breast Daughter   Hypertension Maternal Grandfather  
Objective:Vitals:  20 0943 BP: 124/65 BP Source: Arm, Left Upper Patient 
Position: Sitting Pulse: 80 Weight: 93.4 kg (206 lb) Height: 180.3 cm (71") 
PainSc: Zero Body mass index is 28.73 kg/m .General: No acute distress, non-
labored breathing. AfebrileHead: Normocephalic and atraumatic.  Eyes: 
Conjunctivae are normal. No scleral icterus. Cardiovascular: Regular rate and 
rhythmPulmonary/Chest: Normal Effort, CTABAbd: surgical scar vertically below 
umbilicus from previous colectomy, Soft, NT, NDMusculoskeletal: Moves all 
extremitiesSkin: Warm and dry.  Neuro: Awake and alert.Psychiatric: Normal mood 
and affect. Nursing note and vitals reviewed.ALL OUTSIDE RECORDS WERE REVIEWED 
AND FILMS EXAMINEDImaging:  3-16-20 CT Chest W Ab/Pel W/WO - Impression:  1.  
Enhancing left renal mass concerning for renal malignancy.  2.  3 mm upper lobe 
pulmonary nodule.  3.  Apparent filling defect of the urinary bladder just to 
the right of midline.  This potentially could reflect a primary urinary bladder 
lesion vs eccentric protrusion of the prostate gland.  Correlation with 
cystoscopy is recommended.   Assessment/Plan:Jas Lewis was seen today 
for kidney mass.Diagnoses and all orders for this visit:Left renal massDiscussed
 with patient treatment options for left renal mass including surveillance, 
biopsy, nephrectomy and patient elected to proceed with partial nephrectomy with
 the possibility of radial nephrectomy if deemed necessary.  Risk and benefits 
discussed with patient.Dysuria-     CULTURE-URINE W/SENSITIVITY; Future; 
Expected date: 2020Plan for patient to return on 2020 for robotic-
assisted laparoscopic nephrectomy vs radical nephrectomyPatient discussed with 
Dr. Luis Monaco MD Family Medicine, PGY-3ATTESTATIONI 
personally performed the key portions of the E/M visit, discussed case with 
resident and concur with resident documentation of history, physical exam, 
assessment, and treatment plan unless otherwise noted.Patient referred to me by 
Dr. Tawil for the findings of a 3.5 cm left renal mass concerning for renal cell
 carcinoma.  Patient does have a long history of BPH and urinary symptoms which 
have been fully evaluated by Dr. Tawil.  He denies gross hematuria.  The mass 
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  He
 also has a history of colon cancer s/p hemicolectomy with no additional 
treatment needed.  He continues with colonoscopy.We discussed the findings of 
his mass and the likelihood that this represents a renal cell carcinoma.  We 
discussed options of observation, biopsy, ablation, partial nephrectomy, and ra
dical nephrectomy.  The mass is in a difficult location near the hilar vessels 
and ureter.  Therefore, I discussed that ablation is not an option.  The patient
 would like treatment, so we focused on partial nephrectomy versus radical 
nephrectomy.  Pros and cons of each approach were discussed in depth.  After 
discussion, we elected to proceed with a LEFT robotic partial nephrectomy and 
possible radical nephrectomy.  I quoted him a 30% chance of needing a radical 
nephrectomy given the location.  Patient would like to proceed.  All risks and 
benefits were discussed in depth.  All questions answered.  Consent on chart.  
We will proceed with surgery in the near future.  Complex counseling.Staff name:
  Nabil Warner MD Date:  2020 Patient Instructions [facility] Pre-Operative InstructionsSurgical Procedure:  Robotic-
assisted laparoscopic nephrectomy versus radical nephrectomy, intraoperative 
ultrasound  Date of Surgery:  2020 Arrival Time at the Admission Office 
(Main Lobby):  To be determined To ensure that your surgery can proceed without 
delay, you will be contacted by a phone triage nurse from the Pre-Operative 
Assessment Clinic (PAC) to complete this process.  Please review the information
 given to you by your surgeon.You will be called by the surgery staff with your 
day of surgery arrival time between 2:30 - 4:30 PM the business day prior to 
surgery. If you have not heard from them after 4:30 PM, please call [phone]
[phone] to confirm your arrival time.Pre-Operative Assessment and Instructions:Once
 you speak to the nurse or are seen in the Pre-Operative Assessment Clinic, you 
will be given medication instructions. However, if surgery is within 2 weeks, 
please read and follow the medication instructions below to prepare for surgery 
before you speak with the phone triage nurse:DO NOT STOP your blood thinner 
until you have contacted your prescribing provider or have been specifically 
instructed to do so.Starting Now:? Contact your provider who prescribes any of 
the following to develop a plan for surgery:o Blood thinners such as aspirin, 
Aggrenox, Brilinta, Effient, Eliquis, enoxaparin (Lovenox), clopidogrel 
(Plavix), cilostazol, pentoxifylline (Trental), Pradaxa, Savaysa, ticlopidine, 
Xarelto, and warfarin (Coumadin)o Immunosuppresants such as methotrexate, 
azathioprine, sulfasalazine, everolimus, sirolimus, Humira, Remicade, Enbrel, 
Simponi, Orencia, Cimzia, Actemra, and Xeljanzo [id number] days prior to 
surgery:? Stop most vitamins, herbals, and supplements including (but not 
limited to):o Alpha lipoic acid, black cohosh, CoQ10, echinacea, eye vitamins, 
fish oil, flaxseed oil, garlic, gingko biloba, ginseng, glucosamine/chondroitin,
 kava, Lovaza, lutein, lysine, multivitamin, red yeast rice, KIKI-e, saw 
palmetto, St. Johns wort, turmeric, valerian root, Vascepa, Vitamin A, Vitamin 
B complex, Vitamin C, Vitamin E? You DO NOT need to stop: iron, magnesium, 
potassium7 days prior to surgery:? Stop anti-inflammatory medications such as 
ibuprofen (Advil, Motrin), naproxen (Aleve), Toyin-Shepherd, Excedrin, Midol, 
celecoxib (Celebrex), diclofenac (Voltaren), diflunisal, etodolac, flurbiprofen,
 indomethacin, ketoprofen, ketorolac, meloxicam, nabumetone, and piroxicamThe 
day prior to surgery begin a clear liquid diet.  This includes water, Jell-O , 
tea, coffee, broth, pop, clear juices (apple, cranberry, grape, orange without 
pulp), sports drinks, and popsicles.  You may have any color of the above 
drinks, but no milk or milk products. The day prior to surgery, drink one bottle
 of magnesium citrate at 2:00 p.m.  Expect a laxative effect.  This laxative may
 be purchased at any pharmacy.  No prescription needed, but a printed 
prescription may be provided for your reference.Do not drink alcohol within 24 
hours of surgery.Please do not eat or drink anything after midnight.  No gum, 
mints, hard candy, snacks, coffee, etc or chewing tobacco allowed after midnight
 before surgery.  You may brush your teeth but be sure to rinse and spit.      
(DIAL)Please shower with an over the counter antibacterial soap the evening 
before or the morning of surgery.If your surgery is scheduled as an outpatient, 
you must arrange to have someone drive you home and have someone with you 24 
hours after 
anesthesia.=====================================================================

===If you have any questions, please contact your provider's office at 
[phone]. ([place], Admin) For emergencies during evenings, nights, weekends, 
and holidays, contact [facility]  and request 
they contact the on-call Urology Resident at 
[phone].===================================================================

======Electronically signed by Nabil Warner MD at 2020  8:02 AM CDT    
                          2020                         [facility],                    

 

                           Note                         Nabil Warner MD - 0

2020  6:42 AM 

CDTFormatting of this note might be different from the original.History and 
Physical Update NoteAllergies:  Sulfa (sulfonamide 
antibiotics)Lab/Radiology/Other Diagnostic Tests:24-hour labs:  No results found
 for this visit on 20 (from the past 24 hour(s)).Point of Care Testing:  
(Last 24 hours): ASSESSMENT: 69 y.o. male with history of cT1a LEFT renal mass 
who presents for LEFT robot-assisted laparoscopic partial nephrectomy, possible 
radical, possible open. The patient has an allergy to sulfa medications. The 
patient does not take anticoagulation or antiplatelet agents.  PLAN: To OR for 
LEFT robot-assisted laparoscopic partial nephrectomy, possible radical, possible
 open 2g Ancef preoperatively Risk and benefits of operative procedure were 
again discussed with the patient in detail, as in clinic; all questions were 
answered. Informed consent was obtained and placed in the chart. Discussed plan 
of care with staff surgeon, Dr. Warner, who directed plan of careI have 
examined the patient, and there are no significant changes in their condition, 
from the previous H&P performed on 20.Lencho Pathak, MDPager  
5302ATTESTATIONI personally performed the key portions of the E/M visit, 
discussed case with resident and concur with resident documentation of history, 
physical exam, assessment, and treatment plan unless otherwise noted.Patient 
here for a left robotic versus open partial nephrectomy and possible radical 
nephrectomy.  The mass is in a difficult location.  He has also had multiple 
prior abdominal surgeries, so he understands increased risks of needing an open 
approach or radical nephrectomy.  All risks and benefits were discussed in 
depth.  All questions answered.  Consent on chart.  Patient marked. Ready for 
OR.Staff name:  Nabil Warner MD Date:  2020 
--------------------------------------------------------------------------------

------------------------------------------------------------Electronically 
signed by Nabil Warner MD at 2020  7:27 AM CDT                         

2020                              [facility]

,    

                

 

                          HP Note                         Nabil Warner MD - 0

2020 10:00 AM 

CDTFormatting of this note might be different from the 
original.Subjective:Jas Lewis is a 69 y.o. male who is referred by Dr. Tawil for further evaluation and treatment of left renal massHPI:69 year old 
male with PMH of HYPERTENSION, GERD, BPH (for years), colon cancer  (with 
partial colectomy; receives colonoscopy every 4 years), new diagnosis of l
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 
mass.During work up for lymphoplasmacytic lymphoma about 1 month ago when 
imaging was done, left renal mass was noted, concerning for RCC.  Imaging also 
showed abnormality in bladder for which Urologist Dr.Tawil did cystoscopy 2 
weeks ago and was found to be non-concerning.  He has had elevated PSA in the 
past and has had 3 cystoscopies and 2 prostate biopsies which were non-
revealing; last cystoscopy performed Dec 2019.  He continues to take Cardura for
 for BPH and HYPERTENSION.  His BPH symptoms are no longer a concern; he denies 
dribbling, incomplete emptying, weak urinary stream.  He is on surveillance for 
lymphoplasmacytic lymphoma and has a f/u appt on 2020 for that.He reports 
dysuria with initiation of voids that has been ongoing for months and urinary 
frequency which started 2020 after he was asked to increased fluid intake by
 his PCP.  This has eased since he has been drinking less fluids.No hematuria, 
unexpected weight loss, night sweats, apetite changes, nocturiaNo FH of kidney 
cancer. No FH of genetic cancer syndromes.ROS:10 point ROS negative other than 
chronic hearing loss, runny nose, light-headedness when going from sitting to 
standing position and nervousness/anxiety.Active Ambulatory Problems   Diagnosis
 Date Noted  No Active Ambulatory Problems Resolved Ambulatory Problems   
Diagnosis Date Noted  No Resolved Ambulatory Problems Past Medical History: 
Diagnosis Date  Colon polyps   Depression   Elevated PSA   Enlarged prostate
   Hypertension  &#8226; Kidney stones   Lymphoplasmacytoid lymphoma, CLL 
(HCC)   Malignant neoplasm of colon (HCC)  Surgical History: Procedure 
Laterality Date  HX TONSILLECTOMY    COLON SURGERY  1995  COLECTOMY  
1995  HX APPENDECTOMY  1995  HERNIA REPAIR    CYSTOURETHROSCOPY  
2020  PROSTATE SURGERY    Biopsy  Current Outpatient Medications on File 
Prior to Visit Medication Sig Dispense Refill  amitriptyline (ELAVIL) 25 mg 
tablet     atenoloL (TENORMIN) 50 mg tablet     doxazosin (CARDURA) 4 mg 
tablet     pantoprazole DR (PROTONIX) 40 mg tablet    No current facility-
administered medications on file prior to visit.  Social History Socioeconomic 
History  Marital status:    Spouse name: Not on file  Number of 
children: Not on file  Years of education: Not on file  Highest education 
level: Not on file Occupational History  Not on file Tobacco Use  Smoking sta
tus: Former Smoker   Types: Pipe   Last attempt to quit:    Years since 
quittin.3  Smokeless tobacco: Former User   Types: Chew   Quit date:  
Substance and Sexual Activity  Alcohol use: Never   Frequency: Never  Drug 
use: Never  Sexual activity: Not Currently   Partners: Female Other Topics 
Concern  Not on file Social History Narrative  Not on file Family History 
Problem Relation Age of Onset  Cancer Mother       Endometrail   Hypertension 
Mother   Cancer Father   Cancer-Colon Father   Hypertension Father  &#8226; 
Neurologic Disorder Father   Alzheimer's Father   Cancer-Skin Brother   
Cancer Daughter   Cancer-Breast Daughter   Hypertension Maternal Grandfather  
Objective:Vitals:  20 0943 BP: 124/65 BP Source: Arm, Left Upper Patient 
Position: Sitting Pulse: 80 Weight: 93.4 kg (206 lb) Height: 180.3 cm (71") 
PainSc: Zero Body mass index is 28.73 kg/m .General: No acute distress, non-
labored breathing. AfebrileHead: Normocephalic and atraumatic.  Eyes: 
Conjunctivae are normal. No scleral icterus. Cardiovascular: Regular rate and 
rhythmPulmonary/Chest: Normal Effort, CTABAbd: surgical scar vertically below 
umbilicus from previous colectomy, Soft, NT, NDMusculoskeletal: Moves all 
extremitiesSkin: Warm and dry.  Neuro: Awake and alert.Psychiatric: Normal mood 
and affect. Nursing note and vitals reviewed.ALL OUTSIDE RECORDS WERE REVIEWED 
AND FILMS EXAMINEDImaging:  3-16-20 CT Chest W Ab/Pel W/WO - Impression:  1.  
Enhancing left renal mass concerning for renal malignancy.  2.  3 mm upper lobe 
pulmonary nodule.  3.  Apparent filling defect of the urinary bladder just to 
the right of midline.  This potentially could reflect a primary urinary bladder 
lesion vs eccentric protrusion of the prostate gland.  Correlation with 
cystoscopy is recommended.   Assessment/Plan:Jas Lewis was seen today 
for kidney mass.Diagnoses and all orders for this visit:Left renal massDiscussed
 with patient treatment options for left renal mass including surveillance, 
biopsy, nephrectomy and patient elected to proceed with partial nephrectomy with
 the possibility of radial nephrectomy if deemed necessary.  Risk and benefits 
discussed with patient.Dysuria-     CULTURE-URINE W/SENSITIVITY; Future; 
Expected date: 2020Plan for patient to return on 2020 for robotic-
assisted laparoscopic nephrectomy vs radical nephrectomyPatient discussed with 
Dr. Luis Monaco MD Family Medicine, PGY-3ATTESTATIONI 
personally performed the key portions of the E/M visit, discussed case with 
resident and concur with resident documentation of history, physical exam, 
assessment, and treatment plan unless otherwise noted.Patient referred to me by 
Dr. Tawil for the findings of a 3.5 cm left renal mass concerning for renal cell
 carcinoma.  Patient does have a long history of BPH and urinary symptoms which 
have been fully evaluated by Dr. Tawil.  He denies gross hematuria.  The mass 
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  He
 also has a history of colon cancer s/p hemicolectomy with no additional 
treatment needed.  He continues with colonoscopy.We discussed the findings of 
his mass and the likelihood that this represents a renal cell carcinoma.  We 
discussed options of observation, biopsy, ablation, partial nephrectomy, and ra
dical nephrectomy.  The mass is in a difficult location near the hilar vessels 
and ureter.  Therefore, I discussed that ablation is not an option.  The patient
 would like treatment, so we focused on partial nephrectomy versus radical 
nephrectomy.  Pros and cons of each approach were discussed in depth.  After 
discussion, we elected to proceed with a LEFT robotic partial nephrectomy and 
possible radical nephrectomy.  I quoted him a 30% chance of needing a radical 
nephrectomy given the location.  Patient would like to proceed.  All risks and 
benefits were discussed in depth.  All questions answered.  Consent on chart.  
We will proceed with surgery in the near future.  Complex counseling.Staff name:
  Nabil Warner MD Date:  2020 Patient Instructions [facility] Pre-Operative InstructionsSurgical Procedure:  Robotic-
assisted laparoscopic nephrectomy versus radical nephrectomy, intraoperative 
ultrasound  Date of Surgery:  2020 Arrival Time at the Admission Office 
(Main Lobby):  To be determined To ensure that your surgery can proceed without 
delay, you will be contacted by a phone triage nurse from the Pre-Operative 
Assessment Clinic (PAC) to complete this process.  Please review the information
 given to you by your surgeon.You will be called by the surgery staff with your 
day of surgery arrival time between 2:30 - 4:30 PM the business day prior to 
surgery. If you have not heard from them after 4:30 PM, please call [phone]
[phone] to confirm your arrival time.Pre-Operative Assessment and Instructions:Once
 you speak to the nurse or are seen in the Pre-Operative Assessment Clinic, you 
will be given medication instructions. However, if surgery is within 2 weeks, 
please read and follow the medication instructions below to prepare for surgery 
before you speak with the phone triage nurse:DO NOT STOP your blood thinner 
until you have contacted your prescribing provider or have been specifically 
instructed to do so.Starting Now:? Contact your provider who prescribes any of 
the following to develop a plan for surgery:o Blood thinners such as aspirin, 
Aggrenox, Brilinta, Effient, Eliquis, enoxaparin (Lovenox), clopidogrel 
(Plavix), cilostazol, pentoxifylline (Trental), Pradaxa, Savaysa, ticlopidine, 
Xarelto, and warfarin (Coumadin)o Immunosuppresants such as methotrexate, 
azathioprine, sulfasalazine, everolimus, sirolimus, Humira, Remicade, Enbrel, 
Simponi, Orencia, Cimzia, Actemra, and Xeljanzo [id number] days prior to 
surgery:? Stop most vitamins, herbals, and supplements including (but not 
limited to):o Alpha lipoic acid, black cohosh, CoQ10, echinacea, eye vitamins, 
fish oil, flaxseed oil, garlic, gingko biloba, ginseng, glucosamine/chondroitin,
 kava, Lovaza, lutein, lysine, multivitamin, red yeast rice, KIKI-e, saw 
palmetto, St. Johns wort, turmeric, valerian root, Vascepa, Vitamin A, Vitamin 
B complex, Vitamin C, Vitamin E? You DO NOT need to stop: iron, magnesium, 
potassium7 days prior to surgery:? Stop anti-inflammatory medications such as 
ibuprofen (Advil, Motrin), naproxen (Aleve), Toyin-Shepherd, Excedrin, Midol, 
celecoxib (Celebrex), diclofenac (Voltaren), diflunisal, etodolac, flurbiprofen,
 indomethacin, ketoprofen, ketorolac, meloxicam, nabumetone, and piroxicamThe 
day prior to surgery begin a clear liquid diet.  This includes water, Jell-O , 
tea, coffee, broth, pop, clear juices (apple, cranberry, grape, orange without 
pulp), sports drinks, and popsicles.  You may have any color of the above 
drinks, but no milk or milk products. The day prior to surgery, drink one bottle
 of magnesium citrate at 2:00 p.m.  Expect a laxative effect.  This laxative may
 be purchased at any pharmacy.  No prescription needed, but a printed 
prescription may be provided for your reference.Do not drink alcohol within 24 
hours of surgery.Please do not eat or drink anything after midnight.  No gum, 
mints, hard candy, snacks, coffee, etc or chewing tobacco allowed after midnight
 before surgery.  You may brush your teeth but be sure to rinse and spit.      
(DIAL)Please shower with an over the counter antibacterial soap the evening 
before or the morning of surgery.If your surgery is scheduled as an outpatient, 
you must arrange to have someone drive you home and have someone with you 24 
hours after 
anesthesia.=====================================================================

===If you have any questions, please contact your provider's office at 
[phone]. ([place], Admin) For emergencies during evenings, nights, weekends, 
and holidays, contact [facility]  and request 
they contact the on-call Urology Resident at 
[phone].===================================================================

======Electronically signed by Nabil Warner MD at 2020  8:02 AM CDT    
                          2020                         [facility],                    



                                                                                
                                        



Vital Signs

                     



                    Vital Sign                         Value                    

     Date           

                          Comments                         Source               

     

 

                          Systolic blood pressure                         116 mm

[Hg]                      

                    06/15/2020                                                  

[facility],                    

 

                          Diastolic blood pressure                         71 mm

[Hg]                      

                    06/15/2020                                                  

[facility],                    

 

                    Heart rate                         76 /min                  

       06/15/2020   

                                                    [facility]

pital

 [facility],                    

 

                    Body height                         180.3 cm                

         06/15/2020 

                                                    [facility],                    

 

                    Body weight                         90.719 kg               

          06/15/2020

                                                    [facility],                    

 

                    BMI                         27.89 kg/m2                     

    06/15/2020      

                                                    [facility] 

[facility],                    

 

                    Body temperature                         37.5 Luci           

              

2020                                                   [facility],                    

 

                          Oxygen saturation in Arterial blood by Pulse oximetry 

                        93

 %                         2020                                           

                                        [facility]

,                    

 

                          Systolic blood pressure                         143 mm

[Hg]                      

                    2020                                                  

[facility],                    

 

                          Diastolic blood pressure                         62 mm

[Hg]                      

                    2020                                                  

[facility],                    

 

                    Heart rate                         77 /min                  

       2020   

                                                    [facility]

 [facility],                    

 

                    Body height                         180.3 cm                

         2020 

                                                    [facility],                    

 

                    Body weight                         92.2 kg                 

        2020  

                                                    [facility],                    

 

                    BMI                         28.35 kg/m2                     

    2020      

                                                    [facility] 

[facility],                    

 

                          Systolic blood pressure                         124 mm

[Hg]                      

                    2020                                                  

[facility],                    

 

                          Diastolic blood pressure                         65 mm

[Hg]                      

                    2020                                                  

[facility],                    

 

                    Heart rate                         80 /min                  

       2020   

                                                    [facility]

 [facility],                    

 

                    Body height                         180.3 cm                

         2020 

                                                    [facility],                    

 

                    Body weight                         93.441 kg               

          2020

                                                    [facility],                    

 

                    BMI                         28.73 kg/m2                     

    2020      

                                                    [facility],                    



                                                                                
                                                                                
                                                                                
                                                                                
                                                                        



Encounters

                    



                    Location                         Location Details           

              

Encounter Type                         Encounter Number                         

Reason For Visit                         Attending Provider                     

                    ADM Date                         DC Date                    

     Status     

                                        Source                    

 

                          [facility]                

                          

                          Hospital Encounter                         [id number] 

                  

                                                                        20

20            

                    2020                                                  

The 

[facility],                    

 

                          [facility]                

                          

                    Telephone                         [phone]                

            

                          Nabil Warner MD                         2020   

    

                                                                      The 

[facility],                    

 

                                                                      Travel    

                    

                    [id number]                                                  

                   

                    2020                                                  

            

                                        [facility]

,                    

 

                    [facility]                                                  [facility]

ATIENT              

                    [id number]                                                  D

AVIPARI WARNER                          2020  

                          Active                         [facility],                    

 

                          [facility]                

                          

                    Office Visit                         [id number]             

            

                          Nabil Warner MD                         2020                                            

     

                                        [facility]

,                    

 

                          [facility]                

                          

                          Prep for Case                         [id number]      

                  

                                             Nabil Warner MD                   

      2020   

                                                                        The 

[facility],                    

 

                          [facility]                

                          

                    Telephone                         [phone]                

            

                          Nabil Warner MD                         2020   

    

                                                                      The 

[facility],                    

 

                          [facility]                

                          

                    Orders Only                         [id number]              

            

                          Unknown Unknown                          05/15/2020   

  

                                                                      The 

[facility],                    

 

                                                                      Travel    

                    

                    [id number]                                                  

                   

                    2020                                                  

            

                                        [facility]

,                    

 

                    8                                                  EXT REC

OVERY               

                    [id number]                                                  D

AVID 

ALANA                          2020  

                          Discharged                         [facility],                    

 

                          [facility]                

                          

                          Hospital Encounter                         [id number] 

                  

                                             Nabil Warner MD                   

      

2020                                   

                                        [facility]

,                    

 

                                        [facility] [place]                       

                                             Surgery                         104

[id number]           

                                             Nabil Warner MD                   

      

2020                                   

                                        [facility]

,                    

 

                                        [facility] [place]                       

                                             Anesthesia Event                   

      [id number]  

                                                    William Wallisch MD         

    

                          2020         

              

                                                    [facility],            

        

 

                          [facility]                

                          

                    Telephone                         [phone]                

            

                          Nabil Warner MD                         2020   

    

                                                                      [facility],                    

 

                                                                      Travel    

                    

                    [id number]                                                  

                   

                    06/15/2020                                                  

            

                                        [facility]

,                    

 

                          [facility]                

                          

                          Hospital Encounter                         [id number] 

                  

                                             Nabil Warner MD                   

      

06/15/2020                         2020                                   

                                        [facility]

,                    

 

                    MPAURO                                                  OUTP

ATIENT              

                    [id number]                                                  PARI WARNER                          06/15/2020                         06/15/2020  

                          Active                         [facility],                    

 

                          [facility]                

                          

                    Office Visit                         [phone]             

            

                          Nabil Warner MD                         06/15/2020   

 

                          06/15/2020                                            

     

                                        [facility]

,                    

 

                          [facility]                

                          

                    Telephone                         [phone]                

            

                          Latoya Arellano RN                         2020  

    

                                                                      The 

[facility],                    

 

                          [facility]                

                          

                    Orders Only                         [id number]              

            

                          Dallas Johnson                          2020     

  

                                                                      The 

[facility],                    

 

                    ICSPSCR                                                  O  

                    

                                                                                

             

                                                    Active                      

  

                                        [facility], 

                   



                                                                                
                                                                                
                                                                                
                                                                        



Procedures

                    



                    Procedure                         Code                      

   Date             

                    Perfomer                         Comments                   

      

Source                    

 

                    HC BASIC METABOLIC PANEL                         27097      

                   

06/15/2020                         [place]                                         

                                        [facility]

,                    

 

                    HC CREATININE-FLUID                         07702           

              

2020                         [place]                                     

                                        [facility]

,                    

 

                    HC CBC,AUTOMATED                         32052              

           

2020                         [place]                                    

                                        [facility]

,                    

 

                    HC BASIC METABOLIC PANEL                         52227      

                   

2020                         [place]                                    

                                        [facility]

,                    

 

                    HC FROZEN SECTION #1                         34203          

               

2020                         [place]                                      

                                        [facility]

,                    

 

                          ROBOT ASSISTED LAPAROSCOPIC NEPHRECTOMY PARTIAL       

                  66823   

                          2020                         [place]            

    

                                                    [facility],     

               

 

                          ULTRASOUND GUIDANCE - INTRAOPERATIVE                  

       03019              

                    2020                         [facility]

,                

    

 

                           COMPREHENSIVE METABOLIC PANEL                      

   66735                  

                    2020                         [place]                  

             

                                        [facility]

,                    

 

                          HC BLOOD TYPING, ABO CONFIRM 91                       

  95295                   

                    2020                         [facility]

,                    

 

                    HC CBC,AUTOMATED                         44880              

           

2020                         [facility]

,                    

 

                    HC ABO GROUP                         90868                  

       2020   

                      [facility],                    

 

                    TELEMETRY STRIPS-SCAN                         [id number]      

                   

2020                         Document                                     

                                        [facility]

,                    

 

                          PATHOLOGY INTEROPERATIVE REPORT SCAN                  

       [id number]            

                          2020                         Document           

             

                                                    [facility],             

       

 

                          EXTERNAL COVID-19 (SARS-COV-2)                        

 [id number]                 

                    05/15/2020                         Unknown                  

            

                                        [facility]

,                   

 

 

                    COVID-19 (SARS-COV-2) PCR                              

                    

05/15/2020                         [facility]

,                    

 

                    HC CULTURE-URINE                         68366              

           

2020                         [facility]                                      

                                        [facility]

,                    

 

                          CT CHEST/ABD/PEL EXTERNAL IMAGING                     

    [id number]                

                    2020                         Outpatient               

           

                                        [facility]

,               

     



                                                                                
                                                                                
                                                                                
                                                            



Plan of Care





                    Plan of Care        Date                Source

 

                                        Health MaintenanceDue DateLast DoneComme

ntsMEDICARE ANNUAL WELLNESS 

VISIT1950DTAP/TDAP VACCINES (1 - Tdap)1968HEPATITIS C 
SMQFETSTJ85/17/1968PHYSICAL (COMPREHENSIVE) EXAM1968COLORECTAL CANCER 
IURGXBEGH48/17/2000SHINGLES RECOMBINANT VACCINE (1 of 2)2000ABDOMINAL 
AORTIC ANEURYSM FNNXVRYPI36/17/2015PNEUMONIA (PPSV23) VACCINE (1 of 1 - 
PPSV23)2015INFLUENZA WQIAEHY41/01/865442/                         

2020                              [facility]

,    

                



                                                                    



Social History

                    



                                        No Data Provided for This Section       

             



                                                                 



Assessment and Plan

                    



                                        No Data Provided for This Section       

             



                                                                



Family History

                    



                    Value                         Date                         S

ource               

     

 

                                        Medical 

BjkdsgjMnpokkghDlkoUfajserbSjwffj-MhemHqgugrnGjirexRvzoaepeQlbwmd-KkmjsqRcrzfmwv

Alzheimer'sFatherCancerFatherCancer-ColonFatherHypertensionFatherNeurologic 
DisorderFatherHypertensionMaternal GrandfatherCancerMotherEndometrail 
HypertensionMotherRelationNameStatusCommentsBrotherDaughterFatherDeceasedMaterna

l GrandfatherMother                         08/10/2020                         

[facility],                    



                                                                        



Advance Directives

                    



                    Order Name                         Results                  

       Value        

                          Date                         Source                   

 

 

                          Advance Directives                         Advance Dir

ectives                   

                                        Documents on FileTypeDate RecordedPatien

t RepresentativeExplanationAdvance

 Directive/DPOALatest Code Status on FileCode StatusDate ActivatedDate 
InactivatedCommentsFull Code2020  4:34 PM2020  1:36 PMProvider has 
discussed Code Status w/Patient or Family?Yes                         08/10/2020

                                        [facility]

,              

      



                                                                    



Functional Status

                    



                                        No Data Provided for This Section

## 2020-08-13 NOTE — XMS REPORT
Encounter Summary

                             Created on: 2020



Jas Lewis

External Reference #: [id number]

: 1950

Sex: Male



Demographics





                          Address                   [address] [place]  [address]

 

                          Home Phone                [phone]

 

                          Preferred Language        English

 

                          Marital Status            Unknown

 

                          Anglican Affiliation     CHR

 

                          Race                      White

 

                          Ethnic Group              Not  or 





Author





                          Author                    [facility]

 

                          Organization              [facility]

 

                          Address                   Unknown

 

                          Phone                     Unavailable







Support





                Name            Relationship    Address         Phone

 

                Kendy Richardson ECON            Unknown         [phone]

[phone]







Care Team Providers





                    Care Team Member Name Role                Phone

 

                    Arnaldo Singh         PCP                 [phone]

 

                    Tawil, Elias A MD   Unavailable         [phone]







Reason for Visit

* Auth/Cert



                          Referred By Contact       Referred To Contact



                 Status          Reason          Specialty       Diagnoses /  



                                         Procedures  

 

                                        



                                        







                                         Diagnoses  



                                         Left renal mass  



                                         Left renal mass  



                                         [N28.89]

  



                                         P  



                                         rocedures  



                                         MD LAPAROSCOPY  



                                         SURG PARTIAL  



                                         NEPHRECTOMY  



                                         MD ULTRASONIC  



                                         GUIDANCE  



                                         INTRAOPERATIVE  



                                         ROBOT ASSISTED  



                                         LAPAROSCOPIC  



                                         NEPHRECTOMY  



                                         PARTIAL  



                                         ULTRASOUND  



                                         GUIDANCE -  



                                         INTRAOPERATIVE  











Encounter Details





                          Care Team                 Description



                     Date                Type                Department  

 

                                        



Miranda Warner MD



 [address]



[facility] [address]



[place] [address]



[phone] (Fax)                      Renal cell carcinoma of left kidney (HCC

)



                     2020          [facility]  



                           2020                [address] [place] [address]  



                                         [phone]  







Social History





                                        Date



                 Tobacco Use     Types           Packs/Day       Years Used 

 

                                        Quit: 



                           Former Smoker             Pipe   

 

    



                     Smokeless Tobacco: Former  Chew                Quit: 



                                         User   







                    Drinks/Week         oz/Week             Comments



                                         Alcohol Use   

 

                                                             



                                         Never   







  



                     Alcohol Habits      Answer              Date Recorded

 

  



                     How often do you have a drink containing alcohol?  Never   

            2020

 

  



                           How many drinks containing alcohol do you have on  No

t asked 



                                         a typical day when you are drinking?  

 

  



                           How often do you have six or more drinks on one  Not 

asked 



                                         occasion?  







 



                           Sex Assigned at Birth     Date Recorded

 

 



                           Male                      2020  6:16 AM CDT







                                        Date Recorded



                           COVID-19 Exposure         Response 

 

                                        2020  6:14 AM CDT



                           In the last month, have you been in contact with  No 

/ Unsure 



                                         someone who was confirmed or suspected 

to have  



                                         Coronavirus / COVID-19?  



documented as of this encounter



Last Filed Vital Signs





                    Reading             Time Taken          Comments



                                         Vital Sign   

 

                    143/62              2020  7:27 AM CDT  



                                         Blood Pressure   

 

                    77                  2020  7:27 AM CDT  



                                         Pulse   

 

                    37.5 C (99.5 F) 2020  7:27 AM CDT  



                                         Temperature   

 

                    -                   -                    



                                         Respiratory Rate   

 

                    93%                 2020  7:27 AM CDT  



                                         Oxygen Saturation   

 

                    -                   -                    



                                         Inhaled Oxygen   



                                         Concentration   

 

                    92.2 kg (203 lb 4.2 oz) 2020  4:38 PM CDT  



                                         Weight   

 

                    180.3 cm (5' 11")   2020  4:38 PM CDT  



                                         Height   

 

                    28.35               2020  4:38 PM CDT  



                                         Body Mass Index   



documented in this encounter



Functional Status





                                        Date of Assessment



                           Functional Status         Response 

 

                                        2020



                           Does the patient have a hearing impairment:  No 

 

                                        2020



                           Does the patient have a visual impairment:  No 

 

                                        2020



                           Does the patient have impaired ambulation:  No 

 

                                        2020



                           Does the patient have an activity of daily living  No

 



                                         (ADL) impairment:  

 

                                        2020



                           Does the patient have an instrumental activity of  No

 



                                         daily living (IADL) impairment:  







                                        Date of Assessment



                           Cognitive Status          Response 

 

                                        2020



                           Does the patient have a cognitive impairment:  No 



documented as of this encounter



Discharge Instructions

* Pre-Anesthesia Patient Instructions* 



 Nicky Alvarado RN - 2020 11:12 AM CDT



GENERAL INFORMATION



Before you come to the hospital

 Make arrangements for a responsible adult to drive you home and stay with you
 for 24 hours following surgery.

 Bath/Shower Instructions

 Take a bath or shower with antibacterial soap the night before or the morning
 of your procedure. Use clean towels.

 Put on clean clothes after bath or shower.  Avoid using lotion and oils.

 Sleep on clean sheets if bath or shower is done the night before procedure.

 Leave money, credit cards, jewelry, and any other valuables at home. The [facility] is not responsible for the loss or breakage of persona
l items.

 Remove nail polish, makeup and all jewelry (including piercings) before comin
g to the hospital.

 The morning of your procedure:

 brush your teeth and tongue

 do not smoke

 do not shave the area where you will have surgery



What to bring to the hospital

 ID/ Insurance Card

 Medical Device card

 Official documents for legal guardianship 

 Copy of your Living Will, Advanced Directives, and/or Durable Power of Attorn
ey 

 Small bag with a few personal belongings

 Walker,cane, or motorized scooter

 Cases for glasses/hearing aids/contact lens (bring solutions for contacts)

 Dress in clean, loose, comfortable clothing 



Eating or drinking before surgery

 Do not eat or drink anything after 11:00 p.m. the day before your procedure (
including gum, mints, candy, or chewing tobacco) OR follow the specific instruct
ions you were given by your Surgeon.

 You may have WATER ONLY up to 2 hours before arriving at the hospital.

 

Other instructions

Notify your surgeon if:

 you become ill with a cough, fever, sore throat, nausea, vomiting or flu-like
 symptoms

 you have any open wounds/sores that are red, painful, draining, or are new si
nce you last saw  the doctor

 you need to cancel your procedure

 You will receive a call with your surgery arrival time from between 2:30pm an
d 4:30pm the last business day before your procedure.  If you do not receive a c
all, please call [phone] before 4:30pm or [phone] after 4:30pm.



Notify us at [facility]: [phone]

 if you need to cancel your procedure

 if you are going to be late



Arrival at the [address] [address]



 Park in the  Parking Garage, located directly across from the main entrance
 to the hospital.

  parking is available  from 7 AM to 4 PM Monday through Friday.

 Enter through the ground floor [facility] entrance and check in at the Inf
ormation Desk in the lobby.

 They will validate your parking ticket and direct you to the next location.

 If you are a woman between the ages of 10 and 55, and have not had a hysterec
evy, you will be asked for a urine sample prior to surgery.  Please do not urin
ate before arriving in the Surgery Waiting Room.  Once there, check in and let t
he attendant know if you need to provide a sample.



For the safety of all patients, visitors and staff as we work to contain COVID-1
9, we must dramatically restrict patient visitors. 

For most patients, no visitors are allowed. Exceptions include:

? 1 parent or guardian for patients younger than 18 

? 1 support person for labor and delivery patients 

? 1 support person for patients with disabilities or impairments needing assista
nce 

? 1 support person or  for patients undergoing outpatient treatment or pro
cedures 

? Support persons for patients nearing end of life



You will need to have a COVID19 test performed 2 days prior to your surgery.  Yo
u will be contacted with the date and time of the COVID19 test.  Your test will 
be performed at a drive-thru clinic at the [facility].  If you are also plannin
g to have lab work drawn while at the [facility], please do so first and then have y
our COVID19 test completed second.



[facility]

[address]

[place] [address]



Please bring a cell phone, your photo ID and your insurance card.

Please use the bathroom prior to your trip to the [facility] for your test.



Once arrived at the [facility] drive-thru clinic, follow the signage/cones to a
 parking spot.  Park and call [phone] to check in.  The team will come out 
to you.  You do not have to get out of your vehicle.  Our team members will viral
ect the test sample using a swab and will be wearing all of the necessary person
al protective equipment, so you do not need to wear a mask.  Once the test is pe
rformed it will be important to self quarantine at home until your surgery.  Ple
ase stay at home, wash your hands frequently, and practice physical distancing. 





Electronically signed by Nicky Alvarado RN at 2020 11:12 AM CDT





* Pre-Anesthesia Medication Instructions* 



 Nicky Alvarado RN - 2020 11:14 AM CDT



Formatting of this note might be different from the original.



YOUR MEDICATIONS:



 amitriptyline (ELAVIL) 25 mg tablet  

 atenoloL (TENORMIN) 50 mg tablet  

 doxazosin (CARDURA) 4 mg tablet  

 pantoprazole DR (PROTONIX) 40 mg tablet  



 

YOUR  MEDICATION INSTRUCTIONS FOR SURGERY:



Before surgery

Stop taking any vitamins, herbals, and natural supplements 14 days before surger
y:



Stop the following medications 7 days before surgery:

 Anti-inflammatory medications such as ibuprofen (Advil, Motrin) and naproxen 
(Aleve)

You may use acetaminophen (Tylenol)



Morning of surgery

On the morning of surgery, do NOT take these medications:

 Remaining vitamins/supplements

 Ointments/creams/lotions/oils/powders/deodorant



On the morning of surgery, take ONLY these medications with a sip (1-2 ounces) o
f water:

 Amitriptyline

 Atenolol

 Doxazosin

 Pantoprazole



Other information

Before surgery, please contact ANTONETTE Sequeira, with any medicine updates or question
s.

 E-mail:  [email or web address]



Before going home from the hospital, please ask your doctor when you should re-s
tart your medicines that were stopped before surgery.



Electronically signed by Nicky Alvarado RN at 2020 11:14 AM CDT





documented in this encounter



Medications at Time of Discharge





                          Start Date                End Date



                 Medication      Sig             Dispensed       Refills  

 

                          2020                 



                     acetaminophen (TYLENOL)  Take two            0  



                           325 mg tablet             tablets by    



                                         mouth every 6    



                                         hours as    



                                         needed.    

 

                          2020                 



                     amitriptyline (ELAVIL) 25  Take 25 mg by       0  



                           mg tablet                 mouth at    



                                         bedtime    



                                         daily.    

 

                          2020                 



                     atenoloL (TENORMIN) 50 mg  Take 50 mg by       0  



                           tablet                    mouth daily.    

 

                          2020                 



                     doxazosin (CARDURA) 4 mg  Take 4 mg by        0  



                           tablet                    mouth daily.    

 

                          2020                 



                 oxyCODONE (ROXICODONE) 5  Take one        30 tablet       0  



                           mg tablet                 tablet to two    



                                         tablets by    



                                         mouth every 4    



                                         hours as    



                                         needed    

 

                          2020                 



                     pantoprazole DR     Take 40 mg by       0  



                           (PROTONIX) 40 mg tablet   mouth daily.    

 

                          2020                 



                 polyethylene glycol 3350  Take            527 g           3  



                           (MIRALAX) 17 gram/dose    seventeen g    



                           powder                    by mouth    



                                         daily.    

 

                          2020                 



                 senna/docusate (SENNA-S)  Take one        30 tablet       0  



                           8.6/50 mg tablet          tablet by    



                                         mouth twice    



                                         daily.    



documented as of this encounter



Progress Notes

* Miranda Warner MD - 2020 11:31 AM CDT



I called patient with pathology results.  Pathological T1a clear cell renal cell
 carcinoma, grade 2 with necrosis, margins negative.  He expressed understanding
 of the pathology.  He is doing well.  Pain nearly completely resolved.  Eating 
well and he is having normal bowel activity.  He will keep follow-up as schedule
d.



Electronically signed by Miranda Warner MD at 2020  6:22 PM CDT

* Miranda Warner MD - 2020  5:29 AM CDT



Formatting of this note might be different from the original.

Urology Progress Note 2020 



ASSESSMENT:

Jas Lewis is a 69 y.o. Male with history of lymphoma, left renal ma
ss s/p left robotic partial nephrectomy on 2020.  LOS: 0 days 



PLAN: Will discuss plan with staff surgeon - Dr. Warner



- Pain control: PO pain

- Diet/FEN: Regular diet, SLIVF

- GI: Bowel regimen, zofran prn

- : Felix out, trial of void; Cr 1.58 (1.45)

- Heme/ID: Afebrile. Hgb, WBC appropriate

- OOB/Amb 

- PTA: Atenolol, doxazosin

- Dispo: Discharge planning today; f/u 3 weeks with BMP



Prophylaxis Review:

-  DVT: SCD's, contraindictated (partial nephrectomy)

-  GI: Yes

-  Catheter: Yes

-  Abx: Yes - Kae-operative Luigi Mcclain M.D.

Urology PGY-2

Please page urology on call with questions



ATTESTATION



I personally performed the key portions of the E/M visit, discussed case with re
sident and concur with resident documentation of history, physical exam, assessm
ent, and treatment plan unless otherwise noted.



Patient doing well. No issues overnight.  Tolerating diet.  Pain controlled.  Am
bulating.



Abdomen soft, appropriate

Incisions c/d/i

Felix removed

EMILY with serosanginous fluid



A/P: s/p LEFT robotic partial nephrectomy doing well

-- increase activity

-- diet as tolerated

-- monitor EMILY and send EMILY creatinine -- D/C if normal

-- D/C planning.  F/U 3 weeks with BMP



Staff name:  Miranda Warner MD Date:  2020 



________________________________________________________________________ 

SUBJECTIVE: 

No acute events overnight. Pain is well-controlled. Patient denies nausea, denie
s vomiting, is ambulating. Patient has not passed flatus, has not had a bowel mo
vement. Patient is tolerating Diet Clear Liquid

Advance Diet as Tolerated.



OBJECTIVE:

                 Vital Signs: Most Recent                Vital Signs: Past 24 Ho
urs 

BP: 111/60 (38)

Temp: 37.1 C (98.7 F) (38)

Pulse: 82 (38)

Respirations: 16 PER MINUTE (38)

SpO2: 93 % (38)

Height: 180.3 cm (71") (1638)

Weight: 92.2 kg (203 lb 4.2 oz) (1638)  BP: (111-155)/(60-90) 

Temp:  [36.3 C (97.3 F)-37.1 C (98.8 F)] 

Pulse:  [62-82] 

Respirations:  [13 PER MINUTE-21 PER MINUTE] 

SpO2:  [91 %-99 %]  



Physical Exam:

General: Alert & oriented, no acute distress

Pulm: Equal chest rise, non-labored

CV: Regular rate and rhythm

Extremities: No edema, SCD's in place

Abd: Soft, appropriately tender to palpation, non-distended

Incisions/Wounds: Appropriately tender to palpation. Portsite and extraction sit
e incisions C/D/I with dermabond 

: Felix catheter in place draining pale salmon urine



Intake/Output:

Date 20 - 20 0720 07 - 20 0700 

Shift 1901-0700 24 Hour Total 1901-0700 24 Hour Total 

INTAKE 

P.O. 240 0 240    

I.V.(mL/kg/hr) 2500(2.3) 682 3182    

Shift Total(mL/kg) 2740(29.7) 682(7.4) 3422(37.1)    

OUTPUT 

Urine(mL/kg/hr) 465(0.4) 1350 1815    

  Urine 115  115    

  Urine Output (ml) (Indwelling Urinary Catheter 20 0830 16 FR) 350 1350 1
700    

Drains 105 80 185    

  Drain Output (ml) ([place] Drain 20 1129 Left Abdomen) 105 80 185 
  

Other 150  150    

  Estimated Blood Loss 150  150    

Shift Total(mL/kg) 720(7.8) 1430(15.5) 2150(23.3)    

NET 2020 1272    

Weight (kg) 92.2 92.2 92.2 92.2 92.2 92.2 



 



Labs:

                   Hematology                               Chemistry 

Recent Labs 

  20

0438 

WBC 7.7 

HGB 12.1* 

PLTCT 134* 

 Recent Labs 

  20

0734 

* 

K 3.8 

 

CO2 24 

BUN 18 

CR 1.45* 

GFR 48* 

GLU 91 

CA 8.7 

 



ACTIVE PROBLEMS:

Principal Problem:

  Left renal mass







Electronically signed by Miranda Warner MD at 2020  8:35 AM CDT

* Zaida Padilla RN - 2020  6:05 PM CDT



Patient arrived to room # (807) via cart accompanied by transport. Patient trans
ferred to the bed with assistance. Bedside safety checks completed. Initial zelda
ent assessment completed. Refer to flowsheet for details.



Admission skin assessment completed with: ANTONETTE Diaz



Pressure injury present on arrival?: No



1. Head/Face/Neck: No

2. Trunk/Back: No

3. Upper Extremities: No

4. Lower Extremities: No

5. Pelvic/Coccyx: No

6. Assessed for device associated injury? Yes

7. Malnutrition Screening Tool (Nursing Nutrition Assessment) Completed? No



See Doc Flowsheet for additional wound details. 



INTERVENTIONS: 

Falls bundle initiated. 



Electronically signed by Zaida Padilla RN at 2020  6:06 PM CDT

* Pretty Capellan RN - 2020  3:22 PM CDT



Attempted to edie patient for pre procedure covid screening, but no answer. Voice
mail and phone number left for patient to call return call.



Electronically signed by Pretty Capellan RN at 2020  3:25 PM CDT

* Nicky Alvarado RN - 2020 11:17 AM CDT



PAC phone triage completed with patient for surgery on 20 with Dr. Warner.
Medications, allergies and medical history reviewed and updated in chart.  He d
enies CP, SOA, dizziness, numbness, tingling, N/V/D, cough, fever, flu-like symp
toms, exposure to anyone ill, positive for COVID-19 or waiting for results of sa
me.  He denies travel outside of [place] or [place] in last 15 days.  He denies PMH of com
plications from anesthesia.  Denies URI symptoms at this time. No PAC visit karol
cated.



Preop and medication instructions reviewed with patient. No vitamins or suppleme
nts for 14 days and no NSAIDS for 7 days before surgery. 

NPO after 11 pm the night before surgery but ok to drink water until 2 hours bef
ore arrival at hospital. Patient verbalized understanding and copy of instructio
ns will be mailed to his home.



Patient informed of visitor policy and expressed understanding.





Electronically signed by Nicky Alvarado RN at 2020 11:19 AM CDT

documented in this encounter



H&P Notes

* Miranda Warner MD - 2020  6:42 AM CDT



Formatting of this note might be different from the original.

History and Physical Update Note



Allergies:  Sulfa (sulfonamide antibiotics)



Lab/Radiology/Other Diagnostic Tests:

24-hour labs:  No results found for this visit on 20 (from the past 24 angélica
r(s)).

Point of Care Testing:  (Last 24 hours):

 



ASSESSMENT: 69 y.o. male with history of cT1a LEFT renal mass who presents for L
EFT robot-assisted laparoscopic partial nephrectomy, possible radical, possible 
open. The patient has an allergy to sulfa medications. The patient does not take
anticoagulation or antiplatelet agents.





PLAN:

> To OR for LEFT robot-assisted laparoscopic partial nephrectomy, possible 
radical, possible open

> 2g Ancef preoperatively

> Risk and benefits of operative procedure were again discussed with the patient
in detail, as in clinic; all questions were answered. Informed consent was obtai
marbella and placed in the chart.



Discussed plan of care with staff surgeon, Dr. Warner, who directed plan of car
e



I have examined the patient, and there are no significant changes in their condi
tion, from the previous H&P performed on 20.



Lencho Pathak MD

Pager  [phone]



ATTESTATION



I personally performed the key portions of the E/M visit, discussed case with re
sident and concur with resident documentation of history, physical exam, assessm
ent, and treatment plan unless otherwise noted.



Patient here for a left robotic versus open partial nephrectomy and possible rad
ical nephrectomy.  The mass is in a difficult location.  He has also had multipl
e prior abdominal surgeries, so he understands increased risks of needing an ope
n approach or radical nephrectomy.  All risks and benefits were discussed in dep
th.  All questions answered.  Consent on chart.  Patient marked. Ready for OR.



Staff name:  Miranda Warner MD Date:  2020 



--------------------------------------------------------------------------------
------------------------------------------------------------



Electronically signed by Miranda Warner MD at 2020  7:27 AM CDT

* Miranda Warner MD - 2020 10:00 AM CDT



Formatting of this note might be different from the original.

Subjective:

Jas Lewis is a 69 y.o. male who is referred by Dr. Tawil for further tiffanie
luation and treatment of left renal mass



HPI:

69 year old male with PMH of HYPERTENSION, GERD, BPH (for years), colon cancer 1
995 (with partial colectomy; receives colonoscopy every 4 years), new diagnosis 
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



During work up for lymphoplasmacytic lymphoma about 1 month ago when imaging was
done, left renal mass was noted, concerning for RCC.  Imaging also showed abnor
mality in bladder for which Urologist Dr.Tawil did cystoscopy 2 weeks ago and wa
s found to be non-concerning.  He has had elevated PSA in the past and has had 3
cystoscopies and 2 prostate biopsies which were non-revealing; last cystoscopy 
performed Dec 2019.  He continues to take Cardura for for BPH and HYPERTENSION. 
His BPH symptoms are no longer a concern; he denies dribbling, incomplete empty
ing, weak urinary stream.  He is on surveillance for lymphoplasmacytic lymphoma 
and has a f/u appt on 2020 for that.



He reports dysuria with initiation of voids that has been ongoing for months and
urinary frequency which started 2020 after he was asked to increased fluid 
intake by his PCP.  This has eased since he has been drinking less fluids.



No hematuria, unexpected weight loss, night sweats, apetite changes, nocturia

No FH of kidney cancer. No FH of genetic cancer syndromes.



ROS:

10 point ROS negative other than chronic hearing loss, runny nose, light-headedn
ess when going from sitting to standing position and nervousness/anxiety.



Active Ambulatory Problems 

  Diagnosis Date Noted 

 No Active Ambulatory Problems 



Resolved Ambulatory Problems 

  Diagnosis Date Noted 

 No Resolved Ambulatory Problems 



Past Medical History: 

Diagnosis Date 

 Colon polyps  

 Depression  

 Elevated PSA  

 Enlarged prostate  

 Hypertension  

 Kidney stones  

 Lymphoplasmacytoid lymphoma, CLL (HCC)  

 Malignant neoplasm of colon (HCC)  



Surgical History: 

Procedure Laterality Date 

 HX TONSILLECTOMY   

 COLON SURGERY  1995 

 COLECTOMY  1995 

 HX APPENDECTOMY  1995 

 HERNIA REPAIR   

 CYSTOURETHROSCOPY  2020 

 PROSTATE SURGERY   

 Biopsy  



Current Outpatient Medications on File Prior to Visit 

Medication Sig Dispense Refill 

 amitriptyline (ELAVIL) 25 mg tablet    

 atenoloL (TENORMIN) 50 mg tablet    

 doxazosin (CARDURA) 4 mg tablet    

 pantoprazole DR (PROTONIX) 40 mg tablet    



No current facility-administered medications on file prior to visit.  



Social History 



Socioeconomic History 

 Marital status:  

  Spouse name: Not on file 

 Number of children: Not on file 

 Years of education: Not on file 

 Highest education level: Not on file 

Occupational History 

 Not on file 

Tobacco Use 

 Smoking status: Former Smoker 

  Types: Pipe 

  Last attempt to quit:  

  Years since quittin.3 

 Smokeless tobacco: Former User 

  Types: Chew 

  Quit date:  

Substance and Sexual Activity 

 Alcohol use: Never 

  Frequency: Never 

 Drug use: Never 

 Sexual activity: Not Currently 

  Partners: Female 

Other Topics Concern 

 Not on file 

Social History Narrative 

 Not on file 



Family History 

Problem Relation Age of Onset 

 Cancer Mother  

     Endometrail  

 Hypertension Mother  

 Cancer Father  

 Cancer-Colon Father  

 Hypertension Father  

 Neurologic Disorder Father  

 Alzheimer's Father  

 Cancer-Skin Brother  

 Cancer Daughter  

 Cancer-Breast Daughter  

 Hypertension Maternal Grandfather  



Objective:



Vitals: 

 20 0943 

BP: 124/65 

BP Source: Arm, Left Upper 

Patient Position: Sitting 

Pulse: 80 

Weight: 93.4 kg (206 lb) 

Height: 180.3 cm (71") 

PainSc: Zero 



Body mass index is 28.73 kg/m.



General: No acute distress, non-labored breathing. Afebrile

Head: Normocephalic and atraumatic.  

Eyes: Conjunctivae are normal. No scleral icterus. 

Cardiovascular: Regular rate and rhythm

Pulmonary/Chest: Normal Effort, CTAB

Abd: surgical scar vertically below umbilicus from previous colectomy, Soft, NT,
ND

Musculoskeletal: Moves all extremities

Skin: Warm and dry.  

Neuro: Awake and alert.

Psychiatric: Normal mood and affect. 

Nursing note and vitals reviewed.



ALL OUTSIDE RECORDS WERE REVIEWED AND FILMS EXAMINED



Imaging:  3-16-20 CT Chest W Ab/Pel W/WO - Impression:  1.  Enhancing left renal
mass concerning for renal malignancy.  2.  3 mm upper lobe pulmonary nodule.  3.
 Apparent filling defect of the urinary bladder just to the right of midline.  
This potentially could reflect a primary urinary bladder lesion vs eccentric pr
otrusion of the prostate gland.  Correlation with cystoscopy is recommended.  





Assessment/Plan:

Jas Lewis was seen today for kidney mass.



Diagnoses and all orders for this visit:



Left renal mass

Discussed with patient treatment options for left renal mass including surveilla
nce, biopsy, nephrectomy and patient elected to proceed with partial nephrectomy
with the possibility of radial nephrectomy if deemed necessary.  Risk and benef
its discussed with patient.



Dysuria

-     CULTURE-URINE W/SENSITIVITY; Future; Expected date: 2020



Plan for patient to return on 2020 for robotic-assisted laparoscopic nephre
ctomy vs radical nephrectomy



Patient discussed with Dr. Jesus Monaco MD 

Family Medicine, PGY-3



ATTESTATION



I personally performed the key portions of the E/M visit, discussed case with re
sident and concur with resident documentation of history, physical exam, assessm
ent, and treatment plan unless otherwise noted.



Patient referred to me by Dr. Tawil for the findings of a 3.5 cm left renal mass
concerning for renal cell carcinoma.  Patient does have a long history of BPH a
nd urinary symptoms which have been fully evaluated by Dr. Tawil.  He denies sharon
ss hematuria.  The mass was discovered incidentally during evaluation of lymphop
lasmacytic lymphoma.  He also has a history of colon cancer s/p hemicolectomy wi
th no additional treatment needed.  He continues with colonoscopy.



We discussed the findings of his mass and the likelihood that this represents a 
renal cell carcinoma.  We discussed options of observation, biopsy, ablation, pa
rtial nephrectomy, and radical nephrectomy.  The mass is in a difficult location
near the hilar vessels and ureter.  Therefore, I discussed that ablation is not 
an option.  The patient would like treatment, so we focused on partial nephrect
danielle versus radical nephrectomy.  Pros and cons of each approach were discussed i
n depth.  After discussion, we elected to proceed with a LEFT robotic partial ne
phrectomy and possible radical nephrectomy.  I quoted him a 30% chance of needin
g a radical nephrectomy given the location.  Patient would like to proceed.  All
risks and benefits were discussed in depth.  All questions answered.  Consent on
chart.  We will proceed with surgery in the near future.  Complex counseling.



Staff name:  Miranda Warner MD Date:  2020 



Patient Instructions 



[facility] 

Pre-Operative Instructions



Surgical Procedure:  Robotic-assisted laparoscopic nephrectomy versus radical ne
phrectomy, intraoperative ultrasound  

Date of Surgery:  2020 

Arrival Time at the Admission Office (Main Lobby):  To be determined 



To ensure that your surgery can proceed without delay, you will be contacted by 
a phone triage nurse from the Pre-Operative Assessment Clinic (P[facility]) to complete 
this process.  



Please review the information given to you by your surgeon.



You will be called by the surgery staff with your day of surgery arrival time be
tween 2:30 - 4:30 PM the business day prior to surgery. If you have not heard fr
om them after 4:30 PM, please call [phone] to confirm your arrival time.



Pre-Operative Assessment and Instructions:



Once you speak to the nurse or are seen in the Pre-Operative Assessment Clinic, 
you will be given medication instructions. However, if surgery is within 2 weeks
, please read and follow the medication instructions below to prepare for surger
y before you speak with the phone triage nurse:



DO NOT STOP your blood thinner until you have contacted your prescribing provide
r or have been specifically instructed to do so.



Starting Now:

? Contact your provider who prescribes any of the following to develop a plan fo
r surgery:

o Blood thinners such as aspirin, Aggrenox, Brilinta, Effient, Eliquis, enoxapar
in (Lovenox), clopidogrel (Plavix), cilostazol, pentoxifylline (Trental), Pradax
a, Savaysa, ticlopidine, Xarelto, and warfarin (Coumadin)

o Immunosuppresants such as methotrexate, azathioprine, sulfasalazine, everolimu
s, sirolimus, Humira, Remicade, Enbrel, Simponi, Orencia, Cimzia, Actemra, and X
eljanz

o Chemotherapy



14 days prior to surgery:

? Stop most vitamins, herbals, and supplements including (but not limited to):

o Alpha lipoic acid, black cohosh, CoQ10, echinacea, eye vitamins, fish oil, fla
xseed oil, garlic, gingko biloba, ginseng, glucosamine/chondroitin, kava, Lovaza
, lutein, lysine, multivitamin, red yeast rice, KIKI-e, saw palmetto, [place]alli
s wort, turmeric, valerian root, Vascepa, Vitamin A, Vitamin B complex, Vitamin 
C, Vitamin E

? You DO NOT need to stop: iron, magnesium, potassium



7 days prior to surgery:

? Stop anti-inflammatory medications such as ibuprofen (Advil, Motrin), naproxen
(Aleve), [place], Excedrin, Midol, celecoxib (Celebrex), diclofenac ([place]
alfonso), diflunisal, etodolac, flurbiprofen, indomethacin, ketoprofen, ketorolac, m
eloxicam, nabumetone, and piroxicam



The day prior to surgery begin a clear liquid diet.  This includes water, Jell-O
, tea, coffee, broth, pop, clear juices (apple, cranberry, grape, orange witho
ut pulp), sports drinks, and popsicles.  You may have any color of the above dri
nks, but no milk or milk products. 



The day prior to surgery, drink one bottle of magnesium citrate at 2:00 p.m.  Ex
pect a laxative effect.  This laxative may be purchased at any pharmacy.  No pre
scription needed, but a printed prescription may be provided for your reference.



Do not drink alcohol within 24 hours of surgery.



Please do not eat or drink anything after midnight.  No gum, mints, hard candy, 
snacks, coffee, etc or chewing tobacco allowed after midnight before surgery.  Y
ou may brush your teeth but be sure to rinse and spit.

      (DIAL)

Please shower with an over the counter antibacterial soap the evening before or 
the morning of surgery.



If your surgery is scheduled as an outpatient, you must arrange to have someone 
drive you home and have someone with you 24 hours after anesthesia.



========================================================================



If you have any questions, please contact your provider's office at [phone]
. (Yenni, Admin) 



For emergencies during evenings, nights, weekends, and holidays, contact [facility]  and request they contact the on-call Urology
Resident at [phone].



=========================================================================







Electronically signed by Miranda Warner MD at 2020  8:02 AM CDT

documented in this encounter



Miscellaneous Notes

* Care Plan - Gabriela Dodge RN - 2020 11:31 AM CDT



Felix is out and voiding well on own.

Free of falls.

Jas Manley Joshua discharged on 2020.

 .

Discharge instructions reviewed with patient.

Valuables returned: pt states has all belongins

Where Are Valuables Stored?: to [facility].

Home medications: 

 .to ben at  pharmacy

Functional assessment at discharge complete: Yes .



 



Electronically signed by Gabriela Dodge RN at 2020 12:02 PM CDT

* Anesthesia Post Op Day 1 - Heather Lozano CRNA - 2020  7:27 AM CDT



Formatting of this note might be different from the original.

Anesthesia Follow-Up Evaluation: Post-Procedure Day One



Name: Jas Lewis     MRN: [id number]     : 1950     Age: 69 y.o
.     Sex: male 

__________________________________________________________________________ 



Procedure Date: 2020 

Procedure: Procedure(s) with comments:

ROBOT ASSISTED LAPAROSCOPIC NEPHRECTOMY PARTIAL - CASE LENGTH 4 HOURS, PLEASE CL
IP PT IN SDS/PRE-POST, REQUEST FIREFLY, INTRA-OP SONO, ARGON, FROZEN SECTION REQ
UIRED

ULTRASOUND GUIDANCE - INTRAOPERATIVE



Physical Assessment

Height: 180.3 cm (71")  Weight: 92.2 kg (203 lb 4.2 oz)



Vital Signs (Last Filed in 24 hours)

BP: 111/60 ( 0038)

Temp: 37.1 C (98.7 F) (38)

Pulse: 82 ( 003)

Respirations: 16 PER MINUTE (38)

SpO2: 93 % (38)

SpO2 Pulse: 71 ( 1345)

Height: 180.3 cm (71") ( 1638)



Patient History 

Allergies

Allergies 

Allergen Reactions 

 Sulfa (Sulfonamide Antibiotics) HIVES 

 



Medications

Scheduled Meds:atenoloL (TENORMIN) tablet 50 mg, 50 mg, Oral, QDAY

bacitracin topical ointment, , Topical, BID

ceFAZolin (ANCEF) IVP 1 g, 1 g, Intravenous, Q8H*

docusate (COLACE) oral solution 100 mg, 100 mg, Oral, BID

doxazosin (CARDURA) tablet 4 mg, 4 mg, Oral, QDAY

pantoprazole DR (PROTONIX) tablet 40 mg, 40 mg, Oral, QDAY(21)

polyethylene glycol 3350 (MIRALAX) packet 17 g, 1 packet, Oral, QDAY

senna (SENOKOT) oral syrup 17.6 mg, 17.6 mg, Oral, BID



Continuous Infusions:

PRN and Respiratory Meds:acetaminophen Q6H PRN, fentaNYL citrate PF Q1H PRN, hyo
scyamine Q4H PRN, ondansetron Q6H PRN **OR** ondansetron (ZOFRAN) IV Q6H PRN, ox
yCODONE Q4H PRN



Diagnostic Tests

Hematology: 

Lab Results 

Component Value Date 

 HGB 12.1 2020 

 HCT 35.3 2020 

 PLTCT 134 2020 

 WBC 7.7 2020 

 MCV 86.8 2020 

 MCH 29.8 2020 

 MCHC 34.3 2020 

 MPV 8.3 2020 

 RDW 14.6 2020 

  



General Chemistry: 

Lab Results 

Component Value Date 

  2020 

 K 4.1 2020 

  2020 

 CO2 24 2020 

 GAP 5 2020 

 BUN 20 2020 

 CR 1.58 2020 

  2020 

 CA 7.7 2020 

 ALBUMIN 3.4 2020 

 TOTBILI 1.2 2020 

 

Coagulation: No results found for: PT, PTT, INR



Follow-Up Assessment

Patient location during evaluation: floor



Anesthetic Complications: 

Anesthetic complications: The patient did not experience any anesthestic complic
ations. 



 Pain:

Score: 1



Management:adequate

 

Level of Consciousness: awake and alert

 Hydration:acceptable

 

Airway Patency: patent

 Respiratory Status: acceptable

 

Cardiovascular Status:acceptable

 Regional/Neuroaxial: 



 







Electronically signed by Heather Lozano CRNA at 2020  7:27 AM CDT

* Care Plan - Amada Sellers RN - 2020  6:24 AM CDT





Problem: Infection, Risk of, Urinary Catheter-Associated Urinary Tract Infection

Goal: Absence of urinary catheter-associated infection

Outcome: Goal Ongoing

Flowsheets (Taken 2020 0623)

Absence of urinary catheter-associated infections:

 Urinary catheter discontinuation

 Assess for signs and sypmtoms of catheter-associated urinary tract infection

Note: Phillipey pulled at 0620 

 

Problem: Falls, High Risk of

Goal: Absence of falls-Adult Patient

Outcome: Goal Ongoing

Flowsheets (Taken 2020 0623)

Absence of falls-Adult Patient:

 Complete Fall Risk Assessment.

 Implement fall risk bundle.

 Consider additional interventions if patient is confused, has gait/balance prob
lems and on high risk medications.

 Provide safe ambulation.

 Provde safe environment.

 Provide fall prevention strategies.

 

Problem: Discharge Planning

Goal: Participation in plan of care

Outcome: Goal Ongoing

Flowsheets (Taken 2020 0623)

Participation in Plan of Care: Involve patient/caregiver in care planning decisi
on making

Goal: Knowledge regarding plan of care

Outcome: Goal Ongoing

Flowsheets (Taken 2020 0623)

Knowledge regarding plan of care:

 Provide admission education to parent/caregiver

 Provide procedural and treatment education

 Provide infection prevention education

 Provide medication management education

 Provide fall prevention education

 Provide plan of care education

Goal: Prepared for discharge

Outcome: Goal Ongoing

Flowsheets (Taken 2020 0623)

Prepared for discharge:

 Complete ADL ability assessment

 Collaborate with multidisciplinary team for hospital discharge coordination

 Provide safe use medical equipment education

 Provide diet and oral health education

 



Electronically signed by Amada Sellers RN at 2020  6:25 AM CDT

* Procedures (Immed Post or Bedside) - Miranda Warner MD - 2020 12:10 PM 
  CDT



Formatting of this note might be different from the original.



 Brief Operative Note



Name: Jas Lewis is a 69 y.o. male     : 1950             MR
N#: [id number]

DATE OF OPERATION: 2020



Date:  2020    



Preoperative Dx: 

Left renal mass [N28.89]



Post-op Diagnosis 

   * Left renal mass [N28.89]



Procedure(s) (LRB):

ROBOT ASSISTED LAPAROSCOPIC NEPHRECTOMY PARTIAL (Left)

ULTRASOUND GUIDANCE - INTRAOPERATIVE (Left)



Anesthesia Type: General



Surgeon(s) and Role:

   * Miranda Warner MD - Primary

   * Param Hobson MD - Resident - Assisting



Findings: Left 4 cm posterior mid/inferior pole renal mass abutting renal artery
, significant endophytic component. Complex hilar anatomy with branching artery 
and vein. Able to perform partial nephrectomy without vascular injury, collectin
g system entry noted and oversewn. Hemostatic renorrhaphy. EMILY drain placed. Fole
y. 



Estimated Blood Loss: 50 ml



Specimen(s) Removed/Disposition: 

ID Type Source Tests Collected by Time Destination 

1 : PERITUMOR FAT Tissue Kidney, Left SURGICAL PATHOLOGY          Miranda Warner MD 2020 1059  

2 : DEEP MARGIN Tissue Kidney, Left SURGICAL PATHOLOGY          Miranda Warner MD 2020 1108  

3 : LEFT RENAL MASS Tissue Kidney, Left SURGICAL PATHOLOGY          Nick Warner MD 2020 4667  



Complications:  None



Implants: None



Drains: EMILY drain LLQ, Felix 



Disposition:  PACU - stable



Param Hobson MD

Pager [phone] 



ATTESTATION



I performed this procedure with a resident.



Staff name:  Miranda Warner MD Date:  2020 







Electronically signed by Miranda Warner MD at 2020  1:10 PM CDT

* Operative Report (Direct Entry) - Miranda Warner MD - 2020  8:39 AM CDT



Formatting of this note might be different from the original.

OPERATIVE REPORT



Name: Jas Lewis is a 69 y.o. male     : 1950             MR
N#: [id number]



DATE OF OPERATION: 2020



Surgeon(s) and Role:

   * Miranda Warner MD - Primary

   * Param Hobson MD - Resident - Assisting

 

Preoperative Diagnosis:  

Left renal mass [N28.89]



Post-op Diagnosis 

   * Left renal mass [N28.89]



Procedure(s) (LRB):

ROBOT ASSISTED LAPAROSCOPIC NEPHRECTOMY PARTIAL (Left)

ULTRASOUND GUIDANCE - INTRAOPERATIVE (Left)



Anesthesia Type: General



Indications: 70 yo M with history of lymphoma, who was found to have an enhancin
g left renal mass, presents for robotic partial nephrectomy. 



FINDINGS:  

1 late branching artery, and 1 early branching vein; artery  clamped with 24-min
stacy warm ischemia time; 4 cm posterior LEFT midpole mass excised with grossly ne
gative margins; hemostatic renorrhaphy. 



OPERATIVE DETAILS: 

The patient was identified and informed consent was obtained.  The patient was b
rought to the operative suite and placed supine on the table.  A time-out proced
ure was performed per protocol.  Preoperative antibiotics were administered.  Se
quential compression devices were operational on bilateral lower extremities.  A
fter smooth induction of general anesthesia, the patient was moved to the latera
l position with care taken to pad all pressure points including the use of an ax
illary roll and then secured to the table.  The abdomen and flank were prepped a
nd draped in the usual sterile fashion.



Veress was needle placed in the left upper quadrant and drop test confirmed plac
ement.  The abdomen was insufflated 15 mm of pneumoperitoneum.  An 8 mm robotic 
non-bladed trocar was placed at the umbilical level just lateral to the rectus m
uscle.  Camera was inserted and no injuries were noted.  This permitted advancem
ent of our two 8-mm assistant ports without difficulty into the left upper quadr
ant inferior to the costal margin and in the left lower quadrant medial and supe
rior to the anterior superior iliac spine. An AirSeal trocar, and a 10-mm assist
ant port were then place between these ports.  The robotic camera was advanced a
nd there was obvious midline omental adhesions that were taken down bluntly with
 the [place].  No bowel injuries were identified, but he did have some bowel adh
esions to the midline. The left colon was also tented up on the left side inferi
ellen. The robot was then docked. 



We began by taking down the White line of [place] and the lateral attachments of gina vu left colon and this occurred without damage to the bowel. The colon was mobil
ized superiorly past the splenocolic attachments, and the spleen was easily medi
alized away from the kidney. The mesentery of the colon was swept away and media
lized leaving Gerota's fascia visible. The kidney was immediately visualized, we
 then dissected into the retroperitoneal fat and identified the ureter, and we t
racked the ureter up to the renal pelvis. The hilar vessels were then easily teresa
ntified medial and anterior to the left renal pelvis There was 1 late branching 
artery, and one early branching renal vein, which were dissected cleanly.  The u
reter was identified and tented upwards, away from the hilum.  After the hilum w
as adequately mobilized, we then turned our attention identifying the mass itsel
f.  Gerota fascia was incised and the kidney was exposed. The patient did have s
ignificantly adherent fat. The entire kidney had to be mobilized and medialized 
in order to identify the posterior mid/inferior pole mass. Of note a branch of t
mirella renal artery was abutting but not involving the tumor which was partially exo
phytic. We felt we could avoid this vessel and we proceed with plans to perform 
a partial nephrectomy. 



Once the mass was identified, we were able to sho out its margins and performed
 an intraoperative ultrasound to verify its depth and laterality.  The mass was 
significantly endophytic and appeared to abut the collecting system.  It was kofi
y near the renal hilum and ureter which were identified and avoided throughout t
he case.  



Once this was completed, we administered 12.5 g of mannitol and clamped our blayne
l hilum with 2 bulldog clamps on the main artery.  We did not clamp the vein.  



We excised the mass using a combination of sharp dissection and electrocautery. 
 We were able to trace the contour of the mass away from the renal surface itsel
f and coagulated any bleeding vessels as they occurred.  We did enter the [facility]
ting system on the deepest aspect of the resection.  After the mass was excised,
 it was placed in an endocatch bag for later removal.  It had grossly negative m
argins.  We also took a deep margin and sent to pathology for frozen section.  T
he margin was negative for carcinoma.



We then ran a 2-0 V-loc suture along the base of the defect to close the collect
ing system.  We then placed a Surgicel bolster into the defect and closed the pa
renchyma over it using 2-0 V-loc suture with Hem-o-lyndsey clips renorrhaphy.  This 
required a total of 3 sutures.  Once this was done, we took the bulldog clamps o
ff the renal artery and noted no significant oozing or bleeding.  5 cc of Flosea
l and a sheet of Surgicel were placed over the defect.  Floseal  was also placed
 at the renal hilum, which was atraumatic.  Gerota fascia was then re-approximat
ed with a 2-0 V-loc suture.



Total warm ischemia time was 24 minutes.  

  

A [place] drain was brought in through one of the camera ports after closi
ng the 12 mm ports with a Estevan-Jordan device and on 0-Vicryl suture.  The ro
bot was then undocked.  We then moved away from the field.  The mass was able to
 be brought out through the assistant port site.  After the fascia had been clos
ed, the skin was irrigated and closed using 4-0 Monocryl.  Local anesthetic was 
applied.  The patient was then cleaned, dressed, smoothly extubated, and dischar
ged to the postanesthesia care unit in stable condition.  



Estimated Blood Loss:  150 ml



Specimen(s) Removed/Disposition: 

ID Type Source Tests Collected by Time Destination 

1 : PERITUMOR FAT Tissue Kidney, Left SURGICAL PATHOLOGY          Miranda Warner MD 2020 1059  

2 : DEEP MARGIN Tissue Kidney, Left SURGICAL PATHOLOGY          Miranda Warner MD 2020 1108  

3 : LEFT RENAL MASS Tissue Kidney, Left SURGICAL PATHOLOGY          Nick Warner MD 2020 1131  



Complications:  None



Implants: None



Drains: Felix Catheter: 40 mL and Alexander-Washington Drain: #1 = 10 mL



Disposition:  PACU - stable



Param Hobson MD

Pager [phone] 



ATTESTATION



I performed this procedure with a resident.



Staff name:  Miranda Warner MD Date:  2020 







Electronically signed by Miranda Warner MD at 2020  8:47 AM CDT

documented in this encounter



Plan of Treatment





Not on filedocumented as of this encounter



Goals





     



            Goal       Patient    Associated  Recent Progress  Patient-Stat  Aut

hor



                     Goal Type           Problems            ed? 

 

     



                 Resume normal activities  [facility]              Lizette Zacarias RN



documented as of this encounter



Procedures





                                        Comments



                 Procedure Name  Priority        Date/Time       Associated Diag

nosis 

 

                                        



Results for this procedure are in the results section.



                     HC CREATININE-FLUID  Routine             2020  



                                         6:50 AM CDT  

 

                                        



Results for this procedure are in the results section.



                     HC CBC,AUTOMATED    Routine             2020  



                                         4:38 AM CDT  

 

                                        



Results for this procedure are in the results section.



                     HC BASIC METABOLIC PANEL  Routine             2020  



                                         4:38 AM CDT  

 

                                        



Results for this procedure are in the results section.



                  FROZEN SECTION #1  Routine         2020      Left blayne

l mass 



                                         10:59 AM CDT  

 

                                         



                     ULTRASOUND GUIDANCE -   2020          Left renal mass

 



                           INTRAOPERATIVE            7:45 AM CDT  

 

                                         



                     ROBOT ASSISTED      2020          Left renal mass 



                           LAPAROSCOPIC NEPHRECTOMY   7:45 AM CDT  



                                         PARTIAL    

 

                                        



Results for this procedure are in the results section.



                     HC COMPREHENSIVE    STAT                2020  



                           METABOLIC PANEL           7:34 AM CDT  

 

                                        



Results for this procedure are in the results section.



                     HC BLOOD TYPING, ABO  STAT                2020  



                           CONFIRM 91                6:55 AM CDT  

 

                                        



Results for this procedure are in the results section.



                     HC CBC,AUTOMATED    Routine             2020  



                                         6:45 AM CDT  

 

                                        



Results for this procedure are in the results section.



                     HC ABO GROUP        Routine             2020  



                                         6:45 AM CDT  

 

                                        



Results for this procedure are in the results section.



                           PATHOLOGY INTEROPERATIVE   2020  



                           REPORT SCAN               12:00 AM CDT  

 

                                        



Results for this procedure are in the results section.



                           TELEMETRY STRIPS-SCAN     2020  



                                         12:00 AM CDT  

 

                                        



Results for this procedure are in the results section.



                     EXTERNAL COVID-19   STAT                05/15/2020  



                           (SARS-COV-2)              7:15 AM CDT  



documented in this encounter



Results

* BASIC METABOLIC PANEL (06/15/2020 11:08 AM CDT)



    



              Component    Value        Ref Range    Performed At  Pathologist



                                         Signature

 

    



                 Sodium          136 (L)         137 - 147 MMOL/L  [facility] 

 

    



                 Potassium       4.3             3.5 - 5.1 MMOL/L  [facility] 

 

    



                 Chloride        106             98 - 110 MMOL/L  [facility] 

 

    



                 CO2             24              21 - 30 MMOL/L  [facility] 

 

    



                 Anion Gap       6               3 - 12          [facility] 

 

    



                 Glucose         81              70 - 100 MG/DL  [facility] 

 

    



                 Blood Urea      15              7 - 25 MG/DL    [facility] 



                                         Nitrogen    

 

    



                 Creatinine      1.56 (H)        0.4 - 1.24 MG/DL  [facility] 

 

    



                 Calcium         9.1             8.5 - 10.6 MG/DL  [facility] 

 

    



                 eGFR Non        44 (L)          >60 mL/min      [facility] 



                                              Comment:   



                           [place]                  The eGFR is not validated f

or   



                                         use in drug dosing   



                                         adjustments. Continue to use   



                                         estimated creatinine   



                                         clearance per dosing reference   



                                         text. Please contact the   



                                         Clinical Pharmacist for   



                                         questions.   

 

    



                 eGFR     54 (L)          >60 mL/min      [facility] 



                           [employer]                  Comment:   



                                         The eGFR is not validated for   



                                         use in drug dosing   



                                         adjustments. Continue to use   



                                         estimated creatinine   



                                         clearance per dosing reference   



                                         text. Please contact the   



                                         Clinical Pharmacist for   



                                         questions.   











                                         Specimen

 





                                         Blood







   



                 Performing Organization  Address         City/[facility]  Ph

one Number

 

   



                      [facility]         [address] [place] [address] 





* DRAIN FLUID CREATININE (2020  6:50 AM CDT)



    



              Component    Value        Ref Range    Performed At  Pathologist



                                         Signature

 

    



                 Drain Fluid     1.80            mg/dL           [facility] 



                           Creatinine                Comment:   



                                         Body fluid to serum creatinine   



                                         ratios >1.0 suggest the   



                                         specimen may be   



                                         contaminated with urine   











                                         Specimen

 





                                         Drainage







   



                 Performing Organization  Address         City/[facility]  Ph

one Number

 

   



                      [facility]         [address] [place] [address] 





* BASIC METABOLIC PANEL (2020  4:38 AM CDT)



    



              Component    Value        Ref Range    Performed At  Pathologist



                                         Signature

 

    



                 Sodium          134 (L)         137 - 147 MMOL/L  [facility] 

 

    



                 Potassium       4.1             3.5 - 5.1 MMOL/L  [facility] 

 

    



                 Chloride        105             98 - 110 MMOL/L  [facility] 

 

    



                 CO2             24              21 - 30 MMOL/L  [facility] 

 

    



                 Anion Gap       5               3 - 12          [facility] 

 

    



                 Glucose         100             70 - 100 MG/DL  [facility] 

 

    



                 Blood Urea      20              7 - 25 MG/DL    [facility] 



                                         Nitrogen    

 

    



                 Creatinine      1.58 (H)        0.4 - 1.24 MG/DL  [facility] 

 

    



                 Calcium         7.7 (L)         8.5 - 10.6 MG/DL  [facility] 

 

    



                 eGFR Non        44 (L)          >60 mL/min      [facility] 



                                              Comment:   



                           [place]                  The eGFR is not validated f

or   



                                         use in drug dosing   



                                         adjustments. Continue to use   



                                         estimated creatinine   



                                         clearance per dosing reference   



                                         text. Please contact the   



                                         Clinical Pharmacist for   



                                         questions.   

 

    



                 eGFR     53 (L)          >60 mL/min      [facility] 



                           American                  Comment:   



                                         The eGFR is not validated for   



                                         use in drug dosing   



                                         adjustments. Continue to use   



                                         estimated creatinine   



                                         clearance per dosing reference   



                                         text. Please contact the   



                                         Clinical Pharmacist for   



                                         questions.   











                                         Specimen

 





                                         Blood







   



                 Performing Organization  Address         City/[facility]  Ph

one Number

 

   



                      [facility]         [address] [place]

 [address] 





* CBC (2020  4:38 AM CDT)



    



              Component    Value        Ref Range    Performed At  Pathologist



                                         Signature

 

    



                 White Blood     7.7             4.5 - 11.0 K/UL  [facility] 



                                         Cells    

 

    



                 RBC             4.07 (L)        4.4 - 5.5 M/UL  [facility] 

 

    



                 Hemoglobin      12.1 (L)        13.5 - 16.5 GM/DL  [facility] 

 

    



                 Hematocrit      35.3 (L)        40 - 50 %       [facility] 

 

    



                 MCV             86.8            80 - 100 FL     [facility] 

 

    



                 MCH             29.8            26 - 34 PG      [facility] 

 

    



                 MCHC            34.3            32.0 - 36.0 G/DL  [facility] 

 

    



                 RDW             14.6            11 - 15 %       [facility] 

 

    



                 Platelet Count  134 (L)         150 - 400 K/UL  [facility] 

 

    



                 MPV             8.3             7 - 11 FL       [facility] 











                                         Specimen

 





                                         Blood







   



                 Performing Organization  Address         City/[facility]  Ph

one Number

 

   



                      [facility]         [address] [place]

 [address] 





* SURGICAL PATHOLOGY          (2020 10:59 AM CDT)



    



              Component    Value        Ref Range    Performed At  Pathologist



                                         Signature

 

    



                     PATHOLOGY           [facility]   [facility] 



                           REPORT                    [facility]   



                                         [email or web address]   



                                         Department of Pathology and   



                                         Laboratory Medicine   



                                         [address] [place] [id number]   



                                         Surgical Pathology Office:   



                                         [phone] Fax:   



                                         [phone]   



                                         SURGICAL PATHOLOGY REPORT   



                                         NAME: JAS LEWIS SURG   



                                         PATH #: [id number] MR #:   



                                         [id number] SPECIMEN   



                                         CLASS: SR BILLING #:   



                                         [id number] ALT ID #:   



                                         LOCATION: [facility]8 DATE OF   



                                         PROCEDURE: 2020 AGE: 69   



                                         SEX: M DATE RECEIVED:   



                                         2020 :   



                                         1950 TIME RECEIVED:   



                                         11:14 PHYSICIAN:   



                                         MIRANDA WARNER DATE OF   



                                         REPORT: 2020 COPY TO:   



                                         DATE OF PRINTIN2020   



                                         ##############################   



                                         ##############################   



                                         ############   



                                         Final Diagnosis:   



                                         A. Kidney, deep margin,   



                                         biopsy:    



                                         Negative for malignancy.   



                                         B. Soft tissue, peritumor fat,   



                                         biopsy:   



                                         Negative for malignancy.   



                                         C. Kidney, left, partial   



                                         nephrectomy:   



                                         Renal cell carcinoma, clear   



                                         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   



                                         2.7 cm, with   



                                         necrosis, confined to kidney.   



                                         Margins uninvolved.   



                                         Comment:   



                                         KIDNEY: Nephrectomy, Partial   



                                         or Radical   



                                         CAP Version: Kidney 4.0.1.0   



                                         Procedure   



                                         Partial nephrectomy   



                                         Specimen Laterality   



                                         Left   



                                         Tumor Site (select all that   



                                         apply)   



                                         Not specified   



                                         Tumor Size (largest tumor if   



                                         multiple)   



                                         Greatest dimension: 2.7 cm   



                                         Tumor Focality   



                                         Unifocal   



                                         Histologic Type   



                                         Clear cell renal cell   



                                         carcinoma   



                                         Sarcomatoid Features   



                                         Not identified   



                                         Rhabdoid Features   



                                         Not identified   



                                         Tumor Necrosis (any amount)   



                                         Present: <5%   



                                         Histologic Grade (WHO/ISUP)   



                                         G2: Nucleoli are conspicuous   



                                         and eosinophilic at x400   



                                         magnification   



                                         and visible but not prominent   



                                                  



                                         at x100 magnification.   



                                         Tumor Extension (select all   



                                         that apply)   



                                         Tumor limited to kidney   



                                         Tumor extension into other   



                                         organ(s)/structure(s)   



                                         (specify):   



                                         Margins (select all that   



                                         apply)   



                                         Margins uninvolved by invasive   



                                         carcinoma   



                                         Lymph-Vascular Invasion   



                                         (excluding renal vein and its   



                                         muscle containing segmental   



                                         branches and   



                                         inferior venacava)   



                                         Not identified   



                                         Pathologic Staging (pTNM)   



                                         TNM Descriptors (required only   



                                         if applicable) (select all   



                                         that apply)   



                                         Primary Tumor (pT)   



                                         pT1: Tumor 7 cm or less in   



                                         greatest dimension, limited to   



                                         the kidney   



                                         pT1a:   Tumor 4 cm or less   



                                         in greatest dimension, limited   



                                         to the   



                                         kidney   



                                         Regional Lymph Nodes (pN)   



                                         pNX:   Regional lymph   



                                         nodes cannot be assessed   



                                         No nodes submitted or found   



                                         Distant Metastasis (pM)   



                                         Not applicable   



                                         Pathologic Findings in   



                                         Nonneoplastic Kidney (select   



                                         all that apply)   



                                         Insufficient tissue (partial   



                                         nephrectomy specimen with <5   



                                         mm of   



                                         adjacent nonneoplastic kidney)   



                                         Significant pathologic   



                                         alterations   



                                         Glomerular disease (specify   



                                         type): Mild   



                                         Vascular disease (specify   



                                         type): Mild   



                                         Other Tumors and/or Tumor-like   



                                         Lesions (select all that   



                                         apply)   



                                         None   



                                         The pathologic stage assigned   



                                         here should be regarded as   



                                         provisional, as   



                                         it reflects only current   



                                         pathologic data and does not   



                                         incorporate full   



                                         knowledge of the patient's   



                                         clinical status and/or prior   



                                         pathology.   



                                         Pursuant to the Quality   



                                         Assurance Program at the   



                                         [facility] Pathology Department,   



                                         selected slides from this case   



                                         have been   



                                         concurrently reviewed by the   



                                         following pathologist:  Dr. Danna Avila who   



                                         agrees with the final   



                                         diagnosis.   



                                         Attestation:   



                                         By this signature, I attest   



                                         that I have personally   



                                         formulated the final   



                                         interpretation expressed in   



                                         this report and that the above   



                                         diagnosis is   



                                         based upon my examination of   



                                         the slides and/or other   



                                         material indicated in   



                                         this report.   



                                         +++Electronically Signed Out   



                                         By RAKAN DESAI MD on   



                                         2020+++   



                                               



                                         ksw/2020   



                                              



                                         ##############################   



                                         ##############################   



                                         ############   



                                         Material Received:   



                                         A: deep margin   



                                         B: peritumor fat   



                                         C: left renal mass stitch at   



                                         deep margin   



                                         History:   



                                         69-year-old male with a   



                                         clinical history of a left   



                                         renal mass.   



                                         Gross Description:   



                                         A. Received fresh labeled with   



                                         the patient's name and "deep   



                                         margin" is a   



                                         0.7 x 0.5 x 0.3 cm fragment of   



                                         tan-brown soft tissue. The   



                                         specimen is   



                                         submitted entirely in cassette   



                                         A1FS for frozen and permanent   



                                         sectioning.   



                                         (sc)   



                                         B. Received in formalin   



                                         labeled with the patient's   



                                         name and "peritumor   



                                         fat" is a 2.8 x 2.3 x 0.3 cm   



                                         aggregate of yellow-tan   



                                         fibroadipose tissue.   



                                         The specimen is submitted   



                                         without sectioning in cassette   



                                         B1. (sc)   



                                         C. Fixative: Formalin   



                                         Labeled: "Left renal mass"   



                                         Measurement: 4.7 x 2.9 x 2.4   



                                         cm   



                                         Mass: 2.7 x 2.4 x 1.8 cm   



                                         Mass appearance: Tan-yellow to   



                                         tan-red, soft, hemorrhagic and   



                                         possibly   



                                         necrotic   



                                         Perinephric fat involved by   



                                         tumor: No   



                                         The resection margin is inked   



                                         black. The remainder of the   



                                         specimen is   



                                         inked blue.   



                                         Mass from inked resection   



                                         margin: 0.1 cm   



                                         Tissue submitted to   



                                         [facility]   



                                         Facility: No   



                                         Representative sections are   



                                         submitted as follows:   



                                         C1-C2   Representative   



                                         sections of mass to inked   



                                         margin.   



                                         C3   Additional   



                                         representative section of   



                                         tumor.   



                                         C4   Tumor nearest   



                                         perinephric fat (blue ink).   



                                         C5   Representative   



                                         section of uninvolved kidney.   



                                         (cjj)   



                                         sc/2020   



                                         Intraoperative Consultation:   



                                         A1FS, kidney, "deep margin",   



                                         biopsy:   



                                         Negative for malignancy.   



                                         MARINO BRIZUELA MD   











                                         Specimen

 





                                         Tissue - Kidney, Left

 





                                         Tissue - Kidney, Left

 





                                         Tissue - Kidney, Left







   



                 Performing Organization  Address         City/State/Zipcode  Ph

one Number

 

   



                     [facility]         [address] [place]

 [address] 





* COMPREHENSIVE METABOLIC PANEL (2020  7:34 AM CDT)



    



              Component    Value        Ref Range    Performed At  Pathologist



                                         Signature

 

    



                 Sodium          135 (L)         137 - 147 MMOL/L  [facility] 

 

    



                 Potassium       3.8             3.5 - 5.1 MMOL/L  [facility] 

 

    



                 Chloride        104             98 - 110 MMOL/L  [facility] 

 

    



                 Glucose         91              70 - 100 MG/DL  [facility] 

 

    



                 Blood Urea      18              7 - 25 MG/DL    [facility] 



                                         Nitrogen    

 

    



                 Creatinine      1.45 (H)        0.4 - 1.24 MG/DL  [facility] 

 

    



                 Calcium         8.7             8.5 - 10.6 MG/DL  [facility] 

 

    



                 Total Protein   8.7 (H)         6.0 - 8.0 G/DL  [facility] 

 

    



                 Total Bilirubin  1.2             0.3 - 1.2 MG/DL  [facility] 

 

    



                 Albumin         3.4 (L)         3.5 - 5.0 G/DL  [facility] 

 

    



                 Alk Phosphatase  58              25 - 110 U/L    [facility] 

 

    



                 AST (SGOT)      23              7 - 40 U/L      [facility] 

 

    



                 CO2             24              21 - 30 MMOL/L  [facility] 

 

    



                 ALT (SGPT)      29              7 - 56 U/L      [facility] 

 

    



                 Anion Gap       7               3 - 12          [facility] 

 

    



                 eGFR Non        48 (L)          >60 mL/min      [facility] 



                                              Comment:   



                           [place]                  The eGFR is not validated f

or   



                                         use in drug dosing   



                                         adjustments. Continue to use   



                                         estimated creatinine   



                                         clearance per dosing reference   



                                         text. Please contact the   



                                         Clinical Pharmacist for   



                                         questions.   

 

    



                 eGFR     58 (L)          >60 mL/min      [facility] 



                           [employer]                  Comment:   



                                         The eGFR is not validated for   



                                         use in drug dosing   



                                         adjustments. Continue to use   



                                         estimated creatinine   



                                         clearance per dosing reference   



                                         text. Please contact the   



                                         Clinical Pharmacist for   



                                         questions.   











                                         Specimen

 





                                         Blood







   



                 Performing Organization  Address         City/[facility]

one Number

 

   



                      [facility]         [address] [place]

 [address] 





* BLOOD TYPE CONFIRMATION - ORDER ONLY IF REQUESTED BY LAB (2020  6:55 AM 
  CDT)



    



              Component    Value        Ref Range    Performed At  Pathologist



                                         Signature

 

    



                     ABO/RH(D)           B POS                [facility] 











                                         Specimen

 





                                         Blood







   



                 Performing Organization  Address         City/[facility]

one Number

 

   



                      [facility]         [address] [place] [address] 





* TYPE & CROSSMATCH (2020  6:45 AM CDT)



    



              Component    Value        Ref Range    Performed At  Pathologist



                                         Signature

 

    



                     Units Ordered       4                    [facility] 

 

    



                     Crossmatch          2020,2359      [facility] 



                                         Expires    

 

    



                     Record Check        2ND TYPE REQUIRED    [facility] 

 

    



                     ABO/RH(D)           B POS               [facility] 

 

    



                     Antibody Screen     NEG                 [facility] 

 

    



                     Electronic          YES                  [facility] 



                                         Crossmatch    

 

    



                     Unit Number         [id number]        [facility] 

 

    



                     Blood Component     RBC,ADSOL,LEUKO REDUCED   [facility] 



                                         Type    

 

    



                     Unit Division       0                    [facility] 

 

    



                     Status OF Unit      REL FROM ALLOC      [facility] 

 

    



                     Transfusion         OK TO TRANSFUSE      [facility] 



                                         Status    

 

    



                     Crossmatch          COMPATIBLE,ELECTRONIC    [facility] 



                                         Result    

 

    



                     Unit Number         [id number]        [facility] 

 

    



                     Blood Component     RBC,ADSOL,LEUKO REDUCED   [facility] 



                                         Type    

 

    



                     Unit Division       0                    [facility] 

 

    



                     Status OF Unit      REL FROM ALLOC      [facility] 

 

    



                     Transfusion         OK TO TRANSFUSE      [facility] 



                                         Status    

 

    



                     Crossmatch          COMPATIBLE,ELECTRONIC    [facility] 



                                         Result    











                                         Specimen

 





                                         Blood







   



                 Performing Organization  Address         City/[facility]

one Number

 

   



                      [facility]         [address] [place]

 [address] 





* CBC (2020  6:45 AM CDT)



    



              Component    Value        Ref Range    Performed At  Pathologist



                                         Signature

 

    



                 White Blood     5.9             4.5 - 11.0 K/UL  [facility] 



                                         Cells    

 

    



                 RBC             4.89            4.4 - 5.5 M/UL  [facility] 

 

    



                 Hemoglobin      14.7            13.5 - 16.5 GM/DL  [facility] 

 

    



                 Hematocrit      42.8            40 - 50 %        [facility] 

 

    



                 MCV             87.6            80 - 100 FL     [facility] 

 

    



                 MCH             30.0            26 - 34 PG       [facility] 

 

    



                 MCHC            34.2            32.0 - 36.0 G/DL   [facility] 

 

    



                 RDW             14.5            11 - 15 %        [facility] 

 

    



                 Platelet Count  160             150 - 400 K/UL   [facility] 

 

    



                 MPV             8.3             7 - 11 FL        [facility] 











                                         Specimen

 





                                         Blood







   



                 Performing Organization  Address         City/[facility]  Ph

one Number

 

   



                      [facility]         [address] [place]

 [address] 





* TELEMETRY STRIPS-SCAN (2020 12:00 AM CDT)



 



                           Narrative                 Performed At

 

 



                                         This result has an attachment that is n

ot available. 



                                         Ordered by an unspecified provider. 





* PATHOLOGY INTEROPERATIVE REPORT SCAN (2020 12:00 AM CDT)



 



                           Narrative                 Performed At

 

 



                                         This result has an attachment that is n

ot available. 



                                         Ordered by an unspecified provider. 





* EXTERNAL COVID-19 (SARS-COV-2) (05/15/2020  7:15 AM CDT)



    



              Component    Value        Ref Range    Performed At  Pathologist



                                         Signature

 

    



                           Overall                   [facility] 



                           COVID-19                  LAB 



                                         (SARS-CoV-2)    



                                         Result    

 

    



                     Source COVID-19     Unknown             [facility] 



                           (SARS-CoV-2)              LAB 

 

    



                     COVID-19            Negative            [facility] 



                           (SARS-CoV-2)              LAB 



                                         RNA    

 

    



                           Pan-SARS RNA              [facility] 



                                         LAB 











                                         Specimen

 





                                         Nasopharyngeal Swab -



                                         Nasopharyngeal Swab







 



                           Narrative                 Performed At

 

 



                                         This result has an attachment that is n

ot available. 







   



                 Performing Organization  Address         City/[facility]  Ph

one Number

 

   



                                         [facility] LAB   





documented in this encounter



Visit Diagnoses









                                         Diagnosis

 





                                         Left renal mass



                                         Unspecified disorder of kidney and uret

er

 





                                         Renal cell carcinoma of left kidney (HC

C)



documented in this encounter



Admitting Diagnoses









                                         Diagnosis

 





                                         Left renal mass



                                         Unspecified disorder of kidney and uret

er



documented in this encounter



Administered Medications





                Action Date     Dose            Rate            Site



                           Medication Order          MAR Action    

 

                2020  7:42 AM CDT 1,000 mg                         



                           acetaminophen (TYLENOL) tablet 1,000 mg  Given    



                                         1,000 mg, Oral, ONCE, 1 dose, 20 at 0745, To be given pre-op wit

h     



                                         a sip of water immediately upon arrival

     



                                         to [facility] (>30 minutes prior to scheduled 

    



                                         surgery time). TOTAL ACETAMINOPHEN DOSE

     



                                         NOT TO EXCEED 4 GM DAILY., Pre-Op     

 

  

 

                2020  9:13 AM  mg                           



                           acetaminophen (TYLENOL) tablet 650 mg  Given    



                                         650 mg, Oral, EVERY  6 HOURS PRN,     



                                         Starting 20 at 1634, Until 20 at 1331, Pain non-opioid: may b

e     



                                         used alone or in combination with opioi

d     



                                         analgesia, TOTAL ACETAMINOPHEN DOSE NOT

     



                                         TO EXCEED 4GM DAILY,     

 

                    650 mg                                   



                           Given                     2020   



                                         4:49 PM CDT   

 

  

 

                2020  8:43 AM CDT 50 mg                            



                           atenoloL (TENORMIN) tablet 50 mg  Given    



                                         50 mg, Oral, DAILY, First dose on 20 at 0900, Until Discontinued,   

  



                                         Hold for heart rate < 55 bpm or systoli

c     



                                         BP < 110,     

 

  

 

                2020  8:50 PM CDT                                  



                           bacitracin topical ointment  Given    



                                         Topical, TWICE DAILY, First dose on 20 at 2100, Until Discontinued,   

  



                                         Apply to tip of penis and catheter for 

    



                                         comfort,     

 

  

 

                2020  8:44 AM CDT 1 g                              



                           ceFAZolin (ANCEF) IVP 1 g  Given    



                                         1 g, Intravenous, EVERY  8 HOURS, 3    

 



                                         doses, First dose on 20 at    

 



                                         1645, Last dose on 20 at 0845,

     



                                         IV PUSH -- RECONSTITUTE each 1 g vial b

y     



                                         adding 10 mL 0.9% NACL,     

 

                    1 g                                      



                           Given                     2020   



                                         12:25 AM CDT   

 

                    1 g                                      



                           Given                     2020   



                                         4:49 PM CDT   

 

  

 

                2020  8:50 PM  mg                           



                           docusate (COLACE) oral solution 100 mg  Given    



                                         100 mg, Oral, TWICE DAILY, First dose o

n     



                                         20 at 2100, Until Discontinued

,     



                                         Hold for loose stools,     

 

  

 

                2020  8:44 AM CDT 4 mg                             



                           doxazosin (CARDURA) tablet 4 mg  Given    



                                         4 mg, Oral, DAILY, First dose on 20 at 0900, Until Discontinued    

 

 

  

 

                2020  7:42 AM  mg                           



                           gabapentin (NEURONTIN) capsule 300 mg  Given    



                                         300 mg, Oral, ONCE, 1 dose, 20

     



                                         at 0745, To be given pre-op with a sip 

    



                                         of water immediately upon arrival to [facility]     



                                         (>30 minutes prior to scheduled surgery

     



                                         time)., Pre-Op     

 

  

 

                2020  6:40 AM CDT 1,000 mL        20 mL/hr         



                           lactated ringers infusion  Given - New    



                           1,000 mL, 1,000 mL, Intravenous, at 20  Bag    



                                         mL/hr, CONTINUOUS, Starting 20

     



                                         at 0615, Until 20 at 1634,    

 



                                         Pre-Op     

 

  

 

  



                                         ondansetron (ZOFRAN) injection 4 mg 



                                         4 mg, Intravenous, EVERY  6 HOURS PRN, 



                                         Starting 20 at 1634, Until 20 at 1331, Nausea/Vomiting 



                                         Injectable 

 

  

 

  



                                         ondansetron (ZOFRAN) tablet 4 mg 



                                         4 mg, Oral, EVERY  6 HOURS PRN, Startin

g 



                                         20 at 1634, Until 20 



                                         at 1331, Nausea/Vomiting PO 

 

  

 

                2020  7:42 AM CDT 5 mg                             



                           oxyCODONE (ROXICODONE) tablet 5 mg  Given    



                                         5 mg, Oral, ONCE, 1 dose, 20 a

t     



                                         0745, To be given pre-op with a sip of 

    



                                         water immediately upon arrival to [facility]  

   



                                         (>30 minutes prior to scheduled surgery

     



                                         time)., Pre-Op     

 

  

 

                2020 12:56 PM CDT 5 mg                             



                           oxyCODONE (ROXICODONE) tablet 5-10 mg  Given    



                                         5-10 mg, Oral, ONCE PRN, 1 dose,     



                                         Starting 20 at 1145, Until 20 at 1256, Pain PO, For Pain Scor

e     



                                         <4, PACU (only)     

 

  

 

                2020  4:50 AM CDT 5 mg                             



                           oxyCODONE (ROXICODONE) tablet 5-10 mg  Given    



                                         5-10 mg, Oral, EVERY  4 HOURS PRN,     



                                         Starting 20 at 1634, Until 20 at 1331, Pain PO     

 

                    5 mg                                     



                           Given                     2020   



                                         4:49 PM CDT   

 

  

 

                2020  8:44 AM CDT 17 g                             



                           polyethylene glycol 3350 (MIRALAX)  Given    



                                         packet 17 g     



                                         17 g (1 packet), Oral, DAILY, First dos

e     



                                         on 20 at 1645, Until     



                                         Discontinued, 8.5 GRAMS = 0.5 PACKET 17

     



                                         GRAMS = 1 PACKET 34 GRAMS = 2 PACKETS, 

    

 

                    17 g                                     



                           Given                     2020   



                                         4:49 PM CDT   

 

  

 

                2020  8:50 PM CDT 17.6 mg                          



                           senna (SENOKOT) oral syrup 17.6 mg  Given    



                                         17.6 mg, Oral, TWICE DAILY, First dose 

    



                                         on 20 at 2100, Until     



                                         Discontinued, Hold for loose stools,   

  

 

  

 

                2020 12:25 AM CDT                 100 mL/hr        



                           sodium chloride 0.9 %   infusion  Given - New    



                           1,000 mL, Intravenous, at 100 mL/hr,  Bag    



                                         CONTINUOUS, Starting 20 at    

 



                                         1315, Until 20 at 0610     

 

                                        100 mL/hr            



                           Given - New Bag           2020   



                                         1:56 PM CDT   

 

  



documented in this encounter

## 2020-08-19 ENCOUNTER — HOSPITAL ENCOUNTER (OUTPATIENT)
Dept: HOSPITAL 75 - CATH | Age: 70
Discharge: HOME | End: 2020-08-19
Attending: INTERNAL MEDICINE
Payer: MEDICARE

## 2020-08-19 DIAGNOSIS — Z11.2: ICD-10-CM

## 2020-08-19 DIAGNOSIS — I47.1: ICD-10-CM

## 2020-08-19 DIAGNOSIS — I25.10: Primary | ICD-10-CM

## 2020-08-19 DIAGNOSIS — Z88.2: ICD-10-CM

## 2020-08-19 DIAGNOSIS — R55: ICD-10-CM

## 2020-08-19 DIAGNOSIS — Z87.891: ICD-10-CM

## 2020-08-19 DIAGNOSIS — I10: ICD-10-CM

## 2020-08-19 DIAGNOSIS — Z79.899: ICD-10-CM

## 2020-08-19 DIAGNOSIS — C64.2: ICD-10-CM

## 2020-08-19 DIAGNOSIS — E78.2: ICD-10-CM

## 2020-08-19 PROCEDURE — 85027 COMPLETE CBC AUTOMATED: CPT

## 2020-08-19 PROCEDURE — 80053 COMPREHEN METABOLIC PANEL: CPT

## 2020-08-19 PROCEDURE — 85610 PROTHROMBIN TIME: CPT

## 2020-08-19 PROCEDURE — 36415 COLL VENOUS BLD VENIPUNCTURE: CPT

## 2020-08-19 PROCEDURE — 85730 THROMBOPLASTIN TIME PARTIAL: CPT

## 2020-08-19 PROCEDURE — 80061 LIPID PANEL: CPT

## 2020-08-19 PROCEDURE — 93458 L HRT ARTERY/VENTRICLE ANGIO: CPT

## 2020-08-19 PROCEDURE — 87081 CULTURE SCREEN ONLY: CPT

## 2020-08-19 PROCEDURE — 92960 CARDIOVERSION ELECTRIC EXT: CPT

## 2020-08-19 PROCEDURE — 71045 X-RAY EXAM CHEST 1 VIEW: CPT

## 2020-08-19 NOTE — CARDIAC CATHETERIZATION
DATE OF SERVICE:  08/19/2020



CARDIAC CATHETERIZATION REPORT



BRIEF HISTORY:

The patient is a 69-year-old gentleman with history of hypertension,

palpitation, chest pain, had an abnormal stress test, scheduled for cardiac

catheterization and possible PTCA.



PROCEDURE NOTE:

After explaining the procedure to the patient, all pros and cons were explained

and all questions were answered.  The patient signed a consent, then placed on

the cardiac catheterization laboratory.  He was sedated using 4 mg of Versed and

100 mg of fentanyl.  Local anesthesia was applied to the right wrist and a

6-Vietnamese sheath was placed in the right radial artery.  [place] catheter was

advanced to the left coronary system and angiogram was done, then exchanged into

a FL catheter, which advanced to the left ventricular cavity, the patient was

noted to be tachycardic and hypotensive after crossing the aortic valve.  I was

concerned about the sedation, gave him Narcan to reverse, but he continued to be

hypotensive with a blood pressure in the 70s and tachycardic with a heart rate

145.  I measured the pressure in the left ventricular cavity, pull backed LV to

aorta and did a right coronary angiogram.  I then removed the catheter and kept

the sheath connected to the pressure monitoring.  It appeared that he was in

supraventricular tachycardia with hypotensive due to his tachycardia.  He

received IV fluid wide open.  I decided to give him adenosine.



A 6 mg of adenosine were given and flushed quickly.  The patient broke out of

the SVG back to sinus rhythm with a heart rate of 92 and blood pressure 107/70

with no complication, regained full consciousness and he was comfortable.  The

sheath was removed and a vascular band was used.



FINDINGS:

HEMODYNAMICS:

No significant gradient across the aortic valve.  Please see separate sheet for

hemodynamics.



ANATOMY:

Left main coronary artery is bifurcating into LAD and circumflex artery with no

obstructive disease.



Left anterior descending artery has mild disease, nonobstructive disease.



Left circumflex artery has mild disease in the mid portion, nonobstructive

disease.



Right coronary artery is dominant with no obstructive disease.



No left ventriculogram was done due to the underlying renal insufficiency. 

Pressure was measured.



CHEMICAL CARDIOVERSION:

The patient was having supraventricular tachycardia, narrow complex tachycardia

with hypotension.  A 6 mg of adenosine were given and flushed quickly, which was

successful in terminating SVT, appeared to be a reentry tachycardia returned to

sinus rhythm.



CONCLUSION:

1.  Mild coronary artery disease, nonobstructive disease.

2.  Normal left ventricular end diastolic pressure.

3.  Supraventricular tachycardia with hypotension, responded to adenosine

challenge.



DISCUSSION AND RECOMMENDATION:

The patient appeared to have reentry tachycardia, SVT, might benefit from

evaluation with EP due to the history of syncope.  I will continue monitoring as

an outpatient and follow up as an outpatient.



FINAL DIAGNOSES:

1.  Coronary artery disease.

2.  Syncope.

3.  Hypertension.

4.  Supraventricular tachycardia.





Job ID: [id number]

DocumentID: [id number]

Dictated Date:  08/19/2020 10:40:59

Transcription Date: 08/19/2020 11:23:20

Dictated By: EDITH FARLEY MD

## 2020-08-20 LAB
ALBUMIN SERPL-MCNC: 3.5 GM/DL (ref 3.2–4.5)
ALP SERPL-CCNC: 75 U/L (ref 40–136)
ALT SERPL-CCNC: 27 U/L (ref 0–55)
APTT BLD: < 20 SEC (ref 24–35)
BILIRUB SERPL-MCNC: 0.8 MG/DL (ref 0.1–1)
BUN/CREAT SERPL: 13
CALCIUM SERPL-MCNC: 8.8 MG/DL (ref 8.5–10.1)
CHLORIDE SERPL-SCNC: 104 MMOL/L (ref 98–107)
CHOLEST SERPL-MCNC: 120 MG/DL (ref ?–200)
CO2 SERPL-SCNC: 20 MMOL/L (ref 21–32)
CREAT SERPL-MCNC: 1.63 MG/DL (ref 0.6–1.3)
ERYTHROCYTE [DISTWIDTH] IN BLOOD BY AUTOMATED COUNT: 14 % (ref 10–14.5)
GFR SERPLBLD BASED ON 1.73 SQ M-ARVRAT: 42 ML/MIN
GLUCOSE SERPL-MCNC: 98 MG/DL (ref 70–105)
HCT VFR BLD CALC: 40 % (ref 40–54)
HDLC SERPL-MCNC: 35 MG/DL (ref 40–60)
HGB BLD-MCNC: 13.5 G/DL (ref 13.3–17.7)
INR PPP: 1 (ref 0.8–1.4)
MCH RBC QN AUTO: 30 PG (ref 25–34)
MCHC RBC AUTO-ENTMCNC: 34 G/DL (ref 32–36)
MCV RBC AUTO: 87 FL (ref 80–99)
PLATELET # BLD: 204 10^3/UL (ref 130–400)
PMV BLD AUTO: 10.5 FL (ref 7.4–10.4)
POTASSIUM SERPL-SCNC: 4.2 MMOL/L (ref 3.6–5)
PROT SERPL-MCNC: 10.1 GM/DL (ref 6.4–8.2)
PROTHROMBIN TIME: 13.2 SEC (ref 12.2–14.7)
SODIUM SERPL-SCNC: 133 MMOL/L (ref 135–145)
TRIGL SERPL-MCNC: 80 MG/DL (ref ?–150)
VLDLC SERPL CALC-MCNC: 16 MG/DL (ref 5–40)
WBC # BLD AUTO: 6.2 10^3/UL (ref 4.3–11)

## 2020-08-20 NOTE — DIAGNOSTIC IMAGING REPORT
NAME: Cas Lewis 

: 1950.



EXAMINATION:

Portable chest on 2018 at 8:16 AM. 



INDICATION: 

Pre-heart catheterization.



COMPARISON: 

No prior studies are available for comparison.



FINDINGS:

The heart size is normal. The pulmonary vascularity is

unremarkable. The lungs are clear. No infiltrate, effusion, or

pneumothorax is detected.



IMPRESSION: 

No acute cardiopulmonary process is detected. 



Dictated by: 



  Dictated on workstation # [id number]

## 2020-09-03 ENCOUNTER — HOSPITAL ENCOUNTER (OUTPATIENT)
Dept: HOSPITAL 75 - CATH | Age: 70
LOS: 1 days | Discharge: HOME | End: 2020-09-04
Attending: INTERNAL MEDICINE
Payer: MEDICARE

## 2020-09-03 VITALS — DIASTOLIC BLOOD PRESSURE: 71 MMHG | SYSTOLIC BLOOD PRESSURE: 130 MMHG

## 2020-09-03 VITALS — DIASTOLIC BLOOD PRESSURE: 70 MMHG | SYSTOLIC BLOOD PRESSURE: 130 MMHG

## 2020-09-03 VITALS — DIASTOLIC BLOOD PRESSURE: 80 MMHG | SYSTOLIC BLOOD PRESSURE: 136 MMHG

## 2020-09-03 VITALS — SYSTOLIC BLOOD PRESSURE: 123 MMHG | DIASTOLIC BLOOD PRESSURE: 61 MMHG

## 2020-09-03 VITALS — SYSTOLIC BLOOD PRESSURE: 88 MMHG | DIASTOLIC BLOOD PRESSURE: 58 MMHG

## 2020-09-03 VITALS — DIASTOLIC BLOOD PRESSURE: 52 MMHG | SYSTOLIC BLOOD PRESSURE: 115 MMHG

## 2020-09-03 VITALS — DIASTOLIC BLOOD PRESSURE: 68 MMHG | SYSTOLIC BLOOD PRESSURE: 124 MMHG

## 2020-09-03 VITALS — SYSTOLIC BLOOD PRESSURE: 121 MMHG | DIASTOLIC BLOOD PRESSURE: 68 MMHG

## 2020-09-03 VITALS — SYSTOLIC BLOOD PRESSURE: 88 MMHG | DIASTOLIC BLOOD PRESSURE: 56 MMHG

## 2020-09-03 VITALS — SYSTOLIC BLOOD PRESSURE: 96 MMHG | DIASTOLIC BLOOD PRESSURE: 53 MMHG

## 2020-09-03 VITALS — DIASTOLIC BLOOD PRESSURE: 60 MMHG | SYSTOLIC BLOOD PRESSURE: 101 MMHG

## 2020-09-03 VITALS — SYSTOLIC BLOOD PRESSURE: 95 MMHG | DIASTOLIC BLOOD PRESSURE: 53 MMHG

## 2020-09-03 VITALS — SYSTOLIC BLOOD PRESSURE: 120 MMHG | DIASTOLIC BLOOD PRESSURE: 69 MMHG

## 2020-09-03 VITALS — DIASTOLIC BLOOD PRESSURE: 94 MMHG | SYSTOLIC BLOOD PRESSURE: 150 MMHG

## 2020-09-03 VITALS — SYSTOLIC BLOOD PRESSURE: 131 MMHG | DIASTOLIC BLOOD PRESSURE: 70 MMHG

## 2020-09-03 VITALS — SYSTOLIC BLOOD PRESSURE: 163 MMHG | DIASTOLIC BLOOD PRESSURE: 97 MMHG

## 2020-09-03 VITALS — DIASTOLIC BLOOD PRESSURE: 79 MMHG | SYSTOLIC BLOOD PRESSURE: 142 MMHG

## 2020-09-03 VITALS — SYSTOLIC BLOOD PRESSURE: 124 MMHG | DIASTOLIC BLOOD PRESSURE: 69 MMHG

## 2020-09-03 VITALS — DIASTOLIC BLOOD PRESSURE: 65 MMHG | SYSTOLIC BLOOD PRESSURE: 129 MMHG

## 2020-09-03 VITALS — DIASTOLIC BLOOD PRESSURE: 65 MMHG | SYSTOLIC BLOOD PRESSURE: 120 MMHG

## 2020-09-03 VITALS — WEIGHT: 189.16 LBS | HEIGHT: 70.98 IN | BODY MASS INDEX: 26.48 KG/M2

## 2020-09-03 VITALS — DIASTOLIC BLOOD PRESSURE: 56 MMHG | SYSTOLIC BLOOD PRESSURE: 76 MMHG

## 2020-09-03 VITALS — SYSTOLIC BLOOD PRESSURE: 111 MMHG | DIASTOLIC BLOOD PRESSURE: 61 MMHG

## 2020-09-03 DIAGNOSIS — I25.119: ICD-10-CM

## 2020-09-03 DIAGNOSIS — Z80.41: ICD-10-CM

## 2020-09-03 DIAGNOSIS — Z79.899: ICD-10-CM

## 2020-09-03 DIAGNOSIS — E78.2: ICD-10-CM

## 2020-09-03 DIAGNOSIS — I47.1: Primary | ICD-10-CM

## 2020-09-03 DIAGNOSIS — Z87.891: ICD-10-CM

## 2020-09-03 DIAGNOSIS — Z80.9: ICD-10-CM

## 2020-09-03 DIAGNOSIS — Z80.0: ICD-10-CM

## 2020-09-03 DIAGNOSIS — I10: ICD-10-CM

## 2020-09-03 DIAGNOSIS — Z88.2: ICD-10-CM

## 2020-09-03 DIAGNOSIS — Z85.528: ICD-10-CM

## 2020-09-03 LAB
ALBUMIN SERPL-MCNC: 3.4 GM/DL (ref 3.2–4.5)
ALP SERPL-CCNC: 81 U/L (ref 40–136)
ALT SERPL-CCNC: 21 U/L (ref 0–55)
APTT BLD: 24 SEC (ref 24–35)
BILIRUB SERPL-MCNC: 0.6 MG/DL (ref 0.1–1)
BUN/CREAT SERPL: 10
CALCIUM SERPL-MCNC: 8.9 MG/DL (ref 8.5–10.1)
CHLORIDE SERPL-SCNC: 104 MMOL/L (ref 98–107)
CO2 SERPL-SCNC: 22 MMOL/L (ref 21–32)
CREAT SERPL-MCNC: 1.73 MG/DL (ref 0.6–1.3)
GFR SERPLBLD BASED ON 1.73 SQ M-ARVRAT: 39 ML/MIN
GLUCOSE SERPL-MCNC: 99 MG/DL (ref 70–105)
HCT VFR BLD CALC: 38 % (ref 40–54)
HGB BLD-MCNC: 12.8 G/DL (ref 13.3–17.7)
INR PPP: 1 (ref 0.8–1.4)
MCH RBC QN AUTO: 30 PG (ref 25–34)
MCHC RBC AUTO-ENTMCNC: 34 G/DL (ref 32–36)
MCV RBC AUTO: 88 FL (ref 80–99)
PLATELET # BLD: 155 10^3/UL (ref 130–400)
PMV BLD AUTO: 9.7 FL (ref 7.4–10.4)
POTASSIUM SERPL-SCNC: 3.9 MMOL/L (ref 3.6–5)
PROT SERPL-MCNC: 9.2 GM/DL (ref 6.4–8.2)
PROTHROMBIN TIME: 13.5 SEC (ref 12.2–14.7)
SODIUM SERPL-SCNC: 135 MMOL/L (ref 135–145)
WBC # BLD AUTO: 4.6 10^3/UL (ref 4.3–11)

## 2020-09-03 PROCEDURE — 93613 INTRACARDIAC EPHYS 3D MAPG: CPT

## 2020-09-03 PROCEDURE — 80048 BASIC METABOLIC PNL TOTAL CA: CPT

## 2020-09-03 PROCEDURE — 93653 COMPRE EP EVAL TX SVT: CPT

## 2020-09-03 PROCEDURE — 93621 COMP EP EVL L PAC&REC C SINS: CPT

## 2020-09-03 PROCEDURE — 93005 ELECTROCARDIOGRAM TRACING: CPT

## 2020-09-03 PROCEDURE — 84100 ASSAY OF PHOSPHORUS: CPT

## 2020-09-03 PROCEDURE — 85610 PROTHROMBIN TIME: CPT

## 2020-09-03 PROCEDURE — 85730 THROMBOPLASTIN TIME PARTIAL: CPT

## 2020-09-03 PROCEDURE — 83735 ASSAY OF MAGNESIUM: CPT

## 2020-09-03 PROCEDURE — 80053 COMPREHEN METABOLIC PANEL: CPT

## 2020-09-03 PROCEDURE — 87081 CULTURE SCREEN ONLY: CPT

## 2020-09-03 PROCEDURE — 36415 COLL VENOUS BLD VENIPUNCTURE: CPT

## 2020-09-03 PROCEDURE — 85027 COMPLETE CBC AUTOMATED: CPT

## 2020-09-03 PROCEDURE — 71045 X-RAY EXAM CHEST 1 VIEW: CPT

## 2020-09-03 RX ADMIN — SODIUM CHLORIDE SCH MLS/HR: 900 INJECTION, SOLUTION INTRAVENOUS at 19:49

## 2020-09-03 RX ADMIN — SODIUM CHLORIDE SCH MLS/HR: 900 INJECTION, SOLUTION INTRAVENOUS at 12:45

## 2020-09-03 NOTE — DIAGNOSTIC IMAGING REPORT
EXAMINATION: Portable semi-erect AP chest at 12:41 p.m.



INDICATION: Post ablation.



FINDINGS: There is shallow inspiration when compared to the prior

exam of 08/19/2020. Allowing for this technical factor, the heart

size is stable and within normal limits. The lungs are clear.

There is no sign of failure, pneumonia, or pleural effusion. The

mediastinum is not widened. The osseous structures are intact.



IMPRESSION: Allowing for the shallow degree of inspiration, there

does not appear to have been any significant change since the

prior study. No new abnormality has developed.



Dictated by: 



  Dictated on workstation # [id number]

## 2020-09-03 NOTE — ELECTROPHYSIOLOGY PROCEDURE
EP Procedure


Typical AVRNT ablation





DATE OF SERVICE:9/3/20 





CARDIAC ELECTROPHYSIOLOGIST:


KAYLIE Vargas MD, [facility]





INDICATION: PSVT





PREOPERATIVE DIAGNOSIS:PSVT





POSTOPERATIVE DIAGNOSES:typical AVNRT ablation





HISTORY:


this is a 69-year-old gentleman with  documented PSVT. The patient is planned 

for comprehensive EP study and ablation.





PROCEDURE PERFORMED:


1. Comprehensive EP study with induction.


2. Fluoroscopy.


3. left atrial pacing and recording.


4.  Drug infusion.


5. Ablation of typical AVNRT.


6. Comprehensive 3D mapping with the carto system.





COMPLICATION:


None.





ESTIMATED BLOOD LOSS:


10 mL.





CONTRAST USED:


None.





FLUOROSCOPY TIME:


4.7 minutes.





FLUOROSCOPY DOSE:


76 mgy. 





SPECIMENS:


None.





ANESTHESIA:


Done by our anesthesia colleagues.





ANTICOAGULATION:


none.





PROCEDURE IN DETAIL:


After informed consent was taken, the patient was brought to the EP lab.


Anesthesia was provided by our anesthesia colleagues. The patient was draped


and prepped in the usual sterile fashion. The patient presented to the EP lab


in sinus rhythm.





Access was gained in the right femoral vein with a 6-Cuban


and an 8-Cuban sheath. Left access in left femoral vein was gained with


5-Cuban and 6-Cuban sheath respectively. High right atrial catheter was an


Abigail catheter, right ventricular catheter was placed, his catheter and the


CS catheter were also placed.





A comprehensive EP study was done including left atrial pacing and recording.


Dual AV donna physiology was not  demonstrated. single echoes were noted


Without Isuprel infusion.  On 3 g of Isuprel infusion, echoes were not induced.

 


No tachycardia was induced.straight atrial pacing on Isuprel at 270 ms cycle 

length


Induced tachycardia with septal VA time of less than 50 ms.  Successful 

entrainment


Demonstrated VAV response. PPI - TCL was in the AVNRT range. 


RV pacing demonstrated concentric AV activation.  Left atrial pacing and 

recording


Ruled out a left lateral bypass tract. diagnoses was typical AVNRT.


A 3D electroanatomic mapping was donewith the carto system.





fine voltage mapping was done with carto below the his and just outside the CS 

os.  


Possible slow pathway was identified. These selected potential areas to target 

that 


had excellent AV ratio (over 1:10). Ablation showed consistent junctional 

response.  


Brief episode of second-degree AV block was noted.


Patient quickly went into normal sinus rhythm with normal AV conduction. 





Post ablation, EP study was repeated, did not demonstrate any echoes and no 

tachycardia induction.


Isuprel was given post-procedure at the same dose that previously induced 

tachycardia,


however there were no echoes and no tachycardia induction.  Straight atrial 

pacing 


at 270 ms which previously induced tachycardia, did not induce tachycardia 


even after repeating multiple times.  ERP was repeated during washout period 


as well with no tachycardia induction. The patienttolerated the procedure


well and did not have any complication. The patientleft the lab in normal 

sinus rhythm.





Total ablation time was 2 minutes and 10 seconds.





MEASUREMENTS/EP STUDY:





AH interval 50 ms,


HV interval 31 ms,


CO interval 92 ms,


QRS duration 74 ms,


QT interval 262 ms,


R-R interval 778 ms,


VV interval 778 ms,


AV Wenckebach when pacing at 360 ms,


retrograde Wenckebach when pacing at 360 ms,





Atrial /220 ms,


Ventricular /300ms,


Left atrial pacing and recording, AV Wenckebach when pacing at 320 ms,





On Isuprel:


AV Wenckebach at 310 ms,


atrial /230 ms,


Atrial /290 ms during washout





At the end of the procedure after ablation:


Atrial ERP was 600/210 ms,


Atrial /290 ms,





PLAN:


The patient will be observed overnight and will be discharged home tomorrow with


precise followup instructions.





KAYLIE Vargas MD, [facility]


Cardiac Electrophysiology











MARIANGEL VARGAS MD              Sep 3, 2020 12:07

## 2020-09-04 VITALS — DIASTOLIC BLOOD PRESSURE: 76 MMHG | SYSTOLIC BLOOD PRESSURE: 151 MMHG

## 2020-09-04 VITALS — DIASTOLIC BLOOD PRESSURE: 65 MMHG | SYSTOLIC BLOOD PRESSURE: 135 MMHG

## 2020-09-04 VITALS — SYSTOLIC BLOOD PRESSURE: 131 MMHG | DIASTOLIC BLOOD PRESSURE: 63 MMHG

## 2020-09-04 VITALS — SYSTOLIC BLOOD PRESSURE: 145 MMHG | DIASTOLIC BLOOD PRESSURE: 77 MMHG

## 2020-09-04 VITALS — SYSTOLIC BLOOD PRESSURE: 133 MMHG | DIASTOLIC BLOOD PRESSURE: 69 MMHG

## 2020-09-04 VITALS — SYSTOLIC BLOOD PRESSURE: 134 MMHG | DIASTOLIC BLOOD PRESSURE: 74 MMHG

## 2020-09-04 VITALS — DIASTOLIC BLOOD PRESSURE: 64 MMHG | SYSTOLIC BLOOD PRESSURE: 131 MMHG

## 2020-09-04 VITALS — DIASTOLIC BLOOD PRESSURE: 65 MMHG | SYSTOLIC BLOOD PRESSURE: 120 MMHG

## 2020-09-04 VITALS — DIASTOLIC BLOOD PRESSURE: 71 MMHG | SYSTOLIC BLOOD PRESSURE: 134 MMHG

## 2020-09-04 VITALS — SYSTOLIC BLOOD PRESSURE: 134 MMHG | DIASTOLIC BLOOD PRESSURE: 72 MMHG

## 2020-09-04 VITALS — DIASTOLIC BLOOD PRESSURE: 75 MMHG | SYSTOLIC BLOOD PRESSURE: 140 MMHG

## 2020-09-04 VITALS — SYSTOLIC BLOOD PRESSURE: 143 MMHG | DIASTOLIC BLOOD PRESSURE: 67 MMHG

## 2020-09-04 VITALS — SYSTOLIC BLOOD PRESSURE: 137 MMHG | DIASTOLIC BLOOD PRESSURE: 74 MMHG

## 2020-09-04 LAB
BUN/CREAT SERPL: 13
CALCIUM SERPL-MCNC: 7.9 MG/DL (ref 8.5–10.1)
CHLORIDE SERPL-SCNC: 108 MMOL/L (ref 98–107)
CO2 SERPL-SCNC: 19 MMOL/L (ref 21–32)
CREAT SERPL-MCNC: 1.57 MG/DL (ref 0.6–1.3)
GFR SERPLBLD BASED ON 1.73 SQ M-ARVRAT: 44 ML/MIN
GLUCOSE SERPL-MCNC: 101 MG/DL (ref 70–105)
HCT VFR BLD CALC: 33 % (ref 40–54)
HGB BLD-MCNC: 10.8 G/DL (ref 13.3–17.7)
MAGNESIUM SERPL-MCNC: 1.5 MG/DL (ref 1.6–2.4)
MCH RBC QN AUTO: 29 PG (ref 25–34)
MCHC RBC AUTO-ENTMCNC: 32 G/DL (ref 32–36)
MCV RBC AUTO: 90 FL (ref 80–99)
PHOSPHATE SERPL-MCNC: 3.3 MG/DL (ref 2.3–4.7)
PLATELET # BLD: 125 10^3/UL (ref 130–400)
PMV BLD AUTO: 10.5 FL (ref 7.4–10.4)
POTASSIUM SERPL-SCNC: 4 MMOL/L (ref 3.6–5)
SODIUM SERPL-SCNC: 135 MMOL/L (ref 135–145)
WBC # BLD AUTO: 11 10^3/UL (ref 4.3–11)

## 2020-09-04 RX ADMIN — SODIUM CHLORIDE SCH MLS/HR: 900 INJECTION, SOLUTION INTRAVENOUS at 11:01

## 2020-09-04 NOTE — ANESTHESIA-GENERAL POST-OP
MAC


Patient Condition


Mental Status/LOC:  Same as Preop


Cardiovascular:  Satisfactory


Nausea/Vomiting:  Absent


Respiratory:  Satisfactory


Pain:  Controlled


Complications:  Absent





Post Op Complications


Complications


Possible aspiration.  Patient vomited near end of case.





Follow Up Care/Instructions


Patient Instructions


Dr. Alexander now case for pulmonary management.





Anesthesiology Discharge Order


Discharge Order


Patient is doing well, no complaints, stable vital signs.


No complications reported per nursing.











LUPE BARILLAS CRNA           Sep 4, 2020 07:09

## 2020-09-04 NOTE — PULMONARY PROGRESS NOTE
Subjective


Time Seen by a Provider:  05:56


Subjective/Events-last exam


Pt is on RA with good Sp02.





Sepsis Event


Evaluation


Height, Weight, BMI


Height: 5'11.00"


Weight: 205lbs. 0.0oz. 92.238239oo; 27.37 BMI


Method:





Exam


Exam





Vital Signs








  Date Time  Temp Pulse Resp B/P (MAP) Pulse Ox O2 Delivery O2 Flow Rate FiO2


 


9/4/20 03:37     95 Room Air  


 


9/4/20 03:21     93 Room Air  


 


9/4/20 03:20 36.4       


 


9/4/20 02:00  84 23 131/63 (85) 93 Room Air  


 


9/4/20 01:00  88 15 143/67 (92) 94 Room Air  


 


9/4/20 01:00  90      


 


9/4/20 00:05 36.8       


 


9/4/20 00:00     93 Room Air  


 


9/4/20 00:00  80 17 120/65 (83) 94 Room Air  


 


9/3/20 23:59     95 Room Air  


 


9/3/20 23:00  96 27 129/65 (86) 93 Room Air  


 


9/3/20 22:00  93 18 120/65 (83) 92 Room Air  


 


9/3/20 21:00  105 26 111/61 (78) 94 Room Air  


 


9/3/20 20:44 37.3       


 


9/3/20 20:00  104 28 130/71 (90) 93 Room Air  


 


9/3/20 20:00     95 Room Air  


 


9/3/20 19:29 38.7     Room Air  


 


9/3/20 19:29     93 Room Air  


 


9/3/20 19:00  98 20 124/69 (87) 96 Room Air  


 


9/3/20 19:00  107      


 


9/3/20 18:45  103 22 124/68 (86) 95 Room Air 2.00 





       2.00 


 


9/3/20 18:00  112 30 121/68 (85) 95 Room Air  


 


9/3/20 17:00  112 33 95/53 (67) 94 Room Air  


 


9/3/20 16:16     93 Room Air 2.00 


 


9/3/20 16:00  99 27 115/52 (73) 90 Room Air  


 


9/3/20 16:00 37.5       


 


9/3/20 15:00  93 22 123/61 (81) 93 Room Air  


 


9/3/20 14:30  84 24 130/70 (90) 92 Room Air  


 


9/3/20 14:00  99 28 163/97 (119) 93 Room Air  


 


9/3/20 13:45  90 23 142/79 (100) 92 Room Air  


 


9/3/20 13:30  87 10 136/80 (98) 96 Room Air  


 


9/3/20 13:22  87      


 


9/3/20 13:22  88      


 


9/3/20 13:15  86 19 131/70 (90) 93 Room Air  


 


9/3/20 13:00 36.2  24 106/76 (86) 94 Room Air  


 


9/3/20 13:00 36.4 86 18 120/69 (86) 94 Room Air  


 


9/3/20 13:00     93 Room Air  


 


9/3/20 13:00      Room Air  


 


9/3/20 12:50   20 101/60 (74) 93 Room Air  


 


9/3/20 12:46   20 88/56 (67) 98 Room Air  


 


9/3/20 12:45      Room Air  


 


9/3/20 12:40   20 76/56 (63) 99 OxyMask 6 


 


9/3/20 12:35      OxyMask 6 


 


9/3/20 12:30   20 88/58 (68) 99 OxyMask 6 


 


9/3/20 12:28      OxyMask 6 


 


9/3/20 12:28 36.1  22 96/53 (67) 95 OxyMask 6 


 


9/3/20 11:32  66      


 


9/3/20 07:57 36.5 123 20 150/94 (112) 95 Room Air  














I & O 


 


 9/4/20





 07:00


 


Intake Total 450 ml


 


Output Total 700 ml


 


Balance -250 ml








Height & Weight


Height: 5'11.00"


Weight: 205lbs. 0.0oz. 92.940676zq; 27.37 BMI


Method:


General Appearance:  WD/WN


HEENT:  PERRL/EOMI, TMs Normal, Normal ENT Inspection, Pharynx Normal


Neck:  Full Range of Motion, Normal Inspection, Non Tender, Supple


Respiratory:  Chest Non Tender, Lungs Clear, Normal Breath Sounds, No Accessory 

Muscle Use, No Respiratory Distress


Cardiovascular:  Regular Rate, Rhythm, No Edema, No Gallop, No JVD, No Murmur


Capillary Refill:  Less Than 3 Seconds


Gastrointestinal:  normal bowel sounds, non tender, soft


Extremity:  Normal Capillary Refill, Normal Inspection, Normal Range of Motion, 

No Pedal Edema


Neurologic/Psychiatric:  Alert, Oriented x3


Skin:  Normal Color, Warm/Dry


Lymphatic:  No Adenopathy





Results


Lab


Laboratory Tests


9/3/20 07:57








9/4/20 02:55











Assessment/Plan


Assessment/Plan


S/P EP procedure 


Possible aspiration 


   -WBC is elevated c/w yesterday. This could be reactive from procedure. 


   -RN states pt did have a temp of 38.7 however he had bear hugger on at the 

time. 


   -CXR does show some increased infiltration RML. 


      -I will go ahead and start Augmentin 875 x 5 days for empiric use.











SHAHEEN LEVY DO               Sep 4, 2020 05:59

## 2020-09-04 NOTE — DIAGNOSTIC IMAGING REPORT
INDICATION: Aspiration.



TECHNIQUE:  Single view chest 2:23 AM.



CORRELATION STUDY:  09/03/2020



FINDINGS: 

Heart size and mediastinum are stable given differences in

technique.  

New opacity in the right perihilar region.



IMPRESSION: 

1. New infiltrate-like density right perihilar region. Given

history, may reflect aspiration with possibility of additional

infiltrate or asymmetric edema not excluded. Follow-up imaging

recommended.



Dictated by: 



  Dictated on workstation # [id number]

## 2020-09-04 NOTE — NUR
JAS BRODERICK demonstrates understanding of discharge instructions and accurately 
returns instructions upon questioning.  Copy of Post-Discharge Instructions and Medication 
Discharge Instructions given to patient. JAS BRODERICK is able to manage continuing 
needs after discharge.  Patients belongings returned to patient. Skin dry and intact; no 
breakdown noted. Patient discharged from [facility]- on 9/4/20 at 1310 . JAS BRODERICK left 
floor via wheelchair, accompanied by RN.

## 2020-09-04 NOTE — CARDIOLOGY DISCHARGE SUMMARY
Diagnosis/Chief Complaint


Date of Admission


9/3/2020


Date of Discharge


2020


Admission Diagnosis


PSVT





Final/Discharge Diagnosis


successful typical AVNRT ablation





Chief Complaint/HPI


Chief Complaint/HPI


PSVT





Discharge Summary


Procedures


successful AVNRT ablation..


Discharge Physical Examination


normal cardiovascular examination.





Hospital Course


Was the Problem List Reviewed?:  Yes


unremarkable.





Discussion & Recommendations


Discussion


discharge took over 30 minutes to complete. procedure were discussed at length. 

All details discussed.  Patient will be discharged on the same medications 

including rate controlling agents for now.


Follow up appt.:  


Dr. Vargas in 2 weeks.


Dicharge Diet:  Cardiac Diet


Activity as Tolerated:  Yes


Home Medications


Reviewed patient Home Medication Reconciliation performed by pharmacy medication

reconciliations technician and/or nursing.


Patients Allergies have been reviewed.





Discharge


Home Medications:


Reviewed and agree with Discharge Medication list on patient's Discharge 

Instruction sheet


Condition at discharge


stable.


Instructions to patient/family


discussed with the patient.





Clinical Quality Measures


DVT/VTE Risk/Contraindication:


Risk Factor Score Per Nursin


RFS Level Per Nursing on Admit:  2=Moderate











MARIANGEL VARGAS MD              Sep 4, 2020 17:52

## 2020-09-17 LAB
ALBUMIN SERPL-MCNC: 3.4 GM/DL (ref 3.2–4.5)
ALP SERPL-CCNC: 88 U/L (ref 40–136)
ALT SERPL-CCNC: 29 U/L (ref 0–55)
BASOPHILS # BLD AUTO: 0 10^3/UL (ref 0–0.1)
BASOPHILS NFR BLD AUTO: 0 % (ref 0–10)
BILIRUB SERPL-MCNC: 0.6 MG/DL (ref 0.1–1)
BUN/CREAT SERPL: 13
CALCIUM SERPL-MCNC: 8.7 MG/DL (ref 8.5–10.1)
CHLORIDE SERPL-SCNC: 107 MMOL/L (ref 98–107)
CO2 SERPL-SCNC: 22 MMOL/L (ref 21–32)
CREAT SERPL-MCNC: 1.64 MG/DL (ref 0.6–1.3)
EOSINOPHIL # BLD AUTO: 0 10^3/UL (ref 0–0.3)
EOSINOPHIL NFR BLD AUTO: 0 % (ref 0–10)
GFR SERPLBLD BASED ON 1.73 SQ M-ARVRAT: 42 ML/MIN
GLUCOSE SERPL-MCNC: 110 MG/DL (ref 70–105)
HCT VFR BLD CALC: 37 % (ref 40–54)
HGB BLD-MCNC: 12.4 G/DL (ref 13.3–17.7)
LYMPHOCYTES # BLD AUTO: 1.5 X 10^3 (ref 1–4)
LYMPHOCYTES NFR BLD AUTO: 30 % (ref 12–44)
MANUAL DIFFERENTIAL PERFORMED BLD QL: NO
MCH RBC QN AUTO: 29 PG (ref 25–34)
MCHC RBC AUTO-ENTMCNC: 33 G/DL (ref 32–36)
MCV RBC AUTO: 88 FL (ref 80–99)
MONOCYTES # BLD AUTO: 0.4 X 10^3 (ref 0–1)
MONOCYTES NFR BLD AUTO: 8 % (ref 0–12)
NEUTROPHILS # BLD AUTO: 3.2 X 10^3 (ref 1.8–7.8)
NEUTROPHILS NFR BLD AUTO: 62 % (ref 42–75)
PLATELET # BLD: 234 10^3/UL (ref 130–400)
PMV BLD AUTO: 9.9 FL (ref 7.4–10.4)
POTASSIUM SERPL-SCNC: 3.8 MMOL/L (ref 3.6–5)
PROT SERPL-MCNC: 9.5 GM/DL (ref 6.4–8.2)
SODIUM SERPL-SCNC: 136 MMOL/L (ref 135–145)
WBC # BLD AUTO: 5.2 10^3/UL (ref 4.3–11)

## 2020-10-15 LAB
ALBUMIN SERPL-MCNC: 3.2 GM/DL (ref 3.2–4.5)
ALP SERPL-CCNC: 64 U/L (ref 40–136)
ALT SERPL-CCNC: 20 U/L (ref 0–55)
BASOPHILS # BLD AUTO: 0 10^3/UL (ref 0–0.1)
BASOPHILS NFR BLD AUTO: 1 % (ref 0–10)
BILIRUB SERPL-MCNC: 0.8 MG/DL (ref 0.1–1)
BUN/CREAT SERPL: 11
CALCIUM SERPL-MCNC: 8.4 MG/DL (ref 8.5–10.1)
CHLORIDE SERPL-SCNC: 105 MMOL/L (ref 98–107)
CO2 SERPL-SCNC: 23 MMOL/L (ref 21–32)
CREAT SERPL-MCNC: 1.54 MG/DL (ref 0.6–1.3)
EOSINOPHIL # BLD AUTO: 0 10^3/UL (ref 0–0.3)
EOSINOPHIL NFR BLD AUTO: 1 % (ref 0–10)
GFR SERPLBLD BASED ON 1.73 SQ M-ARVRAT: 45 ML/MIN
GLUCOSE SERPL-MCNC: 139 MG/DL (ref 70–105)
HCT VFR BLD CALC: 36 % (ref 40–54)
HGB BLD-MCNC: 11.8 G/DL (ref 13.3–17.7)
LYMPHOCYTES # BLD AUTO: 2 10^3/UL (ref 1–4)
LYMPHOCYTES NFR BLD AUTO: 45 % (ref 12–44)
MANUAL DIFFERENTIAL PERFORMED BLD QL: NO
MCH RBC QN AUTO: 30 PG (ref 25–34)
MCHC RBC AUTO-ENTMCNC: 33 G/DL (ref 32–36)
MCV RBC AUTO: 92 FL (ref 80–99)
MONOCYTES # BLD AUTO: 0.2 10^3/UL (ref 0–1)
MONOCYTES NFR BLD AUTO: 5 % (ref 0–12)
NEUTROPHILS # BLD AUTO: 2.1 10^3/UL (ref 1.8–7.8)
NEUTROPHILS NFR BLD AUTO: 48 % (ref 42–75)
PLATELET # BLD: 161 10^3/UL (ref 130–400)
PMV BLD AUTO: 10 FL (ref 9–12.2)
POTASSIUM SERPL-SCNC: 3.6 MMOL/L (ref 3.6–5)
PROT SERPL-MCNC: 9.3 GM/DL (ref 6.4–8.2)
SODIUM SERPL-SCNC: 135 MMOL/L (ref 135–145)
WBC # BLD AUTO: 4.3 10^3/UL (ref 4.3–11)

## 2020-10-29 ENCOUNTER — HOSPITAL ENCOUNTER (OUTPATIENT)
Dept: HOSPITAL 75 - ONC | Age: 70
LOS: 48 days | Discharge: HOME | End: 2020-12-16
Attending: INTERNAL MEDICINE
Payer: MEDICARE

## 2020-10-29 VITALS — WEIGHT: 198 LBS | BODY MASS INDEX: 27.72 KG/M2 | HEIGHT: 71 IN

## 2020-10-29 DIAGNOSIS — Z98.890: ICD-10-CM

## 2020-10-29 DIAGNOSIS — Z85.038: ICD-10-CM

## 2020-10-29 DIAGNOSIS — C85.90: Primary | ICD-10-CM

## 2020-10-29 DIAGNOSIS — I10: ICD-10-CM

## 2020-10-29 PROCEDURE — 85025 COMPLETE CBC W/AUTO DIFF WBC: CPT

## 2020-10-29 PROCEDURE — 84155 ASSAY OF PROTEIN SERUM: CPT

## 2020-10-29 PROCEDURE — 82784 ASSAY IGA/IGD/IGG/IGM EACH: CPT

## 2020-10-29 PROCEDURE — 84165 PROTEIN E-PHORESIS SERUM: CPT

## 2020-10-29 PROCEDURE — 99213 OFFICE O/P EST LOW 20 MIN: CPT

## 2020-10-29 PROCEDURE — 83883 ASSAY NEPHELOMETRY NOT SPEC: CPT

## 2020-10-29 PROCEDURE — 80053 COMPREHEN METABOLIC PANEL: CPT

## 2021-01-06 ENCOUNTER — HOSPITAL ENCOUNTER (OUTPATIENT)
Dept: HOSPITAL 75 - RAD | Age: 71
End: 2021-01-06
Attending: UROLOGY
Payer: MEDICARE

## 2021-01-06 DIAGNOSIS — C64.2: Primary | ICD-10-CM

## 2021-01-06 PROCEDURE — 71046 X-RAY EXAM CHEST 2 VIEWS: CPT

## 2021-01-06 PROCEDURE — 74176 CT ABD & PELVIS W/O CONTRAST: CPT

## 2021-01-06 NOTE — DIAGNOSTIC IMAGING REPORT
PROCEDURE: CT abdomen and pelvis without contrast.



TECHNIQUE: Multiple contiguous axial images were obtained through

the abdomen and pelvis without the use of intravenous contrast.

Auto Exposure Controls were utilized during the CT exam to meet

ALARA standards for radiation dose reduction. 



INDICATION: Left renal carcinoma, status post partial

nephrectomy.



COMPARISON: Correlation is made with prior CT from 03/16/2020.



FINDINGS: Lung bases are clear. Liver and gallbladder are

unremarkable. No biliary ductal dilatation is seen. The pancreas

and spleen are unremarkable. No adrenal mass is detected. Right

kidney is unremarkable. There are postop changes to the left

kidney with resection of solid mass previously noted. There is

some perinephric inflammatory stranding present. No residual or

recurrent mass is seen. Aorta is nonaneurysmal. No central

retroperitoneal or mesenteric lymphadenopathy is identified.

Small and large bowel loops are normal in caliber. There is no

obstruction. There is no free fluid or fluid collection. There

are postop changes at the rectosigmoid junction. No pelvic

lymphadenopathy is identified. Bony structures are unremarkable

for osteolytic or blastic lesions.



IMPRESSION: Status post partial nephrectomy on the left. No

definite evidence of tumor recurrence is seen. There is no

abdominal or pelvic lymphadenopathy or evidence of metastatic

disease.



Dictated by: 



  Dictated on workstation # [id number]

## 2021-01-06 NOTE — DIAGNOSTIC IMAGING REPORT
INDICATION: Renal cancer



PA and lateral chest



Heart size and pulmonary vascularity are normal. Lungs are clear.

There are no effusions or pneumothoraces.



IMPRESSION: Negative chest.



Dictated by: 



  Dictated on workstation # [id number]

## 2021-01-12 LAB
ALBUMIN SERPL-MCNC: 3.2 GM/DL (ref 3.2–4.5)
ALP SERPL-CCNC: 69 U/L (ref 40–136)
ALT SERPL-CCNC: 20 U/L (ref 0–55)
BASOPHILS # BLD AUTO: 0 10^3/UL (ref 0–0.1)
BASOPHILS NFR BLD AUTO: 0 % (ref 0–10)
BILIRUB SERPL-MCNC: 0.6 MG/DL (ref 0.1–1)
BUN/CREAT SERPL: 11
CALCIUM SERPL-MCNC: 8.5 MG/DL (ref 8.5–10.1)
CHLORIDE SERPL-SCNC: 107 MMOL/L (ref 98–107)
CO2 SERPL-SCNC: 25 MMOL/L (ref 21–32)
CREAT SERPL-MCNC: 1.45 MG/DL (ref 0.6–1.3)
EOSINOPHIL # BLD AUTO: 0.1 10^3/UL (ref 0–0.3)
EOSINOPHIL NFR BLD AUTO: 1 % (ref 0–10)
GFR SERPLBLD BASED ON 1.73 SQ M-ARVRAT: 48 ML/MIN
GLUCOSE SERPL-MCNC: 132 MG/DL (ref 70–105)
HCT VFR BLD CALC: 37 % (ref 40–54)
HGB BLD-MCNC: 12.3 G/DL (ref 13.3–17.7)
LYMPHOCYTES # BLD AUTO: 1.9 10^3/UL (ref 1–4)
LYMPHOCYTES NFR BLD AUTO: 37 % (ref 12–44)
MANUAL DIFFERENTIAL PERFORMED BLD QL: NO
MCH RBC QN AUTO: 30 PG (ref 25–34)
MCHC RBC AUTO-ENTMCNC: 33 G/DL (ref 32–36)
MCV RBC AUTO: 91 FL (ref 80–99)
MONOCYTES # BLD AUTO: 0.3 10^3/UL (ref 0–1)
MONOCYTES NFR BLD AUTO: 5 % (ref 0–12)
NEUTROPHILS # BLD AUTO: 2.9 10^3/UL (ref 1.8–7.8)
NEUTROPHILS NFR BLD AUTO: 56 % (ref 42–75)
PLATELET # BLD: 162 10^3/UL (ref 130–400)
PMV BLD AUTO: 10 FL (ref 9–12.2)
POTASSIUM SERPL-SCNC: 3.4 MMOL/L (ref 3.6–5)
PROT SERPL-MCNC: 9.6 GM/DL (ref 6.4–8.2)
SODIUM SERPL-SCNC: 137 MMOL/L (ref 135–145)
WBC # BLD AUTO: 5.1 10^3/UL (ref 4.3–11)

## 2021-02-09 ENCOUNTER — HOSPITAL ENCOUNTER (OUTPATIENT)
Dept: HOSPITAL 75 - RAD | Age: 71
End: 2021-02-09
Attending: NURSE PRACTITIONER
Payer: MEDICARE

## 2021-02-09 DIAGNOSIS — C85.90: Primary | ICD-10-CM

## 2021-02-09 LAB
ALBUMIN SERPL-MCNC: 3.3 GM/DL (ref 3.2–4.5)
ALP SERPL-CCNC: 69 U/L (ref 40–136)
ALT SERPL-CCNC: 23 U/L (ref 0–55)
BASOPHILS # BLD AUTO: 0 10^3/UL (ref 0–0.1)
BASOPHILS NFR BLD AUTO: 0 % (ref 0–10)
BILIRUB SERPL-MCNC: 0.6 MG/DL (ref 0.1–1)
BUN/CREAT SERPL: 11
CALCIUM SERPL-MCNC: 8.3 MG/DL (ref 8.5–10.1)
CHLORIDE SERPL-SCNC: 107 MMOL/L (ref 98–107)
CO2 SERPL-SCNC: 23 MMOL/L (ref 21–32)
CREAT SERPL-MCNC: 1.63 MG/DL (ref 0.6–1.3)
EOSINOPHIL # BLD AUTO: 0 10^3/UL (ref 0–0.3)
EOSINOPHIL NFR BLD AUTO: 0 % (ref 0–10)
GFR SERPLBLD BASED ON 1.73 SQ M-ARVRAT: 42 ML/MIN
GLUCOSE SERPL-MCNC: 100 MG/DL (ref 70–105)
HCT VFR BLD CALC: 36 % (ref 40–54)
HGB BLD-MCNC: 12 G/DL (ref 13.3–17.7)
LYMPHOCYTES # BLD AUTO: 1.8 10^3/UL (ref 1–4)
LYMPHOCYTES NFR BLD AUTO: 25 % (ref 12–44)
MANUAL DIFFERENTIAL PERFORMED BLD QL: NO
MCH RBC QN AUTO: 31 PG (ref 25–34)
MCHC RBC AUTO-ENTMCNC: 33 G/DL (ref 32–36)
MCV RBC AUTO: 92 FL (ref 80–99)
MONOCYTES # BLD AUTO: 0.5 10^3/UL (ref 0–1)
MONOCYTES NFR BLD AUTO: 7 % (ref 0–12)
NEUTROPHILS # BLD AUTO: 4.8 10^3/UL (ref 1.8–7.8)
NEUTROPHILS NFR BLD AUTO: 68 % (ref 42–75)
PLATELET # BLD: 149 10^3/UL (ref 130–400)
PMV BLD AUTO: 10 FL (ref 9–12.2)
POTASSIUM SERPL-SCNC: 3.8 MMOL/L (ref 3.6–5)
PROT SERPL-MCNC: 9.9 GM/DL (ref 6.4–8.2)
SODIUM SERPL-SCNC: 137 MMOL/L (ref 135–145)
WBC # BLD AUTO: 7.1 10^3/UL (ref 4.3–11)

## 2021-02-09 PROCEDURE — 71250 CT THORAX DX C-: CPT

## 2021-02-09 NOTE — DIAGNOSTIC IMAGING REPORT
PROCEDURE: CT chest without contrast.



TECHNIQUE: Multiple contiguous axial images were obtained through

the chest without the use of intravenous contrast. Auto Exposure

Controls were utilized during the CT exam to meet ALARA standards

for radiation dose reduction. 



INDICATION:  Non-Hodgkin's lymphoma.



COMPARISON: 01/06/2021 and 03/16/2020.



FINDINGS: No significant adenopathy within the chest, though

evaluation is slightly limited secondary to lack of intravenous

contrast. Mild scattered vascular calcifications. No aneurysmal

dilatation of the thoracic aorta. The heart is within normal

limits in size. No significant pericardial effusion. No pleural

effusion. The trachea is patent. No pneumothorax. Biapical

pleural parenchymal scarring. Stable 0.4 cm lingular pulmonary

nodule, series 3, image 92. Stable 0.5 cm right upper lobe

pulmonary nodule, series 3, image 22. However, there has been

interval development of new groundglass and reticular opacities

within the posterior aspect of the right upper lobe with some

more focal nodular opacities. Overall region measures 6.1 x 4.0

cm. The most discrete nodule within this region measures 0.7 cm.

Findings are best seen on series 3, image 48. Small hiatal

hernia. The minimally visualized upper abdomen is otherwise

unremarkable. No acute osseous abnormality with scattered osseous

degenerative changes present.



IMPRESSION: New 6 cm region of groundglass and reticular

opacities within the right upper lobe. This is favored to relate

to a focal infectious or inflammatory process. However,

malignancy should be considered. Recommend clinical correlation.

Additionally, a follow-up CT of the chest is recommended in one

month after appropriate therapy. If these opacities and nodules

do not resolve at that time, then a CT guided biopsy would be

recommended.



No new adenopathy within the limits of the exam.



Additional stable findings as above.



Dictated by: 



  Dictated on workstation # [id number]

## 2021-03-24 ENCOUNTER — HOSPITAL ENCOUNTER (OUTPATIENT)
Dept: HOSPITAL 75 - RAD | Age: 71
End: 2021-03-24
Attending: INTERNAL MEDICINE
Payer: MEDICARE

## 2021-03-24 DIAGNOSIS — J18.9: ICD-10-CM

## 2021-03-24 DIAGNOSIS — C85.90: Primary | ICD-10-CM

## 2021-03-24 DIAGNOSIS — I25.10: ICD-10-CM

## 2021-03-24 DIAGNOSIS — R91.8: ICD-10-CM

## 2021-03-24 PROCEDURE — 71250 CT THORAX DX C-: CPT

## 2021-03-24 NOTE — DIAGNOSTIC IMAGING REPORT
PROCEDURE: CT chest without contrast.



TECHNIQUE: Multiple contiguous axial images were obtained through

the chest without the use of intravenous contrast. Auto Exposure

Controls were utilized during the CT exam to meet ALARA standards

for radiation dose reduction. 



INDICATION: Pneumonia



The previous CT chest exam performed on 02/09/2021 noted a 6 mm

region of groundglass and reticular opacities within the right

upper lobe. This is felt to be most likely due to

pneumonia/atelectasis. On this exam that area has essentially

resolved. There is little if any residual density still present

in this region.



The prior exam also identified biapical pleural parenchymal

scarring and small subcentimeter nodules involving the lingula

and right upper lobe.  Those findings are also again evident on

this study and essentially no different. (Image 98 of 189 and 35

of 189).



The overall appearance of the chest has not changed significantly

otherwise. The heart is stable in size. There are coronary

calcifications evident. The aorta is not abnormally dilated.

There is no obvious mediastinal or hilar adenopathy. The thyroid

gland where visualized is unremarkable.



The sections through the upper abdomen again show postsurgical

changes consistent with previous partial left nephrectomy. The

remaining left kidney seems similar in appearance to the prior

exam of 01/06/2021.



The bone windows are unremarkable for an acute fracture or for

destructive lesion. 



IMPRESSION:

1. The appearance of the chest has improved since the prior exam

as the right upper lung does seem better aerated. There is little

if any residual pneumonia/atelectasis still present.

2. There is no acute cardiopulmonary abnormality noted.

3. The small nodules in the right upper lobe and lingula seen

previously appear stable and are most likely benign.

4. There is coronary artery disease.

5. The postsurgical changes involving the left kidney seen

previously are again evident and no different.



Dictated by: 



  Dictated on workstation # [id number]

## 2021-03-30 ENCOUNTER — HOSPITAL ENCOUNTER (OUTPATIENT)
Dept: HOSPITAL 75 - ONC | Age: 71
LOS: 13 days | Discharge: HOME | End: 2021-04-12
Attending: INTERNAL MEDICINE
Payer: MEDICARE

## 2021-03-30 DIAGNOSIS — I10: ICD-10-CM

## 2021-03-30 DIAGNOSIS — C85.90: Primary | ICD-10-CM

## 2021-03-30 DIAGNOSIS — Z98.890: ICD-10-CM

## 2021-03-30 DIAGNOSIS — Z85.038: ICD-10-CM

## 2021-03-30 DIAGNOSIS — C91.10: ICD-10-CM

## 2021-03-30 LAB
ALBUMIN SERPL-MCNC: 3.1 GM/DL (ref 3.2–4.5)
ALP SERPL-CCNC: 69 U/L (ref 40–136)
ALT SERPL-CCNC: 22 U/L (ref 0–55)
BASOPHILS # BLD AUTO: 0 10^3/UL (ref 0–0.1)
BASOPHILS NFR BLD AUTO: 1 % (ref 0–10)
BILIRUB SERPL-MCNC: 0.6 MG/DL (ref 0.1–1)
BUN/CREAT SERPL: 9
CALCIUM SERPL-MCNC: 8.1 MG/DL (ref 8.5–10.1)
CHLORIDE SERPL-SCNC: 108 MMOL/L (ref 98–107)
CO2 SERPL-SCNC: 23 MMOL/L (ref 21–32)
CREAT SERPL-MCNC: 1.59 MG/DL (ref 0.6–1.3)
EOSINOPHIL # BLD AUTO: 0.1 10^3/UL (ref 0–0.3)
EOSINOPHIL NFR BLD AUTO: 1 % (ref 0–10)
GFR SERPLBLD BASED ON 1.73 SQ M-ARVRAT: 43 ML/MIN
GLUCOSE SERPL-MCNC: 98 MG/DL (ref 70–105)
HCT VFR BLD CALC: 35 % (ref 40–54)
HGB BLD-MCNC: 11.8 G/DL (ref 13.3–17.7)
LYMPHOCYTES # BLD AUTO: 2.3 10^3/UL (ref 1–4)
LYMPHOCYTES NFR BLD AUTO: 45 % (ref 12–44)
MANUAL DIFFERENTIAL PERFORMED BLD QL: NO
MCH RBC QN AUTO: 31 PG (ref 25–34)
MCHC RBC AUTO-ENTMCNC: 34 G/DL (ref 32–36)
MCV RBC AUTO: 91 FL (ref 80–99)
MONOCYTES # BLD AUTO: 0.5 10^3/UL (ref 0–1)
MONOCYTES NFR BLD AUTO: 9 % (ref 0–12)
NEUTROPHILS # BLD AUTO: 2.3 10^3/UL (ref 1.8–7.8)
NEUTROPHILS NFR BLD AUTO: 44 % (ref 42–75)
PLATELET # BLD: 140 10^3/UL (ref 130–400)
PMV BLD AUTO: 10.3 FL (ref 9–12.2)
POTASSIUM SERPL-SCNC: 3.9 MMOL/L (ref 3.6–5)
PROT SERPL-MCNC: 9.8 GM/DL (ref 6.4–8.2)
SODIUM SERPL-SCNC: 137 MMOL/L (ref 135–145)
WBC # BLD AUTO: 5.2 10^3/UL (ref 4.3–11)

## 2021-03-30 PROCEDURE — 83883 ASSAY NEPHELOMETRY NOT SPEC: CPT

## 2021-03-30 PROCEDURE — 80053 COMPREHEN METABOLIC PANEL: CPT

## 2021-03-30 PROCEDURE — 99214 OFFICE O/P EST MOD 30 MIN: CPT

## 2021-03-30 PROCEDURE — 83615 LACTATE (LD) (LDH) ENZYME: CPT

## 2021-03-30 PROCEDURE — 85025 COMPLETE CBC W/AUTO DIFF WBC: CPT

## 2021-03-30 PROCEDURE — 99213 OFFICE O/P EST LOW 20 MIN: CPT

## 2021-03-30 PROCEDURE — 82784 ASSAY IGA/IGD/IGG/IGM EACH: CPT

## 2021-06-22 LAB
ALBUMIN SERPL-MCNC: 3.1 GM/DL (ref 3.2–4.5)
ALP SERPL-CCNC: 72 U/L (ref 40–136)
ALT SERPL-CCNC: 15 U/L (ref 0–55)
BASOPHILS # BLD AUTO: 0 10^3/UL (ref 0–0.1)
BASOPHILS NFR BLD AUTO: 0 % (ref 0–10)
BILIRUB SERPL-MCNC: 0.7 MG/DL (ref 0.1–1)
BUN/CREAT SERPL: 11
CALCIUM SERPL-MCNC: 8.4 MG/DL (ref 8.5–10.1)
CHLORIDE SERPL-SCNC: 107 MMOL/L (ref 98–107)
CO2 SERPL-SCNC: 24 MMOL/L (ref 21–32)
CREAT SERPL-MCNC: 1.47 MG/DL (ref 0.6–1.3)
EOSINOPHIL # BLD AUTO: 0 10^3/UL (ref 0–0.3)
EOSINOPHIL NFR BLD AUTO: 0 % (ref 0–10)
GFR SERPLBLD BASED ON 1.73 SQ M-ARVRAT: 47 ML/MIN
GLUCOSE SERPL-MCNC: 99 MG/DL (ref 70–105)
HCT VFR BLD CALC: 35 % (ref 40–54)
HGB BLD-MCNC: 11.5 G/DL (ref 13.3–17.7)
LYMPHOCYTES # BLD AUTO: 1.9 10^3/UL (ref 1–4)
LYMPHOCYTES NFR BLD AUTO: 42 % (ref 12–44)
MANUAL DIFFERENTIAL PERFORMED BLD QL: NO
MCH RBC QN AUTO: 30 PG (ref 25–34)
MCHC RBC AUTO-ENTMCNC: 33 G/DL (ref 32–36)
MCV RBC AUTO: 93 FL (ref 80–99)
MONOCYTES # BLD AUTO: 0.3 10^3/UL (ref 0–1)
MONOCYTES NFR BLD AUTO: 6 % (ref 0–12)
NEUTROPHILS # BLD AUTO: 2.3 10^3/UL (ref 1.8–7.8)
NEUTROPHILS NFR BLD AUTO: 51 % (ref 42–75)
PLATELET # BLD: 131 10^3/UL (ref 130–400)
PMV BLD AUTO: 9.9 FL (ref 9–12.2)
POTASSIUM SERPL-SCNC: 4 MMOL/L (ref 3.6–5)
PROT SERPL-MCNC: 10.4 GM/DL (ref 6.4–8.2)
SODIUM SERPL-SCNC: 134 MMOL/L (ref 135–145)
WBC # BLD AUTO: 4.6 10^3/UL (ref 4.3–11)

## 2021-09-15 ENCOUNTER — HOSPITAL ENCOUNTER (OUTPATIENT)
Dept: HOSPITAL 75 - ONC | Age: 71
LOS: 5 days | Discharge: HOME | End: 2021-09-20
Attending: INTERNAL MEDICINE
Payer: MEDICARE

## 2021-09-15 DIAGNOSIS — I10: ICD-10-CM

## 2021-09-15 DIAGNOSIS — Z87.891: ICD-10-CM

## 2021-09-15 DIAGNOSIS — Z85.038: ICD-10-CM

## 2021-09-15 DIAGNOSIS — C91.10: ICD-10-CM

## 2021-09-15 DIAGNOSIS — Z98.890: ICD-10-CM

## 2021-09-15 DIAGNOSIS — C85.90: Primary | ICD-10-CM

## 2021-09-15 LAB
ALBUMIN SERPL-MCNC: 2.9 GM/DL (ref 3.2–4.5)
ALP SERPL-CCNC: 73 U/L (ref 40–136)
ALT SERPL-CCNC: 16 U/L (ref 0–55)
BASOPHILS # BLD AUTO: 0 10^3/UL (ref 0–0.1)
BASOPHILS NFR BLD AUTO: 0 % (ref 0–10)
BILIRUB SERPL-MCNC: 0.7 MG/DL (ref 0.1–1)
BUN/CREAT SERPL: 10
CALCIUM SERPL-MCNC: 8.4 MG/DL (ref 8.5–10.1)
CHLORIDE SERPL-SCNC: 105 MMOL/L (ref 98–107)
CO2 SERPL-SCNC: 21 MMOL/L (ref 21–32)
CREAT SERPL-MCNC: 1.61 MG/DL (ref 0.6–1.3)
EOSINOPHIL # BLD AUTO: 0 10^3/UL (ref 0–0.3)
EOSINOPHIL NFR BLD AUTO: 1 % (ref 0–10)
GFR SERPLBLD BASED ON 1.73 SQ M-ARVRAT: 43 ML/MIN
GLUCOSE SERPL-MCNC: 98 MG/DL (ref 70–105)
HCT VFR BLD CALC: 32 % (ref 40–54)
HGB BLD-MCNC: 10.2 G/DL (ref 13.3–17.7)
LYMPHOCYTES # BLD AUTO: 1.9 X 10^3 (ref 1–4)
LYMPHOCYTES NFR BLD AUTO: 51 % (ref 12–44)
MANUAL DIFFERENTIAL PERFORMED BLD QL: NO
MCH RBC QN AUTO: 32 PG (ref 25–34)
MCHC RBC AUTO-ENTMCNC: 32 G/DL (ref 32–36)
MCV RBC AUTO: 97 FL (ref 80–99)
MONOCYTES # BLD AUTO: 0.2 X 10^3 (ref 0–1)
MONOCYTES NFR BLD AUTO: 7 % (ref 0–12)
NEUTROPHILS # BLD AUTO: 1.5 X 10^3 (ref 1.8–7.8)
NEUTROPHILS NFR BLD AUTO: 42 % (ref 42–75)
PLATELET # BLD: 111 10^3/UL (ref 130–400)
PMV BLD AUTO: 10 FL (ref 9–12.2)
POTASSIUM SERPL-SCNC: 3.7 MMOL/L (ref 3.6–5)
PROT SERPL-MCNC: 10.3 GM/DL (ref 6.4–8.2)
SODIUM SERPL-SCNC: 132 MMOL/L (ref 135–145)
WBC # BLD AUTO: 3.7 10^3/UL (ref 4.3–11)

## 2021-09-15 PROCEDURE — 83615 LACTATE (LD) (LDH) ENZYME: CPT

## 2021-09-15 PROCEDURE — 80053 COMPREHEN METABOLIC PANEL: CPT

## 2021-09-15 PROCEDURE — 99213 OFFICE O/P EST LOW 20 MIN: CPT

## 2021-09-15 PROCEDURE — 85025 COMPLETE CBC W/AUTO DIFF WBC: CPT

## 2021-12-08 ENCOUNTER — HOSPITAL ENCOUNTER (OUTPATIENT)
Dept: HOSPITAL 75 - ONC | Age: 71
LOS: 23 days | Discharge: HOME | End: 2021-12-31
Attending: INTERNAL MEDICINE
Payer: MEDICARE

## 2021-12-08 DIAGNOSIS — Z87.891: ICD-10-CM

## 2021-12-08 DIAGNOSIS — Z85.038: ICD-10-CM

## 2021-12-08 DIAGNOSIS — C91.10: Primary | ICD-10-CM

## 2021-12-08 DIAGNOSIS — I10: ICD-10-CM

## 2021-12-08 DIAGNOSIS — Z98.890: ICD-10-CM

## 2021-12-08 DIAGNOSIS — C64.2: ICD-10-CM

## 2021-12-08 LAB
ALBUMIN SERPL-MCNC: 2.8 GM/DL (ref 3.2–4.5)
ALP SERPL-CCNC: 66 U/L (ref 40–136)
ALT SERPL-CCNC: 15 U/L (ref 0–55)
BASOPHILS # BLD AUTO: 0 10^3/UL (ref 0–0.1)
BASOPHILS NFR BLD AUTO: 0 % (ref 0–10)
BILIRUB SERPL-MCNC: 0.8 MG/DL (ref 0.1–1)
BUN/CREAT SERPL: 12
CALCIUM SERPL-MCNC: 8.1 MG/DL (ref 8.5–10.1)
CHLORIDE SERPL-SCNC: 107 MMOL/L (ref 98–107)
CO2 SERPL-SCNC: 22 MMOL/L (ref 21–32)
CREAT SERPL-MCNC: 1.64 MG/DL (ref 0.6–1.3)
EOSINOPHIL # BLD AUTO: 0 10^3/UL (ref 0–0.3)
EOSINOPHIL NFR BLD AUTO: 1 % (ref 0–10)
GFR SERPLBLD BASED ON 1.73 SQ M-ARVRAT: 42 ML/MIN
GLUCOSE SERPL-MCNC: 111 MG/DL (ref 70–105)
HCT VFR BLD CALC: 29 % (ref 40–54)
HGB BLD-MCNC: 9.3 G/DL (ref 13.3–17.7)
LYMPHOCYTES # BLD AUTO: 1.7 10^3/UL (ref 1–4)
LYMPHOCYTES NFR BLD AUTO: 47 % (ref 12–44)
MANUAL DIFFERENTIAL PERFORMED BLD QL: NO
MCH RBC QN AUTO: 31 PG (ref 25–34)
MCHC RBC AUTO-ENTMCNC: 32 G/DL (ref 32–36)
MCV RBC AUTO: 96 FL (ref 80–99)
MONOCYTES # BLD AUTO: 0.2 10^3/UL (ref 0–1)
MONOCYTES NFR BLD AUTO: 5 % (ref 0–12)
NEUTROPHILS # BLD AUTO: 1.8 10^3/UL (ref 1.8–7.8)
NEUTROPHILS NFR BLD AUTO: 47 % (ref 42–75)
PLATELET # BLD: 122 10^3/UL (ref 130–400)
PMV BLD AUTO: 10.4 FL (ref 9–12.2)
POTASSIUM SERPL-SCNC: 3.7 MMOL/L (ref 3.6–5)
PROT SERPL-MCNC: 10.7 GM/DL (ref 6.4–8.2)
SODIUM SERPL-SCNC: 132 MMOL/L (ref 135–145)
WBC # BLD AUTO: 3.7 10^3/UL (ref 4.3–11)

## 2021-12-08 PROCEDURE — 84165 PROTEIN E-PHORESIS SERUM: CPT

## 2021-12-08 PROCEDURE — 83883 ASSAY NEPHELOMETRY NOT SPEC: CPT

## 2021-12-08 PROCEDURE — 84155 ASSAY OF PROTEIN SERUM: CPT

## 2021-12-08 PROCEDURE — 82784 ASSAY IGA/IGD/IGG/IGM EACH: CPT

## 2021-12-08 PROCEDURE — 85025 COMPLETE CBC W/AUTO DIFF WBC: CPT

## 2021-12-08 PROCEDURE — 80053 COMPREHEN METABOLIC PANEL: CPT

## 2021-12-08 PROCEDURE — 83615 LACTATE (LD) (LDH) ENZYME: CPT

## 2021-12-08 PROCEDURE — 99213 OFFICE O/P EST LOW 20 MIN: CPT

## 2021-12-29 ENCOUNTER — HOSPITAL ENCOUNTER (OUTPATIENT)
Dept: HOSPITAL 75 - RAD | Age: 71
End: 2021-12-29
Attending: INTERNAL MEDICINE
Payer: MEDICARE

## 2021-12-29 VITALS — SYSTOLIC BLOOD PRESSURE: 174 MMHG | DIASTOLIC BLOOD PRESSURE: 97 MMHG

## 2021-12-29 VITALS — SYSTOLIC BLOOD PRESSURE: 150 MMHG | DIASTOLIC BLOOD PRESSURE: 80 MMHG

## 2021-12-29 VITALS — DIASTOLIC BLOOD PRESSURE: 89 MMHG | SYSTOLIC BLOOD PRESSURE: 153 MMHG

## 2021-12-29 VITALS — SYSTOLIC BLOOD PRESSURE: 150 MMHG | DIASTOLIC BLOOD PRESSURE: 86 MMHG

## 2021-12-29 VITALS — SYSTOLIC BLOOD PRESSURE: 157 MMHG | DIASTOLIC BLOOD PRESSURE: 82 MMHG

## 2021-12-29 VITALS — SYSTOLIC BLOOD PRESSURE: 163 MMHG | DIASTOLIC BLOOD PRESSURE: 89 MMHG

## 2021-12-29 VITALS — DIASTOLIC BLOOD PRESSURE: 94 MMHG | SYSTOLIC BLOOD PRESSURE: 165 MMHG

## 2021-12-29 VITALS — WEIGHT: 189.16 LBS | BODY MASS INDEX: 30.76 KG/M2 | HEIGHT: 65.75 IN

## 2021-12-29 VITALS — DIASTOLIC BLOOD PRESSURE: 80 MMHG | SYSTOLIC BLOOD PRESSURE: 146 MMHG

## 2021-12-29 VITALS — SYSTOLIC BLOOD PRESSURE: 178 MMHG | DIASTOLIC BLOOD PRESSURE: 93 MMHG

## 2021-12-29 VITALS — DIASTOLIC BLOOD PRESSURE: 97 MMHG | SYSTOLIC BLOOD PRESSURE: 165 MMHG

## 2021-12-29 VITALS — SYSTOLIC BLOOD PRESSURE: 153 MMHG | DIASTOLIC BLOOD PRESSURE: 85 MMHG

## 2021-12-29 VITALS — SYSTOLIC BLOOD PRESSURE: 150 MMHG | DIASTOLIC BLOOD PRESSURE: 83 MMHG

## 2021-12-29 DIAGNOSIS — Z82.49: ICD-10-CM

## 2021-12-29 DIAGNOSIS — Z87.891: ICD-10-CM

## 2021-12-29 DIAGNOSIS — C64.2: ICD-10-CM

## 2021-12-29 DIAGNOSIS — Z79.899: ICD-10-CM

## 2021-12-29 DIAGNOSIS — C85.10: Primary | ICD-10-CM

## 2021-12-29 DIAGNOSIS — C85.90: ICD-10-CM

## 2021-12-29 DIAGNOSIS — D47.2: ICD-10-CM

## 2021-12-29 DIAGNOSIS — Z80.9: ICD-10-CM

## 2021-12-29 LAB
ANISOCYTOSIS BLD QL SMEAR: SLIGHT
APTT BLD: 28 SEC (ref 24–35)
BASOPHILS # BLD AUTO: 0 10^3/UL (ref 0–0.1)
BASOPHILS NFR BLD AUTO: 1 % (ref 0–10)
EOSINOPHIL # BLD AUTO: 0 10^3/UL (ref 0–0.3)
EOSINOPHIL NFR BLD AUTO: 1 % (ref 0–10)
EOSINOPHIL NFR BLD MANUAL: 1 %
HCT VFR BLD CALC: 27 % (ref 40–54)
HGB BLD-MCNC: 8.8 G/DL (ref 13.3–17.7)
INR PPP: 1.1 (ref 0.8–1.4)
LYMPHOCYTES # BLD AUTO: 1.7 10^3/UL (ref 1–4)
LYMPHOCYTES NFR BLD AUTO: 49 % (ref 12–44)
MCH RBC QN AUTO: 31 PG (ref 25–34)
MCHC RBC AUTO-ENTMCNC: 33 G/DL (ref 32–36)
MCV RBC AUTO: 97 FL (ref 80–99)
MONOCYTES # BLD AUTO: 0.2 10^3/UL (ref 0–1)
MONOCYTES NFR BLD AUTO: 7 % (ref 0–12)
MONOCYTES NFR BLD: 6 %
NEUTROPHILS # BLD AUTO: 1.5 10^3/UL (ref 1.8–7.8)
NEUTROPHILS NFR BLD AUTO: 42 % (ref 42–75)
NEUTS BAND NFR BLD MANUAL: 52 %
PLATELET # BLD: 118 10^3/UL (ref 130–400)
PMV BLD AUTO: 10.5 FL (ref 9–12.2)
PROTHROMBIN TIME: 15 SEC (ref 12.2–14.7)
RETICS #: 76 10E9/UL (ref 24–90)
RETICS/RBC NFR: 2.7 % (ref 0.5–2.4)
TOXIC GRANULES BLD QL SMEAR: (no result)
VARIANT LYMPHS NFR BLD MANUAL: 41 %
WBC # BLD AUTO: 3.4 10^3/UL (ref 4.3–11)

## 2021-12-29 PROCEDURE — 85730 THROMBOPLASTIN TIME PARTIAL: CPT

## 2021-12-29 PROCEDURE — 85045 AUTOMATED RETICULOCYTE COUNT: CPT

## 2021-12-29 PROCEDURE — 88264 CHROMOSOME ANALYSIS 20-25: CPT

## 2021-12-29 PROCEDURE — 85007 BL SMEAR W/DIFF WBC COUNT: CPT

## 2021-12-29 PROCEDURE — 85610 PROTHROMBIN TIME: CPT

## 2021-12-29 PROCEDURE — 38222 DX BONE MARROW BX & ASPIR: CPT

## 2021-12-29 PROCEDURE — 88311 DECALCIFY TISSUE: CPT

## 2021-12-29 PROCEDURE — 99156 MOD SED OTH PHYS/QHP 5/>YRS: CPT

## 2021-12-29 PROCEDURE — 88360 TUMOR IMMUNOHISTOCHEM/MANUAL: CPT

## 2021-12-29 PROCEDURE — 88184 FLOWCYTOMETRY/ TC 1 MARKER: CPT

## 2021-12-29 PROCEDURE — 85027 COMPLETE CBC AUTOMATED: CPT

## 2021-12-29 PROCEDURE — 88185 FLOWCYTOMETRY/TC ADD-ON: CPT

## 2021-12-29 PROCEDURE — 88342 IMHCHEM/IMCYTCHM 1ST ANTB: CPT

## 2021-12-29 PROCEDURE — 88305 TISSUE EXAM BY PATHOLOGIST: CPT

## 2021-12-29 PROCEDURE — 88237 TISSUE CULTURE BONE MARROW: CPT

## 2021-12-29 PROCEDURE — 36415 COLL VENOUS BLD VENIPUNCTURE: CPT

## 2021-12-29 PROCEDURE — 88341 IMHCHEM/IMCYTCHM EA ADD ANTB: CPT

## 2021-12-29 PROCEDURE — 88313 SPECIAL STAINS GROUP 2: CPT

## 2021-12-29 PROCEDURE — 77012 CT SCAN FOR NEEDLE BIOPSY: CPT

## 2021-12-29 NOTE — DIAGNOSTIC IMAGING REPORT
INDICATION: 

Non-Hodgkin's lymphoma.



DETAILS OF THE PROCEDURE:

The patient was brought to the CT suite and placed on the table

in the prone position. Axial imaging through the pelvis was

performed to evaluate for an appropriate entry site. The low back

was prepped and draped in the usual sterile fashion. A small

amount of 1% lidocaine was utilized for local anesthesia. The

procedure was performed utilizing conscious sedation with

Radiology nursing and constant patient monitoring. The patient

was given a total of 75 mcg of fentanyl intravenously and 1 mg of

Versed intravenously. The total procedure time was 13 minutes.



An 11-gauge bone marrow biopsy needle was advanced and placed

with its tip along the posterior cortex of the right iliac bone.

The needle was advanced through the cortex utilizing a bone

marrow drill. Bone marrow aspirates were performed. The bone

marrow drill was then utilized to obtain a bone marrow core. Due

to initial aspirate clotting, the needle was repositioned along

the posterior cortex of the right iliac bone. This was advanced

through the cortex utilizing a bone marrow drill. An additional

bone marrow aspirate was performed. In addition, an additional

bone marrow core was obtained. The needle was withdrawn and

hemostasis was obtained. The patient tolerated the procedure well

and left the Department in stable condition.



IMPRESSION: 

Successful CT-guided bone marrow aspiration and core biopsy

utilizing conscious sedation. Pathology results are currently

pending.



Dictated by: 



  Dictated on workstation # [id number]

## 2021-12-29 NOTE — PRE-OP NOTE & CONSCIOUS SEDAT
Pre-Operative Progress Note


H&P Reviewed


The H&P was reviewed, patient examined and no changes noted.


Date H&P Reviewed:  Dec 29, 2021


Time H&P Reviewed:  09:00


Pre-Op Diagnosis:  Lymphoma





Conscious Sedation Pre-Proced


Time


09:00





ASA Score


2


For ASA 3 and 4: Consider anesthesia and medical clearance. Also, for patients

with a history of failed moderate sedation consider anesthesia.

















Airway 


 


Lungs 


 


Heart 


 


 ASA score


 


 ASA 1: a normal healthy patient


 


 ASA 2:  a patient with a mild systemic disease (mid diabetes, controlled 

hypertension, obesity 


 


 ASA 3:  a patient with a severe systemic disease that limits activity  (angina,

COPD, prior Myocardial infarction)


 


 ASA 4:  a patient with an incapacitating disease that is a constant threat to 

life (CHF, renal failure)


 


 ASA 5:  a moribund patient not expected to survive 24 hrs.  (ruptured aneurysm)


 


 ASA 6:  a declared brain-dead patient whose organs are being harvested.


 


 For emergent operations, add the letter E after the classification











Mallampati Classification


Grade 2





Sedation Plan


Analgesia, Amnesia, Plan communicated to team members, Discussed options with 

patient/fam, Discussed risks with patient/fam


The patient is an appropriate candidate to undergo the planned procedure, 

sedation, and anesthesia.





The patient immediately re-assessed prior to indication.











STEPHANIE SAVAGE MD             Dec 29, 2021 11:03

## 2022-04-22 ENCOUNTER — HOSPITAL ENCOUNTER (OUTPATIENT)
Dept: HOSPITAL 75 - CARD | Age: 72
End: 2022-04-22
Attending: INTERNAL MEDICINE
Payer: MEDICARE

## 2022-04-22 DIAGNOSIS — I11.9: Primary | ICD-10-CM

## 2022-04-22 DIAGNOSIS — I35.1: ICD-10-CM

## 2022-04-22 DIAGNOSIS — I25.10: ICD-10-CM

## 2022-04-22 PROCEDURE — 93306 TTE W/DOPPLER COMPLETE: CPT

## 2022-05-25 ENCOUNTER — HOSPITAL ENCOUNTER (OUTPATIENT)
Dept: HOSPITAL 75 - PREOP | Age: 72
LOS: 1 days | Discharge: HOME | End: 2022-05-26
Attending: SURGERY
Payer: MEDICARE

## 2022-05-25 VITALS — WEIGHT: 199.96 LBS | BODY MASS INDEX: 27.99 KG/M2 | HEIGHT: 70.98 IN

## 2022-05-25 DIAGNOSIS — Z01.818: Primary | ICD-10-CM

## 2022-06-01 ENCOUNTER — HOSPITAL ENCOUNTER (OUTPATIENT)
Dept: HOSPITAL 75 - ENDO | Age: 72
Discharge: HOME | End: 2022-06-01
Attending: SURGERY
Payer: MEDICARE

## 2022-06-01 VITALS — DIASTOLIC BLOOD PRESSURE: 59 MMHG | SYSTOLIC BLOOD PRESSURE: 103 MMHG

## 2022-06-01 VITALS — WEIGHT: 199.96 LBS | BODY MASS INDEX: 27.99 KG/M2 | HEIGHT: 70.98 IN

## 2022-06-01 VITALS — DIASTOLIC BLOOD PRESSURE: 83 MMHG | SYSTOLIC BLOOD PRESSURE: 160 MMHG

## 2022-06-01 VITALS — SYSTOLIC BLOOD PRESSURE: 100 MMHG | DIASTOLIC BLOOD PRESSURE: 61 MMHG

## 2022-06-01 DIAGNOSIS — K22.89: ICD-10-CM

## 2022-06-01 DIAGNOSIS — K44.9: ICD-10-CM

## 2022-06-01 DIAGNOSIS — K22.2: ICD-10-CM

## 2022-06-01 DIAGNOSIS — Z85.038: ICD-10-CM

## 2022-06-01 DIAGNOSIS — Z85.528: ICD-10-CM

## 2022-06-01 DIAGNOSIS — Z87.891: ICD-10-CM

## 2022-06-01 DIAGNOSIS — K21.00: Primary | ICD-10-CM

## 2022-06-01 DIAGNOSIS — K29.50: ICD-10-CM

## 2022-06-01 DIAGNOSIS — Z79.899: ICD-10-CM

## 2022-06-01 NOTE — PROGRESS NOTE-POST OPERATIVE
Post-Operative Progess Note


Surgeon (s)/Assistant (s)


Surgeon


ROOSEVELT ALVA MD


Assistant:  none





Pre-Operative Diagnosis


GERD, hx NHL





Post-Operative Diagnosis





reflux esophagitis(grade C), distal esoph stricture, large HH(4-5cm), mild


gastritis.





Procedure & Operative Findings


Date of Procedure


6/1/22


Procedure Performed/Findings


EGD with bx and balloon dilatation.


Anesthesia Type


mac





Estimated Blood Loss


Estimated blood loss (mL):  minimal





Specimens/Packing


Specimens Removed


ge jxn, antrum











ROOSEVELT ALVA MD                 Jun 1, 2022 11:46

## 2022-06-01 NOTE — ANESTHESIA-GENERAL POST-OP
MAC


Patient Condition


Mental Status/LOC:  Same as Preop


Cardiovascular:  Satisfactory


Nausea/Vomiting:  Absent


Respiratory:  Satisfactory


Pain:  Controlled


Complications:  Absent





Post Op Complications


Complications


None





Follow Up Care/Instructions


Patient Instructions


None needed.





Anesthesiology Discharge Order


Discharge Order


Patient is already discharged to home but he was doing well, no complaints, 

stable vital signs, no apparent adverse anesthesia problems. No complications 

noted per nursing staff.











SUNIL HERMAN DO          Jun 1, 2022 12:46

## 2022-09-04 NOTE — XMS REPORT
09/12/21 1500   Over the last 2 weeks, how often have you been bothered by any of the following problems?   Little interest or pleasure in doing things 3   Feeling down, depressed, or hopeless 3   Trouble falling or staying asleep, or sleeping too much 3   Feeling tired or having little energy 3   Poor appetite or overeating 3   Feeling bad about yourself - or that you are a failure or have let yourself or your family down 3   Trouble concentrating on things, such as reading the newspaper or watching television 3   Moving or speaking so slowly that other people could have noticed. Or the opposite - being so fidgety or restless that you have been moving around a lot more than usual 3   Thoughts that you would be better off dead, or of hurting yourself in some way 3   PHQ-9 Total Score 27   If you checked off any problems, how difficult have these problems made it for you to do your work, take care of things at home, or get along with other people? Somewhat difficult   ABELARDO Lopez, SAC-IT    DAUGHTER MADINA AT BEDSIDE. UPDATE PT INFORMATION. ALL QUESTIONS ANSWERED. Encounter Summary

                             Created on: 08/10/2020



Cas Lewis

External Reference #: [id number]

: 1950

Sex: Male



Demographics





                          Address                   [address] [place]  [address]

 

                          Home Phone                [phone]

 

                          Preferred Language        English

 

                          Marital Status            Unknown

 

                          Yazidism Affiliation     [facility]

 

                          Race                      White

 

                          Ethnic Group              Not  or 





Author





                          Author                    [facility]

 

                          Organization              [facility]

 

                          Address                   Unknown

 

                          Phone                     Unavailable







Support





                Name            Relationship    Address         Phone

 

                Kendy Richardson ECON            Unknown         [phone]

[phone]







Care Team Providers





                    Care Team Member Name Role                Phone

 

                    Arnaldo Singh         PCP                 [phone]

 

                    Tawil, Elias A MD   Unavailable         [phone]







Reason for Visit

* 



 



                           Reason                    Comments

 

 



                           Results                   pt calling for pathology re

sulmar









Encounter Details





                          Care Team                 Description



                     Date                Type                Department  

 

                                        



Nabil Lee MD



 [address]



[facility] [address] [facility] [address]



 [address] [phone] (Fax)                      Results (pt calling for pathology result

s)



                     2020          Telephone           [facility]  



                                          [address]  



                                         [address]  



                                         [place]  



                                         [address] [phone]  







Social History





                                        Date



                 Tobacco Use     Types           Packs/Day       Years Used 

 

                                        Quit: 



                           Former Smoker             Pipe   

 

    



                     Smokeless Tobacco: Former  Chew                Quit: 



                                         User   







                    Drinks/Week         oz/Week             Comments



                                         Alcohol Use   

 

                                                             



                                         Never   







  



                     Alcohol Habits      Answer              Date Recorded

 

  



                     How often do you have a drink containing alcohol?  Never   

            2020

 

  



                           How many drinks containing alcohol do you have on  No

t asked 



                                         a typical day when you are drinking?  

 

  



                           How often do you have six or more drinks on one  Not 

asked 



                                         occasion?  







 



                           Sex Assigned at Birth     Date Recorded

 

 



                           Male                      2020  6:16 AM CDT







                                        Industry



                           Job Start Date            Occupation 

 

                                        Not on file



                           Not on file               Not on file 







                                        Travel End



                           Travel History            Travel Start 

 





                                         No recent travel history available.







                                        Date Recorded



                           COVID-19 Exposure         Response 

 

                                        2020  6:14 AM CDT



                           In the last month, have you been in contact with  No 

/ Unsure 



                                         someone who was confirmed or suspected 

to have  



                                         Coronavirus / COVID-19?  



documented as of this encounter



Functional Status





                                        Date of Assessment



                           Functional Status         Response 

 

                                        2020



                           Does the patient have a hearing impairment:  No 

 

                                        2020



                           Does the patient have a visual impairment:  No 

 

                                        2020



                           Does the patient have impaired ambulation:  No 

 

                                        2020



                           Does the patient have an activity of daily living  No

 



                                         (ADL) impairment:  

 

                                        2020



                           Does the patient have an instrumental activity of  No

 



                                         daily living (IADL) impairment:  







                                        Date of Assessment



                           Cognitive Status          Response 

 

                                        2020



                           Does the patient have a cognitive impairment:  No 



documented as of this encounter



Miscellaneous Notes

* Telephone Encounter - Kendy Gardner LPN - 2020  3:54 PM CDT



Pt calling for pathology results



Electronically signed by Kendy Gardner LPN at 2020  3:54 PM CDT

documented in this encounter



Plan of Treatment





Not on filedocumented as of this encounter



Goals





     



            Goal       Patient    Associated  Recent Progress  Patient-Stat  Aut

hor



                     Goal Type           Problems            ed? 

 

     



                 Resume normal activities  Hospital        No              Lizette Zacarias RN



documented as of this encounter



Visit Diagnoses

Not on filedocumented in this encounter